# Patient Record
Sex: FEMALE | Race: WHITE | NOT HISPANIC OR LATINO | Employment: OTHER | URBAN - METROPOLITAN AREA
[De-identification: names, ages, dates, MRNs, and addresses within clinical notes are randomized per-mention and may not be internally consistent; named-entity substitution may affect disease eponyms.]

---

## 2017-02-28 RX ORDER — LOSARTAN POTASSIUM 100 MG/1
100 TABLET ORAL DAILY
Qty: 180 TABLET | Refills: 2 | Status: SHIPPED | OUTPATIENT
Start: 2017-02-28 | End: 2017-02-28 | Stop reason: SDUPTHER

## 2017-02-28 RX ORDER — LOSARTAN POTASSIUM 100 MG/1
100 TABLET ORAL DAILY
Qty: 180 TABLET | Refills: 0 | Status: SHIPPED | OUTPATIENT
Start: 2017-02-28 | End: 2017-05-18 | Stop reason: SDUPTHER

## 2017-02-28 RX ORDER — CARVEDILOL 6.25 MG/1
6.25 TABLET ORAL 2 TIMES DAILY
Qty: 180 TABLET | Refills: 3 | Status: SHIPPED | OUTPATIENT
Start: 2017-02-28 | End: 2017-02-28 | Stop reason: SDUPTHER

## 2017-02-28 RX ORDER — CARVEDILOL 6.25 MG/1
6.25 TABLET ORAL 2 TIMES DAILY
Qty: 180 TABLET | Refills: 0 | Status: SHIPPED | OUTPATIENT
Start: 2017-02-28 | End: 2017-05-18 | Stop reason: SDUPTHER

## 2017-03-07 RX ORDER — ISOSORBIDE MONONITRATE 20 MG/1
20 TABLET ORAL 2 TIMES DAILY
Qty: 360 TABLET | Refills: 3 | Status: SHIPPED | OUTPATIENT
Start: 2017-03-07 | End: 2018-07-07 | Stop reason: SDUPTHER

## 2017-03-07 RX ORDER — SPIRONOLACTONE 25 MG/1
25 TABLET ORAL DAILY
Qty: 90 TABLET | Refills: 3 | Status: SHIPPED | OUTPATIENT
Start: 2017-03-07 | End: 2018-03-28 | Stop reason: SDUPTHER

## 2017-05-18 RX ORDER — LOSARTAN POTASSIUM 100 MG/1
100 TABLET ORAL DAILY
Qty: 180 TABLET | Refills: 0 | Status: SHIPPED | OUTPATIENT
Start: 2017-05-18 | End: 2018-03-28 | Stop reason: SDUPTHER

## 2017-05-18 RX ORDER — CARVEDILOL 6.25 MG/1
6.25 TABLET ORAL 2 TIMES DAILY
Qty: 180 TABLET | Refills: 0 | Status: SHIPPED | OUTPATIENT
Start: 2017-05-18 | End: 2018-10-11 | Stop reason: SDUPTHER

## 2017-06-22 ENCOUNTER — OFFICE VISIT (OUTPATIENT)
Dept: CARDIOLOGY | Facility: CLINIC | Age: 72
End: 2017-06-22

## 2017-06-22 VITALS
HEIGHT: 62 IN | BODY MASS INDEX: 29.26 KG/M2 | SYSTOLIC BLOOD PRESSURE: 118 MMHG | HEART RATE: 59 BPM | WEIGHT: 159 LBS | DIASTOLIC BLOOD PRESSURE: 70 MMHG

## 2017-06-22 DIAGNOSIS — E78.2 MIXED HYPERLIPIDEMIA: ICD-10-CM

## 2017-06-22 DIAGNOSIS — R26.2 INABILITY TO AMBULATE DUE TO HIP: ICD-10-CM

## 2017-06-22 DIAGNOSIS — I25.118 CORONARY ARTERY DISEASE OF NATIVE ARTERY OF NATIVE HEART WITH STABLE ANGINA PECTORIS (HCC): Primary | ICD-10-CM

## 2017-06-22 DIAGNOSIS — I10 ESSENTIAL HYPERTENSION: ICD-10-CM

## 2017-06-22 DIAGNOSIS — I20.9 ANGINA PECTORIS (HCC): ICD-10-CM

## 2017-06-22 PROCEDURE — 99214 OFFICE O/P EST MOD 30 MIN: CPT | Performed by: INTERNAL MEDICINE

## 2017-06-22 PROCEDURE — 93000 ELECTROCARDIOGRAM COMPLETE: CPT | Performed by: INTERNAL MEDICINE

## 2017-06-22 NOTE — PROGRESS NOTES
Subjective:     Encounter Date:6/22/2017      Patient ID: Ashlie Levi is a 71 y.o. female.    Chief Complaint:  History of Present Illness    Dear Dr. John,    I had the pleasure of seeing this patient in the office today for follow-up of her cardiac status.  She comes in for routine follow-up; it's been one year since her last visit.  She has a history of CAD and had a stent placed in her LAD in 2006.  This was performed by Dr. Caldwell at [facility].  She had a stress test performed about 3 and half years ago that was normal with no ischemia and normal ventricular function.    She has been having worsening fatigability.  She states if she does anything now she just has no energy and she has to stop.  She has been having some shortness breath with activity.  She has occasional chest discomfort but this is not a consistent finding.  When she has the discomfort it does not radiate and is not associated with nausea, vomiting, or diaphoresis.  She's been having a lot of back pain as well as left hip and left groin pain.  This is been really limiting which she can do as well.    She denies any palpitations or heart racing.  No presyncope or syncope.  No orthopnea or PND.  She has not experienced any lower extremity edema.    The following portions of the patient's history were reviewed and updated as appropriate: allergies, current medications, past family history, past medical history, past social history, past surgical history and problem list.    Past Medical History:   Diagnosis Date   • Abnormal electrocardiogram    • Chest discomfort    • Coronary artery disease    • Disease of thyroid gland    • Hyperlipidemia    • Hypertension        Past Surgical History:   Procedure Laterality Date   • APPENDECTOMY     • CARDIAC SURGERY     • CHOLECYSTECTOMY     • COLONOSCOPY N/A 7/13/2016    Procedure: COLONOSCOPY;  Surgeon: Giovani Avelar MD;  Location: [facility];  Service:    • CORONARY STENT PLACEMENT      • EYE SURGERY     • FOOT GANGLION EXCISION Left    • HEMORROIDECTOMY     • HERNIA REPAIR     • HYSTERECTOMY         Social History     Social History   • Marital status:      Spouse name: N/A   • Number of children: N/A   • Years of education: N/A     Occupational History   • Not on file.     Social History Main Topics   • Smoking status: Former Smoker     Packs/day: 1.00     Years: 50.00     Quit date: 10/13/2006   • Smokeless tobacco: Never Used   • Alcohol use Yes      Comment: occasional   • Drug use: Not on file   • Sexual activity: Not on file     Other Topics Concern   • Not on file     Social History Narrative       Review of Systems   Constitution: Negative for chills, decreased appetite, fever and night sweats.   HENT: Negative for ear discharge, ear pain, hearing loss, nosebleeds and sore throat.    Eyes: Negative for blurred vision, double vision and pain.   Cardiovascular: Negative for cyanosis.   Respiratory: Negative for hemoptysis and sputum production.    Endocrine: Negative for cold intolerance and heat intolerance.   Hematologic/Lymphatic: Negative for adenopathy.   Skin: Negative for dry skin, itching, nail changes, rash and suspicious lesions.   Musculoskeletal: Negative for arthritis, gout, muscle cramps, muscle weakness, myalgias and neck pain.   Gastrointestinal: Negative for anorexia, bowel incontinence, constipation, diarrhea, dysphagia, hematemesis and jaundice.   Genitourinary: Negative for bladder incontinence, dysuria, flank pain, frequency, hematuria and nocturia.   Neurological: Negative for focal weakness, numbness, paresthesias and seizures.   Psychiatric/Behavioral: Negative for altered mental status, hallucinations, hypervigilance, suicidal ideas and thoughts of violence.   Allergic/Immunologic: Negative for persistent infections.         ECG 12 Lead  Date/Time: 6/22/2017 1:55 PM  Performed by: JEAN CLAUDE ORTIZ III  Authorized by: JEAN CLAUDE ORTIZ III   Comparison:  "compared with previous ECG   Similar to previous ECG  Rhythm: sinus rhythm  Rate: normal  Conduction: conduction normal  ST Segments: ST segments normal  T Waves: T waves normal  QRS axis: normal  Other: no other findings  Clinical impression: normal ECG               Objective:     Vitals:    06/22/17 1030   BP: 118/70   Pulse: 59   Weight: 159 lb (72.1 kg)   Height: 62\" (157.5 cm)         Physical Exam   Constitutional: She is oriented to person, place, and time. She appears well-developed and well-nourished. No distress.   HENT:   Head: Normocephalic and atraumatic.   Nose: Nose normal.   Mouth/Throat: Oropharynx is clear and moist.   Eyes: Conjunctivae and EOM are normal. Pupils are equal, round, and reactive to light. Right eye exhibits no discharge. Left eye exhibits no discharge.   Neck: Normal range of motion. Neck supple. No tracheal deviation present. No thyromegaly present.   Cardiovascular: Normal rate, regular rhythm, S1 normal, S2 normal, normal heart sounds and normal pulses.  Exam reveals no S3.    Pulmonary/Chest: Effort normal and breath sounds normal. No stridor. No respiratory distress. She exhibits no tenderness.   Abdominal: Soft. Bowel sounds are normal. She exhibits no distension and no mass. There is no tenderness. There is no rebound and no guarding.   Musculoskeletal: Normal range of motion. She exhibits no tenderness or deformity.   Lymphadenopathy:     She has no cervical adenopathy.   Neurological: She is alert and oriented to person, place, and time. She has normal reflexes.   Skin: Skin is warm and dry. No rash noted. She is not diaphoretic. No erythema.   Psychiatric: She has a normal mood and affect. Thought content normal.       Lab Review:             Performed        Assessment:          Diagnosis Plan   1. Coronary artery disease of native artery of native heart with stable angina pectoris  Stress Test With Myocardial Perfusion One Day   2. Mixed hyperlipidemia  Stress Test With " Myocardial Perfusion One Day   3. Essential hypertension     4. Inability to ambulate due to hip  Stress Test With Myocardial Perfusion One Day   5. Angina pectoris  Stress Test With Myocardial Perfusion One Day          Plan:       1. Coronary Artery Disease  Assessment  • The patient has no angina    Plan  • Lifestyle modifications discussed include adhering to a heart healthy diet, avoidance of tobacco products, maintenance of a healthy weight, medication compliance, regular exercise and regular monitoring of cholesterol and blood pressure  • This patient is experiencing worsening fatigue with any activity as well as worsening dyspnea.  She is not able to ambulate on a treadmill because of her left groin and left hip pain; we will arrange for a [facility] given her known history of CAD and no recent evaluation.    Subjective - Objective  • There has been a previous stent procedure using HERBERTH  • Current antiplatelet therapy includes aspirin 81 mg      Thank you very much for allowing us to participate in the care of this pleasant patient.  Please don't hesitate to call if I can be of assistance in any way.      Current Outpatient Prescriptions:   •  amLODIPine (NORVASC) 2.5 MG tablet, Take 1 tablet by mouth Daily., Disp: 90 tablet, Rfl: 3  •  aspirin 81 MG tablet, Take by mouth., Disp: , Rfl:   •  carvedilol (COREG) 6.25 MG tablet, Take 1 tablet by mouth 2 (Two) Times a Day., Disp: 180 tablet, Rfl: 0  •  isosorbide mononitrate (ISMO,MONOKET) 20 MG tablet, Take 1 tablet by mouth 2 (Two) Times a Day., Disp: 360 tablet, Rfl: 3  •  levothyroxine (SYNTHROID, LEVOTHROID) 150 MCG tablet, Take 150 mcg by mouth Daily., Disp: , Rfl:   •  losartan (COZAAR) 100 MG tablet, Take 1 tablet by mouth Daily., Disp: 180 tablet, Rfl: 0  •  sertraline (ZOLOFT) 100 MG tablet, Take 100 mg by mouth daily., Disp: , Rfl:   •  spironolactone (ALDACTONE) 25 MG tablet, Take 1 tablet by mouth Daily., Disp: 90 tablet, Rfl: 3         EMR  Dragon/Transcription disclaimer:    Much of this encounter note is an electronic transcription/translation of spoken language to printed text. The electronic translation of spoken language may permit erroneous, or at times, nonsensical words or phrases to be inadvertently transcribed; Although I have reviewed the note for such errors, some may still exist.

## 2017-06-23 ENCOUNTER — HOSPITAL ENCOUNTER (OUTPATIENT)
Dept: NUCLEAR MEDICINE | Facility: HOSPITAL | Age: 72
Discharge: HOME | End: 2017-06-23
Attending: INTERNAL MEDICINE

## 2017-06-23 ENCOUNTER — HOSPITAL ENCOUNTER (OUTPATIENT)
Dept: CARDIOLOGY | Facility: HOSPITAL | Age: 72
Discharge: HOME | End: 2017-06-23
Attending: INTERNAL MEDICINE

## 2017-06-23 DIAGNOSIS — I20.9 ANGINA PECTORIS (HCC): ICD-10-CM

## 2017-06-23 DIAGNOSIS — E78.2 MIXED HYPERLIPIDEMIA: ICD-10-CM

## 2017-06-23 DIAGNOSIS — R26.2 INABILITY TO AMBULATE DUE TO HIP: ICD-10-CM

## 2017-06-23 DIAGNOSIS — I25.118 CORONARY ARTERY DISEASE OF NATIVE ARTERY OF NATIVE HEART WITH STABLE ANGINA PECTORIS (HCC): ICD-10-CM

## 2017-06-23 LAB
BH CV NUCLEAR PRIOR STUDY: 3
BH CV STRESS BP STAGE 1: NORMAL
BH CV STRESS BP STAGE 2: NORMAL
BH CV STRESS BP STAGE 3: NORMAL
BH CV STRESS BP STAGE 4: NORMAL
BH CV STRESS COMMENTS STAGE 1: NORMAL
BH CV STRESS COMMENTS STAGE 2: NORMAL
BH CV STRESS DOSE REGADENOSON STAGE 1: 0.4
BH CV STRESS DURATION MIN STAGE 1: 0
BH CV STRESS DURATION MIN STAGE 2: 2
BH CV STRESS DURATION MIN STAGE 3: 4
BH CV STRESS DURATION MIN STAGE 4: 5
BH CV STRESS DURATION SEC STAGE 1: 15
BH CV STRESS DURATION SEC STAGE 2: 0
BH CV STRESS HR STAGE 1: 70
BH CV STRESS HR STAGE 2: 84
BH CV STRESS HR STAGE 3: 75
BH CV STRESS HR STAGE 4: 74
BH CV STRESS O2 STAGE 1: 98
BH CV STRESS PROTOCOL 1: NORMAL
BH CV STRESS RECOVERY BP: NORMAL MMHG
BH CV STRESS RECOVERY HR: 74 BPM
BH CV STRESS STAGE 1: 1
BH CV STRESS STAGE 2: 2
BH CV STRESS STAGE 3: 3
BH CV STRESS STAGE 4: 4
LV EF NUC BP: 86 %
MAXIMAL PREDICTED HEART RATE: 147 BPM
PERCENT MAX PREDICTED HR: 57.82 %
STRESS BASELINE BP: NORMAL MMHG
STRESS BASELINE HR: 59 BPM
STRESS O2 SAT REST: 96 %
STRESS PERCENT HR: 68 %
STRESS POST ESTIMATED WORKLOAD: 1 METS
STRESS POST EXERCISE DUR SEC: 30 SEC
STRESS POST PEAK BP: NORMAL MMHG
STRESS POST PEAK HR: 85 BPM
STRESS TARGET HR: 125 BPM

## 2017-06-23 PROCEDURE — 0 TECHNETIUM SESTAMIBI: Performed by: INTERNAL MEDICINE

## 2017-06-23 PROCEDURE — 93016 CV STRESS TEST SUPVJ ONLY: CPT | Performed by: INTERNAL MEDICINE

## 2017-06-23 PROCEDURE — 93018 CV STRESS TEST I&R ONLY: CPT | Performed by: INTERNAL MEDICINE

## 2017-06-23 PROCEDURE — 78452 HT MUSCLE IMAGE SPECT MULT: CPT | Performed by: INTERNAL MEDICINE

## 2017-06-23 PROCEDURE — A9500 TC99M SESTAMIBI: HCPCS | Performed by: INTERNAL MEDICINE

## 2017-06-23 RX ADMIN — REGADENOSON 0.4 MG: 0.08 INJECTION, SOLUTION INTRAVENOUS at 10:15

## 2017-06-23 RX ADMIN — Medication 1 DOSE: at 06:45

## 2017-06-23 RX ADMIN — Medication 1 DOSE: at 09:35

## 2017-07-10 ENCOUNTER — OFFICE VISIT (OUTPATIENT)
Dept: SURGERY | Facility: CLINIC | Age: 72
End: 2017-07-10

## 2017-07-10 VITALS
HEIGHT: 62 IN | DIASTOLIC BLOOD PRESSURE: 74 MMHG | HEART RATE: 72 BPM | BODY MASS INDEX: 29.44 KG/M2 | RESPIRATION RATE: 16 BRPM | SYSTOLIC BLOOD PRESSURE: 120 MMHG | WEIGHT: 160 LBS

## 2017-07-10 DIAGNOSIS — R10.32 LEFT GROIN PAIN: Primary | ICD-10-CM

## 2017-07-10 DIAGNOSIS — R10.32 LLQ PAIN: Primary | ICD-10-CM

## 2017-07-10 PROCEDURE — 99203 OFFICE O/P NEW LOW 30 MIN: CPT | Performed by: SURGERY

## 2017-07-10 NOTE — PROGRESS NOTES
Ashlie PUSHPA Levi 71 y.o. female presents @ the req of Dr. Conway for eval of poss LIH.  Pt reports she had either CT or MRI last winter that reveal LIH.  Pt states she has not had hernia repaired.  Pt c/o LLQ pain.  PCP Dr. Conway.       HPI     Above noted and agree.  This very pleasant patient noticed pain this past winter in her left groin when she would stand up.  She had a left inguinal hernia repair 10-15 years ago and this pain is similar.  She had a CT scan done that showed diverticulosis but did not show any hernia.  She tolerates a regular diet and gets nauseated at night.  She has IBS and has had abdominal issues for years.  She moves her bowels daily.  She denies chest pain and shortness of breath.  She really only hurts when she walks.  She feels like she can push something back in.    Review of Systems   All other systems reviewed and are negative.          Past Medical History:   Diagnosis Date   • Abnormal electrocardiogram    • Chest discomfort    • Coronary artery disease    • Disease of thyroid gland    • Hyperlipidemia    • Hypertension            Past Surgical History:   Procedure Laterality Date   • APPENDECTOMY     • CARDIAC SURGERY     • CHOLECYSTECTOMY     • COLONOSCOPY N/A 7/13/2016    Procedure: COLONOSCOPY;  Surgeon: Giovani Avelar MD;  Location: [facility];  Service:    • CORONARY STENT PLACEMENT     • EYE SURGERY     • FOOT GANGLION EXCISION Left    • HEMORROIDECTOMY     • HERNIA REPAIR     • HYSTERECTOMY             Physical Exam   Constitutional: She is oriented to person, place, and time. She appears well-developed and well-nourished.   HENT:   Head: Normocephalic and atraumatic.   Neck: Neck supple.   Cardiovascular: Normal rate and regular rhythm.    Pulmonary/Chest: Effort normal and breath sounds normal.   Abdominal: Soft. Bowel sounds are normal.   Musculoskeletal: She exhibits no edema.   Neurological: She is alert and oriented to person, place, and time.   Skin: Skin is  "warm and dry.   Psychiatric: She has a normal mood and affect. Her behavior is normal.   Nursing note and vitals reviewed.          /74  Pulse 72  Resp 16  Ht 62\" (157.5 cm)  Wt 160 lb (72.6 kg)  BMI 29.26 kg/m2        Ashlie was seen today for hernia.    Diagnoses and all orders for this visit:    Left groin pain    At this point we will order an MRI of the pelvis.  I can not palpate a hernia but her symptoms do sound consistent with a recurrent hernia.  We will follow up after that.    Thank you for allowing me to participate in the care of this interesting patient.  "

## 2017-07-11 ENCOUNTER — HOSPITAL ENCOUNTER (OUTPATIENT)
Dept: MRI IMAGING | Facility: HOSPITAL | Age: 72
Discharge: HOME | End: 2017-07-11
Attending: SURGERY | Admitting: SURGERY

## 2017-07-11 DIAGNOSIS — R10.32 LLQ PAIN: ICD-10-CM

## 2017-07-11 PROCEDURE — A9577 INJ MULTIHANCE: HCPCS | Performed by: SURGERY

## 2017-07-11 PROCEDURE — 0 GADOBENATE DIMEGLUMINE 529 MG/ML SOLUTION: Performed by: SURGERY

## 2017-07-11 PROCEDURE — 72197 MRI PELVIS W/O & W/DYE: CPT

## 2017-07-11 RX ADMIN — GADOBENATE DIMEGLUMINE 14 ML: 529 INJECTION, SOLUTION INTRAVENOUS at 14:00

## 2017-07-17 ENCOUNTER — OFFICE VISIT (OUTPATIENT)
Dept: SURGERY | Facility: CLINIC | Age: 72
End: 2017-07-17

## 2017-07-17 DIAGNOSIS — R10.32 LEFT GROIN PAIN: Primary | ICD-10-CM

## 2017-07-17 PROCEDURE — 99213 OFFICE O/P EST LOW 20 MIN: CPT | Performed by: SURGERY

## 2017-07-17 RX ORDER — MELOXICAM 7.5 MG/1
7.5 TABLET ORAL 2 TIMES DAILY
Qty: 60 TABLET | Refills: 3 | Status: SHIPPED | OUTPATIENT
Start: 2017-07-17 | End: 2017-08-16

## 2017-07-17 NOTE — PROGRESS NOTES
Ashlie Levi 71 y.o. female presents for  FU/discuss MRI.  Pt reports she cont. To have pain LEFT ing area.  PCP Dr. Conway.       HPI     Ashlie is here following her MRI.  The MRI did not show any hernia, but did show a mild subarticular cyst of the left anterior acetabulum which may be related to degenerative arthropathy.  She is tolerating a regular diet without nausea or vomiting.  She moves her bowels well.  She denies fevers or chills.  Her pain occurs with ambulation.    Review of Systems        Past Medical History:   Diagnosis Date   • Abnormal electrocardiogram    • Chest discomfort    • Coronary artery disease    • Disease of thyroid gland    • Hyperlipidemia    • Hypertension            Past Surgical History:   Procedure Laterality Date   • APPENDECTOMY     • CARDIAC SURGERY     • CHOLECYSTECTOMY     • COLONOSCOPY N/A 7/13/2016    Procedure: COLONOSCOPY;  Surgeon: Giovani Avelar MD;  Location: [facility];  Service:    • CORONARY STENT PLACEMENT     • EYE SURGERY     • FOOT GANGLION EXCISION Left    • HEMORROIDECTOMY     • HERNIA REPAIR     • HYSTERECTOMY             Physical Exam   Constitutional: She is oriented to person, place, and time. She appears well-developed and well-nourished.   HENT:   Head: Normocephalic and atraumatic.   Cardiovascular: Normal rate and regular rhythm.    Pulmonary/Chest: Effort normal and breath sounds normal.   Abdominal: Soft. Bowel sounds are normal.   Musculoskeletal: She exhibits no edema.   Neurological: She is alert and oriented to person, place, and time.   Psychiatric: She has a normal mood and affect. Her behavior is normal.   Nursing note reviewed.          There were no vitals taken for this visit.        Ashlie was seen today for follow-up.    Diagnoses and all orders for this visit:    Left groin pain    Other orders  -     meloxicam (MOBIC) 7.5 MG tablet; Take 1 tablet by mouth 2 (Two) Times a Day for 30 days.    If the Mobic does not help her pain,  then we can discuss a diagnostic laparoscopy.    Thank you for allowing me to participate in the care of this interesting patient.

## 2017-09-11 ENCOUNTER — TRANSCRIBE ORDERS (OUTPATIENT)
Dept: ADMINISTRATIVE | Facility: HOSPITAL | Age: 72
End: 2017-09-11

## 2017-09-11 DIAGNOSIS — Z12.31 VISIT FOR SCREENING MAMMOGRAM: Primary | ICD-10-CM

## 2017-10-06 ENCOUNTER — HOSPITAL ENCOUNTER (OUTPATIENT)
Dept: MAMMOGRAPHY | Facility: HOSPITAL | Age: 72
Discharge: HOME | End: 2017-10-06
Admitting: FAMILY MEDICINE

## 2017-10-06 DIAGNOSIS — Z12.31 VISIT FOR SCREENING MAMMOGRAM: ICD-10-CM

## 2017-10-06 PROCEDURE — 77063 BREAST TOMOSYNTHESIS BI: CPT

## 2017-10-06 PROCEDURE — G0202 SCR MAMMO BI INCL CAD: HCPCS

## 2017-10-17 ENCOUNTER — HOSPITAL ENCOUNTER (EMERGENCY)
Facility: HOSPITAL | Age: 72
Discharge: HOME | End: 2017-10-17
Attending: EMERGENCY MEDICINE | Admitting: EMERGENCY MEDICINE

## 2017-10-17 ENCOUNTER — APPOINTMENT (OUTPATIENT)
Dept: CT IMAGING | Facility: HOSPITAL | Age: 72
End: 2017-10-17
Attending: EMERGENCY MEDICINE

## 2017-10-17 VITALS
TEMPERATURE: 98.4 F | HEART RATE: 62 BPM | WEIGHT: 147.7 LBS | RESPIRATION RATE: 16 BRPM | OXYGEN SATURATION: 96 % | DIASTOLIC BLOOD PRESSURE: 66 MMHG | HEIGHT: 62 IN | BODY MASS INDEX: 27.18 KG/M2 | SYSTOLIC BLOOD PRESSURE: 126 MMHG

## 2017-10-17 DIAGNOSIS — R10.84 GENERALIZED ABDOMINAL PAIN: Primary | ICD-10-CM

## 2017-10-17 DIAGNOSIS — R11.2 NON-INTRACTABLE VOMITING WITH NAUSEA, UNSPECIFIED VOMITING TYPE: ICD-10-CM

## 2017-10-17 LAB
ALBUMIN SERPL-MCNC: 4.7 G/DL (ref 3.5–5.2)
ALBUMIN/GLOB SERPL: 1.3 G/DL
ALP SERPL-CCNC: 72 U/L (ref 40–129)
ALT SERPL W P-5'-P-CCNC: 20 U/L (ref 5–33)
ANION GAP SERPL CALCULATED.3IONS-SCNC: 13.6 MMOL/L
AST SERPL-CCNC: 21 U/L (ref 5–32)
BACTERIA UR QL AUTO: ABNORMAL /HPF
BASOPHILS # BLD AUTO: 0.06 10*3/MM3 (ref 0–0.2)
BASOPHILS NFR BLD AUTO: 0.4 % (ref 0–2)
BILIRUB SERPL-MCNC: 0.7 MG/DL (ref 0.2–1.2)
BILIRUB UR QL STRIP: NEGATIVE
BUN BLD-MCNC: 13 MG/DL (ref 8–23)
BUN/CREAT SERPL: 15.3 (ref 7–25)
CALCIUM SPEC-SCNC: 10.7 MG/DL (ref 8.8–10.5)
CHLORIDE SERPL-SCNC: 97 MMOL/L (ref 98–107)
CLARITY UR: CLEAR
CO2 SERPL-SCNC: 27.4 MMOL/L (ref 22–29)
COLOR UR: YELLOW
CREAT BLD-MCNC: 0.85 MG/DL (ref 0.57–1)
DEPRECATED RDW RBC AUTO: 40.2 FL (ref 37–54)
EOSINOPHIL # BLD AUTO: 0.24 10*3/MM3 (ref 0.1–0.3)
EOSINOPHIL NFR BLD AUTO: 1.6 % (ref 0–4)
ERYTHROCYTE [DISTWIDTH] IN BLOOD BY AUTOMATED COUNT: 12.3 % (ref 11.5–14.5)
GFR SERPL CREATININE-BSD FRML MDRD: 65 ML/MIN/1.73
GLOBULIN UR ELPH-MCNC: 3.6 GM/DL
GLUCOSE BLD-MCNC: 132 MG/DL (ref 65–99)
GLUCOSE UR STRIP-MCNC: NEGATIVE MG/DL
HCT VFR BLD AUTO: 46.7 % (ref 37–47)
HGB BLD-MCNC: 15.8 G/DL (ref 12–16)
HGB UR QL STRIP.AUTO: NEGATIVE
HYALINE CASTS UR QL AUTO: ABNORMAL /LPF
IMM GRANULOCYTES # BLD: 0.07 10*3/MM3 (ref 0–0.03)
IMM GRANULOCYTES NFR BLD: 0.5 % (ref 0–0.5)
KETONES UR QL STRIP: NEGATIVE
LEUKOCYTE ESTERASE UR QL STRIP.AUTO: ABNORMAL
LIPASE SERPL-CCNC: 12 U/L (ref 13–60)
LYMPHOCYTES # BLD AUTO: 2.52 10*3/MM3 (ref 0.6–4.8)
LYMPHOCYTES NFR BLD AUTO: 16.3 % (ref 20–45)
MCH RBC QN AUTO: 30.4 PG (ref 27–31)
MCHC RBC AUTO-ENTMCNC: 33.8 G/DL (ref 31–37)
MCV RBC AUTO: 90 FL (ref 81–99)
MONOCYTES # BLD AUTO: 0.91 10*3/MM3 (ref 0–1)
MONOCYTES NFR BLD AUTO: 5.9 % (ref 3–8)
NEUTROPHILS # BLD AUTO: 11.66 10*3/MM3 (ref 1.5–8.3)
NEUTROPHILS NFR BLD AUTO: 75.3 % (ref 45–70)
NITRITE UR QL STRIP: NEGATIVE
NRBC BLD MANUAL-RTO: 0 /100 WBC (ref 0–0)
PH UR STRIP.AUTO: 5.5 [PH] (ref 4.5–8)
PLATELET # BLD AUTO: 252 10*3/MM3 (ref 140–500)
PMV BLD AUTO: 10.4 FL (ref 7.4–10.4)
POTASSIUM BLD-SCNC: 4.2 MMOL/L (ref 3.5–5.2)
PROT SERPL-MCNC: 8.3 G/DL (ref 6–8.5)
PROT UR QL STRIP: NEGATIVE
RBC # BLD AUTO: 5.19 10*6/MM3 (ref 4.2–5.4)
RBC # UR: ABNORMAL /HPF
REF LAB TEST METHOD: ABNORMAL
SODIUM BLD-SCNC: 138 MMOL/L (ref 136–145)
SP GR UR STRIP: <=1.005 (ref 1–1.03)
SQUAMOUS #/AREA URNS HPF: ABNORMAL /HPF
TROPONIN T SERPL-MCNC: <0.01 NG/ML (ref 0–0.03)
UROBILINOGEN UR QL STRIP: ABNORMAL
WBC NRBC COR # BLD: 15.46 10*3/MM3 (ref 4.8–10.8)
WBC UR QL AUTO: ABNORMAL /HPF

## 2017-10-17 PROCEDURE — 93005 ELECTROCARDIOGRAM TRACING: CPT | Performed by: EMERGENCY MEDICINE

## 2017-10-17 PROCEDURE — 83690 ASSAY OF LIPASE: CPT | Performed by: EMERGENCY MEDICINE

## 2017-10-17 PROCEDURE — 74177 CT ABD & PELVIS W/CONTRAST: CPT

## 2017-10-17 PROCEDURE — 81001 URINALYSIS AUTO W/SCOPE: CPT | Performed by: EMERGENCY MEDICINE

## 2017-10-17 PROCEDURE — 0 IOPAMIDOL PER 1 ML: Performed by: EMERGENCY MEDICINE

## 2017-10-17 PROCEDURE — 99284 EMERGENCY DEPT VISIT MOD MDM: CPT | Performed by: EMERGENCY MEDICINE

## 2017-10-17 PROCEDURE — 96361 HYDRATE IV INFUSION ADD-ON: CPT

## 2017-10-17 PROCEDURE — 84484 ASSAY OF TROPONIN QUANT: CPT | Performed by: EMERGENCY MEDICINE

## 2017-10-17 PROCEDURE — 96375 TX/PRO/DX INJ NEW DRUG ADDON: CPT

## 2017-10-17 PROCEDURE — 25010000002 ONDANSETRON PER 1 MG: Performed by: EMERGENCY MEDICINE

## 2017-10-17 PROCEDURE — 80053 COMPREHEN METABOLIC PANEL: CPT | Performed by: EMERGENCY MEDICINE

## 2017-10-17 PROCEDURE — 96374 THER/PROPH/DIAG INJ IV PUSH: CPT

## 2017-10-17 PROCEDURE — 87086 URINE CULTURE/COLONY COUNT: CPT | Performed by: EMERGENCY MEDICINE

## 2017-10-17 PROCEDURE — 96376 TX/PRO/DX INJ SAME DRUG ADON: CPT

## 2017-10-17 PROCEDURE — 99284 EMERGENCY DEPT VISIT MOD MDM: CPT

## 2017-10-17 PROCEDURE — 25010000002 HYDROMORPHONE PER 4 MG: Performed by: EMERGENCY MEDICINE

## 2017-10-17 PROCEDURE — 93010 ELECTROCARDIOGRAM REPORT: CPT | Performed by: INTERNAL MEDICINE

## 2017-10-17 PROCEDURE — 85025 COMPLETE CBC W/AUTO DIFF WBC: CPT | Performed by: EMERGENCY MEDICINE

## 2017-10-17 RX ORDER — SODIUM CHLORIDE 0.9 % (FLUSH) 0.9 %
10 SYRINGE (ML) INJECTION AS NEEDED
Status: DISCONTINUED | OUTPATIENT
Start: 2017-10-17 | End: 2017-10-17 | Stop reason: HOSPADM

## 2017-10-17 RX ORDER — ONDANSETRON 2 MG/ML
4 INJECTION INTRAMUSCULAR; INTRAVENOUS ONCE
Status: COMPLETED | OUTPATIENT
Start: 2017-10-17 | End: 2017-10-17

## 2017-10-17 RX ORDER — DICYCLOMINE HCL 20 MG
20 TABLET ORAL EVERY 8 HOURS PRN
Qty: 20 TABLET | Refills: 0 | Status: SHIPPED | OUTPATIENT
Start: 2017-10-17 | End: 2018-07-05

## 2017-10-17 RX ORDER — ONDANSETRON 4 MG/1
4 TABLET, FILM COATED ORAL EVERY 8 HOURS PRN
Qty: 20 TABLET | Refills: 0 | Status: SHIPPED | OUTPATIENT
Start: 2017-10-17 | End: 2017-10-24

## 2017-10-17 RX ADMIN — HYDROMORPHONE HYDROCHLORIDE 0.5 MG: 1 INJECTION, SOLUTION INTRAMUSCULAR; INTRAVENOUS; SUBCUTANEOUS at 12:54

## 2017-10-17 RX ADMIN — IOPAMIDOL 100 ML: 755 INJECTION, SOLUTION INTRAVENOUS at 13:38

## 2017-10-17 RX ADMIN — Medication 10 ML: at 14:23

## 2017-10-17 RX ADMIN — ONDANSETRON 4 MG: 2 INJECTION, SOLUTION INTRAMUSCULAR; INTRAVENOUS at 12:41

## 2017-10-17 RX ADMIN — HYDROMORPHONE HYDROCHLORIDE 0.5 MG: 1 INJECTION, SOLUTION INTRAMUSCULAR; INTRAVENOUS; SUBCUTANEOUS at 16:23

## 2017-10-17 RX ADMIN — SODIUM CHLORIDE 1000 ML: 9 INJECTION, SOLUTION INTRAVENOUS at 12:41

## 2017-10-17 NOTE — ED PROVIDER NOTES
Subjective   History of Present Illness  History of Present Illness    Chief complaint: Abdominal pain, belching, vomiting    Location: Diffuse abdominal    Quality/Severity:  Moderate aching, crampy pain    Timing/Duration: Present for the past 3 days now.    Modifying Factors: None    Narrative: This patient presents for evaluation of diffuse abdominal pains with a lot of belching and passing gas and multiple episodes of nausea and vomiting.  She has not had any diarrhea or blood in her stools.  She tried multiple medications over the past several days including Tums and even nitroglycerin at one point time.  She complains of pain in the upper abdomen but also pain radiating down towards the lower abdomen equally.  There doesn't seem to be any pain radiating to the back.  She denies any dysuria or hematuria.  She denies any fevers.  There are no known sick contacts.  She has had no appetite today.  She went to her primary care doctor's office this morning but he referred her here for further consultation.    Associated Symptoms: As above    Review of Systems   Constitutional: Positive for activity change, appetite change and fatigue. Negative for fever.   HENT: Negative.    Eyes: Positive for photophobia. Negative for redness.   Respiratory: Negative for cough and shortness of breath.    Cardiovascular: Negative for chest pain.   Gastrointestinal: Positive for abdominal pain, nausea and vomiting. Negative for blood in stool.   Genitourinary: Negative for dysuria, frequency and hematuria.   Musculoskeletal: Positive for myalgias. Negative for back pain.   Skin: Negative for color change, rash and wound.   Neurological: Negative for syncope and headaches.   All other systems reviewed and are negative.      Past Medical History:   Diagnosis Date   • Abnormal electrocardiogram    • Chest discomfort    • Coronary artery disease    • Disease of thyroid gland    • Hyperlipidemia    • Hypertension        No Known  Allergies    Past Surgical History:   Procedure Laterality Date   • APPENDECTOMY     • CARDIAC SURGERY     • CHOLECYSTECTOMY     • COLONOSCOPY N/A 7/13/2016    Procedure: COLONOSCOPY;  Surgeon: Giovani Avelar MD;  Location: [facility];  Service:    • CORONARY STENT PLACEMENT     • EYE SURGERY     • FOOT GANGLION EXCISION Left    • HEMORROIDECTOMY     • HERNIA REPAIR     • HYSTERECTOMY         Family History   Problem Relation Age of Onset   • Hypertension Mother    • Stroke Mother    • Diabetes Father    • Hypertension Father    • Heart disease Father    • Stroke Father    • Heart disease Brother        Social History     Social History   • Marital status:      Spouse name: N/A   • Number of children: N/A   • Years of education: N/A     Social History Main Topics   • Smoking status: Former Smoker     Packs/day: 1.00     Years: 50.00     Quit date: 10/13/2006   • Smokeless tobacco: Never Used   • Alcohol use Yes      Comment: occasional   • Drug use: No   • Sexual activity: Not Asked     Other Topics Concern   • None     Social History Narrative   • None     ED Triage Vitals   Temp Heart Rate Resp BP SpO2   10/17/17 1203 10/17/17 1203 10/17/17 1203 10/17/17 1203 10/17/17 1203   98.4 °F (36.9 °C) 69 16 140/78 96 %      Temp src Heart Rate Source Patient Position BP Location FiO2 (%)   10/17/17 1203 10/17/17 1203 10/17/17 1203 10/17/17 1203 --   Oral Monitor Lying Right arm            Objective   Physical Exam   Constitutional: She is oriented to person, place, and time. She appears well-developed and well-nourished. She appears distressed (mild).   HENT:   Head: Normocephalic and atraumatic.   Eyes: EOM are normal. Pupils are equal, round, and reactive to light. Right eye exhibits no discharge. Left eye exhibits no discharge.   Neck: Normal range of motion. Neck supple. No tracheal deviation present.   Cardiovascular: Normal rate, regular rhythm, normal heart sounds and intact distal pulses.  Exam  reveals no gallop and no friction rub.    No murmur heard.  Pulmonary/Chest: Effort normal. No respiratory distress. She has no wheezes. She has no rales. She exhibits no tenderness.   Abdominal: Soft. She exhibits no mass. There is tenderness. There is no rebound and no guarding. No hernia.   Mild diffuse tenderness throughout the entire abdomen.  There are no focal areas of maximal tenderness apparent.  No peritoneal signs.   Musculoskeletal: Normal range of motion. She exhibits no edema, tenderness or deformity.   Neurological: She is alert and oriented to person, place, and time. She exhibits normal muscle tone.   Skin: Skin is warm and dry. No rash noted. She is not diaphoretic. No erythema. No pallor.   Psychiatric: She has a normal mood and affect. Her behavior is normal. Judgment and thought content normal.   Nursing note and vitals reviewed.    EKG           EKG time/Interp time: 1242/1245  Rhythm/Rate: Normal sinus rhythm, 63 bpm  P waves and KS: P waves are present, 200 ms  QRS, axis: 84 ms, normal axis   ST and T waves: No acute ischemic changes evident     Independently interpreted by me contemporaneously with treatment    Results for orders placed or performed during the hospital encounter of 10/17/17   Comprehensive Metabolic Panel   Result Value Ref Range    Glucose 132 (H) 65 - 99 mg/dL    BUN 13 8 - 23 mg/dL    Creatinine 0.85 0.57 - 1.00 mg/dL    Sodium 138 136 - 145 mmol/L    Potassium 4.2 3.5 - 5.2 mmol/L    Chloride 97 (L) 98 - 107 mmol/L    CO2 27.4 22.0 - 29.0 mmol/L    Calcium 10.7 (H) 8.8 - 10.5 mg/dL    Total Protein 8.3 6.0 - 8.5 g/dL    Albumin 4.70 3.50 - 5.20 g/dL    ALT (SGPT) 20 5 - 33 U/L    AST (SGOT) 21 5 - 32 U/L    Alkaline Phosphatase 72 40 - 129 U/L    Total Bilirubin 0.7 0.2 - 1.2 mg/dL    eGFR Non African Amer 65 >60 mL/min/1.73    Globulin 3.6 gm/dL    A/G Ratio 1.3 g/dL    BUN/Creatinine Ratio 15.3 7.0 - 25.0    Anion Gap 13.6 mmol/L   Lipase   Result Value Ref Range     Lipase 12 (L) 13 - 60 U/L   Urinalysis With / Culture If Indicated - Urine, Clean Catch   Result Value Ref Range    Color, UA Yellow Yellow, Straw    Appearance, UA Clear Clear    pH, UA 5.5 4.5 - 8.0    Specific Gravity, UA <=1.005 1.003 - 1.030    Glucose, UA Negative Negative    Ketones, UA Negative Negative, 80 mg/dL (3+), >=160 mg/dL (4+)    Bilirubin, UA Negative Negative    Blood, UA Negative Negative    Protein, UA Negative Negative    Leuk Esterase, UA Trace (A) Negative    Nitrite, UA Negative Negative    Urobilinogen, UA 0.2 E.U./dL 0.2 - 1.0 E.U./dL   CBC Auto Differential   Result Value Ref Range    WBC 15.46 (H) 4.80 - 10.80 10*3/mm3    RBC 5.19 4.20 - 5.40 10*6/mm3    Hemoglobin 15.8 12.0 - 16.0 g/dL    Hematocrit 46.7 37.0 - 47.0 %    MCV 90.0 81.0 - 99.0 fL    MCH 30.4 27.0 - 31.0 pg    MCHC 33.8 31.0 - 37.0 g/dL    RDW 12.3 11.5 - 14.5 %    RDW-SD 40.2 37.0 - 54.0 fl    MPV 10.4 7.4 - 10.4 fL    Platelets 252 140 - 500 10*3/mm3    Neutrophil % 75.3 (H) 45.0 - 70.0 %    Lymphocyte % 16.3 (L) 20.0 - 45.0 %    Monocyte % 5.9 3.0 - 8.0 %    Eosinophil % 1.6 0.0 - 4.0 %    Basophil % 0.4 0.0 - 2.0 %    Immature Grans % 0.5 0.0 - 0.5 %    Neutrophils, Absolute 11.66 (H) 1.50 - 8.30 10*3/mm3    Lymphocytes, Absolute 2.52 0.60 - 4.80 10*3/mm3    Monocytes, Absolute 0.91 0.00 - 1.00 10*3/mm3    Eosinophils, Absolute 0.24 0.10 - 0.30 10*3/mm3    Basophils, Absolute 0.06 0.00 - 0.20 10*3/mm3    Immature Grans, Absolute 0.07 (H) 0.00 - 0.03 10*3/mm3    nRBC 0.0 0.0 - 0.0 /100 WBC   Troponin   Result Value Ref Range    Troponin T <0.010 0.000 - 0.030 ng/mL   Urinalysis, Microscopic Only - Urine, Clean Catch   Result Value Ref Range    RBC, UA 3-5 (A) None Seen /HPF    WBC, UA 13-20 (A) None Seen /HPF    Bacteria, UA Trace (A) None Seen /HPF    Squamous Epithelial Cells, UA 3-6 (A) None Seen, 0-2 /HPF    Hyaline Casts, UA 0-2 None Seen /LPF    Methodology Manual Light Microscopy        RADIOLOGY        Study: CT  "abdomen and pelvis with contrast    Findings: IMPRESSION:   Impression:    1. Abnormal circumferential thickening of distal small bowel loops with  some associated inflammation in the mesentery. This probably represents  an enteritis, rather than an early small bowel obstruction, however,  follow-up would be recommended.  2. Small volume of ascites is likely reactive. No abscess or  perforation.  3. Diverticulosis without evidence of diverticulitis.     This report was finalized on 10/17/2017 2:28 PM by Dr. Tristin Galaviz MD.     Interpreted contemporaneously with treatment by Dr. hardy, independently viewed by me            Procedures         ED Course  ED Course   Comment By Time   I have reviewed the labs and the CT report.  The findings are most consistent with a enteritis process.  That was when the patient's crampy pain and her vomiting symptoms.  There is some bacteria in her urine but I think this is probably a contaminant because she does not have any dysuria symptoms at all.  I'll elect to not treat with any antibiotics at this time.  Can wait for urine cultures as needed.  I'll plan to discharge her home with Bentyl and Zofran when necessary for symptomatic management.  We spoke about gentle diet strategies for the next one to 2 days.  I also encouraged her to follow up with her primary care doctor this week for repeat evaluation.  I gave her the usual \"return to ER\" instructions for abdominal pain symptoms at the time discharge.  She is feeling much better now and resting more comfortably.  She has not had any vomiting in the past couple hours.  Will discharge home in good condition Isidro Villatoro MD 10/17 1603                  MDM  Number of Diagnoses or Management Options     Amount and/or Complexity of Data Reviewed  Clinical lab tests: reviewed and ordered  Tests in the radiology section of CPT®: ordered and reviewed  Independent visualization of images, tracings, or specimens: yes    Risk of " Complications, Morbidity, and/or Mortality  Presenting problems: moderate  Diagnostic procedures: moderate  Management options: moderate        Final diagnoses:   Generalized abdominal pain   Non-intractable vomiting with nausea, unspecified vomiting type            Isidro Villatoro MD  10/17/17 [id number]

## 2017-10-17 NOTE — ED NOTES
Pt attempted urine specimen but was not able to get urine into collection hat.  Pt refused in and out catheter and asked to wait until IV fluids went in and try again for collection     Cookie Bennett RN  10/17/17 [id number]

## 2017-10-17 NOTE — DISCHARGE INSTRUCTIONS
Please return to the emergency room for any worsening pain, fevers, nausea, vomiting, weakness or any other concerns.

## 2017-10-19 LAB
BACTERIA SPEC AEROBE CULT: NORMAL
BACTERIA SPEC AEROBE CULT: NORMAL

## 2017-11-03 ENCOUNTER — OFFICE VISIT (OUTPATIENT)
Dept: SURGERY | Facility: CLINIC | Age: 72
End: 2017-11-03

## 2017-11-03 VITALS
SYSTOLIC BLOOD PRESSURE: 124 MMHG | HEART RATE: 72 BPM | BODY MASS INDEX: 26.5 KG/M2 | HEIGHT: 62 IN | WEIGHT: 144 LBS | DIASTOLIC BLOOD PRESSURE: 80 MMHG | RESPIRATION RATE: 16 BRPM

## 2017-11-03 DIAGNOSIS — K52.9 ENTERITIS: Primary | ICD-10-CM

## 2017-11-03 DIAGNOSIS — R10.13 EPIGASTRIC PAIN: ICD-10-CM

## 2017-11-03 DIAGNOSIS — R10.13 EPIGASTRIC PAIN: Primary | ICD-10-CM

## 2017-11-03 PROCEDURE — 99213 OFFICE O/P EST LOW 20 MIN: CPT | Performed by: SURGERY

## 2017-11-03 NOTE — PROGRESS NOTES
"Ashlie Levi 72 y.o. female presents @ the req of Dr. Conway for eval of epi pain X \"20 years.\"  Pt report + nausea and sudden urge for BM 30 min after a meal.        HPI   Above noted and agree.  Ashlie has several attacks of abdominal pain, nausea and vomiting, and diarrhea each year.  On October 17, she got very sick.  She has nausea and vomiting, severe abdominal pain and diarrhea.  She went to [place] [facility] where she was found to have an elevated WBC of 15 and a CT scan that showed enteritis of the terminal ileum.  She still has some epigastric tenderness and bloating.  She also gets nauseated.  She had a colonoscopy a year ago and had some polyps removed.  She denies fevers or chills.  She has no chest pain.        Review of Systems        Past Medical History:   Diagnosis Date   • Abnormal electrocardiogram    • Chest discomfort    • Coronary artery disease    • Disease of thyroid gland    • Hyperlipidemia    • Hypertension            Past Surgical History:   Procedure Laterality Date   • APPENDECTOMY     • CARDIAC SURGERY     • CHOLECYSTECTOMY     • COLONOSCOPY N/A 7/13/2016    Procedure: COLONOSCOPY;  Surgeon: Giovani Avelar MD;  Location: [facility];  Service:    • CORONARY STENT PLACEMENT     • EYE SURGERY     • FOOT GANGLION EXCISION Left    • HEMORROIDECTOMY     • HERNIA REPAIR     • HYSTERECTOMY             Physical Exam   Constitutional: She is oriented to person, place, and time. She appears well-developed and well-nourished.   HENT:   Head: Normocephalic and atraumatic.   Cardiovascular: Normal rate and regular rhythm.    Pulmonary/Chest: Effort normal and breath sounds normal.   Abdominal: Soft. Bowel sounds are normal.   Epigastric tenderness and RLQ tenderness of palpation   Musculoskeletal: She exhibits no edema.   Neurological: She is alert and oriented to person, place, and time.   Skin: Skin is warm and dry.   Psychiatric: She has a normal mood and affect. Her behavior is normal. " "  Nursing note and vitals reviewed.          /80  Pulse 72  Resp 16  Ht 62\" (157.5 cm)  Wt 144 lb (65.3 kg)  BMI 26.34 kg/m2        Ashlie was seen today for abdominal pain.    Diagnoses and all orders for this visit:    Enteritis    Epigastric pain    We will order and upper GI with small bowel follow through.      Thank you for allowing me to participate in the care of this interesting patient.      "

## 2017-11-09 ENCOUNTER — HOSPITAL ENCOUNTER (OUTPATIENT)
Dept: GENERAL RADIOLOGY | Facility: HOSPITAL | Age: 72
Discharge: HOME | End: 2017-11-09
Attending: SURGERY | Admitting: SURGERY

## 2017-11-09 DIAGNOSIS — R10.13 EPIGASTRIC PAIN: ICD-10-CM

## 2017-11-09 PROCEDURE — 74249: CPT

## 2017-11-15 ENCOUNTER — OFFICE VISIT (OUTPATIENT)
Dept: SURGERY | Facility: CLINIC | Age: 72
End: 2017-11-15

## 2017-11-15 VITALS
OXYGEN SATURATION: 97 % | WEIGHT: 144 LBS | SYSTOLIC BLOOD PRESSURE: 102 MMHG | DIASTOLIC BLOOD PRESSURE: 60 MMHG | HEART RATE: 67 BPM | BODY MASS INDEX: 26.5 KG/M2 | HEIGHT: 62 IN

## 2017-11-15 DIAGNOSIS — R11.0 NAUSEA: ICD-10-CM

## 2017-11-15 DIAGNOSIS — R10.13 EPIGASTRIC PAIN: Primary | ICD-10-CM

## 2017-11-15 PROCEDURE — 99214 OFFICE O/P EST MOD 30 MIN: CPT | Performed by: SURGERY

## 2017-11-15 NOTE — PROGRESS NOTES
PATIENT INFORMATION  Ashlie Levi       - 1945    CHIEF COMPLAINT  Chief Complaint   Patient presents with   • Follow-up     UPPER GI, ABD PAIN AND NAUSEA       HISTORY OF PRESENT ILLNESS  HPI    Ashlie's upper GI with small bowel follow through were normal.  She says she still gets epigastric pain and nausea after eating.  She had a colonoscopy last year where polyps were removed but she has never had an EGD.  She is moving her bowels well without blood.  She has no chest pain.  She has no shortness of breath.  She has no other complaints.    REVIEW OF SYSTEMS  Review of Systems   All other systems reviewed and are negative.        ACTIVE PROBLEMS  Patient Active Problem List    Diagnosis   • Coronary artery disease [I25.10]   • Hyperlipidemia [E78.5]   • Hypertension [I10]         PAST MEDICAL HISTORY  Past Medical History:   Diagnosis Date   • Abnormal electrocardiogram    • Chest discomfort    • Coronary artery disease    • Disease of thyroid gland    • Hyperlipidemia    • Hypertension          SURGICAL HISTORY  Past Surgical History:   Procedure Laterality Date   • APPENDECTOMY     • CARDIAC SURGERY     • CHOLECYSTECTOMY     • COLONOSCOPY N/A 2016    Procedure: COLONOSCOPY;  Surgeon: Giovani Avelar MD;  Location: [facility];  Service:    • CORONARY STENT PLACEMENT     • EYE SURGERY     • FOOT GANGLION EXCISION Left    • HEMORROIDECTOMY     • HERNIA REPAIR     • HYSTERECTOMY           FAMILY HISTORY  Family History   Problem Relation Age of Onset   • Hypertension Mother    • Stroke Mother    • Diabetes Father    • Hypertension Father    • Heart disease Father    • Stroke Father    • Heart disease Brother          SOCIAL HISTORY  Social History     Occupational History   • Not on file.     Social History Main Topics   • Smoking status: Former Smoker     Packs/day: 1.00     Years: 50.00     Quit date: 10/13/2006   • Smokeless tobacco: Never Used   • Alcohol use Yes      Comment:  "occasional   • Drug use: No   • Sexual activity: Not on file         CURRENT MEDICATIONS    Current Outpatient Prescriptions:   •  amLODIPine (NORVASC) 2.5 MG tablet, Take 1 tablet by mouth Daily., Disp: 90 tablet, Rfl: 3  •  aspirin 81 MG tablet, Take by mouth., Disp: , Rfl:   •  carvedilol (COREG) 6.25 MG tablet, Take 1 tablet by mouth 2 (Two) Times a Day., Disp: 180 tablet, Rfl: 0  •  dicyclomine (BENTYL) 20 MG tablet, Take 1 tablet by mouth Every 8 (Eight) Hours As Needed (abdominal pain)., Disp: 20 tablet, Rfl: 0  •  isosorbide mononitrate (ISMO,MONOKET) 20 MG tablet, Take 1 tablet by mouth 2 (Two) Times a Day., Disp: 360 tablet, Rfl: 3  •  levothyroxine (SYNTHROID, LEVOTHROID) 150 MCG tablet, Take 150 mcg by mouth Daily., Disp: , Rfl:   •  losartan (COZAAR) 100 MG tablet, Take 1 tablet by mouth Daily., Disp: 180 tablet, Rfl: 0  •  sertraline (ZOLOFT) 100 MG tablet, Take 100 mg by mouth daily., Disp: , Rfl:   •  spironolactone (ALDACTONE) 25 MG tablet, Take 1 tablet by mouth Daily., Disp: 90 tablet, Rfl: 3    ALLERGIES  Review of patient's allergies indicates no known allergies.    VITALS  Vitals:    11/15/17 1114   BP: 102/60   Pulse: 67   SpO2: 97%   Weight: 144 lb (65.3 kg)   Height: 62\" (157.5 cm)       LAST RESULTS   Admission on 10/17/2017, Discharged on 10/17/2017   Component Date Value Ref Range Status   • Glucose 10/17/2017 132* 65 - 99 mg/dL Final   • BUN 10/17/2017 13  8 - 23 mg/dL Final   • Creatinine 10/17/2017 0.85  0.57 - 1.00 mg/dL Final   • Sodium 10/17/2017 138  136 - 145 mmol/L Final   • Potassium 10/17/2017 4.2  3.5 - 5.2 mmol/L Final   • Chloride 10/17/2017 97* 98 - 107 mmol/L Final   • CO2 10/17/2017 27.4  22.0 - 29.0 mmol/L Final   • Calcium 10/17/2017 10.7* 8.8 - 10.5 mg/dL Final   • Total Protein 10/17/2017 8.3  6.0 - 8.5 g/dL Final   • Albumin 10/17/2017 4.70  3.50 - 5.20 g/dL Final   • ALT (SGPT) 10/17/2017 20  5 - 33 U/L Final   • AST (SGOT) 10/17/2017 21  5 - 32 U/L Final   • " Alkaline Phosphatase 10/17/2017 72  40 - 129 U/L Final   • Total Bilirubin 10/17/2017 0.7  0.2 - 1.2 mg/dL Final   • eGFR Non  Amer 10/17/2017 65  >60 mL/min/1.73 Final   • Globulin 10/17/2017 3.6  gm/dL Final   • A/G Ratio 10/17/2017 1.3  g/dL Final   • BUN/Creatinine Ratio 10/17/2017 15.3  7.0 - 25.0 Final   • Anion Gap 10/17/2017 13.6  mmol/L Final   • Lipase 10/17/2017 12* 13 - 60 U/L Final   • Color, UA 10/17/2017 Yellow  Yellow, Straw Final   • Appearance, UA 10/17/2017 Clear  Clear Final   • pH, UA 10/17/2017 5.5  4.5 - 8.0 Final   • Specific Gravity, UA 10/17/2017 <=1.005  1.003 - 1.030 Final   • Glucose, UA 10/17/2017 Negative  Negative Final   • Ketones, UA 10/17/2017 Negative  Negative, 80 mg/dL (3+), >=160 mg/dL (4+) Final   • Bilirubin, UA 10/17/2017 Negative  Negative Final   • Blood, UA 10/17/2017 Negative  Negative Final   • Protein, UA 10/17/2017 Negative  Negative Final   • Leuk Esterase, UA 10/17/2017 Trace* Negative Final   • Nitrite, UA 10/17/2017 Negative  Negative Final   • Urobilinogen, UA 10/17/2017 0.2 E.U./dL  0.2 - 1.0 E.U./dL Final   • WBC 10/17/2017 15.46* 4.80 - 10.80 10*3/mm3 Final   • RBC 10/17/2017 5.19  4.20 - 5.40 10*6/mm3 Final   • Hemoglobin 10/17/2017 15.8  12.0 - 16.0 g/dL Final   • Hematocrit 10/17/2017 46.7  37.0 - 47.0 % Final   • MCV 10/17/2017 90.0  81.0 - 99.0 fL Final   • MCH 10/17/2017 30.4  27.0 - 31.0 pg Final   • MCHC 10/17/2017 33.8  31.0 - 37.0 g/dL Final   • RDW 10/17/2017 12.3  11.5 - 14.5 % Final   • RDW-SD 10/17/2017 40.2  37.0 - 54.0 fl Final   • MPV 10/17/2017 10.4  7.4 - 10.4 fL Final   • Platelets 10/17/2017 252  140 - 500 10*3/mm3 Final   • Neutrophil % 10/17/2017 75.3* 45.0 - 70.0 % Final   • Lymphocyte % 10/17/2017 16.3* 20.0 - 45.0 % Final   • Monocyte % 10/17/2017 5.9  3.0 - 8.0 % Final   • Eosinophil % 10/17/2017 1.6  0.0 - 4.0 % Final   • Basophil % 10/17/2017 0.4  0.0 - 2.0 % Final   • Immature Grans % 10/17/2017 0.5  0.0 - 0.5 % Final   •  Neutrophils, Absolute 10/17/2017 11.66* 1.50 - 8.30 10*3/mm3 Final   • Lymphocytes, Absolute 10/17/2017 2.52  0.60 - 4.80 10*3/mm3 Final   • Monocytes, Absolute 10/17/2017 0.91  0.00 - 1.00 10*3/mm3 Final   • Eosinophils, Absolute 10/17/2017 0.24  0.10 - 0.30 10*3/mm3 Final   • Basophils, Absolute 10/17/2017 0.06  0.00 - 0.20 10*3/mm3 Final   • Immature Grans, Absolute 10/17/2017 0.07* 0.00 - 0.03 10*3/mm3 Final   • nRBC 10/17/2017 0.0  0.0 - 0.0 /100 WBC Final   • Troponin T 10/17/2017 <0.010  0.000 - 0.030 ng/mL Final   • RBC, UA 10/17/2017 3-5* None Seen /HPF Final   • WBC, UA 10/17/2017 13-20* None Seen /HPF Final   • Bacteria, UA 10/17/2017 Trace* None Seen /HPF Final   • Squamous Epithelial Cells, UA 10/17/2017 3-6* None Seen, 0-2 /HPF Final   • Hyaline Casts, UA 10/17/2017 0-2  None Seen /LPF Final   • Methodology 10/17/2017 Manual Light Microscopy   Final   • Urine Culture 10/17/2017 50,000 CFU/mL Mixed Priscila Isolated   Final     Ct Abdomen Pelvis With Contrast    Result Date: 10/17/2017  Narrative: INDICATION: Nausea and vomiting. Generalized abdominal pain and tenderness for 3 days..  TECHNIQUE: CT of the abdomen and pelvis with p.o. and IV contrast. Coronal and sagittal reconstructions were obtained.  Radiation dose reduction techniques were utilized, including automated exposure control and exposure modulation based on body size.  COMPARISON: CT abdomen pelvis 11/03/2015.  FINDINGS: Abdomen: The solid abdominal organs are normal. The gallbladder is surgically absent. The proximal small bowel is fluid-filled. There are occasional air-fluid levels. The bowel measures up to 2.3 cm in diameter. There is a focal area of circumferential thickening of the distal ileum in the right lower quadrant. There is some associated induration and inflammation of the small bowel mesentery. This has appearance more typical for an enteritis, rather than a small bowel obstruction. There are occasional colonic diverticula,  however no diverticulitis. There is a small volume of ascites.  There is diffuse atherosclerotic disease of the abdominal aorta and iliac vessels. No aneurysm.  The appendix is not identified, however there is no focal inflammation around the cecum.  Pelvis: No pelvic mass.  No enlarged pelvic or inguinal lymph nodes.The uterus and ovaries are presumed surgically absent.  No acute osseous abnormalities.      Impression: Impression:  1. Abnormal circumferential thickening of distal small bowel loops with some associated inflammation in the mesentery. This probably represents an enteritis, rather than an early small bowel obstruction, however, follow-up would be recommended. 2. Small volume of ascites is likely reactive. No abscess or perforation. 3. Diverticulosis without evidence of diverticulitis.  This report was finalized on 10/17/2017 2:28 PM by Dr. Tristin Galaviz MD.      Fl Upper Gi With Air Contrast & Sbft    Result Date: 11/9/2017  Narrative: UPPER GI EXAMINATION AND SMALL BOWEL FOLLOW-THROUGH, 11/09/2017:  INDICATION: 72-year-old female complaining of four week history of intermittent epigastric pain. Some nausea.  TECHNIQUE: Upper GI examination was performed using double contrast technique. *  Fluoroscopy time, 03 minutes, 40 seconds. *  37 fluoroscopic images were recorded.  FINDINGS: The esophagus is normal in appearance. No hiatal hernia or gastroesophageal reflux is observed with the patient in recumbent position. No evidence of active esophagitis, esophageal stricture or mass. A 13 mm diameter barium tablet swallowed in upright position passes promptly into the stomach without obstruction or delay.  Stomach and duodenum are normal in appearance. No evidence of peptic ulcer disease or mass. The stomach empties without delay.  Jejunum and ileum are normal in caliber and appearance. No evidence of inflammatory bowel disease. No small bowel stricture, diverticulum or mass is identified.      Impression:  1. Negative upper GI examination. 2. Negative barium small bowel examination.  This report was finalized on 11/9/2017 1:15 PM by Dr. Miguelito Gottlieb MD.        PHYSICAL EXAM  Physical Exam   Constitutional: She is oriented to person, place, and time. She appears well-developed and well-nourished.   HENT:   Head: Normocephalic and atraumatic.   Neck: Neck supple.   Cardiovascular: Normal rate and regular rhythm.    Pulmonary/Chest: Effort normal and breath sounds normal.   Abdominal: Soft. Bowel sounds are normal.   Epigastric tenderness   Musculoskeletal: She exhibits no edema.   Neurological: She is alert and oriented to person, place, and time.   Skin: Skin is warm and dry.   Psychiatric: She has a normal mood and affect. Her behavior is normal.   Nursing note and vitals reviewed.      ASSESSMENT  Diagnoses and all orders for this visit:    Epigastric pain    Nausea        PLAN  We will schedule Ashile for an EGD.  I discussed with the patient the benefits and risks of performing endoscopy.  Benefits and risks were not limited to but including bleeding, infection, perforation, complications of anesthesia, aspiration.  The patient appeared to understand and is willing to proceed.    Thank you for allowing me to participate in the care of this interesting patient.

## 2017-11-15 NOTE — H&P
PATIENT INFORMATION  Ashlie Levi       - 1945    CHIEF COMPLAINT  Chief Complaint   Patient presents with   • Follow-up     UPPER GI, ABD PAIN AND NAUSEA       HISTORY OF PRESENT ILLNESS  HPI    Ashlie's upper GI with small bowel follow through were normal.  She says she still gets epigastric pain and nausea after eating.  She had a colonoscopy last year where polyps were removed but she has never had an EGD.  She is moving her bowels well without blood.  She has no chest pain.  She has no shortness of breath.  She has no other complaints.    REVIEW OF SYSTEMS  Review of Systems   All other systems reviewed and are negative.        ACTIVE PROBLEMS  Patient Active Problem List    Diagnosis   • Coronary artery disease [I25.10]   • Hyperlipidemia [E78.5]   • Hypertension [I10]         PAST MEDICAL HISTORY  Past Medical History:   Diagnosis Date   • Abnormal electrocardiogram    • Chest discomfort    • Coronary artery disease    • Disease of thyroid gland    • Hyperlipidemia    • Hypertension          SURGICAL HISTORY  Past Surgical History:   Procedure Laterality Date   • APPENDECTOMY     • CARDIAC SURGERY     • CHOLECYSTECTOMY     • COLONOSCOPY N/A 2016    Procedure: COLONOSCOPY;  Surgeon: Giovani Avelar MD;  Location: [facility];  Service:    • CORONARY STENT PLACEMENT     • EYE SURGERY     • FOOT GANGLION EXCISION Left    • HEMORROIDECTOMY     • HERNIA REPAIR     • HYSTERECTOMY           FAMILY HISTORY  Family History   Problem Relation Age of Onset   • Hypertension Mother    • Stroke Mother    • Diabetes Father    • Hypertension Father    • Heart disease Father    • Stroke Father    • Heart disease Brother          SOCIAL HISTORY  Social History     Occupational History   • Not on file.     Social History Main Topics   • Smoking status: Former Smoker     Packs/day: 1.00     Years: 50.00     Quit date: 10/13/2006   • Smokeless tobacco: Never Used   • Alcohol use Yes      Comment:  "occasional   • Drug use: No   • Sexual activity: Not on file         CURRENT MEDICATIONS    Current Outpatient Prescriptions:   •  amLODIPine (NORVASC) 2.5 MG tablet, Take 1 tablet by mouth Daily., Disp: 90 tablet, Rfl: 3  •  aspirin 81 MG tablet, Take by mouth., Disp: , Rfl:   •  carvedilol (COREG) 6.25 MG tablet, Take 1 tablet by mouth 2 (Two) Times a Day., Disp: 180 tablet, Rfl: 0  •  dicyclomine (BENTYL) 20 MG tablet, Take 1 tablet by mouth Every 8 (Eight) Hours As Needed (abdominal pain)., Disp: 20 tablet, Rfl: 0  •  isosorbide mononitrate (ISMO,MONOKET) 20 MG tablet, Take 1 tablet by mouth 2 (Two) Times a Day., Disp: 360 tablet, Rfl: 3  •  levothyroxine (SYNTHROID, LEVOTHROID) 150 MCG tablet, Take 150 mcg by mouth Daily., Disp: , Rfl:   •  losartan (COZAAR) 100 MG tablet, Take 1 tablet by mouth Daily., Disp: 180 tablet, Rfl: 0  •  sertraline (ZOLOFT) 100 MG tablet, Take 100 mg by mouth daily., Disp: , Rfl:   •  spironolactone (ALDACTONE) 25 MG tablet, Take 1 tablet by mouth Daily., Disp: 90 tablet, Rfl: 3    ALLERGIES  Review of patient's allergies indicates no known allergies.    VITALS  Vitals:    11/15/17 1114   BP: 102/60   Pulse: 67   SpO2: 97%   Weight: 144 lb (65.3 kg)   Height: 62\" (157.5 cm)       LAST RESULTS   Admission on 10/17/2017, Discharged on 10/17/2017   Component Date Value Ref Range Status   • Glucose 10/17/2017 132* 65 - 99 mg/dL Final   • BUN 10/17/2017 13  8 - 23 mg/dL Final   • Creatinine 10/17/2017 0.85  0.57 - 1.00 mg/dL Final   • Sodium 10/17/2017 138  136 - 145 mmol/L Final   • Potassium 10/17/2017 4.2  3.5 - 5.2 mmol/L Final   • Chloride 10/17/2017 97* 98 - 107 mmol/L Final   • CO2 10/17/2017 27.4  22.0 - 29.0 mmol/L Final   • Calcium 10/17/2017 10.7* 8.8 - 10.5 mg/dL Final   • Total Protein 10/17/2017 8.3  6.0 - 8.5 g/dL Final   • Albumin 10/17/2017 4.70  3.50 - 5.20 g/dL Final   • ALT (SGPT) 10/17/2017 20  5 - 33 U/L Final   • AST (SGOT) 10/17/2017 21  5 - 32 U/L Final   • " Alkaline Phosphatase 10/17/2017 72  40 - 129 U/L Final   • Total Bilirubin 10/17/2017 0.7  0.2 - 1.2 mg/dL Final   • eGFR Non  Amer 10/17/2017 65  >60 mL/min/1.73 Final   • Globulin 10/17/2017 3.6  gm/dL Final   • A/G Ratio 10/17/2017 1.3  g/dL Final   • BUN/Creatinine Ratio 10/17/2017 15.3  7.0 - 25.0 Final   • Anion Gap 10/17/2017 13.6  mmol/L Final   • Lipase 10/17/2017 12* 13 - 60 U/L Final   • Color, UA 10/17/2017 Yellow  Yellow, Straw Final   • Appearance, UA 10/17/2017 Clear  Clear Final   • pH, UA 10/17/2017 5.5  4.5 - 8.0 Final   • Specific Gravity, UA 10/17/2017 <=1.005  1.003 - 1.030 Final   • Glucose, UA 10/17/2017 Negative  Negative Final   • Ketones, UA 10/17/2017 Negative  Negative, 80 mg/dL (3+), >=160 mg/dL (4+) Final   • Bilirubin, UA 10/17/2017 Negative  Negative Final   • Blood, UA 10/17/2017 Negative  Negative Final   • Protein, UA 10/17/2017 Negative  Negative Final   • Leuk Esterase, UA 10/17/2017 Trace* Negative Final   • Nitrite, UA 10/17/2017 Negative  Negative Final   • Urobilinogen, UA 10/17/2017 0.2 E.U./dL  0.2 - 1.0 E.U./dL Final   • WBC 10/17/2017 15.46* 4.80 - 10.80 10*3/mm3 Final   • RBC 10/17/2017 5.19  4.20 - 5.40 10*6/mm3 Final   • Hemoglobin 10/17/2017 15.8  12.0 - 16.0 g/dL Final   • Hematocrit 10/17/2017 46.7  37.0 - 47.0 % Final   • MCV 10/17/2017 90.0  81.0 - 99.0 fL Final   • MCH 10/17/2017 30.4  27.0 - 31.0 pg Final   • MCHC 10/17/2017 33.8  31.0 - 37.0 g/dL Final   • RDW 10/17/2017 12.3  11.5 - 14.5 % Final   • RDW-SD 10/17/2017 40.2  37.0 - 54.0 fl Final   • MPV 10/17/2017 10.4  7.4 - 10.4 fL Final   • Platelets 10/17/2017 252  140 - 500 10*3/mm3 Final   • Neutrophil % 10/17/2017 75.3* 45.0 - 70.0 % Final   • Lymphocyte % 10/17/2017 16.3* 20.0 - 45.0 % Final   • Monocyte % 10/17/2017 5.9  3.0 - 8.0 % Final   • Eosinophil % 10/17/2017 1.6  0.0 - 4.0 % Final   • Basophil % 10/17/2017 0.4  0.0 - 2.0 % Final   • Immature Grans % 10/17/2017 0.5  0.0 - 0.5 % Final   •  Neutrophils, Absolute 10/17/2017 11.66* 1.50 - 8.30 10*3/mm3 Final   • Lymphocytes, Absolute 10/17/2017 2.52  0.60 - 4.80 10*3/mm3 Final   • Monocytes, Absolute 10/17/2017 0.91  0.00 - 1.00 10*3/mm3 Final   • Eosinophils, Absolute 10/17/2017 0.24  0.10 - 0.30 10*3/mm3 Final   • Basophils, Absolute 10/17/2017 0.06  0.00 - 0.20 10*3/mm3 Final   • Immature Grans, Absolute 10/17/2017 0.07* 0.00 - 0.03 10*3/mm3 Final   • nRBC 10/17/2017 0.0  0.0 - 0.0 /100 WBC Final   • Troponin T 10/17/2017 <0.010  0.000 - 0.030 ng/mL Final   • RBC, UA 10/17/2017 3-5* None Seen /HPF Final   • WBC, UA 10/17/2017 13-20* None Seen /HPF Final   • Bacteria, UA 10/17/2017 Trace* None Seen /HPF Final   • Squamous Epithelial Cells, UA 10/17/2017 3-6* None Seen, 0-2 /HPF Final   • Hyaline Casts, UA 10/17/2017 0-2  None Seen /LPF Final   • Methodology 10/17/2017 Manual Light Microscopy   Final   • Urine Culture 10/17/2017 50,000 CFU/mL Mixed Priscila Isolated   Final     Ct Abdomen Pelvis With Contrast    Result Date: 10/17/2017  Narrative: INDICATION: Nausea and vomiting. Generalized abdominal pain and tenderness for 3 days..  TECHNIQUE: CT of the abdomen and pelvis with p.o. and IV contrast. Coronal and sagittal reconstructions were obtained.  Radiation dose reduction techniques were utilized, including automated exposure control and exposure modulation based on body size.  COMPARISON: CT abdomen pelvis 11/03/2015.  FINDINGS: Abdomen: The solid abdominal organs are normal. The gallbladder is surgically absent. The proximal small bowel is fluid-filled. There are occasional air-fluid levels. The bowel measures up to 2.3 cm in diameter. There is a focal area of circumferential thickening of the distal ileum in the right lower quadrant. There is some associated induration and inflammation of the small bowel mesentery. This has appearance more typical for an enteritis, rather than a small bowel obstruction. There are occasional colonic diverticula,  however no diverticulitis. There is a small volume of ascites.  There is diffuse atherosclerotic disease of the abdominal aorta and iliac vessels. No aneurysm.  The appendix is not identified, however there is no focal inflammation around the cecum.  Pelvis: No pelvic mass.  No enlarged pelvic or inguinal lymph nodes.The uterus and ovaries are presumed surgically absent.  No acute osseous abnormalities.      Impression: Impression:  1. Abnormal circumferential thickening of distal small bowel loops with some associated inflammation in the mesentery. This probably represents an enteritis, rather than an early small bowel obstruction, however, follow-up would be recommended. 2. Small volume of ascites is likely reactive. No abscess or perforation. 3. Diverticulosis without evidence of diverticulitis.  This report was finalized on 10/17/2017 2:28 PM by Dr. Tristin Galaviz MD.      Fl Upper Gi With Air Contrast & Sbft    Result Date: 11/9/2017  Narrative: UPPER GI EXAMINATION AND SMALL BOWEL FOLLOW-THROUGH, 11/09/2017:  INDICATION: 72-year-old female complaining of four week history of intermittent epigastric pain. Some nausea.  TECHNIQUE: Upper GI examination was performed using double contrast technique. *  Fluoroscopy time, 03 minutes, 40 seconds. *  37 fluoroscopic images were recorded.  FINDINGS: The esophagus is normal in appearance. No hiatal hernia or gastroesophageal reflux is observed with the patient in recumbent position. No evidence of active esophagitis, esophageal stricture or mass. A 13 mm diameter barium tablet swallowed in upright position passes promptly into the stomach without obstruction or delay.  Stomach and duodenum are normal in appearance. No evidence of peptic ulcer disease or mass. The stomach empties without delay.  Jejunum and ileum are normal in caliber and appearance. No evidence of inflammatory bowel disease. No small bowel stricture, diverticulum or mass is identified.      Impression:  1. Negative upper GI examination. 2. Negative barium small bowel examination.  This report was finalized on 11/9/2017 1:15 PM by Dr. Miguelito Gottlieb MD.        PHYSICAL EXAM  Physical Exam   Constitutional: She is oriented to person, place, and time. She appears well-developed and well-nourished.   HENT:   Head: Normocephalic and atraumatic.   Neck: Neck supple.   Cardiovascular: Normal rate and regular rhythm.    Pulmonary/Chest: Effort normal and breath sounds normal.   Abdominal: Soft. Bowel sounds are normal.   Epigastric tenderness   Musculoskeletal: She exhibits no edema.   Neurological: She is alert and oriented to person, place, and time.   Skin: Skin is warm and dry.   Psychiatric: She has a normal mood and affect. Her behavior is normal.   Nursing note and vitals reviewed.      ASSESSMENT  Diagnoses and all orders for this visit:    Epigastric pain    Nausea        PLAN  We will schedule Ashlie for an EGD.  I discussed with the patient the benefits and risks of performing endoscopy.  Benefits and risks were not limited to but including bleeding, infection, perforation, complications of anesthesia, aspiration.  The patient appeared to understand and is willing to proceed.    Thank you for allowing me to participate in the care of this interesting patient.

## 2017-11-20 ENCOUNTER — ANESTHESIA EVENT (OUTPATIENT)
Dept: PERIOP | Facility: HOSPITAL | Age: 72
End: 2017-11-20

## 2017-11-21 ENCOUNTER — ANESTHESIA (OUTPATIENT)
Dept: PERIOP | Facility: HOSPITAL | Age: 72
End: 2017-11-21

## 2017-11-21 ENCOUNTER — HOSPITAL ENCOUNTER (OUTPATIENT)
Facility: HOSPITAL | Age: 72
Setting detail: HOSPITAL OUTPATIENT SURGERY
Discharge: HOME | End: 2017-11-21
Attending: SURGERY | Admitting: SURGERY

## 2017-11-21 VITALS
OXYGEN SATURATION: 96 % | BODY MASS INDEX: 26.59 KG/M2 | SYSTOLIC BLOOD PRESSURE: 128 MMHG | TEMPERATURE: 97.7 F | RESPIRATION RATE: 14 BRPM | HEART RATE: 57 BPM | DIASTOLIC BLOOD PRESSURE: 70 MMHG | WEIGHT: 145.4 LBS

## 2017-11-21 DIAGNOSIS — R10.13 EPIGASTRIC PAIN: ICD-10-CM

## 2017-11-21 DIAGNOSIS — R11.0 NAUSEA: ICD-10-CM

## 2017-11-21 PROCEDURE — 43239 EGD BIOPSY SINGLE/MULTIPLE: CPT | Performed by: SURGERY

## 2017-11-21 PROCEDURE — 25010000002 PROPOFOL 10 MG/ML EMULSION: Performed by: ANESTHESIOLOGY

## 2017-11-21 PROCEDURE — 87081 CULTURE SCREEN ONLY: CPT | Performed by: SURGERY

## 2017-11-21 RX ORDER — SODIUM CHLORIDE 0.9 % (FLUSH) 0.9 %
1-10 SYRINGE (ML) INJECTION AS NEEDED
Status: DISCONTINUED | OUTPATIENT
Start: 2017-11-21 | End: 2017-11-21 | Stop reason: HOSPADM

## 2017-11-21 RX ORDER — OMEPRAZOLE 40 MG/1
40 CAPSULE, DELAYED RELEASE ORAL DAILY
Qty: 30 CAPSULE | Refills: 6 | Status: SHIPPED | OUTPATIENT
Start: 2017-11-21 | End: 2018-07-05

## 2017-11-21 RX ORDER — LIDOCAINE HYDROCHLORIDE 10 MG/ML
0.5 INJECTION, SOLUTION EPIDURAL; INFILTRATION; INTRACAUDAL; PERINEURAL ONCE AS NEEDED
Status: COMPLETED | OUTPATIENT
Start: 2017-11-21 | End: 2017-11-21

## 2017-11-21 RX ORDER — LIDOCAINE HYDROCHLORIDE 20 MG/ML
INJECTION, SOLUTION INFILTRATION; PERINEURAL AS NEEDED
Status: DISCONTINUED | OUTPATIENT
Start: 2017-11-21 | End: 2017-11-21 | Stop reason: SURG

## 2017-11-21 RX ORDER — SODIUM CHLORIDE 9 MG/ML
40 INJECTION, SOLUTION INTRAVENOUS AS NEEDED
Status: DISCONTINUED | OUTPATIENT
Start: 2017-11-21 | End: 2017-11-21 | Stop reason: HOSPADM

## 2017-11-21 RX ORDER — PROPOFOL 10 MG/ML
VIAL (ML) INTRAVENOUS AS NEEDED
Status: DISCONTINUED | OUTPATIENT
Start: 2017-11-21 | End: 2017-11-21 | Stop reason: SURG

## 2017-11-21 RX ORDER — SODIUM CHLORIDE, SODIUM LACTATE, POTASSIUM CHLORIDE, CALCIUM CHLORIDE 600; 310; 30; 20 MG/100ML; MG/100ML; MG/100ML; MG/100ML
9 INJECTION, SOLUTION INTRAVENOUS CONTINUOUS
Status: DISCONTINUED | OUTPATIENT
Start: 2017-11-21 | End: 2017-11-21 | Stop reason: HOSPADM

## 2017-11-21 RX ADMIN — SODIUM CHLORIDE, POTASSIUM CHLORIDE, SODIUM LACTATE AND CALCIUM CHLORIDE 9 ML/HR: 600; 310; 30; 20 INJECTION, SOLUTION INTRAVENOUS at 10:40

## 2017-11-21 RX ADMIN — LIDOCAINE HYDROCHLORIDE 60 MG: 20 INJECTION, SOLUTION INFILTRATION; PERINEURAL at 11:24

## 2017-11-21 RX ADMIN — LIDOCAINE HYDROCHLORIDE 0.1 ML: 10 INJECTION, SOLUTION EPIDURAL; INFILTRATION; INTRACAUDAL; PERINEURAL at 10:40

## 2017-11-21 RX ADMIN — PROPOFOL 80 MG: 10 INJECTION, EMULSION INTRAVENOUS at 11:24

## 2017-11-21 NOTE — H&P (VIEW-ONLY)
PATIENT INFORMATION  Ashlie Levi       - 1945    CHIEF COMPLAINT  Chief Complaint   Patient presents with   • Follow-up     UPPER GI, ABD PAIN AND NAUSEA       HISTORY OF PRESENT ILLNESS  HPI    Ashlie's upper GI with small bowel follow through were normal.  She says she still gets epigastric pain and nausea after eating.  She had a colonoscopy last year where polyps were removed but she has never had an EGD.  She is moving her bowels well without blood.  She has no chest pain.  She has no shortness of breath.  She has no other complaints.    REVIEW OF SYSTEMS  Review of Systems   All other systems reviewed and are negative.        ACTIVE PROBLEMS  Patient Active Problem List    Diagnosis   • Coronary artery disease [I25.10]   • Hyperlipidemia [E78.5]   • Hypertension [I10]         PAST MEDICAL HISTORY  Past Medical History:   Diagnosis Date   • Abnormal electrocardiogram    • Chest discomfort    • Coronary artery disease    • Disease of thyroid gland    • Hyperlipidemia    • Hypertension          SURGICAL HISTORY  Past Surgical History:   Procedure Laterality Date   • APPENDECTOMY     • CARDIAC SURGERY     • CHOLECYSTECTOMY     • COLONOSCOPY N/A 2016    Procedure: COLONOSCOPY;  Surgeon: Giovani Avelar MD;  Location: [facility];  Service:    • CORONARY STENT PLACEMENT     • EYE SURGERY     • FOOT GANGLION EXCISION Left    • HEMORROIDECTOMY     • HERNIA REPAIR     • HYSTERECTOMY           FAMILY HISTORY  Family History   Problem Relation Age of Onset   • Hypertension Mother    • Stroke Mother    • Diabetes Father    • Hypertension Father    • Heart disease Father    • Stroke Father    • Heart disease Brother          SOCIAL HISTORY  Social History     Occupational History   • Not on file.     Social History Main Topics   • Smoking status: Former Smoker     Packs/day: 1.00     Years: 50.00     Quit date: 10/13/2006   • Smokeless tobacco: Never Used   • Alcohol use Yes      Comment:  "occasional   • Drug use: No   • Sexual activity: Not on file         CURRENT MEDICATIONS    Current Outpatient Prescriptions:   •  amLODIPine (NORVASC) 2.5 MG tablet, Take 1 tablet by mouth Daily., Disp: 90 tablet, Rfl: 3  •  aspirin 81 MG tablet, Take by mouth., Disp: , Rfl:   •  carvedilol (COREG) 6.25 MG tablet, Take 1 tablet by mouth 2 (Two) Times a Day., Disp: 180 tablet, Rfl: 0  •  dicyclomine (BENTYL) 20 MG tablet, Take 1 tablet by mouth Every 8 (Eight) Hours As Needed (abdominal pain)., Disp: 20 tablet, Rfl: 0  •  isosorbide mononitrate (ISMO,MONOKET) 20 MG tablet, Take 1 tablet by mouth 2 (Two) Times a Day., Disp: 360 tablet, Rfl: 3  •  levothyroxine (SYNTHROID, LEVOTHROID) 150 MCG tablet, Take 150 mcg by mouth Daily., Disp: , Rfl:   •  losartan (COZAAR) 100 MG tablet, Take 1 tablet by mouth Daily., Disp: 180 tablet, Rfl: 0  •  sertraline (ZOLOFT) 100 MG tablet, Take 100 mg by mouth daily., Disp: , Rfl:   •  spironolactone (ALDACTONE) 25 MG tablet, Take 1 tablet by mouth Daily., Disp: 90 tablet, Rfl: 3    ALLERGIES  Review of patient's allergies indicates no known allergies.    VITALS  Vitals:    11/15/17 1114   BP: 102/60   Pulse: 67   SpO2: 97%   Weight: 144 lb (65.3 kg)   Height: 62\" (157.5 cm)       LAST RESULTS   Admission on 10/17/2017, Discharged on 10/17/2017   Component Date Value Ref Range Status   • Glucose 10/17/2017 132* 65 - 99 mg/dL Final   • BUN 10/17/2017 13  8 - 23 mg/dL Final   • Creatinine 10/17/2017 0.85  0.57 - 1.00 mg/dL Final   • Sodium 10/17/2017 138  136 - 145 mmol/L Final   • Potassium 10/17/2017 4.2  3.5 - 5.2 mmol/L Final   • Chloride 10/17/2017 97* 98 - 107 mmol/L Final   • CO2 10/17/2017 27.4  22.0 - 29.0 mmol/L Final   • Calcium 10/17/2017 10.7* 8.8 - 10.5 mg/dL Final   • Total Protein 10/17/2017 8.3  6.0 - 8.5 g/dL Final   • Albumin 10/17/2017 4.70  3.50 - 5.20 g/dL Final   • ALT (SGPT) 10/17/2017 20  5 - 33 U/L Final   • AST (SGOT) 10/17/2017 21  5 - 32 U/L Final   • " Alkaline Phosphatase 10/17/2017 72  40 - 129 U/L Final   • Total Bilirubin 10/17/2017 0.7  0.2 - 1.2 mg/dL Final   • eGFR Non  Amer 10/17/2017 65  >60 mL/min/1.73 Final   • Globulin 10/17/2017 3.6  gm/dL Final   • A/G Ratio 10/17/2017 1.3  g/dL Final   • BUN/Creatinine Ratio 10/17/2017 15.3  7.0 - 25.0 Final   • Anion Gap 10/17/2017 13.6  mmol/L Final   • Lipase 10/17/2017 12* 13 - 60 U/L Final   • Color, UA 10/17/2017 Yellow  Yellow, Straw Final   • Appearance, UA 10/17/2017 Clear  Clear Final   • pH, UA 10/17/2017 5.5  4.5 - 8.0 Final   • Specific Gravity, UA 10/17/2017 <=1.005  1.003 - 1.030 Final   • Glucose, UA 10/17/2017 Negative  Negative Final   • Ketones, UA 10/17/2017 Negative  Negative, 80 mg/dL (3+), >=160 mg/dL (4+) Final   • Bilirubin, UA 10/17/2017 Negative  Negative Final   • Blood, UA 10/17/2017 Negative  Negative Final   • Protein, UA 10/17/2017 Negative  Negative Final   • Leuk Esterase, UA 10/17/2017 Trace* Negative Final   • Nitrite, UA 10/17/2017 Negative  Negative Final   • Urobilinogen, UA 10/17/2017 0.2 E.U./dL  0.2 - 1.0 E.U./dL Final   • WBC 10/17/2017 15.46* 4.80 - 10.80 10*3/mm3 Final   • RBC 10/17/2017 5.19  4.20 - 5.40 10*6/mm3 Final   • Hemoglobin 10/17/2017 15.8  12.0 - 16.0 g/dL Final   • Hematocrit 10/17/2017 46.7  37.0 - 47.0 % Final   • MCV 10/17/2017 90.0  81.0 - 99.0 fL Final   • MCH 10/17/2017 30.4  27.0 - 31.0 pg Final   • MCHC 10/17/2017 33.8  31.0 - 37.0 g/dL Final   • RDW 10/17/2017 12.3  11.5 - 14.5 % Final   • RDW-SD 10/17/2017 40.2  37.0 - 54.0 fl Final   • MPV 10/17/2017 10.4  7.4 - 10.4 fL Final   • Platelets 10/17/2017 252  140 - 500 10*3/mm3 Final   • Neutrophil % 10/17/2017 75.3* 45.0 - 70.0 % Final   • Lymphocyte % 10/17/2017 16.3* 20.0 - 45.0 % Final   • Monocyte % 10/17/2017 5.9  3.0 - 8.0 % Final   • Eosinophil % 10/17/2017 1.6  0.0 - 4.0 % Final   • Basophil % 10/17/2017 0.4  0.0 - 2.0 % Final   • Immature Grans % 10/17/2017 0.5  0.0 - 0.5 % Final   •  Neutrophils, Absolute 10/17/2017 11.66* 1.50 - 8.30 10*3/mm3 Final   • Lymphocytes, Absolute 10/17/2017 2.52  0.60 - 4.80 10*3/mm3 Final   • Monocytes, Absolute 10/17/2017 0.91  0.00 - 1.00 10*3/mm3 Final   • Eosinophils, Absolute 10/17/2017 0.24  0.10 - 0.30 10*3/mm3 Final   • Basophils, Absolute 10/17/2017 0.06  0.00 - 0.20 10*3/mm3 Final   • Immature Grans, Absolute 10/17/2017 0.07* 0.00 - 0.03 10*3/mm3 Final   • nRBC 10/17/2017 0.0  0.0 - 0.0 /100 WBC Final   • Troponin T 10/17/2017 <0.010  0.000 - 0.030 ng/mL Final   • RBC, UA 10/17/2017 3-5* None Seen /HPF Final   • WBC, UA 10/17/2017 13-20* None Seen /HPF Final   • Bacteria, UA 10/17/2017 Trace* None Seen /HPF Final   • Squamous Epithelial Cells, UA 10/17/2017 3-6* None Seen, 0-2 /HPF Final   • Hyaline Casts, UA 10/17/2017 0-2  None Seen /LPF Final   • Methodology 10/17/2017 Manual Light Microscopy   Final   • Urine Culture 10/17/2017 50,000 CFU/mL Mixed Pricsila Isolated   Final     Ct Abdomen Pelvis With Contrast    Result Date: 10/17/2017  Narrative: INDICATION: Nausea and vomiting. Generalized abdominal pain and tenderness for 3 days..  TECHNIQUE: CT of the abdomen and pelvis with p.o. and IV contrast. Coronal and sagittal reconstructions were obtained.  Radiation dose reduction techniques were utilized, including automated exposure control and exposure modulation based on body size.  COMPARISON: CT abdomen pelvis 11/03/2015.  FINDINGS: Abdomen: The solid abdominal organs are normal. The gallbladder is surgically absent. The proximal small bowel is fluid-filled. There are occasional air-fluid levels. The bowel measures up to 2.3 cm in diameter. There is a focal area of circumferential thickening of the distal ileum in the right lower quadrant. There is some associated induration and inflammation of the small bowel mesentery. This has appearance more typical for an enteritis, rather than a small bowel obstruction. There are occasional colonic diverticula,  however no diverticulitis. There is a small volume of ascites.  There is diffuse atherosclerotic disease of the abdominal aorta and iliac vessels. No aneurysm.  The appendix is not identified, however there is no focal inflammation around the cecum.  Pelvis: No pelvic mass.  No enlarged pelvic or inguinal lymph nodes.The uterus and ovaries are presumed surgically absent.  No acute osseous abnormalities.      Impression: Impression:  1. Abnormal circumferential thickening of distal small bowel loops with some associated inflammation in the mesentery. This probably represents an enteritis, rather than an early small bowel obstruction, however, follow-up would be recommended. 2. Small volume of ascites is likely reactive. No abscess or perforation. 3. Diverticulosis without evidence of diverticulitis.  This report was finalized on 10/17/2017 2:28 PM by Dr. Tristin Galaviz MD.      Fl Upper Gi With Air Contrast & Sbft    Result Date: 11/9/2017  Narrative: UPPER GI EXAMINATION AND SMALL BOWEL FOLLOW-THROUGH, 11/09/2017:  INDICATION: 72-year-old female complaining of four week history of intermittent epigastric pain. Some nausea.  TECHNIQUE: Upper GI examination was performed using double contrast technique. *  Fluoroscopy time, 03 minutes, 40 seconds. *  37 fluoroscopic images were recorded.  FINDINGS: The esophagus is normal in appearance. No hiatal hernia or gastroesophageal reflux is observed with the patient in recumbent position. No evidence of active esophagitis, esophageal stricture or mass. A 13 mm diameter barium tablet swallowed in upright position passes promptly into the stomach without obstruction or delay.  Stomach and duodenum are normal in appearance. No evidence of peptic ulcer disease or mass. The stomach empties without delay.  Jejunum and ileum are normal in caliber and appearance. No evidence of inflammatory bowel disease. No small bowel stricture, diverticulum or mass is identified.      Impression:  1. Negative upper GI examination. 2. Negative barium small bowel examination.  This report was finalized on 11/9/2017 1:15 PM by Dr. Miguelito Gottlieb MD.        PHYSICAL EXAM  Physical Exam   Constitutional: She is oriented to person, place, and time. She appears well-developed and well-nourished.   HENT:   Head: Normocephalic and atraumatic.   Neck: Neck supple.   Cardiovascular: Normal rate and regular rhythm.    Pulmonary/Chest: Effort normal and breath sounds normal.   Abdominal: Soft. Bowel sounds are normal.   Epigastric tenderness   Musculoskeletal: She exhibits no edema.   Neurological: She is alert and oriented to person, place, and time.   Skin: Skin is warm and dry.   Psychiatric: She has a normal mood and affect. Her behavior is normal.   Nursing note and vitals reviewed.      ASSESSMENT  Diagnoses and all orders for this visit:    Epigastric pain    Nausea        PLAN  We will schedule Ashlie for an EGD.  I discussed with the patient the benefits and risks of performing endoscopy.  Benefits and risks were not limited to but including bleeding, infection, perforation, complications of anesthesia, aspiration.  The patient appeared to understand and is willing to proceed.    Thank you for allowing me to participate in the care of this interesting patient.

## 2017-11-21 NOTE — PLAN OF CARE
Problem: Patient Care Overview (Adult)  Goal: Plan of Care Review  Outcome: Outcome(s) achieved Date Met:  11/21/17 11/21/17 1044 11/21/17 1140   Coping/Psychosocial Response Interventions   Plan Of Care Reviewed With patient;spouse --    Patient Care Overview   Progress --  improving   Outcome Evaluation   Outcome Summary/Follow up Plan --  vss, waiting to go home

## 2017-11-21 NOTE — ANESTHESIA POSTPROCEDURE EVALUATION
Patient: Ashlie Levi    Procedure Summary     Date Anesthesia Start Anesthesia Stop Room / Location    11/21/17 1120 1133 [facility] / [facility]       Procedure Diagnosis Surgeon Provider    ESOPHAGOGASTRODUODENOSCOPY with CARIN test  (N/A Esophagus) Nausea; Epigastric pain  (Nausea [R11.0]; Epigastric pain [R10.13]) DO Moraima Henely MD          Anesthesia Type: MAC  Last vitals  BP   116/62 (11/21/17 1140)   Temp   97.7 °F (36.5 °C) (11/21/17 1030)   Pulse   56 (11/21/17 1140)   Resp   18 (11/21/17 1140)     SpO2   96 % (11/21/17 1140)     Post Anesthesia Care and Evaluation    Patient location during evaluation: PHASE II  Patient participation: complete - patient participated  Level of consciousness: awake and alert  Pain score: 0  Pain management: adequate  Airway patency: patent  Anesthetic complications: No anesthetic complications  PONV Status: none  Cardiovascular status: acceptable  Respiratory status: acceptable  Hydration status: acceptable

## 2017-11-21 NOTE — PLAN OF CARE
Problem: GI Endoscopy (Adult)  Goal: Signs and Symptoms of Listed Potential Problems Will be Absent or Manageable (GI Endoscopy)  Outcome: Ongoing (interventions implemented as appropriate)    11/21/17 1127   GI Endoscopy   Problems Assessed (GI Endoscopy) all   Problems Present (GI Endoscopy) none

## 2017-11-21 NOTE — PLAN OF CARE
Problem: Patient Care Overview (Adult)  Goal: Plan of Care Review  Outcome: Ongoing (interventions implemented as appropriate)    11/21/17 1044   Coping/Psychosocial Response Interventions   Plan Of Care Reviewed With patient;spouse   Patient Care Overview   Progress no change   Outcome Evaluation   Outcome Summary/Follow up Plan VSS, NO C/O, AWAITING ANESTHESIA THEN READY FOR PROCEDURE       Goal: Adult Individualization and Mutuality  Outcome: Ongoing (interventions implemented as appropriate)    Problem: GI Endoscopy (Adult)  Goal: Signs and Symptoms of Listed Potential Problems Will be Absent or Manageable (GI Endoscopy)  Outcome: Ongoing (interventions implemented as appropriate)

## 2017-11-21 NOTE — OP NOTE
EGD Procedure Note    Pre-operative Diagnosis:  Nausea [R11.0]  Epigastric pain [R10.13]    Post-operative Diagnosis: gastritis    Procedure:  EGD with biopsy for H. Pylori with cold forceps    Surgeon: Karlene    Estimated Blood Loss:  Minimal     Complications:  none    Anesthetic: MAC per Moraima Nelson MD    Indications:  See preop diagnosis    Findings/Treatments:  Gastritis--biopsy for H. Pylori with cold forceps    Recommendations:  -Await CARIN test result and treat for Helicobacter pylori if positive.      Technique:    After discussing the benefits and risks of an EGD, benefits and risks not limited to but including:  Bleeding, infection, perforation, aspiration; informed consent was signed.  The patient was taken into the endoscopy suite at [facility] and placed in the left lateral decubitus position.  MAC anesthesia was induced under appropriate monitoring.  Bite block was placed and the gastroscope was inserted thru such and advanced under direct vision to second portion of the duodenum.  A careful inspection was made as the gastroscope was withdrawn, including a retroflexed view of the proximal stomach; findings and interventions are described below.  If biopsies were taken, this was done with the cold biopsy forceps.    Alfreda Soler D.O.

## 2017-11-21 NOTE — ANESTHESIA PREPROCEDURE EVALUATION
Anesthesia Evaluation     no history of anesthetic complications:  NPO Solid Status: > 6 hours  NPO Liquid Status: > 8 hours     Airway   Mallampati: I  TM distance: >3 FB  Neck ROM: full  no difficulty expected  Dental - normal exam     Pulmonary - normal exam   (+) a smoker (QUIT 2006) Former,   Cardiovascular     Patient on routine beta blocker and Beta blocker given within 24 hours of surgery  Rhythm: regular  Rate: normal    (+) hypertension well controlled, past MI , CAD, cardiac stents (one stent placed 2006)       Neuro/Psych  (+) numbness (FEET AND HANDS),    GI/Hepatic/Renal/Endo    (+)  GERD, hypothyroidism,   Hepatitis: as a child.    Musculoskeletal     (+) back pain, chronic pain,   Abdominal    Substance History      OB/GYN negative ob/gyn ROS         Other   (+) arthritis (left shoulder)                                     Anesthesia Plan    ASA 2     MAC     intravenous induction   Anesthetic plan and risks discussed with patient.

## 2017-11-21 NOTE — PLAN OF CARE
Problem: GI Endoscopy (Adult)  Goal: Signs and Symptoms of Listed Potential Problems Will be Absent or Manageable (GI Endoscopy)  Outcome: Outcome(s) achieved Date Met:  11/21/17 11/21/17 1127   GI Endoscopy   Problems Assessed (GI Endoscopy) all   Problems Present (GI Endoscopy) none

## 2017-11-21 NOTE — PLAN OF CARE
Problem: Patient Care Overview (Adult)  Goal: Adult Individualization and Mutuality  Outcome: Outcome(s) achieved Date Met:  11/21/17 11/21/17 1044   Individualization   Patient Specific Preferences GOES BY ADAMA

## 2017-11-22 LAB — UREASE TISS QL: NEGATIVE

## 2017-11-25 ENCOUNTER — HOSPITAL ENCOUNTER (EMERGENCY)
Facility: HOSPITAL | Age: 72
Discharge: HOME | End: 2017-11-25
Attending: EMERGENCY MEDICINE | Admitting: EMERGENCY MEDICINE

## 2017-11-25 VITALS
RESPIRATION RATE: 16 BRPM | BODY MASS INDEX: 26.22 KG/M2 | OXYGEN SATURATION: 90 % | TEMPERATURE: 98 F | SYSTOLIC BLOOD PRESSURE: 158 MMHG | DIASTOLIC BLOOD PRESSURE: 75 MMHG | HEIGHT: 63 IN | HEART RATE: 63 BPM | WEIGHT: 148 LBS

## 2017-11-25 DIAGNOSIS — R10.13 EPIGASTRIC PAIN: Primary | ICD-10-CM

## 2017-11-25 LAB
ALBUMIN SERPL-MCNC: 4 G/DL (ref 3.5–5.2)
ALBUMIN/GLOB SERPL: 1.2 G/DL
ALP SERPL-CCNC: 78 U/L (ref 40–129)
ALT SERPL W P-5'-P-CCNC: 29 U/L (ref 5–33)
ANION GAP SERPL CALCULATED.3IONS-SCNC: 13.4 MMOL/L
AST SERPL-CCNC: 27 U/L (ref 5–32)
BACTERIA UR QL AUTO: ABNORMAL /HPF
BASOPHILS # BLD AUTO: 0.05 10*3/MM3 (ref 0–0.2)
BASOPHILS NFR BLD AUTO: 0.5 % (ref 0–2)
BILIRUB SERPL-MCNC: 0.2 MG/DL (ref 0.2–1.2)
BILIRUB UR QL STRIP: NEGATIVE
BUN BLD-MCNC: 11 MG/DL (ref 8–23)
BUN/CREAT SERPL: 17.7 (ref 7–25)
CALCIUM SPEC-SCNC: 9.5 MG/DL (ref 8.8–10.5)
CHLORIDE SERPL-SCNC: 99 MMOL/L (ref 98–107)
CLARITY UR: CLEAR
CO2 SERPL-SCNC: 24.6 MMOL/L (ref 22–29)
COLOR UR: YELLOW
CREAT BLD-MCNC: 0.62 MG/DL (ref 0.57–1)
DEPRECATED RDW RBC AUTO: 38.4 FL (ref 37–54)
EOSINOPHIL # BLD AUTO: 0.5 10*3/MM3 (ref 0.1–0.3)
EOSINOPHIL NFR BLD AUTO: 5.4 % (ref 0–4)
ERYTHROCYTE [DISTWIDTH] IN BLOOD BY AUTOMATED COUNT: 11.9 % (ref 11.5–14.5)
GFR SERPL CREATININE-BSD FRML MDRD: 94 ML/MIN/1.73
GLOBULIN UR ELPH-MCNC: 3.3 GM/DL
GLUCOSE BLD-MCNC: 86 MG/DL (ref 65–99)
GLUCOSE UR STRIP-MCNC: NEGATIVE MG/DL
HCT VFR BLD AUTO: 38.4 % (ref 37–47)
HGB BLD-MCNC: 13.3 G/DL (ref 12–16)
HGB UR QL STRIP.AUTO: ABNORMAL
IMM GRANULOCYTES # BLD: 0.04 10*3/MM3 (ref 0–0.03)
IMM GRANULOCYTES NFR BLD: 0.4 % (ref 0–0.5)
KETONES UR QL STRIP: NEGATIVE
LEUKOCYTE ESTERASE UR QL STRIP.AUTO: ABNORMAL
LIPASE SERPL-CCNC: 24 U/L (ref 13–60)
LYMPHOCYTES # BLD AUTO: 3.08 10*3/MM3 (ref 0.6–4.8)
LYMPHOCYTES NFR BLD AUTO: 33.3 % (ref 20–45)
MCH RBC QN AUTO: 30.3 PG (ref 27–31)
MCHC RBC AUTO-ENTMCNC: 34.6 G/DL (ref 31–37)
MCV RBC AUTO: 87.5 FL (ref 81–99)
MONOCYTES # BLD AUTO: 0.74 10*3/MM3 (ref 0–1)
MONOCYTES NFR BLD AUTO: 8 % (ref 3–8)
NEUTROPHILS # BLD AUTO: 4.84 10*3/MM3 (ref 1.5–8.3)
NEUTROPHILS NFR BLD AUTO: 52.4 % (ref 45–70)
NITRITE UR QL STRIP: NEGATIVE
NRBC BLD MANUAL-RTO: 0 /100 WBC (ref 0–0)
PH UR STRIP.AUTO: 5.5 [PH] (ref 4.5–8)
PLATELET # BLD AUTO: 223 10*3/MM3 (ref 140–500)
PMV BLD AUTO: 10.3 FL (ref 7.4–10.4)
POTASSIUM BLD-SCNC: 4 MMOL/L (ref 3.5–5.2)
PROT SERPL-MCNC: 7.3 G/DL (ref 6–8.5)
PROT UR QL STRIP: NEGATIVE
RBC # BLD AUTO: 4.39 10*6/MM3 (ref 4.2–5.4)
RBC # UR: ABNORMAL /HPF
REF LAB TEST METHOD: ABNORMAL
SODIUM BLD-SCNC: 137 MMOL/L (ref 136–145)
SP GR UR STRIP: 1.01 (ref 1–1.03)
SQUAMOUS #/AREA URNS HPF: ABNORMAL /HPF
TROPONIN T SERPL-MCNC: <0.01 NG/ML (ref 0–0.03)
UROBILINOGEN UR QL STRIP: ABNORMAL
WBC NRBC COR # BLD: 9.25 10*3/MM3 (ref 4.8–10.8)
WBC UR QL AUTO: ABNORMAL /HPF

## 2017-11-25 PROCEDURE — 25010000002 MORPHINE PER 10 MG: Performed by: EMERGENCY MEDICINE

## 2017-11-25 PROCEDURE — 96361 HYDRATE IV INFUSION ADD-ON: CPT

## 2017-11-25 PROCEDURE — 93005 ELECTROCARDIOGRAM TRACING: CPT | Performed by: EMERGENCY MEDICINE

## 2017-11-25 PROCEDURE — 99284 EMERGENCY DEPT VISIT MOD MDM: CPT

## 2017-11-25 PROCEDURE — 85025 COMPLETE CBC W/AUTO DIFF WBC: CPT | Performed by: EMERGENCY MEDICINE

## 2017-11-25 PROCEDURE — 80053 COMPREHEN METABOLIC PANEL: CPT | Performed by: EMERGENCY MEDICINE

## 2017-11-25 PROCEDURE — 96374 THER/PROPH/DIAG INJ IV PUSH: CPT

## 2017-11-25 PROCEDURE — 96375 TX/PRO/DX INJ NEW DRUG ADDON: CPT

## 2017-11-25 PROCEDURE — 99282 EMERGENCY DEPT VISIT SF MDM: CPT | Performed by: EMERGENCY MEDICINE

## 2017-11-25 PROCEDURE — 83690 ASSAY OF LIPASE: CPT | Performed by: EMERGENCY MEDICINE

## 2017-11-25 PROCEDURE — 25010000002 ONDANSETRON PER 1 MG: Performed by: EMERGENCY MEDICINE

## 2017-11-25 PROCEDURE — 84484 ASSAY OF TROPONIN QUANT: CPT | Performed by: EMERGENCY MEDICINE

## 2017-11-25 PROCEDURE — 87086 URINE CULTURE/COLONY COUNT: CPT | Performed by: EMERGENCY MEDICINE

## 2017-11-25 PROCEDURE — 81001 URINALYSIS AUTO W/SCOPE: CPT | Performed by: EMERGENCY MEDICINE

## 2017-11-25 PROCEDURE — 93010 ELECTROCARDIOGRAM REPORT: CPT | Performed by: INTERNAL MEDICINE

## 2017-11-25 RX ORDER — ALUMINA, MAGNESIA, AND SIMETHICONE 2400; 2400; 240 MG/30ML; MG/30ML; MG/30ML
15 SUSPENSION ORAL ONCE
Status: COMPLETED | OUTPATIENT
Start: 2017-11-25 | End: 2017-11-25

## 2017-11-25 RX ORDER — METOCLOPRAMIDE 10 MG/1
10 TABLET ORAL ONCE
Status: COMPLETED | OUTPATIENT
Start: 2017-11-25 | End: 2017-11-25

## 2017-11-25 RX ORDER — METOCLOPRAMIDE 10 MG/1
10 TABLET ORAL 2 TIMES DAILY PRN
Qty: 20 TABLET | Refills: 0 | Status: SHIPPED | OUTPATIENT
Start: 2017-11-25 | End: 2018-07-05

## 2017-11-25 RX ORDER — HYDROCODONE BITARTRATE AND ACETAMINOPHEN 5; 325 MG/1; MG/1
1 TABLET ORAL ONCE
Status: COMPLETED | OUTPATIENT
Start: 2017-11-25 | End: 2017-11-25

## 2017-11-25 RX ORDER — SODIUM CHLORIDE 0.9 % (FLUSH) 0.9 %
10 SYRINGE (ML) INJECTION AS NEEDED
Status: DISCONTINUED | OUTPATIENT
Start: 2017-11-25 | End: 2017-11-26 | Stop reason: HOSPADM

## 2017-11-25 RX ORDER — METOCLOPRAMIDE 10 MG/1
TABLET ORAL
Status: COMPLETED
Start: 2017-11-25 | End: 2017-11-25

## 2017-11-25 RX ORDER — ONDANSETRON 2 MG/ML
4 INJECTION INTRAMUSCULAR; INTRAVENOUS ONCE
Status: COMPLETED | OUTPATIENT
Start: 2017-11-25 | End: 2017-11-25

## 2017-11-25 RX ADMIN — HYDROCODONE BITARTRATE AND ACETAMINOPHEN 1 TABLET: 5; 325 TABLET ORAL at 22:05

## 2017-11-25 RX ADMIN — MORPHINE SULFATE 4 MG: 4 INJECTION, SOLUTION INTRAMUSCULAR; INTRAVENOUS at 20:54

## 2017-11-25 RX ADMIN — METOCLOPRAMIDE 10 MG: 10 TABLET ORAL at 22:32

## 2017-11-25 RX ADMIN — LIDOCAINE HYDROCHLORIDE 15 ML: 20 SOLUTION ORAL; TOPICAL at 22:05

## 2017-11-25 RX ADMIN — ALUMINUM HYDROXIDE, MAGNESIUM HYDROXIDE, AND DIMETHICONE 15 ML: 400; 400; 40 SUSPENSION ORAL at 22:05

## 2017-11-25 RX ADMIN — ONDANSETRON 4 MG: 2 INJECTION, SOLUTION INTRAMUSCULAR; INTRAVENOUS at 20:53

## 2017-11-25 RX ADMIN — ALUMINUM HYDROXIDE, MAGNESIUM HYDROXIDE, AND DIMETHICONE 15 ML: 400; 400; 40 SUSPENSION ORAL at 20:56

## 2017-11-25 RX ADMIN — LIDOCAINE HYDROCHLORIDE 15 ML: 20 SOLUTION ORAL; TOPICAL at 20:56

## 2017-11-25 RX ADMIN — SODIUM CHLORIDE 1000 ML: 9 INJECTION, SOLUTION INTRAVENOUS at 20:52

## 2017-11-26 NOTE — ED NOTES
Patient up to the restroom stating her pain is coming back. Dr. Villatoro aware.     Luisana Leal, RN  11/25/17 2908

## 2017-11-26 NOTE — DISCHARGE INSTRUCTIONS
Gentle diet as tolerated.  Continue taking your stomach acid medications as directed by your surgery doctor.  Please return to the emergency room for any worsening pain, fevers, nausea, vomiting, diarrhea, weakness, difficulties breathing or any other concerns.

## 2017-11-26 NOTE — ED PROVIDER NOTES
Subjective   History of Present Illness  History of Present Illness    Chief complaint: Abdominal pain    Location: Upper abdomen only    Quality/Severity:  Moderate to severe cramping pain    Timing/Duration: Began about one and half hours prior to arrival    Modifying Factors: None    Narrative: This patient presents for evaluation of new onset upper abdominal pain symptoms tonight.  She said that she was feeling well earlier today but this evening the pain came on suddenly about an hour and a half ago.  It is severe crampy and sharp pain in the upper abdomen only.  It does not radiate to the back.  It has caused some nausea and a couple episodes of vomiting prior to arrival.  She denies any diarrhea.  She denies any blood in her stool.  She denies any fevers.  She denies any dysuria or hematuria.  She has had some similar episodes in the past.  In fact, she had an EGD last week for further evaluation of these symptoms and she is still awaiting final results from that study.  She tried to take a pain pill or to arrival here today but that has not helped her at all so far.    Associated Symptoms: As above    Review of Systems   Constitutional: Negative for activity change, diaphoresis and fever.   HENT: Negative.    Respiratory: Negative for cough and shortness of breath.    Cardiovascular: Negative for chest pain.   Gastrointestinal: Positive for abdominal pain, nausea and vomiting. Negative for blood in stool and diarrhea.   Genitourinary: Negative for dysuria and frequency.   Musculoskeletal: Negative for back pain and myalgias.   Skin: Negative for color change and rash.   Neurological: Negative for syncope and headaches.   All other systems reviewed and are negative.      Past Medical History:   Diagnosis Date   • Abnormal electrocardiogram    • Arthritis    • Chest discomfort    • Coronary artery disease    • Disease of thyroid gland    • Hyperlipidemia    • Hypertension        No Known Allergies    Past  Surgical History:   Procedure Laterality Date   • APPENDECTOMY     • CARDIAC SURGERY     • CHOLECYSTECTOMY     • COLONOSCOPY N/A 7/13/2016    Procedure: COLONOSCOPY;  Surgeon: Giovani Avelar MD;  Location: [facility] OR;  Service:    • CORONARY STENT PLACEMENT     • ENDOSCOPY N/A 11/21/2017    Procedure: ESOPHAGOGASTRODUODENOSCOPY with CARIN test ;  Surgeon: Alfreda Soler DO;  Location: [facility] OR;  Service:    • EYE SURGERY     • FOOT GANGLION EXCISION Left    • HEMORROIDECTOMY     • HERNIA REPAIR     • HYSTERECTOMY         Family History   Problem Relation Age of Onset   • Hypertension Mother    • Stroke Mother    • Diabetes Father    • Hypertension Father    • Heart disease Father    • Stroke Father    • Heart disease Brother        Social History     Social History   • Marital status:      Spouse name: N/A   • Number of children: N/A   • Years of education: N/A     Social History Main Topics   • Smoking status: Former Smoker     Packs/day: 1.00     Years: 50.00     Quit date: 10/13/2006   • Smokeless tobacco: Never Used   • Alcohol use Yes      Comment: occasional   • Drug use: No   • Sexual activity: Defer     Other Topics Concern   • None     Social History Narrative     ED Triage Vitals   Temp Heart Rate Resp BP SpO2   11/25/17 2017 11/25/17 2017 11/25/17 2017 11/25/17 2017 11/25/17 2017   98 °F (36.7 °C) 62 22 174/95 96 %      Temp src Heart Rate Source Patient Position BP Location FiO2 (%)   11/25/17 2017 11/25/17 2017 11/25/17 2017 11/25/17 2017 --   Oral Monitor Lying Left arm            Objective   Physical Exam   Constitutional: She is oriented to person, place, and time. She appears well-developed and well-nourished. She appears distressed (mild).   HENT:   Head: Normocephalic and atraumatic.   Eyes: EOM are normal. Pupils are equal, round, and reactive to light. Right eye exhibits no discharge. Left eye exhibits no discharge.   Neck: Normal range of motion. Neck supple. No tracheal  deviation present.   Cardiovascular: Normal rate, regular rhythm, normal heart sounds and intact distal pulses.  Exam reveals no gallop and no friction rub.    No murmur heard.  Pulmonary/Chest: Effort normal. No respiratory distress. She has no wheezes. She has no rales. She exhibits no tenderness.   Abdominal: Soft. She exhibits no mass. There is tenderness (mild epigastric tenderness to palpation only.  No peritoneal signs). There is no rebound and no guarding. No hernia.   Musculoskeletal: Normal range of motion. She exhibits no edema or deformity.   Neurological: She is alert and oriented to person, place, and time. She exhibits normal muscle tone.   Skin: Skin is warm and dry. No rash noted. She is not diaphoretic. No erythema. No pallor.   Psychiatric: She has a normal mood and affect. Her behavior is normal. Judgment and thought content normal.   Nursing note and vitals reviewed.    EKG           EKG time/Interp time: 2108/2110  Rhythm/Rate: Normal sinus rhythm, 57 bpm  P waves and NV: P waves are present, 216 ms  QRS, axis: 78 ms, normal axis   ST and T waves: No acute ischemic changes apparent     Independently interpreted by me contemporaneously with treatment    Results for orders placed or performed during the hospital encounter of 11/25/17   Comprehensive Metabolic Panel   Result Value Ref Range    Glucose 86 65 - 99 mg/dL    BUN 11 8 - 23 mg/dL    Creatinine 0.62 0.57 - 1.00 mg/dL    Sodium 137 136 - 145 mmol/L    Potassium 4.0 3.5 - 5.2 mmol/L    Chloride 99 98 - 107 mmol/L    CO2 24.6 22.0 - 29.0 mmol/L    Calcium 9.5 8.8 - 10.5 mg/dL    Total Protein 7.3 6.0 - 8.5 g/dL    Albumin 4.00 3.50 - 5.20 g/dL    ALT (SGPT) 29 5 - 33 U/L    AST (SGOT) 27 5 - 32 U/L    Alkaline Phosphatase 78 40 - 129 U/L    Total Bilirubin 0.2 0.2 - 1.2 mg/dL    eGFR Non African Amer 94 >60 mL/min/1.73    Globulin 3.3 gm/dL    A/G Ratio 1.2 g/dL    BUN/Creatinine Ratio 17.7 7.0 - 25.0    Anion Gap 13.4 mmol/L   Lipase    Result Value Ref Range    Lipase 24 13 - 60 U/L   Urinalysis With / Culture If Indicated - Urine, Clean Catch   Result Value Ref Range    Color, UA Yellow Yellow, Straw    Appearance, UA Clear Clear    pH, UA 5.5 4.5 - 8.0    Specific Gravity, UA 1.010 1.003 - 1.030    Glucose, UA Negative Negative    Ketones, UA Negative Negative, 80 mg/dL (3+), >=160 mg/dL (4+)    Bilirubin, UA Negative Negative    Blood, UA Trace (A) Negative    Protein, UA Negative Negative    Leuk Esterase, UA Trace (A) Negative    Nitrite, UA Negative Negative    Urobilinogen, UA 0.2 E.U./dL 0.2 - 1.0 E.U./dL   Troponin   Result Value Ref Range    Troponin T <0.010 0.000 - 0.030 ng/mL   CBC Auto Differential   Result Value Ref Range    WBC 9.25 4.80 - 10.80 10*3/mm3    RBC 4.39 4.20 - 5.40 10*6/mm3    Hemoglobin 13.3 12.0 - 16.0 g/dL    Hematocrit 38.4 37.0 - 47.0 %    MCV 87.5 81.0 - 99.0 fL    MCH 30.3 27.0 - 31.0 pg    MCHC 34.6 31.0 - 37.0 g/dL    RDW 11.9 11.5 - 14.5 %    RDW-SD 38.4 37.0 - 54.0 fl    MPV 10.3 7.4 - 10.4 fL    Platelets 223 140 - 500 10*3/mm3    Neutrophil % 52.4 45.0 - 70.0 %    Lymphocyte % 33.3 20.0 - 45.0 %    Monocyte % 8.0 3.0 - 8.0 %    Eosinophil % 5.4 (H) 0.0 - 4.0 %    Basophil % 0.5 0.0 - 2.0 %    Immature Grans % 0.4 0.0 - 0.5 %    Neutrophils, Absolute 4.84 1.50 - 8.30 10*3/mm3    Lymphocytes, Absolute 3.08 0.60 - 4.80 10*3/mm3    Monocytes, Absolute 0.74 0.00 - 1.00 10*3/mm3    Eosinophils, Absolute 0.50 (H) 0.10 - 0.30 10*3/mm3    Basophils, Absolute 0.05 0.00 - 0.20 10*3/mm3    Immature Grans, Absolute 0.04 (H) 0.00 - 0.03 10*3/mm3    nRBC 0.0 0.0 - 0.0 /100 WBC   Urinalysis, Microscopic Only - Urine, Clean Catch   Result Value Ref Range    RBC, UA 3-5 (A) None Seen /HPF    WBC, UA 6-12 (A) None Seen /HPF    Bacteria, UA None Seen None Seen /HPF    Squamous Epithelial Cells, UA 7-12 (A) None Seen, 0-2 /HPF    Methodology Manual Light Microscopy            Procedures         ED Course  ED Course   Comment  "By Time   I have reviewed all the labs as well as her recent records including her EGD report.  It seems like this patient has had multiple abdominal symptoms for the past several weeks now.  Her presentation has been pretty nonspecific.  She is feeling better now after a dose of morphine and Zofran.  She has not had any vomiting here.  Her repeat abdominal exam remains benign without any signs of peritonitis.  There is no clinical indication for CT imaging or any other diagnostics at this time.  I think she can discharge home safely to continue symptomatic management as outlined by her surgery doctor from recent visit.  I spoke with her about the need to follow-up with her doctors for continued care.  I gave her the usual \"return to ER\" instructions for abdominal pain symptoms as well. Isidro Villatoro MD 11/25 2203                  MDM  Number of Diagnoses or Management Options     Amount and/or Complexity of Data Reviewed  Clinical lab tests: reviewed and ordered  Decide to obtain previous medical records or to obtain history from someone other than the patient: yes  Review and summarize past medical records: yes  Independent visualization of images, tracings, or specimens: yes    Risk of Complications, Morbidity, and/or Mortality  Presenting problems: moderate  Diagnostic procedures: moderate  Management options: moderate        Final diagnoses:   Epigastric pain            Isidro Villatoro MD  11/25/17 2203    "

## 2017-11-27 LAB — BACTERIA SPEC AEROBE CULT: NO GROWTH

## 2017-11-27 NOTE — ED NOTES
"ED Call Back Questions    1. How are you doing since leaving the Emergency Department?  Feeling a little bit better- spouse reports she just took some pain medication     2. Do you have any questions about your discharge instructions? No     3. Have you filled your new prescriptions yet? Yes   a. Do you have any questions about those medications? No     4. Were you able to make a follow-up appointment with the physician? No     5. Do you have a primary care physician? Yes   a. If No, would you like for me to set you up with one? No   i. If Yes, “I will have our ED  give you a call right back at this number to work with you on the best time for an appointment.”    6. We are always looking to get better at what we do. Do you have any suggestions for what we can do to be even better? N/A  a. If Yes, \"Thank you for sharing your concerns. I apologize. I will follow up with our manager and patient . Would you like someone to call you back?\" N/A    7. Is there anything else I can do for you? No        Rachel Red RN  11/27/17 1211    "

## 2017-12-27 DIAGNOSIS — I10 ESSENTIAL HYPERTENSION: ICD-10-CM

## 2017-12-27 RX ORDER — AMLODIPINE BESYLATE 2.5 MG/1
2.5 TABLET ORAL DAILY
Qty: 90 TABLET | Refills: 1 | Status: SHIPPED | OUTPATIENT
Start: 2017-12-27 | End: 2019-05-29 | Stop reason: ALTCHOICE

## 2018-03-28 RX ORDER — LOSARTAN POTASSIUM 100 MG/1
TABLET ORAL
Qty: 90 TABLET | Refills: 0 | Status: SHIPPED | OUTPATIENT
Start: 2018-03-28 | End: 2018-10-11 | Stop reason: SDUPTHER

## 2018-03-28 RX ORDER — SPIRONOLACTONE 25 MG/1
TABLET ORAL
Qty: 90 TABLET | Refills: 0 | Status: SHIPPED | OUTPATIENT
Start: 2018-03-28 | End: 2019-07-08

## 2018-07-05 ENCOUNTER — OFFICE VISIT (OUTPATIENT)
Dept: CARDIOLOGY | Facility: CLINIC | Age: 73
End: 2018-07-05

## 2018-07-05 VITALS
SYSTOLIC BLOOD PRESSURE: 140 MMHG | DIASTOLIC BLOOD PRESSURE: 88 MMHG | HEIGHT: 62 IN | WEIGHT: 151 LBS | HEART RATE: 64 BPM | BODY MASS INDEX: 27.79 KG/M2

## 2018-07-05 DIAGNOSIS — I10 ESSENTIAL HYPERTENSION: Chronic | ICD-10-CM

## 2018-07-05 DIAGNOSIS — E78.2 MIXED HYPERLIPIDEMIA: Chronic | ICD-10-CM

## 2018-07-05 DIAGNOSIS — I25.118 CORONARY ARTERY DISEASE OF NATIVE ARTERY OF NATIVE HEART WITH STABLE ANGINA PECTORIS (HCC): Primary | Chronic | ICD-10-CM

## 2018-07-05 PROCEDURE — 99213 OFFICE O/P EST LOW 20 MIN: CPT | Performed by: INTERNAL MEDICINE

## 2018-07-05 PROCEDURE — 93000 ELECTROCARDIOGRAM COMPLETE: CPT | Performed by: INTERNAL MEDICINE

## 2018-07-05 RX ORDER — NITROGLYCERIN 0.4 MG/1
0.4 TABLET SUBLINGUAL
COMMUNITY
End: 2018-08-03 | Stop reason: SDUPTHER

## 2018-07-05 RX ORDER — LEVOTHYROXINE SODIUM 88 UG/1
88 CAPSULE ORAL DAILY
COMMUNITY
End: 2019-07-10 | Stop reason: HOSPADM

## 2018-07-05 NOTE — PROGRESS NOTES
Subjective:     Encounter Date: 7/5/2018      Patient ID: Ashlie Levi is a 72 y.o. female.    Chief Complaint: CAD  History of Present Illness    Dear Dr. John,    I had the pleasure of seeing this patient in the office today for follow-up of her cardiac status.  She comes in for routine follow-up; it's been one year since her last visit.  She has a history of CAD and had a stent placed in her LAD in 2006.  This was performed by Dr. Caldwell at [facility].  She had a stress test performed  June 2017 that demonstrated normal left ventricular and no ischemia.    She continues to have generalized fatigue.  She also has been seen in the emergency room twice for abdominal cramping and pain.  CT scan suggested enteritis.  Upper GI showed no pathology.    She has not had any cardiac issues since I last saw her.  She has not had any chest pain or angina pectoris.  No shortness of breath.  No palpitations or heart racing.  No presyncope or syncope.  No orthopnea or PND.  She's not been hospitalized for any reason.    She denies any palpitations or heart racing.  No presyncope or syncope.  No orthopnea or PND.  She has not experienced any lower extremity edema.    The following portions of the patient's history were reviewed and updated as appropriate: allergies, current medications, past family history, past medical history, past social history, past surgical history and problem list.    Past Medical History:   Diagnosis Date   • Abnormal electrocardiogram    • Arthritis    • Chest discomfort    • Coronary artery disease    • Disease of thyroid gland    • Hyperlipidemia    • Hypertension        Past Surgical History:   Procedure Laterality Date   • APPENDECTOMY     • CARDIAC SURGERY     • CHOLECYSTECTOMY     • COLONOSCOPY N/A 7/13/2016    Procedure: COLONOSCOPY;  Surgeon: Giovani Avelar MD;  Location: [facility];  Service:    • CORONARY STENT PLACEMENT     • ENDOSCOPY N/A 11/21/2017    Procedure:  ESOPHAGOGASTRODUODENOSCOPY with CARIN test ;  Surgeon: Alfreda Soler DO;  Location: [facility] OR;  Service:    • EYE SURGERY     • FOOT GANGLION EXCISION Left    • HEMORROIDECTOMY     • HERNIA REPAIR     • HYSTERECTOMY         Social History     Social History   • Marital status:      Spouse name: N/A   • Number of children: N/A   • Years of education: N/A     Occupational History   • Not on file.     Social History Main Topics   • Smoking status: Former Smoker     Packs/day: 1.00     Years: 50.00     Quit date: 10/13/2006   • Smokeless tobacco: Never Used   • Alcohol use Yes      Comment: occasional   • Drug use: No   • Sexual activity: Defer     Other Topics Concern   • Not on file     Social History Narrative   • No narrative on file       Review of Systems   Constitution: Negative for chills, decreased appetite, fever and night sweats.   HENT: Negative for ear discharge, ear pain, hearing loss, nosebleeds and sore throat.    Eyes: Negative for blurred vision, double vision and pain.   Cardiovascular: Negative for cyanosis.   Respiratory: Negative for hemoptysis and sputum production.    Endocrine: Negative for cold intolerance and heat intolerance.   Hematologic/Lymphatic: Negative for adenopathy.   Skin: Negative for dry skin, itching, nail changes, rash and suspicious lesions.   Musculoskeletal: Negative for arthritis, gout, muscle cramps, muscle weakness, myalgias and neck pain.   Gastrointestinal: Negative for anorexia, bowel incontinence, constipation, diarrhea, dysphagia, hematemesis and jaundice.   Genitourinary: Negative for bladder incontinence, dysuria, flank pain, frequency, hematuria and nocturia.   Neurological: Negative for focal weakness, numbness, paresthesias and seizures.   Psychiatric/Behavioral: Negative for altered mental status, hallucinations, hypervigilance, suicidal ideas and thoughts of violence.   Allergic/Immunologic: Negative for persistent infections.         ECG 12  "Lead  Date/Time: 7/5/2018 2:39 PM  Performed by: JEAN CLAUDE ORTIZ III.  Authorized by: JEAN CLAUDE ORTIZ III   Comparison: compared with previous ECG   Similar to previous ECG  Rhythm: sinus rhythm  Rate: normal  Conduction: conduction normal  ST Segments: ST segments normal  T Waves: T waves normal  QRS axis: normal  Other: no other findings  Clinical impression: normal ECG               Objective:     Vitals:    07/05/18 1416   BP: 140/88   Pulse: 64   Weight: 68.5 kg (151 lb)   Height: 157.5 cm (62\")         Physical Exam   Constitutional: She is oriented to person, place, and time. She appears well-developed and well-nourished. No distress.   HENT:   Head: Normocephalic and atraumatic.   Nose: Nose normal.   Mouth/Throat: Oropharynx is clear and moist.   Eyes: Conjunctivae and EOM are normal. Pupils are equal, round, and reactive to light. Right eye exhibits no discharge. Left eye exhibits no discharge.   Neck: Normal range of motion. Neck supple. No tracheal deviation present. No thyromegaly present.   Cardiovascular: Normal rate, regular rhythm, S1 normal, S2 normal, normal heart sounds and normal pulses.  Exam reveals no S3.    Pulmonary/Chest: Effort normal and breath sounds normal. No stridor. No respiratory distress. She exhibits no tenderness.   Abdominal: Soft. Bowel sounds are normal. She exhibits no distension and no mass. There is no tenderness. There is no rebound and no guarding.   Musculoskeletal: Normal range of motion. She exhibits no tenderness or deformity.   Lymphadenopathy:     She has no cervical adenopathy.   Neurological: She is alert and oriented to person, place, and time. She has normal reflexes.   Skin: Skin is warm and dry. No rash noted. She is not diaphoretic. No erythema.   Psychiatric: She has a normal mood and affect. Thought content normal.       Lab Review:             Performed        Assessment:          Diagnosis Plan   1. Coronary artery disease of native artery of native heart " with stable angina pectoris (CMS/[facility])  ECG 12 Lead   2. Essential hypertension  ECG 12 Lead   3. Mixed hyperlipidemia            Plan:       1. Coronary Artery Disease  Assessment  • The patient has no angina    Plan  • Lifestyle modifications discussed include adhering to a heart healthy diet, avoidance of tobacco products, maintenance of a healthy weight, medication compliance, regular exercise and regular monitoring of cholesterol and blood pressure    Subjective - Objective  • There has been a previous stent procedure using HERBERTH  • Current antiplatelet therapy includes aspirin 81 mg      Thank you very much for allowing us to participate in the care of this pleasant patient.  Please don't hesitate to call if I can be of assistance in any way.      Current Outpatient Prescriptions:   •  amLODIPine (NORVASC) 2.5 MG tablet, Take 1 tablet by mouth Daily., Disp: 90 tablet, Rfl: 1  •  aspirin 81 MG tablet, Take 81 mg by mouth Daily., Disp: , Rfl:   •  carvedilol (COREG) 6.25 MG tablet, Take 1 tablet by mouth 2 (Two) Times a Day., Disp: 180 tablet, Rfl: 0  •  isosorbide mononitrate (ISMO,MONOKET) 20 MG tablet, Take 1 tablet by mouth 2 (Two) Times a Day., Disp: 360 tablet, Rfl: 3  •  levothyroxine (SYNTHROID, LEVOTHROID) 112 MCG tablet, Take 112 mcg by mouth Daily., Disp: , Rfl:   •  losartan (COZAAR) 100 MG tablet, TAKE ONE TABLET BY MOUTH ONCE DAILY, Disp: 90 tablet, Rfl: 0  •  nitroglycerin (NITROSTAT) 0.4 MG SL tablet, Place 0.4 mg under the tongue Every 5 (Five) Minutes As Needed for Chest Pain. Take no more than 3 doses in 15 minutes., Disp: , Rfl:   •  sertraline (ZOLOFT) 100 MG tablet, Take 100 mg by mouth daily., Disp: , Rfl:   •  spironolactone (ALDACTONE) 25 MG tablet, TAKE ONE TABLET BY MOUTH ONCE DAILY, Disp: 90 tablet, Rfl: 0         EMR Dragon/Transcription disclaimer:    Much of this encounter note is an electronic transcription/translation of spoken language to printed text. The electronic translation of  spoken language may permit erroneous, or at times, nonsensical words or phrases to be inadvertently transcribed; Although I have reviewed the note for such errors, some may still exist.

## 2018-07-05 NOTE — PATIENT INSTRUCTIONS
Heart Disease Prevention  Heart disease is a leading cause of death. There are many things you can do to help prevent heart disease.  Be physically active  Physical activity is good for your heart. It helps control your blood pressure, cholesterol levels, and weight. Try to be physically active every day. Ask your health care provider what activities are best for you.  Be a healthy weight  Extra weight can strain your heart and affect your blood pressure and cholesterol levels. Lose weight with diet and exercise if recommended by your health care provider.  Eat heart-healthy foods  Follow a healthy eating plan as recommended by your health care provider or dietitian. Heart-healthy foods include:  · High-fiber foods. These include oat bran, oatmeal, and whole-grain breads and cereals.  · Fruits and vegetables.    Avoid:  · Alcohol.  · Fried foods.  · Foods high in saturated fat. These include meats, butter, whole dairy products, shortening, and coconut or palm oil.  · Salty foods. These include canned food, luncheon meat, salty snacks, and fast food.    Keep your cholesterol levels under control  Cholesterol is a substance that is used for many important functions. When your cholesterol levels are high, cholesterol can stick to the insides of your blood vessels, making them narrow or clog. This can lead to chest pain (angina) and a heart attack.  Keep your cholesterol levels under control as recommended by your health care provider. Have your cholesterol checked at least once a year. Target cholesterol levels (in mg/dL) for most people are:  · Total cholesterol below 200.  · LDL cholesterol below 100.  · HDL cholesterol above 40 in men and above 50 in women.  · Triglycerides below 150.    Keep your blood pressure under control  Having high blood pressure (hypertension) puts you at risk for stroke and other forms of heart disease. Keep your blood pressure under control as recommended by your health care provider. Ask  your health care provider if you need treatment to lower your blood pressure. If you are 18-39 years of age, have your blood pressure checked every 3-5 years. If you are 40 years of age or older, have your blood pressure checked every year.  Do not use tobacco products  Tobacco smoke can damage your heart and blood vessels. Do not use any tobacco products including cigarettes, chewing tobacco, or electronic cigarettes. If you need help quitting, ask your health care provider.  Take medicines as directed  Take medicines only as directed by your health care provider. Ask your health care provider whether you should take an aspirin every day. Taking aspirin can help reduce your risk of heart disease and stroke.  Where to find more information:  To find out more about heart disease, visit the American Heart Association's website at www.americanheart.org  This information is not intended to replace advice given to you by your health care provider. Make sure you discuss any questions you have with your health care provider.  Document Released: 08/01/2005 Document Revised: 05/17/2017 Document Reviewed: 02/11/2015  Elsevier Interactive Patient Education © 2018 [employer]

## 2018-07-07 DIAGNOSIS — I10 ESSENTIAL HYPERTENSION: ICD-10-CM

## 2018-07-10 RX ORDER — LOSARTAN POTASSIUM 100 MG/1
TABLET ORAL
Qty: 90 TABLET | Refills: 1 | Status: SHIPPED | OUTPATIENT
Start: 2018-07-10 | End: 2020-03-26 | Stop reason: SDUPTHER

## 2018-07-10 RX ORDER — SPIRONOLACTONE 25 MG/1
TABLET ORAL
Qty: 90 TABLET | Refills: 3 | Status: SHIPPED | OUTPATIENT
Start: 2018-07-10 | End: 2018-10-11 | Stop reason: SDUPTHER

## 2018-07-10 RX ORDER — CARVEDILOL 6.25 MG/1
TABLET ORAL
Qty: 180 TABLET | Refills: 3 | Status: SHIPPED | OUTPATIENT
Start: 2018-07-10 | End: 2019-09-02 | Stop reason: SDUPTHER

## 2018-07-10 RX ORDER — AMLODIPINE BESYLATE 2.5 MG/1
TABLET ORAL
Qty: 90 TABLET | Refills: 3 | Status: SHIPPED | OUTPATIENT
Start: 2018-07-10 | End: 2018-10-11 | Stop reason: SDUPTHER

## 2018-07-10 RX ORDER — ISOSORBIDE MONONITRATE 20 MG/1
TABLET ORAL
Qty: 180 TABLET | Refills: 3 | Status: SHIPPED | OUTPATIENT
Start: 2018-07-10 | End: 2019-07-25 | Stop reason: SDUPTHER

## 2018-08-03 RX ORDER — NITROGLYCERIN 0.4 MG/1
TABLET SUBLINGUAL
Qty: 25 TABLET | Refills: 0 | Status: SHIPPED | OUTPATIENT
Start: 2018-08-03 | End: 2019-07-08

## 2018-10-11 ENCOUNTER — OFFICE VISIT (OUTPATIENT)
Dept: CARDIOLOGY | Facility: CLINIC | Age: 73
End: 2018-10-11

## 2018-10-11 ENCOUNTER — LAB (OUTPATIENT)
Dept: LAB | Facility: HOSPITAL | Age: 73
End: 2018-10-11
Attending: INTERNAL MEDICINE

## 2018-10-11 VITALS
SYSTOLIC BLOOD PRESSURE: 110 MMHG | BODY MASS INDEX: 26.46 KG/M2 | HEIGHT: 62 IN | DIASTOLIC BLOOD PRESSURE: 66 MMHG | WEIGHT: 143.8 LBS | HEART RATE: 57 BPM

## 2018-10-11 DIAGNOSIS — I25.118 CORONARY ARTERY DISEASE OF NATIVE ARTERY OF NATIVE HEART WITH STABLE ANGINA PECTORIS (HCC): Primary | Chronic | ICD-10-CM

## 2018-10-11 DIAGNOSIS — I10 ESSENTIAL HYPERTENSION: Chronic | ICD-10-CM

## 2018-10-11 DIAGNOSIS — I95.9 HYPOTENSION, UNSPECIFIED HYPOTENSION TYPE: ICD-10-CM

## 2018-10-11 DIAGNOSIS — I25.118 CORONARY ARTERY DISEASE OF NATIVE ARTERY OF NATIVE HEART WITH STABLE ANGINA PECTORIS (HCC): Chronic | ICD-10-CM

## 2018-10-11 LAB
ALBUMIN SERPL-MCNC: 4.7 G/DL (ref 3.5–5.2)
ALBUMIN/GLOB SERPL: 1.6 G/DL
ALP SERPL-CCNC: 61 U/L (ref 40–129)
ALT SERPL W P-5'-P-CCNC: 29 U/L (ref 5–33)
ANION GAP SERPL CALCULATED.3IONS-SCNC: 11.7 MMOL/L
AST SERPL-CCNC: 30 U/L (ref 5–32)
BILIRUB SERPL-MCNC: 0.5 MG/DL (ref 0.2–1.2)
BUN BLD-MCNC: 13 MG/DL (ref 8–23)
BUN/CREAT SERPL: 11.9 (ref 7–25)
CALCIUM SPEC-SCNC: 9.9 MG/DL (ref 8.8–10.5)
CHLORIDE SERPL-SCNC: 102 MMOL/L (ref 98–107)
CO2 SERPL-SCNC: 28.3 MMOL/L (ref 22–29)
CREAT BLD-MCNC: 1.09 MG/DL (ref 0.57–1)
DEPRECATED RDW RBC AUTO: 42.4 FL (ref 37–54)
ERYTHROCYTE [DISTWIDTH] IN BLOOD BY AUTOMATED COUNT: 12.5 % (ref 11.5–14.5)
GFR SERPL CREATININE-BSD FRML MDRD: 49 ML/MIN/1.73
GLOBULIN UR ELPH-MCNC: 3 GM/DL
GLUCOSE BLD-MCNC: 94 MG/DL (ref 65–99)
HCT VFR BLD AUTO: 40.3 % (ref 37–47)
HGB BLD-MCNC: 13.5 G/DL (ref 12–16)
MCH RBC QN AUTO: 31 PG (ref 27–31)
MCHC RBC AUTO-ENTMCNC: 33.5 G/DL (ref 31–37)
MCV RBC AUTO: 92.4 FL (ref 81–99)
PLATELET # BLD AUTO: 205 10*3/MM3 (ref 140–500)
PMV BLD AUTO: 10.1 FL (ref 7.4–10.4)
POTASSIUM BLD-SCNC: 5.3 MMOL/L (ref 3.5–5.2)
PROT SERPL-MCNC: 7.7 G/DL (ref 6–8.5)
RBC # BLD AUTO: 4.36 10*6/MM3 (ref 4.2–5.4)
SODIUM BLD-SCNC: 142 MMOL/L (ref 136–145)
T-UPTAKE NFR SERPL: 1.03 TBI (ref 0.8–1.3)
T4 SERPL-MCNC: 10.81 MCG/DL (ref 4.5–11.7)
TSH SERPL DL<=0.05 MIU/L-ACNC: 0.27 MIU/ML (ref 0.27–4.2)
WBC NRBC COR # BLD: 7.98 10*3/MM3 (ref 4.8–10.8)

## 2018-10-11 PROCEDURE — 80053 COMPREHEN METABOLIC PANEL: CPT

## 2018-10-11 PROCEDURE — 84443 ASSAY THYROID STIM HORMONE: CPT

## 2018-10-11 PROCEDURE — 36415 COLL VENOUS BLD VENIPUNCTURE: CPT

## 2018-10-11 PROCEDURE — 99214 OFFICE O/P EST MOD 30 MIN: CPT | Performed by: INTERNAL MEDICINE

## 2018-10-11 PROCEDURE — 85027 COMPLETE CBC AUTOMATED: CPT

## 2018-10-11 PROCEDURE — 84479 ASSAY OF THYROID (T3 OR T4): CPT

## 2018-10-11 PROCEDURE — 84436 ASSAY OF TOTAL THYROXINE: CPT

## 2018-10-11 RX ORDER — RANITIDINE 300 MG/1
300 TABLET ORAL NIGHTLY
Status: ON HOLD | COMMUNITY
End: 2019-06-13

## 2018-10-11 NOTE — PROGRESS NOTES
Subjective:     Encounter Date: 10/11/2018      Patient ID: Ashlie Levi is a 72 y.o. female.    Chief Complaint: CAD  History of Present Illness    Dear Dr. Conway,    I had the pleasure of seeing this patient in the office today for follow-up of her cardiac status.  She comes in early because she has been having low blood pressure.  She has a history of CAD and had a stent placed in her LAD in 2006.  This was performed by Dr. Caldwell at [facility].  She had a stress test performed  June 2017 that demonstrated normal left ventricular and no ischemia.    Lately, they have been following her blood pressure and at times it's been running low.  She also has been having some fatigability and occasionally has had some dizziness particularly on standing.  She has not had any chest pain or chest discomfort.  No palpitations or tachycardia.  She's not had a chills or fevers.  She has not recently had any blood work performed.    She denies any palpitations or heart racing.  No presyncope or syncope.  No orthopnea or PND.  She has not experienced any lower extremity edema.    The following portions of the patient's history were reviewed and updated as appropriate: allergies, current medications, past family history, past medical history, past social history, past surgical history and problem list.    Past Medical History:   Diagnosis Date   • Abnormal electrocardiogram    • Arthritis    • Chest discomfort    • Coronary artery disease    • Disease of thyroid gland    • Hyperlipidemia    • Hypertension        Past Surgical History:   Procedure Laterality Date   • APPENDECTOMY     • CARDIAC SURGERY     • CHOLECYSTECTOMY     • COLONOSCOPY N/A 7/13/2016    Procedure: COLONOSCOPY;  Surgeon: Giovani Avelar MD;  Location: [facility] OR;  Service:    • CORONARY STENT PLACEMENT     • ENDOSCOPY N/A 11/21/2017    Procedure: ESOPHAGOGASTRODUODENOSCOPY with CARIN test ;  Surgeon: Alfreda Soler DO;  Location: [facility] OR;   Service:    • EYE SURGERY     • FOOT GANGLION EXCISION Left    • HEMORROIDECTOMY     • HERNIA REPAIR     • HYSTERECTOMY         Social History     Social History   • Marital status:      Spouse name: N/A   • Number of children: N/A   • Years of education: N/A     Occupational History   • Not on file.     Social History Main Topics   • Smoking status: Former Smoker     Packs/day: 1.00     Years: 50.00     Quit date: 10/13/2006   • Smokeless tobacco: Never Used   • Alcohol use Yes      Comment: occasional   • Drug use: No   • Sexual activity: Defer     Other Topics Concern   • Not on file     Social History Narrative   • No narrative on file       Review of Systems   Constitution: Negative for chills, decreased appetite, fever and night sweats.   HENT: Negative for ear discharge, ear pain, hearing loss, nosebleeds and sore throat.    Eyes: Negative for blurred vision, double vision and pain.   Cardiovascular: Negative for cyanosis.   Respiratory: Negative for hemoptysis and sputum production.    Endocrine: Negative for cold intolerance and heat intolerance.   Hematologic/Lymphatic: Negative for adenopathy.   Skin: Negative for dry skin, itching, nail changes, rash and suspicious lesions.   Musculoskeletal: Negative for arthritis, gout, muscle cramps, muscle weakness, myalgias and neck pain.   Gastrointestinal: Negative for anorexia, bowel incontinence, constipation, diarrhea, dysphagia, hematemesis and jaundice.   Genitourinary: Negative for bladder incontinence, dysuria, flank pain, frequency, hematuria and nocturia.   Neurological: Negative for focal weakness, numbness, paresthesias and seizures.   Psychiatric/Behavioral: Negative for altered mental status, hallucinations, hypervigilance, suicidal ideas and thoughts of violence.   Allergic/Immunologic: Negative for persistent infections.       Procedures       Objective:     Vitals:    10/11/18 1404   BP: 110/66   Pulse: 57   Weight: 65.2 kg (143 lb 12.8 oz)  "  Height: 157.5 cm (62\")         Physical Exam   Constitutional: She is oriented to person, place, and time. She appears well-developed and well-nourished. No distress.   HENT:   Head: Normocephalic and atraumatic.   Nose: Nose normal.   Mouth/Throat: Oropharynx is clear and moist.   Eyes: Pupils are equal, round, and reactive to light. Conjunctivae and EOM are normal. Right eye exhibits no discharge. Left eye exhibits no discharge.   Neck: Normal range of motion. Neck supple. No tracheal deviation present. No thyromegaly present.   Cardiovascular: Normal rate, regular rhythm, S1 normal, S2 normal, normal heart sounds and normal pulses.  Exam reveals no S3.    Pulmonary/Chest: Effort normal and breath sounds normal. No stridor. No respiratory distress. She exhibits no tenderness.   Abdominal: Soft. Bowel sounds are normal. She exhibits no distension and no mass. There is no tenderness. There is no rebound and no guarding.   Musculoskeletal: Normal range of motion. She exhibits no tenderness or deformity.   Lymphadenopathy:     She has no cervical adenopathy.   Neurological: She is alert and oriented to person, place, and time. She has normal reflexes.   Skin: Skin is warm and dry. No rash noted. She is not diaphoretic. No erythema.   Psychiatric: She has a normal mood and affect. Thought content normal.       Lab Review:             Performed        Assessment:          Diagnosis Plan   1. Coronary artery disease of native artery of native heart with stable angina pectoris (CMS/[facility])  Comprehensive Metabolic Panel    Thyroid Panel With TSH    CBC (No Diff)   2. Essential hypertension  Comprehensive Metabolic Panel    Thyroid Panel With TSH    CBC (No Diff)   3. Hypotension, unspecified hypotension type  Comprehensive Metabolic Panel    Thyroid Panel With TSH    CBC (No Diff)          Plan:       1. Coronary Artery Disease  Assessment  • The patient has no angina    Plan  • Lifestyle modifications discussed include " adhering to a heart healthy diet, avoidance of tobacco products, maintenance of a healthy weight, medication compliance, regular exercise and regular monitoring of cholesterol and blood pressure    Subjective - Objective  • There has been a previous stent procedure using HERBERTH  • Current antiplatelet therapy includes aspirin 81 mg    2.  Episodes of hypotension-I'm in to have her stop her amlodipine; she is only taking 2.5 mg daily of that.  We will also send her over for blood work.  I'll have her call me in a week with her blood pressure response and of course we will review the results of the lab tests as they become available.  Thank you very much for allowing us to participate in the care of this pleasant patient.  Please don't hesitate to call if I can be of assistance in any way.      Current Outpatient Prescriptions:   •  amLODIPine (NORVASC) 2.5 MG tablet, Take 1 tablet by mouth Daily., Disp: 90 tablet, Rfl: 1  •  aspirin 81 MG tablet, Take 81 mg by mouth Daily., Disp: , Rfl:   •  carvedilol (COREG) 6.25 MG tablet, TAKE ONE TABLET BY MOUTH TWICE DAILY (Patient taking differently: TAKE ONE TABLET BY MOUTH DAILY), Disp: 180 tablet, Rfl: 3  •  isosorbide mononitrate (ISMO,MONOKET) 20 MG tablet, TAKE ONE TABLET BY MOUTH TWICE DAILY, Disp: 180 tablet, Rfl: 3  •  levothyroxine (SYNTHROID, LEVOTHROID) 112 MCG tablet, Take 112 mcg by mouth Daily., Disp: , Rfl:   •  losartan (COZAAR) 100 MG tablet, TAKE ONE TABLET BY MOUTH ONCE DAILY, Disp: 90 tablet, Rfl: 1  •  nitroglycerin (NITROSTAT) 0.4 MG SL tablet, DISSOLVE ONE TABLET UNDER THE TONGUE EVERY 5 MINUTES AS NEEDED FOR CHEST PAIN.  DO NOT EXCEED A TOTAL OF 3 DOSES IN 15 MINUTES, Disp: 25 tablet, Rfl: 0  •  raNITIdine (ZANTAC) 300 MG tablet, Take 300 mg by mouth Every Night., Disp: , Rfl:   •  sertraline (ZOLOFT) 100 MG tablet, Take 100 mg by mouth daily., Disp: , Rfl:   •  spironolactone (ALDACTONE) 25 MG tablet, TAKE ONE TABLET BY MOUTH ONCE DAILY, Disp: 90 tablet,  Rfl: 0         JAMES Dragon/Transcription disclaimer:    Much of this encounter note is an electronic transcription/translation of spoken language to printed text. The electronic translation of spoken language may permit erroneous, or at times, nonsensical words or phrases to be inadvertently transcribed; Although I have reviewed the note for such errors, some may still exist.

## 2018-10-12 ENCOUNTER — TELEPHONE (OUTPATIENT)
Dept: CARDIOLOGY | Facility: CLINIC | Age: 73
End: 2018-10-12

## 2018-10-12 DIAGNOSIS — T50.0X5A SIDE EFFECT OF SPIRONOLACTONE: Primary | ICD-10-CM

## 2018-10-12 NOTE — TELEPHONE ENCOUNTER
----- Message from Al Granado III, MD sent at 10/12/2018  1:00 PM EDT -----  Please call patient and let her know that all of her labs look fine except her potassium is a little bit elevated.  I want her to cut her spironolactone in half, take one half tablet daily, and then I have placed an order for a BMP to be drawn in about 3 weeks.

## 2018-10-12 NOTE — TELEPHONE ENCOUNTER
I spoke to her esa. He is aware that her labs look fine except her potassium is a little bit elevated. She will cut her spironolactone in half, take one half tablet daily, and have a BMP check in 3 weeks. (11/2/18)

## 2018-11-01 ENCOUNTER — LAB (OUTPATIENT)
Dept: LAB | Facility: HOSPITAL | Age: 73
End: 2018-11-01
Attending: INTERNAL MEDICINE

## 2018-11-01 DIAGNOSIS — T50.0X5A SIDE EFFECT OF SPIRONOLACTONE: ICD-10-CM

## 2018-11-01 LAB
ANION GAP SERPL CALCULATED.3IONS-SCNC: 11.9 MMOL/L
BUN BLD-MCNC: 18 MG/DL (ref 8–23)
BUN/CREAT SERPL: 20 (ref 7–25)
CALCIUM SPEC-SCNC: 9.8 MG/DL (ref 8.8–10.5)
CHLORIDE SERPL-SCNC: 99 MMOL/L (ref 98–107)
CO2 SERPL-SCNC: 26.1 MMOL/L (ref 22–29)
CREAT BLD-MCNC: 0.9 MG/DL (ref 0.57–1)
GFR SERPL CREATININE-BSD FRML MDRD: 61 ML/MIN/1.73
GLUCOSE BLD-MCNC: 106 MG/DL (ref 65–99)
POTASSIUM BLD-SCNC: 4.5 MMOL/L (ref 3.5–5.2)
SODIUM BLD-SCNC: 137 MMOL/L (ref 136–145)

## 2018-11-01 PROCEDURE — 80048 BASIC METABOLIC PNL TOTAL CA: CPT

## 2018-11-01 PROCEDURE — 36415 COLL VENOUS BLD VENIPUNCTURE: CPT

## 2018-11-02 ENCOUNTER — TELEPHONE (OUTPATIENT)
Dept: CARDIOLOGY | Facility: CLINIC | Age: 73
End: 2018-11-02

## 2018-11-02 NOTE — TELEPHONE ENCOUNTER
----- Message from lA Granado III, MD sent at 11/2/2018 10:41 AM EDT -----  Please call and let this patient know that the lab results look good. Continue the current medical regimen.

## 2019-01-09 RX ORDER — LOSARTAN POTASSIUM 100 MG/1
TABLET ORAL
Qty: 90 TABLET | Refills: 0 | Status: SHIPPED | OUTPATIENT
Start: 2019-01-09 | End: 2019-02-02 | Stop reason: SDUPTHER

## 2019-02-04 RX ORDER — LOSARTAN POTASSIUM 100 MG/1
TABLET ORAL
Qty: 90 TABLET | Refills: 1 | Status: SHIPPED | OUTPATIENT
Start: 2019-02-04 | End: 2019-05-29 | Stop reason: SDUPTHER

## 2019-05-07 ENCOUNTER — TELEPHONE (OUTPATIENT)
Dept: CARDIOLOGY | Facility: CLINIC | Age: 74
End: 2019-05-07

## 2019-05-07 NOTE — TELEPHONE ENCOUNTER
is out and will return on Friday.    Pt is having 3 teeth pulled tomorrow with  in [place].     PT stopped her ASA 81 MG since 5/1/19    She is wanting to know if it safe to have her teeth pulled from a heart standpoint. Pt was last seen on 10/11/18.     PT #: [phone]    Thanks Lamar

## 2019-05-29 ENCOUNTER — OFFICE VISIT (OUTPATIENT)
Dept: CARDIOLOGY | Facility: CLINIC | Age: 74
End: 2019-05-29

## 2019-05-29 VITALS
HEIGHT: 62 IN | DIASTOLIC BLOOD PRESSURE: 82 MMHG | WEIGHT: 154 LBS | HEART RATE: 57 BPM | BODY MASS INDEX: 28.34 KG/M2 | SYSTOLIC BLOOD PRESSURE: 142 MMHG

## 2019-05-29 DIAGNOSIS — I10 ESSENTIAL HYPERTENSION: Chronic | ICD-10-CM

## 2019-05-29 DIAGNOSIS — E78.2 MIXED HYPERLIPIDEMIA: Chronic | ICD-10-CM

## 2019-05-29 DIAGNOSIS — I25.118 CORONARY ARTERY DISEASE OF NATIVE ARTERY OF NATIVE HEART WITH STABLE ANGINA PECTORIS (HCC): Primary | Chronic | ICD-10-CM

## 2019-05-29 PROCEDURE — 99214 OFFICE O/P EST MOD 30 MIN: CPT | Performed by: INTERNAL MEDICINE

## 2019-05-29 PROCEDURE — 93000 ELECTROCARDIOGRAM COMPLETE: CPT | Performed by: INTERNAL MEDICINE

## 2019-05-29 RX ORDER — CLINDAMYCIN HYDROCHLORIDE 300 MG/1
300 CAPSULE ORAL 3 TIMES DAILY
Status: ON HOLD | COMMUNITY
End: 2019-07-08

## 2019-05-29 NOTE — PROGRESS NOTES
Subjective:     Encounter Date: 5/29/2019      Patient ID: Ashlie Levi is a 73 y.o. female.    Chief Complaint: CAD  History of Present Illness    Dear Dr. Conway,    I had the pleasure of seeing this patient in the office today for follow-up of her cardiac status.  She comes in early because she has been having concerns of significant fatigue, some dyspnea on exertion, and some epigastric discomfort.  She has a history of CAD and had a stent placed in her LAD in 2006.  This was performed by Dr. Caldwell at [facility].  She had a stress test performed  June 2017 that demonstrated normal left ventricular and no ischemia.    Lately she is just been very fatigued.  She has no energy at all.  She has been having some dyspnea with activity.  She notes that she has had some epigastric discomfort but she thinks that it is indigestion.  She has not had any precordial discomfort with activity.  No palpitations or tachycardia.  She does have a history of hypothyroidism on replacement therapy.  She states that her thyroid replacement was decreased over the winter when she was in [place].  It has not been checked since.    I do not have a recent lipid level.  She thinks they told her that it was increased somewhat but we do not have those results and is been many months now since she had any blood work performed.    She denies any lower extremity edema.  No orthopnea or PND.      The following portions of the patient's history were reviewed and updated as appropriate: allergies, current medications, past family history, past medical history, past social history, past surgical history and problem list.    Past Medical History:   Diagnosis Date   • Abnormal electrocardiogram    • Arthritis    • Chest discomfort    • Colon polyp    • Coronary artery disease    • Disease of thyroid gland    • Hyperlipidemia    • Hypertension        Past Surgical History:   Procedure Laterality Date   • APPENDECTOMY     • CARDIAC SURGERY      • CHOLECYSTECTOMY     • COLONOSCOPY N/A 2016    Procedure: COLONOSCOPY;  Surgeon: Giovani Avelar MD;  Location: [facility] OR;  Service:    • CORONARY STENT PLACEMENT     • ENDOSCOPY N/A 2017    Procedure: ESOPHAGOGASTRODUODENOSCOPY with CARIN test ;  Surgeon: Alfreda Soler DO;  Location:  [facility] OR;  Service:    • EYE SURGERY     • FOOT GANGLION EXCISION Left    • HEMORROIDECTOMY     • HERNIA REPAIR     • HYSTERECTOMY         Social History     Socioeconomic History   • Marital status:      Spouse name: Not on file   • Number of children: Not on file   • Years of education: Not on file   • Highest education level: Not on file   Tobacco Use   • Smoking status: Former Smoker     Packs/day: 1.00     Years: 50.00     Pack years: 50.00     Last attempt to quit: 10/13/2006     Years since quittin.6   • Smokeless tobacco: Never Used   Substance and Sexual Activity   • Alcohol use: Yes     Comment: occasional   • Drug use: No   • Sexual activity: Defer       Review of Systems   Constitution: Negative for chills, decreased appetite, fever and night sweats.   HENT: Negative for ear discharge, ear pain, hearing loss, nosebleeds and sore throat.    Eyes: Negative for blurred vision, double vision and pain.   Cardiovascular: Negative for cyanosis.   Respiratory: Negative for hemoptysis and sputum production.    Endocrine: Negative for cold intolerance and heat intolerance.   Hematologic/Lymphatic: Negative for adenopathy.   Skin: Negative for dry skin, itching, nail changes, rash and suspicious lesions.   Musculoskeletal: Negative for arthritis, gout, muscle cramps, muscle weakness, myalgias and neck pain.   Gastrointestinal: Negative for anorexia, bowel incontinence, constipation, diarrhea, dysphagia, hematemesis and jaundice.   Genitourinary: Negative for bladder incontinence, dysuria, flank pain, frequency, hematuria and nocturia.   Neurological: Negative for focal weakness, numbness,  "paresthesias and seizures.   Psychiatric/Behavioral: Negative for altered mental status, hallucinations, hypervigilance, suicidal ideas and thoughts of violence.   Allergic/Immunologic: Negative for persistent infections.         ECG 12 Lead  Date/Time: 5/29/2019 10:05 AM  Performed by: Al Granado III, MD  Authorized by: Al Granado III, MD   Comparison: compared with previous ECG   Similar to previous ECG  Rhythm: sinus rhythm  Rate: normal  Conduction: conduction normal  ST Segments: ST segments normal  ST Flattening: aVF, V1 and V2  T inversion: aVL  T flattening: I, V1 and V2  QRS axis: normal  Other: no other findings    Clinical impression: abnormal EKG               Objective:     Vitals:    05/29/19 0936   BP: 142/82   Pulse: 57   Weight: 69.9 kg (154 lb)   Height: 157.5 cm (62\")         Physical Exam   Constitutional: She is oriented to person, place, and time. She appears well-developed and well-nourished. No distress.   HENT:   Head: Normocephalic and atraumatic.   Nose: Nose normal.   Mouth/Throat: Oropharynx is clear and moist.   Eyes: Conjunctivae and EOM are normal. Pupils are equal, round, and reactive to light. Right eye exhibits no discharge. Left eye exhibits no discharge.   Neck: Normal range of motion. Neck supple. No tracheal deviation present. No thyromegaly present.   Cardiovascular: Normal rate, regular rhythm, S1 normal, S2 normal, normal heart sounds and normal pulses. Exam reveals no S3.   Pulmonary/Chest: Effort normal and breath sounds normal. No stridor. No respiratory distress. She exhibits no tenderness.   Abdominal: Soft. Bowel sounds are normal. She exhibits no distension and no mass. There is no tenderness. There is no rebound and no guarding.   Musculoskeletal: Normal range of motion. She exhibits no tenderness or deformity.   Lymphadenopathy:     She has no cervical adenopathy.   Neurological: She is alert and oriented to person, place, and time. She has normal " reflexes.   Skin: Skin is warm and dry. No rash noted. She is not diaphoretic. No erythema.   Psychiatric: She has a normal mood and affect. Thought content normal.       Lab Review:             Performed        Assessment:          Diagnosis Plan   1. Coronary artery disease of native artery of native heart with stable angina pectoris (CMS/HCC)  Lipid Panel    Comprehensive Metabolic Panel    CBC (No Diff)    Thyroid Panel With TSH    Stress Test With Myocardial Perfusion One Day    ECG 12 Lead   2. Essential hypertension  Lipid Panel    Comprehensive Metabolic Panel    CBC (No Diff)    Thyroid Panel With TSH    Stress Test With Myocardial Perfusion One Day    ECG 12 Lead   3. Mixed hyperlipidemia  Lipid Panel    Comprehensive Metabolic Panel    CBC (No Diff)    Thyroid Panel With TSH    Stress Test With Myocardial Perfusion One Day    ECG 12 Lead          Plan:       1. Coronary Artery Disease  Assessment  • The patient has no angina    Plan  • Lifestyle modifications discussed include adhering to a heart healthy diet, avoidance of tobacco products, maintenance of a healthy weight, medication compliance, regular exercise and regular monitoring of cholesterol and blood pressure    Subjective - Objective  • There has been a previous stent procedure using HERBERTH  • Current antiplatelet therapy includes aspirin 81 mg    2.  Hyperlipidemia-I do not have a recent lipid level, we will arrange for that to be obtained  3.  Generalized fatigue, some episodes of epigastric discomfort, and dyspnea on exertion-a stress Cardiolite will be obtained.  EKG has pre-existing ST/T wave changes  4.  Hypertensive heart disease-at her last visit blood pressure was running low so we discontinued amlodipine.  No problems with hypotension since.  5.  On thyroid replacement- dose of thyroid replacement was diminished in [place] over the winter.  With fatigue, we will recheck thyroid levels    Thank you very much for allowing us to participate  in the care of this pleasant patient.  Please don't hesitate to call if I can be of assistance in any way.      Current Outpatient Medications:   •  aspirin 81 MG tablet, Take 81 mg by mouth Daily., Disp: , Rfl:   •  carvedilol (COREG) 6.25 MG tablet, TAKE ONE TABLET BY MOUTH TWICE DAILY (Patient taking differently: TAKE ONE TABLET twice DAILY), Disp: 180 tablet, Rfl: 3  •  clindamycin (CLEOCIN) 300 MG capsule, Take 300 mg by mouth 3 (Three) Times a Day., Disp: , Rfl:   •  isosorbide mononitrate (ISMO,MONOKET) 20 MG tablet, TAKE ONE TABLET BY MOUTH TWICE DAILY, Disp: 180 tablet, Rfl: 3  •  levothyroxine sodium (TIROSINT) 88 MCG capsule, Take 88 mcg by mouth Daily., Disp: , Rfl:   •  losartan (COZAAR) 100 MG tablet, TAKE ONE TABLET BY MOUTH ONCE DAILY, Disp: 90 tablet, Rfl: 1  •  nitroglycerin (NITROSTAT) 0.4 MG SL tablet, DISSOLVE ONE TABLET UNDER THE TONGUE EVERY 5 MINUTES AS NEEDED FOR CHEST PAIN.  DO NOT EXCEED A TOTAL OF 3 DOSES IN 15 MINUTES, Disp: 25 tablet, Rfl: 0  •  raNITIdine (ZANTAC) 300 MG tablet, Take 300 mg by mouth Every Night., Disp: , Rfl:   •  sertraline (ZOLOFT) 100 MG tablet, Take 100 mg by mouth daily., Disp: , Rfl:   •  spironolactone (ALDACTONE) 25 MG tablet, TAKE ONE TABLET BY MOUTH ONCE DAILY (Patient taking differently: TAKE half TABLET BY MOUTH ONCE DAILY), Disp: 90 tablet, Rfl: 0         EMR Dragon/Transcription disclaimer:    Much of this encounter note is an electronic transcription/translation of spoken language to printed text. The electronic translation of spoken language may permit erroneous, or at times, nonsensical words or phrases to be inadvertently transcribed; Although I have reviewed the note for such errors, some may still exist.

## 2019-05-31 ENCOUNTER — HOSPITAL ENCOUNTER (OUTPATIENT)
Dept: NUCLEAR MEDICINE | Facility: HOSPITAL | Age: 74
Discharge: HOME | End: 2019-05-31

## 2019-05-31 ENCOUNTER — HOSPITAL ENCOUNTER (OUTPATIENT)
Dept: CARDIOLOGY | Facility: HOSPITAL | Age: 74
Discharge: HOME | End: 2019-05-31
Admitting: INTERNAL MEDICINE

## 2019-05-31 ENCOUNTER — LAB (OUTPATIENT)
Dept: LAB | Facility: HOSPITAL | Age: 74
End: 2019-05-31

## 2019-05-31 ENCOUNTER — TELEPHONE (OUTPATIENT)
Dept: CARDIOLOGY | Facility: CLINIC | Age: 74
End: 2019-05-31

## 2019-05-31 DIAGNOSIS — E78.2 MIXED HYPERLIPIDEMIA: Chronic | ICD-10-CM

## 2019-05-31 DIAGNOSIS — E78.2 MIXED HYPERLIPIDEMIA: ICD-10-CM

## 2019-05-31 DIAGNOSIS — I25.118 CORONARY ARTERY DISEASE OF NATIVE ARTERY OF NATIVE HEART WITH STABLE ANGINA PECTORIS (HCC): Chronic | ICD-10-CM

## 2019-05-31 DIAGNOSIS — I10 ESSENTIAL HYPERTENSION: ICD-10-CM

## 2019-05-31 DIAGNOSIS — I25.118 CORONARY ARTERY DISEASE OF NATIVE ARTERY OF NATIVE HEART WITH STABLE ANGINA PECTORIS (HCC): ICD-10-CM

## 2019-05-31 DIAGNOSIS — I10 ESSENTIAL HYPERTENSION: Chronic | ICD-10-CM

## 2019-05-31 LAB
ALBUMIN SERPL-MCNC: 4.4 G/DL (ref 3.5–5.2)
ALBUMIN/GLOB SERPL: 1.3 G/DL
ALP SERPL-CCNC: 82 U/L (ref 39–117)
ALT SERPL W P-5'-P-CCNC: 17 U/L (ref 1–33)
ANION GAP SERPL CALCULATED.3IONS-SCNC: 12.8 MMOL/L
AST SERPL-CCNC: 23 U/L (ref 1–32)
BH CV NUCLEAR PRIOR STUDY: 3
BH CV STRESS BP STAGE 1: NORMAL
BH CV STRESS COMMENTS STAGE 1: NORMAL
BH CV STRESS DOSE REGADENOSON STAGE 1: 0.4
BH CV STRESS DURATION MIN STAGE 1: 0
BH CV STRESS DURATION SEC STAGE 1: 30
BH CV STRESS HR STAGE 1: 58
BH CV STRESS O2 STAGE 1: 93
BH CV STRESS PROTOCOL 1: NORMAL
BH CV STRESS RECOVERY BP: NORMAL MMHG
BH CV STRESS RECOVERY HR: 83 BPM
BH CV STRESS RECOVERY O2: 98 %
BH CV STRESS STAGE 1: 1
BILIRUB SERPL-MCNC: 0.4 MG/DL (ref 0.2–1.2)
BUN BLD-MCNC: 11 MG/DL (ref 8–23)
BUN/CREAT SERPL: 14.1 (ref 7–25)
CALCIUM SPEC-SCNC: 10.1 MG/DL (ref 8.6–10.5)
CHLORIDE SERPL-SCNC: 101 MMOL/L (ref 98–107)
CHOLEST SERPL-MCNC: 213 MG/DL (ref 0–200)
CO2 SERPL-SCNC: 26.2 MMOL/L (ref 22–29)
CREAT BLD-MCNC: 0.78 MG/DL (ref 0.57–1)
DEPRECATED RDW RBC AUTO: 45.2 FL (ref 37–54)
ERYTHROCYTE [DISTWIDTH] IN BLOOD BY AUTOMATED COUNT: 12.9 % (ref 12.3–15.4)
GFR SERPL CREATININE-BSD FRML MDRD: 72 ML/MIN/1.73
GLOBULIN UR ELPH-MCNC: 3.5 GM/DL
GLUCOSE BLD-MCNC: 133 MG/DL (ref 65–99)
HCT VFR BLD AUTO: 43.2 % (ref 34–46.6)
HDLC SERPL-MCNC: 44 MG/DL (ref 40–60)
HGB BLD-MCNC: 13.7 G/DL (ref 12–15.9)
LDLC SERPL CALC-MCNC: 136 MG/DL (ref 0–100)
LDLC/HDLC SERPL: 3.09 {RATIO}
LV EF NUC BP: 70 %
MAXIMAL PREDICTED HEART RATE: 145 BPM
MCH RBC QN AUTO: 30.2 PG (ref 26.6–33)
MCHC RBC AUTO-ENTMCNC: 31.7 G/DL (ref 31.5–35.7)
MCV RBC AUTO: 95.4 FL (ref 79–97)
PERCENT MAX PREDICTED HR: 62.76 %
PLATELET # BLD AUTO: 246 10*3/MM3 (ref 140–450)
PMV BLD AUTO: 10.2 FL (ref 6–12)
POTASSIUM BLD-SCNC: 5 MMOL/L (ref 3.5–5.2)
PROT SERPL-MCNC: 7.9 G/DL (ref 6–8.5)
RBC # BLD AUTO: 4.53 10*6/MM3 (ref 3.77–5.28)
SODIUM BLD-SCNC: 140 MMOL/L (ref 136–145)
STRESS BASELINE BP: NORMAL MMHG
STRESS BASELINE HR: 58 BPM
STRESS O2 SAT REST: 94 %
STRESS PERCENT HR: 74 %
STRESS POST ESTIMATED WORKLOAD: 1 METS
STRESS POST EXERCISE DUR SEC: 30 SEC
STRESS POST PEAK BP: NORMAL MMHG
STRESS POST PEAK HR: 91 BPM
STRESS TARGET HR: 123 BPM
T-UPTAKE NFR SERPL: 1.25 TBI (ref 0.8–1.3)
T4 SERPL-MCNC: 7.07 MCG/DL (ref 4.5–11.7)
TRIGL SERPL-MCNC: 165 MG/DL (ref 0–150)
TSH SERPL DL<=0.05 MIU/L-ACNC: 23.5 MIU/ML (ref 0.27–4.2)
VLDLC SERPL-MCNC: 33 MG/DL (ref 5–40)
WBC NRBC COR # BLD: 9.06 10*3/MM3 (ref 3.4–10.8)

## 2019-05-31 PROCEDURE — 0 TECHNETIUM SESTAMIBI: Performed by: INTERNAL MEDICINE

## 2019-05-31 PROCEDURE — 36415 COLL VENOUS BLD VENIPUNCTURE: CPT

## 2019-05-31 PROCEDURE — 84443 ASSAY THYROID STIM HORMONE: CPT

## 2019-05-31 PROCEDURE — 80053 COMPREHEN METABOLIC PANEL: CPT

## 2019-05-31 PROCEDURE — A9500 TC99M SESTAMIBI: HCPCS | Performed by: INTERNAL MEDICINE

## 2019-05-31 PROCEDURE — 78452 HT MUSCLE IMAGE SPECT MULT: CPT

## 2019-05-31 PROCEDURE — 93016 CV STRESS TEST SUPVJ ONLY: CPT | Performed by: INTERNAL MEDICINE

## 2019-05-31 PROCEDURE — 78452 HT MUSCLE IMAGE SPECT MULT: CPT | Performed by: INTERNAL MEDICINE

## 2019-05-31 PROCEDURE — 84436 ASSAY OF TOTAL THYROXINE: CPT

## 2019-05-31 PROCEDURE — 93018 CV STRESS TEST I&R ONLY: CPT | Performed by: INTERNAL MEDICINE

## 2019-05-31 PROCEDURE — 25010000002 REGADENOSON 0.4 MG/5ML SOLUTION: Performed by: INTERNAL MEDICINE

## 2019-05-31 PROCEDURE — 93017 CV STRESS TEST TRACING ONLY: CPT

## 2019-05-31 PROCEDURE — 80061 LIPID PANEL: CPT

## 2019-05-31 PROCEDURE — 84479 ASSAY OF THYROID (T3 OR T4): CPT

## 2019-05-31 PROCEDURE — 85027 COMPLETE CBC AUTOMATED: CPT

## 2019-05-31 RX ADMIN — TECHNETIUM TC 99M SESTAMIBI 1 DOSE: 1 INJECTION INTRAVENOUS at 07:21

## 2019-05-31 RX ADMIN — REGADENOSON 0.4 MG: 0.08 INJECTION, SOLUTION INTRAVENOUS at 08:48

## 2019-05-31 RX ADMIN — TECHNETIUM TC 99M SESTAMIBI 1 DOSE: 1 INJECTION INTRAVENOUS at 08:48

## 2019-05-31 NOTE — TELEPHONE ENCOUNTER
----- Message from Al Granado III, MD sent at 5/31/2019  3:27 PM EDT -----  Please call- normal results. Also, if you reach her let her know labs show her thyroid function is abnormal and she will need to f/u with primary/endocrine on that.  I called earlier and TC

## 2019-06-05 ENCOUNTER — OFFICE VISIT (OUTPATIENT)
Dept: SURGERY | Facility: CLINIC | Age: 74
End: 2019-06-05

## 2019-06-05 VITALS
SYSTOLIC BLOOD PRESSURE: 128 MMHG | HEIGHT: 62 IN | BODY MASS INDEX: 28.16 KG/M2 | RESPIRATION RATE: 16 BRPM | DIASTOLIC BLOOD PRESSURE: 78 MMHG | HEART RATE: 72 BPM | WEIGHT: 153 LBS

## 2019-06-05 DIAGNOSIS — R10.13 EPIGASTRIC PAIN: Primary | ICD-10-CM

## 2019-06-05 DIAGNOSIS — Z86.010 HISTORY OF COLON POLYPS: ICD-10-CM

## 2019-06-05 DIAGNOSIS — R13.19 OTHER DYSPHAGIA: ICD-10-CM

## 2019-06-05 DIAGNOSIS — R14.0 BLOATING: ICD-10-CM

## 2019-06-05 PROBLEM — Z86.0100 HISTORY OF COLON POLYPS: Status: ACTIVE | Noted: 2019-06-05

## 2019-06-05 PROCEDURE — 99214 OFFICE O/P EST MOD 30 MIN: CPT | Performed by: SURGERY

## 2019-06-05 RX ORDER — ERGOCALCIFEROL (VITAMIN D2) 10 MCG
400 TABLET ORAL DAILY
COMMUNITY

## 2019-06-05 NOTE — H&P (VIEW-ONLY)
"Ashlie Levi 73 y.o. female presents as a self ref to discuss 3 yr repeat c-scope.  Pt reports last c-scope 2016 per Dr. Avelar.  + personal H/O colon polyps = tub adenoma.  + personal H/O colon CA = brother, + abd pain, bloating, constipation/diarrhea alt., diff swallowing, GERD, heartburn.  Pt also reports 2 episodes of \"light CP.\"  Pt states she is followed per Dr. Granado, stress test last week and was told results were neg.  PCP Dr. oCnway.   Chief Complaint   Patient presents with   • Colonoscopy             HPI   Above-noted and agree.  Ashlie is known to my service.  I did a EGD on her in November 2017 and found gastritis.  She presents today to discuss a colonoscopy.  She has a history of adenomatous polyps.  Her last colonoscopy was 3 years ago.  She also has a lot of issues with epigastric pain and bloating.  She feels difficulty swallowing in her throat.  A lot of heartburn.  On her list of medications Zantac which she says she does not take that.  She also said Dr. Conway prescribed her something but she decided not to take it because she read that a side effect could be a seizure and she does not want to have a seizure.  She drinks a lot of green tea.  Not use tobacco products.      Review of Systems   All other systems reviewed and are negative.            Current Outpatient Medications:   •  Vitamin D, Cholecalciferol, (CHOLECALCIFEROL) 400 units tablet, Take 400 Units by mouth Daily., Disp: , Rfl:   •  aspirin 81 MG tablet, Take 81 mg by mouth Daily., Disp: , Rfl:   •  carvedilol (COREG) 6.25 MG tablet, TAKE ONE TABLET BY MOUTH TWICE DAILY (Patient taking differently: TAKE ONE TABLET twice DAILY), Disp: 180 tablet, Rfl: 3  •  clindamycin (CLEOCIN) 300 MG capsule, Take 300 mg by mouth 3 (Three) Times a Day., Disp: , Rfl:   •  isosorbide mononitrate (ISMO,MONOKET) 20 MG tablet, TAKE ONE TABLET BY MOUTH TWICE DAILY, Disp: 180 tablet, Rfl: 3  •  levothyroxine sodium (TIROSINT) 88 MCG capsule, Take 88 " mcg by mouth Daily., Disp: , Rfl:   •  losartan (COZAAR) 100 MG tablet, TAKE ONE TABLET BY MOUTH ONCE DAILY, Disp: 90 tablet, Rfl: 1  •  nitroglycerin (NITROSTAT) 0.4 MG SL tablet, DISSOLVE ONE TABLET UNDER THE TONGUE EVERY 5 MINUTES AS NEEDED FOR CHEST PAIN.  DO NOT EXCEED A TOTAL OF 3 DOSES IN 15 MINUTES, Disp: 25 tablet, Rfl: 0  •  raNITIdine (ZANTAC) 300 MG tablet, Take 300 mg by mouth Every Night., Disp: , Rfl:   •  sertraline (ZOLOFT) 100 MG tablet, Take 100 mg by mouth daily., Disp: , Rfl:   •  spironolactone (ALDACTONE) 25 MG tablet, TAKE ONE TABLET BY MOUTH ONCE DAILY (Patient taking differently: TAKE half TABLET BY MOUTH ONCE DAILY), Disp: 90 tablet, Rfl: 0        No Known Allergies        Past Medical History:   Diagnosis Date   • Abnormal electrocardiogram    • Arthritis    • Chest discomfort    • Colon polyp    • Coronary artery disease    • Disease of thyroid gland    • Hyperlipidemia    • Hypertension            Past Surgical History:   Procedure Laterality Date   • APPENDECTOMY     • CARDIAC SURGERY     • CHOLECYSTECTOMY     • COLONOSCOPY N/A 2016    Procedure: COLONOSCOPY;  Surgeon: Giovani Avelar MD;  Location: [facility] OR;  Service:    • CORONARY STENT PLACEMENT     • ENDOSCOPY N/A 2017    Procedure: ESOPHAGOGASTRODUODENOSCOPY with CARIN test ;  Surgeon: Alfreda Soler DO;  Location: [facility];  Service:    • EYE SURGERY     • FOOT GANGLION EXCISION Left    • HEMORROIDECTOMY     • HERNIA REPAIR     • HYSTERECTOMY             Social History     Tobacco Use   • Smoking status: Former Smoker     Packs/day: 1.00     Years: 50.00     Pack years: 50.00     Last attempt to quit: 10/13/2006     Years since quittin.6   • Smokeless tobacco: Never Used   Substance Use Topics   • Alcohol use: Yes     Comment: occasional   • Drug use: No             There is no immunization history on file for this patient.        Physical Exam   Constitutional: She is oriented to person, place,  "and time. She appears well-developed and well-nourished.   HENT:   Head: Normocephalic and atraumatic.   Right Ear: External ear normal.   Left Ear: External ear normal.   Cardiovascular: Normal rate and regular rhythm.   Pulmonary/Chest: Effort normal and breath sounds normal.   Abdominal: Soft. Bowel sounds are normal.   Musculoskeletal: She exhibits no edema or deformity.   Neurological: She is alert and oriented to person, place, and time.   Skin: Skin is warm and dry.   Psychiatric: She has a normal mood and affect. Her behavior is normal.   Nursing note and vitals reviewed.      Debilities/Disabilities Identified: None    Emotional Behavior: Appropriate      /78   Pulse 72   Resp 16   Ht 157.5 cm (62\")   Wt 69.4 kg (153 lb)   BMI 27.98 kg/m²         Ashlie was seen today for colonoscopy.    Diagnoses and all orders for this visit:    Epigastric pain    Bloating    Other dysphagia    History of colon polyps    We will get Ashlie scheduled for an EGD and colonoscopy.  I discussed with the patient the benefits and risks of performing endoscopy.  Benefits and risks were not limited to but including bleeding, infection, perforation, complications of anesthesia, aspiration.  The patient appeared to understand and is willing to proceed.    Thank you for allowing me to participate in the care of this interesting patient.            "

## 2019-06-05 NOTE — H&P
"Ashlie Levi 73 y.o. female presents as a self ref to discuss 3 yr repeat c-scope.  Pt reports last c-scope 2016 per Dr. Avelar.  + personal H/O colon polyps = tub adenoma.  + personal H/O colon CA = brother, + abd pain, bloating, constipation/diarrhea alt., diff swallowing, GERD, heartburn.  Pt also reports 2 episodes of \"light CP.\"  Pt states she is followed per Dr. Granado, stress test last week and was told results were neg.  PCP Dr. Conway.   Chief Complaint   Patient presents with   • Colonoscopy             HPI   Above-noted and agree.  Ashlie is known to my service.  I did a EGD on her in November 2017 and found gastritis.  She presents today to discuss a colonoscopy.  She has a history of adenomatous polyps.  Her last colonoscopy was 3 years ago.  She also has a lot of issues with epigastric pain and bloating.  She feels difficulty swallowing in her throat.  A lot of heartburn.  On her list of medications Zantac which she says she does not take that.  She also said Dr. Conway prescribed her something but she decided not to take it because she read that a side effect could be a seizure and she does not want to have a seizure.  She drinks a lot of green tea.  Not use tobacco products.      Review of Systems   All other systems reviewed and are negative.            Current Outpatient Medications:   •  Vitamin D, Cholecalciferol, (CHOLECALCIFEROL) 400 units tablet, Take 400 Units by mouth Daily., Disp: , Rfl:   •  aspirin 81 MG tablet, Take 81 mg by mouth Daily., Disp: , Rfl:   •  carvedilol (COREG) 6.25 MG tablet, TAKE ONE TABLET BY MOUTH TWICE DAILY (Patient taking differently: TAKE ONE TABLET twice DAILY), Disp: 180 tablet, Rfl: 3  •  clindamycin (CLEOCIN) 300 MG capsule, Take 300 mg by mouth 3 (Three) Times a Day., Disp: , Rfl:   •  isosorbide mononitrate (ISMO,MONOKET) 20 MG tablet, TAKE ONE TABLET BY MOUTH TWICE DAILY, Disp: 180 tablet, Rfl: 3  •  levothyroxine sodium (TIROSINT) 88 MCG capsule, Take 88 " mcg by mouth Daily., Disp: , Rfl:   •  losartan (COZAAR) 100 MG tablet, TAKE ONE TABLET BY MOUTH ONCE DAILY, Disp: 90 tablet, Rfl: 1  •  nitroglycerin (NITROSTAT) 0.4 MG SL tablet, DISSOLVE ONE TABLET UNDER THE TONGUE EVERY 5 MINUTES AS NEEDED FOR CHEST PAIN.  DO NOT EXCEED A TOTAL OF 3 DOSES IN 15 MINUTES, Disp: 25 tablet, Rfl: 0  •  raNITIdine (ZANTAC) 300 MG tablet, Take 300 mg by mouth Every Night., Disp: , Rfl:   •  sertraline (ZOLOFT) 100 MG tablet, Take 100 mg by mouth daily., Disp: , Rfl:   •  spironolactone (ALDACTONE) 25 MG tablet, TAKE ONE TABLET BY MOUTH ONCE DAILY (Patient taking differently: TAKE half TABLET BY MOUTH ONCE DAILY), Disp: 90 tablet, Rfl: 0        No Known Allergies        Past Medical History:   Diagnosis Date   • Abnormal electrocardiogram    • Arthritis    • Chest discomfort    • Colon polyp    • Coronary artery disease    • Disease of thyroid gland    • Hyperlipidemia    • Hypertension            Past Surgical History:   Procedure Laterality Date   • APPENDECTOMY     • CARDIAC SURGERY     • CHOLECYSTECTOMY     • COLONOSCOPY N/A 2016    Procedure: COLONOSCOPY;  Surgeon: Giovani Avelar MD;  Location: [facility] OR;  Service:    • CORONARY STENT PLACEMENT     • ENDOSCOPY N/A 2017    Procedure: ESOPHAGOGASTRODUODENOSCOPY with CARIN test ;  Surgeon: Alfreda Soler DO;  Location: [facility];  Service:    • EYE SURGERY     • FOOT GANGLION EXCISION Left    • HEMORROIDECTOMY     • HERNIA REPAIR     • HYSTERECTOMY             Social History     Tobacco Use   • Smoking status: Former Smoker     Packs/day: 1.00     Years: 50.00     Pack years: 50.00     Last attempt to quit: 10/13/2006     Years since quittin.6   • Smokeless tobacco: Never Used   Substance Use Topics   • Alcohol use: Yes     Comment: occasional   • Drug use: No             There is no immunization history on file for this patient.        Physical Exam   Constitutional: She is oriented to person, place,  "and time. She appears well-developed and well-nourished.   HENT:   Head: Normocephalic and atraumatic.   Right Ear: External ear normal.   Left Ear: External ear normal.   Cardiovascular: Normal rate and regular rhythm.   Pulmonary/Chest: Effort normal and breath sounds normal.   Abdominal: Soft. Bowel sounds are normal.   Musculoskeletal: She exhibits no edema or deformity.   Neurological: She is alert and oriented to person, place, and time.   Skin: Skin is warm and dry.   Psychiatric: She has a normal mood and affect. Her behavior is normal.   Nursing note and vitals reviewed.      Debilities/Disabilities Identified: None    Emotional Behavior: Appropriate      /78   Pulse 72   Resp 16   Ht 157.5 cm (62\")   Wt 69.4 kg (153 lb)   BMI 27.98 kg/m²          Ashlie was seen today for colonoscopy.    Diagnoses and all orders for this visit:    Epigastric pain    Bloating    Other dysphagia    History of colon polyps    We will get Ashlie scheduled for an EGD and colonoscopy.  I discussed with the patient the benefits and risks of performing endoscopy.  Benefits and risks were not limited to but including bleeding, infection, perforation, complications of anesthesia, aspiration.  The patient appeared to understand and is willing to proceed.    Thank you for allowing me to participate in the care of this interesting patient.              "

## 2019-06-05 NOTE — PROGRESS NOTES
"Ashlie Levi 73 y.o. female presents as a self ref to discuss 3 yr repeat c-scope.  Pt reports last c-scope 2016 per Dr. Avelar.  + personal H/O colon polyps = tub adenoma.  + personal H/O colon CA = brother, + abd pain, bloating, constipation/diarrhea alt., diff swallowing, GERD, heartburn.  Pt also reports 2 episodes of \"light CP.\"  Pt states she is followed per Dr. Granado, stress test last week and was told results were neg.  PCP Dr. Conway.   Chief Complaint   Patient presents with   • Colonoscopy             HPI   Above-noted and agree.  Ashlie is known to my service.  I did a EGD on her in November 2017 and found gastritis.  She presents today to discuss a colonoscopy.  She has a history of adenomatous polyps.  Her last colonoscopy was 3 years ago.  She also has a lot of issues with epigastric pain and bloating.  She feels difficulty swallowing in her throat.  A lot of heartburn.  On her list of medications Zantac which she says she does not take that.  She also said Dr. Conway prescribed her something but she decided not to take it because she read that a side effect could be a seizure and she does not want to have a seizure.  She drinks a lot of green tea.  Not use tobacco products.      Review of Systems   All other systems reviewed and are negative.            Current Outpatient Medications:   •  Vitamin D, Cholecalciferol, (CHOLECALCIFEROL) 400 units tablet, Take 400 Units by mouth Daily., Disp: , Rfl:   •  aspirin 81 MG tablet, Take 81 mg by mouth Daily., Disp: , Rfl:   •  carvedilol (COREG) 6.25 MG tablet, TAKE ONE TABLET BY MOUTH TWICE DAILY (Patient taking differently: TAKE ONE TABLET twice DAILY), Disp: 180 tablet, Rfl: 3  •  clindamycin (CLEOCIN) 300 MG capsule, Take 300 mg by mouth 3 (Three) Times a Day., Disp: , Rfl:   •  isosorbide mononitrate (ISMO,MONOKET) 20 MG tablet, TAKE ONE TABLET BY MOUTH TWICE DAILY, Disp: 180 tablet, Rfl: 3  •  levothyroxine sodium (TIROSINT) 88 MCG capsule, Take 88 " mcg by mouth Daily., Disp: , Rfl:   •  losartan (COZAAR) 100 MG tablet, TAKE ONE TABLET BY MOUTH ONCE DAILY, Disp: 90 tablet, Rfl: 1  •  nitroglycerin (NITROSTAT) 0.4 MG SL tablet, DISSOLVE ONE TABLET UNDER THE TONGUE EVERY 5 MINUTES AS NEEDED FOR CHEST PAIN.  DO NOT EXCEED A TOTAL OF 3 DOSES IN 15 MINUTES, Disp: 25 tablet, Rfl: 0  •  raNITIdine (ZANTAC) 300 MG tablet, Take 300 mg by mouth Every Night., Disp: , Rfl:   •  sertraline (ZOLOFT) 100 MG tablet, Take 100 mg by mouth daily., Disp: , Rfl:   •  spironolactone (ALDACTONE) 25 MG tablet, TAKE ONE TABLET BY MOUTH ONCE DAILY (Patient taking differently: TAKE half TABLET BY MOUTH ONCE DAILY), Disp: 90 tablet, Rfl: 0        No Known Allergies        Past Medical History:   Diagnosis Date   • Abnormal electrocardiogram    • Arthritis    • Chest discomfort    • Colon polyp    • Coronary artery disease    • Disease of thyroid gland    • Hyperlipidemia    • Hypertension            Past Surgical History:   Procedure Laterality Date   • APPENDECTOMY     • CARDIAC SURGERY     • CHOLECYSTECTOMY     • COLONOSCOPY N/A 2016    Procedure: COLONOSCOPY;  Surgeon: Giovani Avelar MD;  Location: [facility] OR;  Service:    • CORONARY STENT PLACEMENT     • ENDOSCOPY N/A 2017    Procedure: ESOPHAGOGASTRODUODENOSCOPY with CARIN test ;  Surgeon: Alfreda Soler DO;  Location: [facility];  Service:    • EYE SURGERY     • FOOT GANGLION EXCISION Left    • HEMORROIDECTOMY     • HERNIA REPAIR     • HYSTERECTOMY             Social History     Tobacco Use   • Smoking status: Former Smoker     Packs/day: 1.00     Years: 50.00     Pack years: 50.00     Last attempt to quit: 10/13/2006     Years since quittin.6   • Smokeless tobacco: Never Used   Substance Use Topics   • Alcohol use: Yes     Comment: occasional   • Drug use: No             There is no immunization history on file for this patient.        Physical Exam   Constitutional: She is oriented to person, place,  "and time. She appears well-developed and well-nourished.   HENT:   Head: Normocephalic and atraumatic.   Right Ear: External ear normal.   Left Ear: External ear normal.   Cardiovascular: Normal rate and regular rhythm.   Pulmonary/Chest: Effort normal and breath sounds normal.   Abdominal: Soft. Bowel sounds are normal.   Musculoskeletal: She exhibits no edema or deformity.   Neurological: She is alert and oriented to person, place, and time.   Skin: Skin is warm and dry.   Psychiatric: She has a normal mood and affect. Her behavior is normal.   Nursing note and vitals reviewed.      Debilities/Disabilities Identified: None    Emotional Behavior: Appropriate      /78   Pulse 72   Resp 16   Ht 157.5 cm (62\")   Wt 69.4 kg (153 lb)   BMI 27.98 kg/m²         Ashlie was seen today for colonoscopy.    Diagnoses and all orders for this visit:    Epigastric pain    Bloating    Other dysphagia    History of colon polyps    We will get Ashlie scheduled for an EGD and colonoscopy.  I discussed with the patient the benefits and risks of performing endoscopy.  Benefits and risks were not limited to but including bleeding, infection, perforation, complications of anesthesia, aspiration.  The patient appeared to understand and is willing to proceed.    Thank you for allowing me to participate in the care of this interesting patient.            "

## 2019-06-12 ENCOUNTER — ANESTHESIA EVENT (OUTPATIENT)
Dept: PERIOP | Facility: HOSPITAL | Age: 74
End: 2019-06-12

## 2019-06-13 ENCOUNTER — HOSPITAL ENCOUNTER (OUTPATIENT)
Facility: HOSPITAL | Age: 74
Setting detail: HOSPITAL OUTPATIENT SURGERY
Discharge: HOME | End: 2019-06-13
Attending: SURGERY | Admitting: SURGERY

## 2019-06-13 ENCOUNTER — ANESTHESIA (OUTPATIENT)
Dept: PERIOP | Facility: HOSPITAL | Age: 74
End: 2019-06-13

## 2019-06-13 VITALS
DIASTOLIC BLOOD PRESSURE: 62 MMHG | RESPIRATION RATE: 12 BRPM | HEART RATE: 52 BPM | BODY MASS INDEX: 28.08 KG/M2 | OXYGEN SATURATION: 97 % | HEIGHT: 62 IN | TEMPERATURE: 98 F | WEIGHT: 152.6 LBS | SYSTOLIC BLOOD PRESSURE: 141 MMHG

## 2019-06-13 DIAGNOSIS — R13.19 OTHER DYSPHAGIA: ICD-10-CM

## 2019-06-13 DIAGNOSIS — R14.0 BLOATING: ICD-10-CM

## 2019-06-13 DIAGNOSIS — R10.13 EPIGASTRIC PAIN: ICD-10-CM

## 2019-06-13 DIAGNOSIS — Z86.010 HISTORY OF COLON POLYPS: ICD-10-CM

## 2019-06-13 PROCEDURE — 87081 CULTURE SCREEN ONLY: CPT | Performed by: SURGERY

## 2019-06-13 PROCEDURE — 43239 EGD BIOPSY SINGLE/MULTIPLE: CPT | Performed by: SURGERY

## 2019-06-13 PROCEDURE — 88305 TISSUE EXAM BY PATHOLOGIST: CPT | Performed by: SURGERY

## 2019-06-13 PROCEDURE — 25010000002 PROPOFOL 10 MG/ML EMULSION: Performed by: NURSE ANESTHETIST, CERTIFIED REGISTERED

## 2019-06-13 PROCEDURE — 45380 COLONOSCOPY AND BIOPSY: CPT | Performed by: SURGERY

## 2019-06-13 RX ORDER — PROPOFOL 10 MG/ML
VIAL (ML) INTRAVENOUS CONTINUOUS PRN
Status: DISCONTINUED | OUTPATIENT
Start: 2019-06-13 | End: 2019-06-13 | Stop reason: SURG

## 2019-06-13 RX ORDER — OMEPRAZOLE 40 MG/1
40 CAPSULE, DELAYED RELEASE ORAL DAILY
Qty: 30 CAPSULE | Refills: 6 | Status: SHIPPED | OUTPATIENT
Start: 2019-06-13 | End: 2019-12-28

## 2019-06-13 RX ORDER — SODIUM CHLORIDE 9 MG/ML
40 INJECTION, SOLUTION INTRAVENOUS AS NEEDED
Status: DISCONTINUED | OUTPATIENT
Start: 2019-06-13 | End: 2019-06-13 | Stop reason: HOSPADM

## 2019-06-13 RX ORDER — PROPOFOL 10 MG/ML
VIAL (ML) INTRAVENOUS AS NEEDED
Status: DISCONTINUED | OUTPATIENT
Start: 2019-06-13 | End: 2019-06-13 | Stop reason: SURG

## 2019-06-13 RX ORDER — LIDOCAINE HYDROCHLORIDE 10 MG/ML
INJECTION, SOLUTION INFILTRATION; PERINEURAL AS NEEDED
Status: DISCONTINUED | OUTPATIENT
Start: 2019-06-13 | End: 2019-06-13 | Stop reason: SURG

## 2019-06-13 RX ORDER — SODIUM CHLORIDE, SODIUM LACTATE, POTASSIUM CHLORIDE, CALCIUM CHLORIDE 600; 310; 30; 20 MG/100ML; MG/100ML; MG/100ML; MG/100ML
9 INJECTION, SOLUTION INTRAVENOUS CONTINUOUS
Status: DISCONTINUED | OUTPATIENT
Start: 2019-06-13 | End: 2019-06-13 | Stop reason: HOSPADM

## 2019-06-13 RX ORDER — LIDOCAINE HYDROCHLORIDE 10 MG/ML
0.5 INJECTION, SOLUTION EPIDURAL; INFILTRATION; INTRACAUDAL; PERINEURAL ONCE AS NEEDED
Status: DISCONTINUED | OUTPATIENT
Start: 2019-06-13 | End: 2019-06-13 | Stop reason: HOSPADM

## 2019-06-13 RX ORDER — GLYCOPYRROLATE 0.2 MG/ML
INJECTION INTRAMUSCULAR; INTRAVENOUS AS NEEDED
Status: DISCONTINUED | OUTPATIENT
Start: 2019-06-13 | End: 2019-06-13 | Stop reason: SURG

## 2019-06-13 RX ORDER — SODIUM CHLORIDE 0.9 % (FLUSH) 0.9 %
1-10 SYRINGE (ML) INJECTION AS NEEDED
Status: DISCONTINUED | OUTPATIENT
Start: 2019-06-13 | End: 2019-06-13 | Stop reason: HOSPADM

## 2019-06-13 RX ADMIN — PROPOFOL 50 MG: 10 INJECTION, EMULSION INTRAVENOUS at 11:10

## 2019-06-13 RX ADMIN — PROPOFOL 50 MG: 10 INJECTION, EMULSION INTRAVENOUS at 10:33

## 2019-06-13 RX ADMIN — PROPOFOL 50 MCG/KG/MIN: 10 INJECTION, EMULSION INTRAVENOUS at 10:33

## 2019-06-13 RX ADMIN — SODIUM CHLORIDE, POTASSIUM CHLORIDE, SODIUM LACTATE AND CALCIUM CHLORIDE: 600; 310; 30; 20 INJECTION, SOLUTION INTRAVENOUS at 10:33

## 2019-06-13 RX ADMIN — PROPOFOL 50 MG: 10 INJECTION, EMULSION INTRAVENOUS at 10:41

## 2019-06-13 RX ADMIN — PROPOFOL 50 MG: 10 INJECTION, EMULSION INTRAVENOUS at 10:57

## 2019-06-13 RX ADMIN — PROPOFOL 50 MG: 10 INJECTION, EMULSION INTRAVENOUS at 10:36

## 2019-06-13 RX ADMIN — PROPOFOL 50 MG: 10 INJECTION, EMULSION INTRAVENOUS at 10:45

## 2019-06-13 RX ADMIN — LIDOCAINE HYDROCHLORIDE 50 MG: 10 INJECTION, SOLUTION INFILTRATION; PERINEURAL at 10:33

## 2019-06-13 RX ADMIN — GLYCOPYRROLATE 0.1 MG: 0.2 INJECTION INTRAMUSCULAR; INTRAVENOUS at 10:33

## 2019-06-13 RX ADMIN — PROPOFOL 50 MG: 10 INJECTION, EMULSION INTRAVENOUS at 11:04

## 2019-06-13 RX ADMIN — PROPOFOL 50 MG: 10 INJECTION, EMULSION INTRAVENOUS at 10:51

## 2019-06-13 NOTE — ANESTHESIA POSTPROCEDURE EVALUATION
Patient: Ashlie Levi    Procedure Summary     Date:  06/13/19 Room / Location:  [facility] ENDOSCOPY 1 /  [facility] OR    Anesthesia Start:  1033 Anesthesia Stop:  1116    Procedures:       Esophagogastroduodenoscopy with possible biopsy, polypectomy, dilation (N/A Esophagus)      Colonoscopy with possible biopsy or polypectomy (N/A ) Diagnosis:       Epigastric pain      Bloating      Other dysphagia      History of colon polyps      (Epigastric pain [R10.13])      (Bloating [R14.0])      (Other dysphagia [R13.19])      (History of colon polyps [Z86.010])    Surgeon:  Alfreda Soler DO Provider:  [place] CRNA    Anesthesia Type:  MAC ASA Status:  3          Anesthesia Type: MAC  Last vitals  BP   140/73 (06/13/19 1140)   Temp   98 °F (36.7 °C) (06/13/19 1123)   Pulse   51 (06/13/19 1140)   Resp   12 (06/13/19 1140)     SpO2   96 % (06/13/19 1140)     Post Anesthesia Care and Evaluation    Patient location during evaluation: PHASE II  Patient participation: complete - patient participated  Level of consciousness: awake  Pain management: adequate  Airway patency: patent  Anesthetic complications: No anesthetic complications  PONV Status: none  Cardiovascular status: acceptable  Respiratory status: acceptable  Hydration status: acceptable

## 2019-06-13 NOTE — OP NOTE
EGD and Colonoscopy Procedure Note      Pre-operative Diagnosis:  Epigastric pain,   Bloating [R14.0]     Other dysphagia [R13.19]    History of colon polyps [Z86.010]         Post-operative Diagnosis: erosive gastritis, duodenitis, polyps of the cecum, right colon, left colon and rectum; diverticulosis    Procedure:  EGD with biopsy for H. Pylori with cold forceps and  Colonoscopy with polypectomy using cold forceps     Surgeon: Perrenouyoli    Anesthetic: MAC     Estimated Blood Loss:  Minimal     Complications:  none    Indications:  See preop diagnosis    Findings/Treatments:   Erosive gastritis and duodenitis-biopsy for H. pylori with cold forceps  Polyps x3 of cecum-removed with cold forceps  Polyp x1 of right colon-removed cold forceps  Polyp x1 of left colon-removed with cold forceps  Polyp x1 of rectum-removed cold forceps       Scope Withdrawal Time:  > 6 minutes      Recommendations: Await pathology      Procedure Details     After discussing the benefits and risks of an EGD and Colonoscopy, benefits and risks not limited to but including:  Bleeding, infection, perforation, aspiration; informed consent was signed.  The patient was taken into the endoscopy suite at [facility] and placed in the left lateral decubitus position.  MAC anesthesia was induced under appropriate monitoring.  Bite block was placed and the gastroscope was inserted thru such and advanced under direct vision to second portion of the duodenum.  A careful inspection was made as the gastroscope was withdrawn, including a retroflexed view of the proximal stomach; findings and interventions are described below.  If biopsies were taken, this was done with the cold biopsy forceps.    The bed was then rotated 180 degrees.  A rectal exam was performed.  Sphincter tone was normal.  The colonoscope was then inserted and carefully advanced to the cecum while visualizing the mucosa.  The cecum was identified by the ileocecal  valve and the orifice of the appendix.  There were 3 cecum polyps that were removed with cold forceps.  The scope was withdrawn in the right colon where another polyp was removed cold forceps.  The scope was then slowly withdrawn while carefully evaluated mucosa deflating air.  In the left colon was a polyp.  In the sigmoid colon there was diverticulosis.  In the rectum there was a polyp that was removed cold forceps.  The scope was then removed, a piece of Gelfoam was placed into the rectum and Ashlie was taken to the recovery area in stable postoperative condition having tolerated procedure well.    Alfreda Soler DO

## 2019-06-13 NOTE — ANESTHESIA PREPROCEDURE EVALUATION
Anesthesia Evaluation     Patient summary reviewed and Nursing notes reviewed   no history of anesthetic complications:  NPO Solid Status: > 8 hours  NPO Liquid Status: > 8 hours           Airway   Mallampati: II  TM distance: >3 FB  Neck ROM: full  no difficulty expected  Dental          Pulmonary - normal exam    breath sounds clear to auscultation  (+) a smoker (QUIT 2006) Former,   Cardiovascular   Exercise tolerance: poor (<4 METS)    Patient on routine beta blocker and Beta blocker given within 24 hours of surgery  Rhythm: regular  Rate: normal    (+) hypertension well controlled, past MI ( 2006) , CAD, cardiac stents (one stent placed 2006. stress test last week for sob at rest. normal .)       Neuro/Psych  (+) numbness (FEET AND HANDS),     GI/Hepatic/Renal/Endo    (+)  GERD poorly controlled,  hypothyroidism,   Hepatitis: as a child.    Musculoskeletal     (+) back pain, chronic pain,   Abdominal    Substance History      OB/GYN negative ob/gyn ROS         Other   (+) arthritis (left shoulder)     ROS/Med Hx Other: Coreg 6/12/19  Asa 6/11/19    ECG 12 Lead   Order: [id number]   Status:  Final result   Visible to patient:  Yes (MyChart) Next appt:  06/19/2019 at 11:00 AM in General Surgery (Alfreda Soler DO) Dx:  Coronary artery disease of native art...     Narrative       Al Granado III, MD     5/29/2019 10:12 AM    ECG 12 Lead  Date/Time: 5/29/2019 10:05 AM  Performed by: Al Granado III, MD  Authorized by: Al Granado III, MD   Comparison: compared with previous ECG   Similar to previous ECG  Rhythm: sinus rhythm  Rate: normal  Conduction: conduction normal  ST Segments: ST segments normal  ST Flattening: aVF, V1 and V2  T inversion: aVL  T flattening: I, V1 and V2  QRS axis: normal  Other: no other findings    Clinical impression: abnormal EKG    Interpretation Summary     · (Calculated EF = 70%).  · Myocardial perfusion imaging indicates a normal myocardial perfusion study with no  evidence of ischemia.  · Impressions are consistent with a low risk study.                  Phys Exam Other: Upper implants   Lower caps, multiple missing                Anesthesia Plan    ASA 3     MAC     intravenous induction   Anesthetic plan, all risks, benefits, and alternatives have been provided, discussed and informed consent has been obtained with: patient.    Plan discussed with CRNA.

## 2019-06-13 NOTE — INTERVAL H&P NOTE
"  H&P reviewed. The patient was examined and there are no changes to the H&P.         /78   Pulse 72   Resp 16   Ht 157.5 cm (62\")   Wt 69.4 kg (153 lb)   BMI 27.98 kg/m²   "

## 2019-06-14 LAB
CYTO UR: NORMAL
LAB AP CASE REPORT: NORMAL
PATH REPORT.FINAL DX SPEC: NORMAL
PATH REPORT.GROSS SPEC: NORMAL
UREASE TISS QL: NEGATIVE

## 2019-06-19 ENCOUNTER — OFFICE VISIT (OUTPATIENT)
Dept: SURGERY | Facility: CLINIC | Age: 74
End: 2019-06-19

## 2019-06-19 DIAGNOSIS — K57.30 DIVERTICULOSIS OF LARGE INTESTINE WITHOUT HEMORRHAGE: ICD-10-CM

## 2019-06-19 DIAGNOSIS — K63.5 POLYP OF COLON, UNSPECIFIED PART OF COLON, UNSPECIFIED TYPE: ICD-10-CM

## 2019-06-19 DIAGNOSIS — K27.9 PUD (PEPTIC ULCER DISEASE): Primary | ICD-10-CM

## 2019-06-19 PROCEDURE — 99213 OFFICE O/P EST LOW 20 MIN: CPT | Performed by: SURGERY

## 2019-06-19 NOTE — PROGRESS NOTES
Ashlie Levi 73 y.o. female presents for PO FU EGD/C-SCOPE.  Pt is feeling better but reports occ has ab pain and bloating. PCP Dr. Conway.      HPI   Above noted and agree.  Ashlie had erosive gastritis, duodenitis, polyps of the cecum, right colon, left colon and rectum; diverticulosis.  She has occasional sharp but dull RUQ pain.  She feels bloated at times.  She has no chest pain or shortness of breath.  She has no fevers or chills.  She has no other complaints.      Review of Systems        Past Medical History:   Diagnosis Date   • Abnormal electrocardiogram    • Arthritis    • Chest discomfort    • Colon polyp    • Coronary artery disease    • Disease of thyroid gland    • Hyperlipidemia    • Hypertension            Past Surgical History:   Procedure Laterality Date   • APPENDECTOMY     • CARDIAC SURGERY     • CHOLECYSTECTOMY     • COLONOSCOPY N/A 7/13/2016    Procedure: COLONOSCOPY;  Surgeon: Giovani Avelar MD;  Location: [facility] OR;  Service:    • COLONOSCOPY N/A 6/13/2019    Procedure: Colonoscopy with possible biopsy or polypectomy;  Surgeon: Alfreda Soler DO;  Location: [facility] OR;  Service: Gastroenterology   • CORONARY STENT PLACEMENT     • ENDOSCOPY N/A 11/21/2017    Procedure: ESOPHAGOGASTRODUODENOSCOPY with CARIN test ;  Surgeon: Alfreda Soler DO;  Location: [facility] OR;  Service:    • ENDOSCOPY N/A 6/13/2019    Procedure: Esophagogastroduodenoscopy with possible biopsy, polypectomy, dilation;  Surgeon: Alfreda Soler DO;  Location: [facility] OR;  Service: Gastroenterology   • EYE SURGERY     • FOOT GANGLION EXCISION Left    • HEMORROIDECTOMY     • HERNIA REPAIR     • HYSTERECTOMY             Physical Exam   Constitutional: She is oriented to person, place, and time. She appears well-developed and well-nourished.   HENT:   Head: Normocephalic and atraumatic.   Right Ear: External ear normal.   Left Ear: External ear normal.   Cardiovascular: Normal rate and regular rhythm.    Pulmonary/Chest: Effort normal and breath sounds normal.   Abdominal: Soft. Bowel sounds are normal.   Musculoskeletal: She exhibits no edema or deformity.   Neurological: She is alert and oriented to person, place, and time.   Skin: Skin is warm and dry.   Psychiatric: She has a normal mood and affect. Her behavior is normal.           There were no vitals taken for this visit.        Ashlie was seen today for post-op follow-up.    Diagnoses and all orders for this visit:    PUD (peptic ulcer disease)    Diverticulosis of large intestine without hemorrhage    Polyp of colon, unspecified part of colon, unspecified type    I gave Ashlie a PUD diet sheet.  We discussed many dietary changes.  She will need a 3 year repeat colonoscopy.    Thank you for allowing me to participate in the care of this interesting patient.

## 2019-07-08 ENCOUNTER — APPOINTMENT (OUTPATIENT)
Dept: CT IMAGING | Facility: HOSPITAL | Age: 74
End: 2019-07-08

## 2019-07-08 ENCOUNTER — HOSPITAL ENCOUNTER (INPATIENT)
Facility: HOSPITAL | Age: 74
LOS: 2 days | Discharge: HOME | End: 2019-07-10
Attending: EMERGENCY MEDICINE | Admitting: HOSPITALIST

## 2019-07-08 DIAGNOSIS — K56.600 PARTIAL SMALL BOWEL OBSTRUCTION (HCC): ICD-10-CM

## 2019-07-08 DIAGNOSIS — I10 HYPERTENSION, UNSPECIFIED TYPE: ICD-10-CM

## 2019-07-08 DIAGNOSIS — E03.9 HYPOTHYROIDISM, UNSPECIFIED TYPE: Primary | ICD-10-CM

## 2019-07-08 DIAGNOSIS — E78.5 HYPERLIPIDEMIA, UNSPECIFIED HYPERLIPIDEMIA TYPE: ICD-10-CM

## 2019-07-08 LAB
ALBUMIN SERPL-MCNC: 4.2 G/DL (ref 3.5–5.2)
ALBUMIN/GLOB SERPL: 1.3 G/DL
ALP SERPL-CCNC: 56 U/L (ref 39–117)
ALT SERPL W P-5'-P-CCNC: 25 U/L (ref 1–33)
ANION GAP SERPL CALCULATED.3IONS-SCNC: 13.2 MMOL/L (ref 5–15)
AST SERPL-CCNC: 29 U/L (ref 1–32)
BACTERIA UR QL AUTO: ABNORMAL /HPF
BASOPHILS # BLD AUTO: 0.02 10*3/MM3 (ref 0–0.2)
BASOPHILS NFR BLD AUTO: 0.3 % (ref 0–1.5)
BILIRUB SERPL-MCNC: 0.7 MG/DL (ref 0.2–1.2)
BILIRUB UR QL STRIP: ABNORMAL
BUN BLD-MCNC: 8 MG/DL (ref 8–23)
BUN/CREAT SERPL: 10 (ref 7–25)
CALCIUM SPEC-SCNC: 9.5 MG/DL (ref 8.6–10.5)
CHLORIDE SERPL-SCNC: 98 MMOL/L (ref 98–107)
CLARITY UR: CLEAR
CO2 SERPL-SCNC: 25.8 MMOL/L (ref 22–29)
COLOR UR: YELLOW
CREAT BLD-MCNC: 0.8 MG/DL (ref 0.57–1)
DEPRECATED RDW RBC AUTO: 43.6 FL (ref 37–54)
EOSINOPHIL # BLD AUTO: 0.14 10*3/MM3 (ref 0–0.4)
EOSINOPHIL NFR BLD AUTO: 1.8 % (ref 0.3–6.2)
ERYTHROCYTE [DISTWIDTH] IN BLOOD BY AUTOMATED COUNT: 13.1 % (ref 12.3–15.4)
GFR SERPL CREATININE-BSD FRML MDRD: 70 ML/MIN/1.73
GLOBULIN UR ELPH-MCNC: 3.3 GM/DL
GLUCOSE BLD-MCNC: 151 MG/DL (ref 65–99)
GLUCOSE UR STRIP-MCNC: NEGATIVE MG/DL
HBA1C MFR BLD: 5.4 % (ref 4.8–5.6)
HCT VFR BLD AUTO: 40.9 % (ref 34–46.6)
HGB BLD-MCNC: 14 G/DL (ref 12–15.9)
HGB UR QL STRIP.AUTO: ABNORMAL
HYALINE CASTS UR QL AUTO: ABNORMAL /LPF
IMM GRANULOCYTES # BLD AUTO: 0.03 10*3/MM3 (ref 0–0.05)
IMM GRANULOCYTES NFR BLD AUTO: 0.4 % (ref 0–0.5)
KETONES UR QL STRIP: NEGATIVE
LEUKOCYTE ESTERASE UR QL STRIP.AUTO: NEGATIVE
LIPASE SERPL-CCNC: 13 U/L (ref 13–60)
LYMPHOCYTES # BLD AUTO: 1.57 10*3/MM3 (ref 0.7–3.1)
LYMPHOCYTES NFR BLD AUTO: 20.4 % (ref 19.6–45.3)
MCH RBC QN AUTO: 31.5 PG (ref 26.6–33)
MCHC RBC AUTO-ENTMCNC: 34.2 G/DL (ref 31.5–35.7)
MCV RBC AUTO: 91.9 FL (ref 79–97)
MONOCYTES # BLD AUTO: 0.59 10*3/MM3 (ref 0.1–0.9)
MONOCYTES NFR BLD AUTO: 7.7 % (ref 5–12)
NEUTROPHILS # BLD AUTO: 5.36 10*3/MM3 (ref 1.7–7)
NEUTROPHILS NFR BLD AUTO: 69.4 % (ref 42.7–76)
NITRITE UR QL STRIP: NEGATIVE
NRBC BLD AUTO-RTO: 0 /100 WBC (ref 0–0.2)
PH UR STRIP.AUTO: 5.5 [PH] (ref 4.5–8)
PLATELET # BLD AUTO: 170 10*3/MM3 (ref 140–450)
PMV BLD AUTO: 9.7 FL (ref 6–12)
POTASSIUM BLD-SCNC: 4.2 MMOL/L (ref 3.5–5.2)
PROT SERPL-MCNC: 7.5 G/DL (ref 6–8.5)
PROT UR QL STRIP: ABNORMAL
RBC # BLD AUTO: 4.45 10*6/MM3 (ref 3.77–5.28)
RBC # UR: ABNORMAL /HPF
REF LAB TEST METHOD: ABNORMAL
SODIUM BLD-SCNC: 137 MMOL/L (ref 136–145)
SP GR UR STRIP: 1.02 (ref 1–1.03)
SQUAMOUS #/AREA URNS HPF: ABNORMAL /HPF
TROPONIN T SERPL-MCNC: <0.01 NG/ML (ref 0–0.03)
TSH SERPL DL<=0.05 MIU/L-ACNC: 6 MIU/ML (ref 0.27–4.2)
UROBILINOGEN UR QL STRIP: ABNORMAL
WBC NRBC COR # BLD: 7.71 10*3/MM3 (ref 3.4–10.8)
WBC UR QL AUTO: ABNORMAL /HPF

## 2019-07-08 PROCEDURE — 25010000002 ONDANSETRON PER 1 MG: Performed by: NURSE PRACTITIONER

## 2019-07-08 PROCEDURE — 85025 COMPLETE CBC W/AUTO DIFF WBC: CPT | Performed by: EMERGENCY MEDICINE

## 2019-07-08 PROCEDURE — 93005 ELECTROCARDIOGRAM TRACING: CPT | Performed by: EMERGENCY MEDICINE

## 2019-07-08 PROCEDURE — 84484 ASSAY OF TROPONIN QUANT: CPT | Performed by: EMERGENCY MEDICINE

## 2019-07-08 PROCEDURE — 80053 COMPREHEN METABOLIC PANEL: CPT | Performed by: EMERGENCY MEDICINE

## 2019-07-08 PROCEDURE — 99284 EMERGENCY DEPT VISIT MOD MDM: CPT

## 2019-07-08 PROCEDURE — 93010 ELECTROCARDIOGRAM REPORT: CPT | Performed by: INTERNAL MEDICINE

## 2019-07-08 PROCEDURE — 25010000002 MORPHINE PER 10 MG: Performed by: NURSE PRACTITIONER

## 2019-07-08 PROCEDURE — 25010000002 ONDANSETRON PER 1 MG: Performed by: EMERGENCY MEDICINE

## 2019-07-08 PROCEDURE — 0 DIATRIZOATE MEGLUMINE & SODIUM PER 1 ML: Performed by: NURSE PRACTITIONER

## 2019-07-08 PROCEDURE — 74176 CT ABD & PELVIS W/O CONTRAST: CPT

## 2019-07-08 PROCEDURE — 83690 ASSAY OF LIPASE: CPT | Performed by: EMERGENCY MEDICINE

## 2019-07-08 PROCEDURE — 0 IOPAMIDOL PER 1 ML: Performed by: EMERGENCY MEDICINE

## 2019-07-08 PROCEDURE — 99222 1ST HOSP IP/OBS MODERATE 55: CPT | Performed by: NURSE PRACTITIONER

## 2019-07-08 PROCEDURE — 74177 CT ABD & PELVIS W/CONTRAST: CPT

## 2019-07-08 PROCEDURE — 81001 URINALYSIS AUTO W/SCOPE: CPT | Performed by: EMERGENCY MEDICINE

## 2019-07-08 PROCEDURE — 83036 HEMOGLOBIN GLYCOSYLATED A1C: CPT | Performed by: NURSE PRACTITIONER

## 2019-07-08 PROCEDURE — 99284 EMERGENCY DEPT VISIT MOD MDM: CPT | Performed by: EMERGENCY MEDICINE

## 2019-07-08 PROCEDURE — 84443 ASSAY THYROID STIM HORMONE: CPT | Performed by: EMERGENCY MEDICINE

## 2019-07-08 PROCEDURE — 25010000002 MORPHINE PER 10 MG: Performed by: SURGERY

## 2019-07-08 RX ORDER — NALOXONE HCL 0.4 MG/ML
0.4 VIAL (ML) INJECTION
Status: DISCONTINUED | OUTPATIENT
Start: 2019-07-08 | End: 2019-07-10 | Stop reason: HOSPADM

## 2019-07-08 RX ORDER — MORPHINE SULFATE 2 MG/ML
1 INJECTION, SOLUTION INTRAMUSCULAR; INTRAVENOUS
Status: DISCONTINUED | OUTPATIENT
Start: 2019-07-08 | End: 2019-07-10 | Stop reason: HOSPADM

## 2019-07-08 RX ORDER — SODIUM CHLORIDE 0.9 % (FLUSH) 0.9 %
3 SYRINGE (ML) INJECTION EVERY 12 HOURS SCHEDULED
Status: DISCONTINUED | OUTPATIENT
Start: 2019-07-08 | End: 2019-07-10 | Stop reason: HOSPADM

## 2019-07-08 RX ORDER — PANTOPRAZOLE SODIUM 40 MG/10ML
40 INJECTION, POWDER, LYOPHILIZED, FOR SOLUTION INTRAVENOUS
Status: DISCONTINUED | OUTPATIENT
Start: 2019-07-09 | End: 2019-07-10 | Stop reason: HOSPADM

## 2019-07-08 RX ORDER — SODIUM CHLORIDE 9 MG/ML
100 INJECTION, SOLUTION INTRAVENOUS CONTINUOUS
Status: DISCONTINUED | OUTPATIENT
Start: 2019-07-08 | End: 2019-07-10 | Stop reason: HOSPADM

## 2019-07-08 RX ORDER — SPIRONOLACTONE 25 MG/1
25 TABLET ORAL DAILY
COMMUNITY
End: 2020-03-26

## 2019-07-08 RX ORDER — RANITIDINE 300 MG/1
300 TABLET ORAL NIGHTLY
COMMUNITY
End: 2020-07-08

## 2019-07-08 RX ORDER — ASPIRIN 81 MG/1
81 TABLET, CHEWABLE ORAL DAILY
COMMUNITY

## 2019-07-08 RX ORDER — HYDRALAZINE HYDROCHLORIDE 20 MG/ML
10 INJECTION INTRAMUSCULAR; INTRAVENOUS EVERY 6 HOURS PRN
Status: DISCONTINUED | OUTPATIENT
Start: 2019-07-08 | End: 2019-07-10 | Stop reason: HOSPADM

## 2019-07-08 RX ORDER — MORPHINE SULFATE 2 MG/ML
1 INJECTION, SOLUTION INTRAMUSCULAR; INTRAVENOUS EVERY 4 HOURS PRN
Status: DISCONTINUED | OUTPATIENT
Start: 2019-07-08 | End: 2019-07-08

## 2019-07-08 RX ORDER — FAMOTIDINE 10 MG/ML
20 INJECTION, SOLUTION INTRAVENOUS ONCE
Status: COMPLETED | OUTPATIENT
Start: 2019-07-08 | End: 2019-07-08

## 2019-07-08 RX ORDER — NALOXONE HCL 0.4 MG/ML
0.4 VIAL (ML) INJECTION
Status: DISCONTINUED | OUTPATIENT
Start: 2019-07-08 | End: 2019-07-08

## 2019-07-08 RX ORDER — SODIUM CHLORIDE 9 MG/ML
40 INJECTION, SOLUTION INTRAVENOUS AS NEEDED
Status: DISCONTINUED | OUTPATIENT
Start: 2019-07-08 | End: 2019-07-10 | Stop reason: HOSPADM

## 2019-07-08 RX ORDER — ONDANSETRON 4 MG/1
4 TABLET, FILM COATED ORAL EVERY 6 HOURS PRN
Status: DISCONTINUED | OUTPATIENT
Start: 2019-07-08 | End: 2019-07-10 | Stop reason: HOSPADM

## 2019-07-08 RX ORDER — LEVOTHYROXINE SODIUM 0.1 MG/1
100 TABLET ORAL DAILY
COMMUNITY
End: 2019-07-10 | Stop reason: HOSPADM

## 2019-07-08 RX ORDER — ONDANSETRON 2 MG/ML
4 INJECTION INTRAMUSCULAR; INTRAVENOUS EVERY 6 HOURS PRN
Status: DISCONTINUED | OUTPATIENT
Start: 2019-07-08 | End: 2019-07-10 | Stop reason: HOSPADM

## 2019-07-08 RX ORDER — ONDANSETRON 2 MG/ML
4 INJECTION INTRAMUSCULAR; INTRAVENOUS ONCE
Status: COMPLETED | OUTPATIENT
Start: 2019-07-08 | End: 2019-07-08

## 2019-07-08 RX ORDER — SODIUM CHLORIDE 0.9 % (FLUSH) 0.9 %
3-10 SYRINGE (ML) INJECTION AS NEEDED
Status: DISCONTINUED | OUTPATIENT
Start: 2019-07-08 | End: 2019-07-10 | Stop reason: HOSPADM

## 2019-07-08 RX ADMIN — FAMOTIDINE 20 MG: 10 INJECTION, SOLUTION INTRAVENOUS at 09:55

## 2019-07-08 RX ADMIN — SODIUM CHLORIDE 100 ML/HR: 9 INJECTION, SOLUTION INTRAVENOUS at 13:17

## 2019-07-08 RX ADMIN — MORPHINE SULFATE 1 MG: 2 INJECTION, SOLUTION INTRAMUSCULAR; INTRAVENOUS at 14:56

## 2019-07-08 RX ADMIN — IOPAMIDOL 100 ML: 755 INJECTION, SOLUTION INTRAVENOUS at 10:33

## 2019-07-08 RX ADMIN — METOPROLOL TARTRATE 2.5 MG: 5 INJECTION INTRAVENOUS at 21:12

## 2019-07-08 RX ADMIN — MORPHINE SULFATE 1 MG: 2 INJECTION, SOLUTION INTRAMUSCULAR; INTRAVENOUS at 19:35

## 2019-07-08 RX ADMIN — DIATRIZOATE MEGLUMINE AND DIATRIZOATE SODIUM 30 ML: 600; 100 SOLUTION ORAL; RECTAL at 18:30

## 2019-07-08 RX ADMIN — SODIUM CHLORIDE 1000 ML: 9 INJECTION, SOLUTION INTRAVENOUS at 09:55

## 2019-07-08 RX ADMIN — MORPHINE SULFATE 1 MG: 2 INJECTION, SOLUTION INTRAMUSCULAR; INTRAVENOUS at 23:41

## 2019-07-08 RX ADMIN — ONDANSETRON 4 MG: 2 INJECTION INTRAMUSCULAR; INTRAVENOUS at 16:55

## 2019-07-08 RX ADMIN — SODIUM CHLORIDE, PRESERVATIVE FREE 3 ML: 5 INJECTION INTRAVENOUS at 13:17

## 2019-07-08 RX ADMIN — ONDANSETRON 4 MG: 2 SOLUTION INTRAMUSCULAR; INTRAVENOUS at 09:37

## 2019-07-08 NOTE — H&P
[facility] HOSPITALIST     Yandel Conway DO    CHIEF COMPLAINT: Abdominal pain, nausea, vomiting    HISTORY OF PRESENT ILLNESS:  The patient is a 73-year-old female who presented to the emergency department secondary to sudden onset epigastric area pain, copious nausea, vomiting starting at 2 PM yesterday.  She notes onset after eating applesauce.  Notes recent EGD per Dr. jonas with polyp removal and note of gastritis and esophagitis, started on omeprazole that she notes as not helping at all.  She reports change of diet since EGD to vegetables and fruit and discontinued coffee but continues to have burning sensation and must sit up after eating.  She notes passing flatus and had 3 normal bowel movements yesterday with a history of IBS type symptoms with varying constipation and diarrhea.    She reports 2 similar attacks in November and December 2018 where she came to the emergency department was given morphine and sent home with resolution.    She notes recent stress testing per Dr. Granado that resulted as normal.    She has a known history of CAD with stent, hypothyroidism, hypertension, hyperlipidemia.  She reports past colonoscopies, cholecystectomy laparoscopic and vaginal hysterectomy.    She otherwise denies f/c/headache/rhinorrhea/nasal congestion/lightheadedness/syncopal sensation/cough/soa/d/chest pain/recent illness/sick exposures/change in bowel or bladder habits/no weight change/bloody emesis or bloody stools/change in medications or any other new concerns.    Past Medical History:   Diagnosis Date   • Abnormal electrocardiogram    • Arthritis    • Chest discomfort    • Colon polyp    • Coronary artery disease    • Disease of thyroid gland    • Hyperlipidemia    • Hypertension      Past Surgical History:   Procedure Laterality Date   • APPENDECTOMY     • CARDIAC SURGERY     • CHOLECYSTECTOMY     • COLONOSCOPY N/A 7/13/2016    Procedure: COLONOSCOPY;  Surgeon: Giovani Bunn  MD Valentino;  Location: [facility] OR;  Service:    • COLONOSCOPY N/A 2019    Procedure: Colonoscopy with possible biopsy or polypectomy;  Surgeon: Alfreda Soler DO;  Location: [facility] OR;  Service: Gastroenterology   • CORONARY STENT PLACEMENT     • ENDOSCOPY N/A 2017    Procedure: ESOPHAGOGASTRODUODENOSCOPY with CARIN test ;  Surgeon: Alfreda Soler DO;  Location: [facility] OR;  Service:    • ENDOSCOPY N/A 2019    Procedure: Esophagogastroduodenoscopy with possible biopsy, polypectomy, dilation;  Surgeon: Alfreda Soler DO;  Location: [facility] OR;  Service: Gastroenterology   • EYE SURGERY     • FOOT GANGLION EXCISION Left    • HEMORROIDECTOMY     • HERNIA REPAIR     • HYSTERECTOMY       Family History   Problem Relation Age of Onset   • Hypertension Mother    • Stroke Mother    • Diabetes Father    • Hypertension Father    • Heart disease Father    • Stroke Father    • Heart disease Brother      Social History     Tobacco Use   • Smoking status: Former Smoker     Packs/day: 1.00     Years: 50.00     Pack years: 50.00     Last attempt to quit: 10/13/2006     Years since quittin.7   • Smokeless tobacco: Never Used   Substance Use Topics   • Alcohol use: Yes     Comment: occasional   • Drug use: No     Medications Prior to Admission   Medication Sig Dispense Refill Last Dose   • aspirin 81 MG tablet Take 81 mg by mouth Daily.   Unknown at Unknown time   • carvedilol (COREG) 6.25 MG tablet TAKE ONE TABLET BY MOUTH TWICE DAILY (Patient taking differently: TAKE ONE TABLET twice DAILY) 180 tablet 3 Unknown at Unknown time   • clindamycin (CLEOCIN) 300 MG capsule Take 300 mg by mouth 3 (Three) Times a Day.   2019 at 2030   • isosorbide mononitrate (ISMO,MONOKET) 20 MG tablet TAKE ONE TABLET BY MOUTH TWICE DAILY 180 tablet 3 Unknown at Unknown time   • levothyroxine (SYNTHROID, LEVOTHROID) 100 MCG tablet Take 100 mcg by mouth Daily.   Unknown at Unknown time   • levothyroxine sodium  "(TIROSINT) 88 MCG capsule Take 88 mcg by mouth Daily.   6/12/2019 at 2030   • losartan (COZAAR) 100 MG tablet TAKE ONE TABLET BY MOUTH ONCE DAILY 90 tablet 1 Unknown at Unknown time   • omeprazole (priLOSEC) 40 MG capsule Take 1 capsule by mouth Daily. 30 capsule 6 Unknown at Unknown time   • raNITIdine (ZANTAC) 300 MG tablet Take 300 mg by mouth Every Night.   Unknown at Unknown time   • sertraline (ZOLOFT) 100 MG tablet Take 100 mg by mouth daily.   Unknown at Unknown time   • spironolactone (ALDACTONE) 25 MG tablet Take 25 mg by mouth Daily.   Unknown at Unknown time   • Vitamin D, Cholecalciferol, (CHOLECALCIFEROL) 400 units tablet Take 400 Units by mouth Daily.   Unknown at Unknown time     Allergies:  Patient has no known allergies.      There is no immunization history on file for this patient.    REVIEW OF SYSTEMS:  Please see the above history of present illness for pertinent positives and negatives.  The remainder of the patient's systems have been reviewed and are negative.     Vital Signs  Temp:  [97.7 °F (36.5 °C)] 97.7 °F (36.5 °C)  Heart Rate:  [57-62] 61  Resp:  [16] 16  BP: (128-145)/(59-83) 128/59   Body mass index is 27.8 kg/m².    Flowsheet Rows      First Filed Value   Admission Height  157.5 cm (62\") Documented at 07/08/2019 0900   Admission Weight  68.9 kg (152 lb) Documented at 07/08/2019 0900           Physical Exam   Constitutional: She is oriented to person, place, and time. She appears well-developed and well-nourished.   HENT:   Head: Normocephalic and atraumatic.   Eyes: EOM are normal. Pupils are equal, round, and reactive to light.   Cardiovascular: Normal rate and regular rhythm.   Pulmonary/Chest: Effort normal and breath sounds normal.   Abdominal: Soft. Bowel sounds are normal. She exhibits no distension. There is tenderness. There is no guarding.   Mild right upper quadrant, moderate midepigastric and periumbilical and mild left lower quadrant tenderness with palpation "   Musculoskeletal: She exhibits no edema.   Neurological: She is alert and oriented to person, place, and time.   Skin: Skin is warm and dry. No erythema.   Psychiatric: She has a normal mood and affect. Her behavior is normal.   Vitals reviewed.    Emotional Behavior:    Judgement and Insight: Good   Mental Status:  Alertness alert   Memory: Good   Mood and Affect:         Depression none               Anxiety none    Debilities:   Physical Weakness none   Handicaps none   Disabilities none   Agitation none     Results Review:    I reviewed the patient's new clinical results.  Lab Results (most recent)     Procedure Component Value Units Date/Time    Urinalysis, Microscopic Only - Urine, Clean Catch [[id number]]  (Abnormal) Collected:  07/08/19 0955    Specimen:  Urine, Clean Catch Updated:  07/08/19 1009     RBC, UA 3-5 /HPF      WBC, UA None Seen /HPF      Bacteria, UA None Seen /HPF      Squamous Epithelial Cells, UA 3-6 /HPF      Hyaline Casts, UA 3-6 /LPF      Methodology Manual Light Microscopy    TSH [[id number]]  (Abnormal) Collected:  07/08/19 0928    Specimen:  Blood Updated:  07/08/19 1001     TSH 6.000 mIU/mL     Urinalysis With Microscopic If Indicated (No Culture) - Urine, Clean Catch [[id number]]  (Abnormal) Collected:  07/08/19 0955    Specimen:  Urine, Clean Catch Updated:  07/08/19 1000     Color, UA Yellow     Appearance, UA Clear     pH, UA 5.5     Specific Gravity, UA 1.025     Glucose, UA Negative     Ketones, UA Negative     Bilirubin, UA Small (1+)     Blood, UA Trace     Protein, UA 30 mg/dL (1+)     Leuk Esterase, UA Negative     Nitrite, UA Negative     Urobilinogen, UA 0.2 E.U./dL    Troponin [[id number]]  (Normal) Collected:  07/08/19 0928    Specimen:  Blood Updated:  07/08/19 0952     Troponin T <0.010 ng/mL     Narrative:       Troponin T Reference Range:  <= 0.03 ng/mL-   Negative for AMI  >0.03 ng/mL-     Abnormal for myocardial necrosis.  Clinicians would have to utilize clinical  acumen, EKG, Troponin and serial changes to determine if it is an Acute Myocardial Infarction or myocardial injury due to an underlying chronic condition.     Comprehensive Metabolic Panel [[id number]]  (Abnormal) Collected:  07/08/19 0928    Specimen:  Blood Updated:  07/08/19 0949     Glucose 151 mg/dL      BUN 8 mg/dL      Creatinine 0.80 mg/dL      Sodium 137 mmol/L      Potassium 4.2 mmol/L      Chloride 98 mmol/L      CO2 25.8 mmol/L      Calcium 9.5 mg/dL      Total Protein 7.5 g/dL      Albumin 4.20 g/dL      ALT (SGPT) 25 U/L      AST (SGOT) 29 U/L      Alkaline Phosphatase 56 U/L      Total Bilirubin 0.7 mg/dL      eGFR Non African Amer 70 mL/min/1.73      Globulin 3.3 gm/dL      A/G Ratio 1.3 g/dL      BUN/Creatinine Ratio 10.0     Anion Gap 13.2 mmol/L     Narrative:       GFR Normal >60  Chronic Kidney Disease <60  Kidney Failure <15    Lipase [[id number]]  (Normal) Collected:  07/08/19 0928    Specimen:  Blood Updated:  07/08/19 0945     Lipase 13 U/L     CBC & Differential [[id number]] Collected:  07/08/19 0928    Specimen:  Blood Updated:  07/08/19 0933    Narrative:       The following orders were created for panel order CBC & Differential.  Procedure                               Abnormality         Status                     ---------                               -----------         ------                     CBC Auto Differential[[id number]]        Normal              Final result                 Please view results for these tests on the individual orders.    CBC Auto Differential [[id number]]  (Normal) Collected:  07/08/19 0928    Specimen:  Blood Updated:  07/08/19 0933     WBC 7.71 10*3/mm3      RBC 4.45 10*6/mm3      Hemoglobin 14.0 g/dL      Hematocrit 40.9 %      MCV 91.9 fL      MCH 31.5 pg      MCHC 34.2 g/dL      RDW 13.1 %      RDW-SD 43.6 fl      MPV 9.7 fL      Platelets 170 10*3/mm3      Neutrophil % 69.4 %      Lymphocyte % 20.4 %      Monocyte % 7.7 %      Eosinophil % 1.8 %       Basophil % 0.3 %      Immature Grans % 0.4 %      Neutrophils, Absolute 5.36 10*3/mm3      Lymphocytes, Absolute 1.57 10*3/mm3      Monocytes, Absolute 0.59 10*3/mm3      Eosinophils, Absolute 0.14 10*3/mm3      Basophils, Absolute 0.02 10*3/mm3      Immature Grans, Absolute 0.03 10*3/mm3      nRBC 0.0 /100 WBC           Imaging Results (most recent)     Procedure Component Value Units Date/Time    CT Abdomen Pelvis With Contrast [[id number]] Collected:  07/08/19 1037     Updated:  07/08/19 1107    Narrative:       CT SCAN OF THE ABDOMEN AND PELVIS WITH CONTRAST 07/08/2019     HISTORY:  Mid abdominal pain, epigastric pain with nausea and vomiting beginning  yesterday. Cholecystectomy and hysterectomy.     TECHNIQUE:  Spiral CT was performed through the abdomen and pelvis following  intravenous contrast administration only as per clinician request.  Radiation dose reduction techniques included automated exposure control  or exposure modulation based on body size. Radiation audit for CT and  nuclear cardiology exams in the last 12 months: 0.     ABDOMEN FINDINGS:  There is mild fatty infiltration of the liver. Calcified granulomas are  seen in the spleen. The pancreas and adrenal glands are normal. The  gallbladder is surgically absent. Kidneys are normal.     PELVIS FINDINGS:  There is mild to moderate dilatation of the proximal and mid small bowel  with multiple air-fluid levels identified. There is a transition zone in  the mid to distal small bowel in the ileum is somewhat decompressed.  Colon is normal in course and caliber and contains a moderate amount of  fecal matter. Findings suggest partial small bowel obstruction. No cause  for the obstruction is seen. Considerate lesions. There is colonic  diverticulosis without evidence of diverticulitis. There is no free  fluid in the abdomen or pelvis. The lung bases are normal.       Impression:       1. Mild to moderate dilatation of the proximal and mid small bowel  "with  multiple air-fluid levels identified. The transition zone is seen in the  mid to distal small bowel and the ileum is somewhat decompressed.  Findings suggest partial small bowel obstruction. No cause for the  obstruction is seen. Considerate lesions. No evidence of free air.  2. Mild fatty infiltration of the liver.  3. Surgical absence of the gallbladder.  4. Diverticulosis.     This report was finalized on 7/8/2019 10:44 AM by Dr. Thomas Guy MD.           reviewed    ECG/EMG Results (most recent)     Procedure Component Value Units Date/Time    ECG 12 Lead [[id number]] Collected:  07/08/19 0951     Updated:  07/08/19 1110    Narrative:       HEART RATE= 59  bpm  RR Interval= 1020  ms  AR Interval= 212  ms  P Horizontal Axis= 35  deg  P Front Axis= 24  deg  QRSD Interval= 91  ms  QT Interval= 446  ms  QRS Axis= 7  deg  T Wave Axis= 62  deg  - BORDERLINE ECG -  Sinus rhythm  Borderline prolonged AR interval  Electronically Signed By:   Date and Time of Study: 2019-07-08 09:51:57        Pending review    Assessment/Plan   PSBO: consult surgery  NPO/NGT/IVFs/Pain management/Protonix  HOLD lovenox, SCDs only pending surgery evaluation  Monitor, ambulate    CAD with history of stent/HLD: No current acute issues, resume home meds when able    Hypothyroidism:  TSH elevated, adjust levothyroxine when off n.p.o. or add IV dosing if continues n.p.o.    Hypertension: Add scheduled metoprolol 2.5 mg every 6 hours IV, hydralazine 10 mg every 6 hours as needed for systolic blood pressure over 160  Monitor    Elevated glucose: Check A1c    I discussed the patients findings and my recommendations with patient and .     Saundra Harrell, APRN  07/08/19  2:43 PM    \"Dictated utilizing Dragon dictation\"    "

## 2019-07-08 NOTE — ED PROVIDER NOTES
Subjective   History of Present Illness  History of Present Illness    Chief complaint: Abdominal pain    Location: Upper    Quality/Severity: Sharp, 10/10 at its worst, 7/10 current    Timing/Onset/Duration: Acute onset at 2 PM yesterday, intermittent    Modifying Factors: Nothing seems to make it better work    Associated Symptoms: No headache.  No fever chills or cough.  No sore throat earache or nasal congestion.  No chest pain or shortness of breath.  No burning when she urinates.  The patient's had nonbloody emesis and nonbloody diarrhea.    Narrative: This 73-year-old white female presents with upper abdominal pain that started 2 PM yesterday.  Patient underwent an EGD and colonoscopy on June 13, 2019 and had erosive gastritis and duodenitis.  Patient had biopsy of polyps.  The patient has not been around anybody who is been sick.  The patient was placed on omeprazole by Dr. Page.    PCP:  Man    Surgeon:  Camilo      Review of Systems   Constitutional: Negative for chills and fever.   HENT: Negative for ear pain and sore throat.    Respiratory: Negative for cough, chest tightness and shortness of breath.    Cardiovascular: Negative for chest pain.   Gastrointestinal: Positive for abdominal pain, diarrhea, nausea and vomiting. Negative for blood in stool and constipation.   Genitourinary: Negative for difficulty urinating and dysuria.   Musculoskeletal: Negative for back pain.   Skin: Negative for rash.   Neurological: Negative for headaches.   Psychiatric/Behavioral: Negative.  Negative for confusion.        Medication List      ASK your doctor about these medications    aspirin 81 MG tablet     carvedilol 6.25 MG tablet  Commonly known as:  COREG  TAKE ONE TABLET BY MOUTH TWICE DAILY     clindamycin 300 MG capsule  Commonly known as:  CLEOCIN     isosorbide mononitrate 20 MG tablet  Commonly known as:  ISMO,MONOKET  TAKE ONE TABLET BY MOUTH TWICE DAILY     * levothyroxine sodium 88 MCG  capsule  Commonly known as:  TIROSINT     * levothyroxine 100 MCG tablet  Commonly known as:  SYNTHROID, LEVOTHROID     losartan 100 MG tablet  Commonly known as:  COZAAR  TAKE ONE TABLET BY MOUTH ONCE DAILY     omeprazole 40 MG capsule  Commonly known as:  priLOSEC  Take 1 capsule by mouth Daily.     raNITIdine 300 MG tablet  Commonly known as:  ZANTAC     sertraline 100 MG tablet  Commonly known as:  ZOLOFT     spironolactone 25 MG tablet  Commonly known as:  ALDACTONE     Vitamin D (Cholecalciferol) 400 units tablet  Commonly known as:  CHOLECALCIFEROL         * This list has 2 medication(s) that are the same as other medications   prescribed for you. Read the directions carefully, and ask your doctor or   other care provider to review them with you.              Past Medical History:   Diagnosis Date   • Abnormal electrocardiogram    • Arthritis    • Chest discomfort    • Colon polyp    • Coronary artery disease    • Disease of thyroid gland    • Hyperlipidemia    • Hypertension        No Known Allergies    Past Surgical History:   Procedure Laterality Date   • APPENDECTOMY     • CARDIAC SURGERY     • CHOLECYSTECTOMY     • COLONOSCOPY N/A 7/13/2016    Procedure: COLONOSCOPY;  Surgeon: Giovani Avelar MD;  Location: [facility] OR;  Service:    • COLONOSCOPY N/A 6/13/2019    Procedure: Colonoscopy with possible biopsy or polypectomy;  Surgeon: Alfreda Soler DO;  Location: [facility] OR;  Service: Gastroenterology   • CORONARY STENT PLACEMENT     • ENDOSCOPY N/A 11/21/2017    Procedure: ESOPHAGOGASTRODUODENOSCOPY with CARIN test ;  Surgeon: Alfreda Soler DO;  Location: [facility] OR;  Service:    • ENDOSCOPY N/A 6/13/2019    Procedure: Esophagogastroduodenoscopy with possible biopsy, polypectomy, dilation;  Surgeon: Alfreda Soler DO;  Location: [facility] OR;  Service: Gastroenterology   • EYE SURGERY     • FOOT GANGLION EXCISION Left    • HEMORROIDECTOMY     • HERNIA REPAIR     • HYSTERECTOMY          Family History   Problem Relation Age of Onset   • Hypertension Mother    • Stroke Mother    • Diabetes Father    • Hypertension Father    • Heart disease Father    • Stroke Father    • Heart disease Brother        Social History     Socioeconomic History   • Marital status:      Spouse name: Not on file   • Number of children: Not on file   • Years of education: Not on file   • Highest education level: Not on file   Tobacco Use   • Smoking status: Former Smoker     Packs/day: 1.00     Years: 50.00     Pack years: 50.00     Last attempt to quit: 10/13/2006     Years since quittin.7   • Smokeless tobacco: Never Used   Substance and Sexual Activity   • Alcohol use: Yes     Comment: occasional   • Drug use: No   • Sexual activity: Defer           Objective   Physical Exam   Constitutional: She is oriented to person, place, and time. She appears well-developed and well-nourished. No distress.   ED Triage Vitals (19 0900)  Temp: 97.7 °F (36.5 °C)  Heart Rate: 58  Resp: 16  BP: 143/69  SpO2: 94 %  Temp src: n/a  Heart Rate Source: n/a  Patient Position: n/a  BP Location: n/a  FiO2 (%): n/a    The patient's vitals were reviewed by me.  Unless otherwise noted they are within normal limits.     HENT:   Head: Normocephalic and atraumatic.   Right Ear: External ear normal.   Left Ear: External ear normal.   Nose: Nose normal.   Mouth/Throat: Oropharynx is clear and moist. No oropharyngeal exudate.   Eyes: Conjunctivae and EOM are normal. Pupils are equal, round, and reactive to light. Right eye exhibits no discharge. Left eye exhibits no discharge. No scleral icterus.   Neck: Normal range of motion. Neck supple. No JVD present. No tracheal deviation present. No thyromegaly present.   Cardiovascular: Normal rate, regular rhythm, normal heart sounds and intact distal pulses. Exam reveals no gallop and no friction rub.   No murmur heard.  Pulmonary/Chest: Effort normal and breath sounds normal. No stridor.  No respiratory distress. She has no wheezes. She has no rales. She exhibits no tenderness.   Abdominal: Soft. Bowel sounds are normal. She exhibits no distension and no mass. There is tenderness (Moderate epigastric and mild right-sided abdominal pain). There is no rebound and no guarding. No hernia.   Musculoskeletal: Normal range of motion. She exhibits no edema or deformity.   Lymphadenopathy:     She has no cervical adenopathy.   Neurological: She is alert and oriented to person, place, and time.   Skin: Skin is warm and dry. No rash noted. She is not diaphoretic. No erythema. No pallor.   Psychiatric: Her behavior is normal.   Nursing note and vitals reviewed.      Procedures           ED Course  ED Course as of Jul 08 1027   Mon Jul 08, 2019   1015 The laboratory values were reviewed by me.  Urine microscopic shows 3-5 red blood cells, 3 to 6 squamousepithelial cells.  The TSH is 6.000 and the serum glucose is 151.  The laboratory values are otherwise unremarkable.  [RC]      ED Course User Index  [RC] Josh Hines MD      11:10 AM, 07/08/19:  The patient was reassessed.  She feels better.  Her vital signs were reviewed and are stable.  Lung exam: Soft, moderate epigastric tenderness, no rebound, no guarding, no masses, positive bowel sounds    11:10 AM, 07/08/19:  The patient's diagnosis of abdominal pain with partial small bowel obstruction was discussed with her.  The patient will be admitted for gut rest, IV hydration and further work-up and evaluation of her bowel obstruction.  All the patient's questions were answered patient will be admitted in stable condition    11:10 AM, 07/08/19:  I spoke with Angela Harrell, practitioner excepting patients for the hospitalist service, she will accept the patient.  9:54 AM, 07/08/19:  The EKG was obtained at 951.  EKG was read by me at 953.  EKG shows normal sinus rhythm with rate of 59.  There is a normal axis with no hypertrophy.  The NC, and QRS intervals  are unremarkable.  There is a borderline prolonged QT interval at 442 ms corrected.  There is no ectopy.  There is no acute ST elevation or depression.    EGD and Colonocscopy 6/13/19    Pre-operative Diagnosis:  Epigastric pain,   Bloating [R14.0]       Other dysphagia [R13.19]     History of colon polyps [Z86.010]            Post-operative Diagnosis: erosive gastritis, duodenitis, polyps of the cecum, right colon, left colon and rectum; diverticulosis     Procedure:  EGD with biopsy for H. Pylori with cold forceps and  Colonoscopy with polypectomy using cold forceps      Surgeon: Karlene     Anesthetic: MAC      Estimated Blood Loss:  Minimal      Complications:  none     Indications:  See preop diagnosis     Findings/Treatments:   Erosive gastritis and duodenitis-biopsy for H. pylori with cold forceps  Polyps x3 of cecum-removed with cold forceps  Polyp x1 of right colon-removed cold forceps  Polyp x1 of left colon-removed with cold forceps  Polyp x1 of rectum-removed cold forceps                                        Scope Withdrawal Time:  > 6 minutes      Recommendations: Await pathology        Procedure Details      After discussing the benefits and risks of an EGD and Colonoscopy, benefits and risks not limited to but including:  Bleeding, infection, perforation, aspiration; informed consent was signed.  The patient was taken into the endoscopy suite at [facility] and placed in the left lateral decubitus position.  MAC anesthesia was induced under appropriate monitoring.  Bite block was placed and the gastroscope was inserted thru such and advanced under direct vision to second portion of the duodenum.  A careful inspection was made as the gastroscope was withdrawn, including a retroflexed view of the proximal stomach; findings and interventions are described below.  If biopsies were taken, this was done with the cold biopsy forceps.     The bed was then rotated 180 degrees.  A  rectal exam was performed.  Sphincter tone was normal.  The colonoscope was then inserted and carefully advanced to the cecum while visualizing the mucosa.  The cecum was identified by the ileocecal valve and the orifice of the appendix.  There were 3 cecum polyps that were removed with cold forceps.  The scope was withdrawn in the right colon where another polyp was removed cold forceps.  The scope was then slowly withdrawn while carefully evaluated mucosa deflating air.  In the left colon was a polyp.  In the sigmoid colon there was diverticulosis.  In the rectum there was a polyp that was removed cold forceps.  The scope was then removed, a piece of Gelfoam was placed into the rectum and Ashlie was taken to the recovery area in stable postoperative condition having tolerated procedure well.     Alfreda Soler,                MDM    No orders to display     Labs Reviewed   COMPREHENSIVE METABOLIC PANEL   URINALYSIS W/ MICROSCOPIC IF INDICATED (NO CULTURE)   CBC WITH AUTO DIFFERENTIAL   TSH   LIPASE   TROPONIN (IN-HOUSE)   CBC AND DIFFERENTIAL    Narrative:     The following orders were created for panel order CBC & Differential.  Procedure                               Abnormality         Status                     ---------                               -----------         ------                     CBC Auto Differential[[id number]]                            In process                   Please view results for these tests on the individual orders.     No results found.    Final diagnoses:   Hypothyroidism, unspecified type   Partial small bowel obstruction (CMS/HCC)         ED Medications:  Medications   ondansetron (ZOFRAN) injection 4 mg (not administered)   sodium chloride 0.9 % bolus 1,000 mL (not administered)       New Medications:     Medication List      ASK your doctor about these medications    aspirin 81 MG tablet     carvedilol 6.25 MG tablet  Commonly known as:  COREG  TAKE ONE TABLET BY MOUTH  TWICE DAILY     clindamycin 300 MG capsule  Commonly known as:  CLEOCIN     isosorbide mononitrate 20 MG tablet  Commonly known as:  ISMO,MONOKET  TAKE ONE TABLET BY MOUTH TWICE DAILY     * levothyroxine sodium 88 MCG capsule  Commonly known as:  TIROSINT     * levothyroxine 100 MCG tablet  Commonly known as:  SYNTHROID, LEVOTHROID     losartan 100 MG tablet  Commonly known as:  COZAAR  TAKE ONE TABLET BY MOUTH ONCE DAILY     omeprazole 40 MG capsule  Commonly known as:  priLOSEC  Take 1 capsule by mouth Daily.     raNITIdine 300 MG tablet  Commonly known as:  ZANTAC     sertraline 100 MG tablet  Commonly known as:  ZOLOFT     spironolactone 25 MG tablet  Commonly known as:  ALDACTONE     Vitamin D (Cholecalciferol) 400 units tablet  Commonly known as:  CHOLECALCIFEROL         * This list has 2 medication(s) that are the same as other medications   prescribed for you. Read the directions carefully, and ask your doctor or   other care provider to review them with you.              Stopped Medications:     Medication List      ASK your doctor about these medications    aspirin 81 MG tablet     carvedilol 6.25 MG tablet  Commonly known as:  COREG  TAKE ONE TABLET BY MOUTH TWICE DAILY     clindamycin 300 MG capsule  Commonly known as:  CLEOCIN     isosorbide mononitrate 20 MG tablet  Commonly known as:  ISMO,MONOKET  TAKE ONE TABLET BY MOUTH TWICE DAILY     * levothyroxine sodium 88 MCG capsule  Commonly known as:  TIROSINT     * levothyroxine 100 MCG tablet  Commonly known as:  SYNTHROID, LEVOTHROID     losartan 100 MG tablet  Commonly known as:  COZAAR  TAKE ONE TABLET BY MOUTH ONCE DAILY     omeprazole 40 MG capsule  Commonly known as:  priLOSEC  Take 1 capsule by mouth Daily.     raNITIdine 300 MG tablet  Commonly known as:  ZANTAC     sertraline 100 MG tablet  Commonly known as:  ZOLOFT     spironolactone 25 MG tablet  Commonly known as:  ALDACTONE     Vitamin D (Cholecalciferol) 400 units tablet  Commonly known  as:  CHOLECALCIFEROL         * This list has 2 medication(s) that are the same as other medications   prescribed for you. Read the directions carefully, and ask your doctor or   other care provider to review them with you.                Final diagnoses:   Hypothyroidism, unspecified type   Partial small bowel obstruction (CMS/HCC)            Josh Hines MD  07/08/19 1059

## 2019-07-09 ENCOUNTER — APPOINTMENT (OUTPATIENT)
Dept: GENERAL RADIOLOGY | Facility: HOSPITAL | Age: 74
End: 2019-07-09

## 2019-07-09 LAB
ANION GAP SERPL CALCULATED.3IONS-SCNC: 11.8 MMOL/L (ref 5–15)
B PARAPERT DNA SPEC QL NAA+PROBE: NOT DETECTED
B PERT DNA SPEC QL NAA+PROBE: NOT DETECTED
BASOPHILS # BLD AUTO: 0.02 10*3/MM3 (ref 0–0.2)
BASOPHILS NFR BLD AUTO: 0.3 % (ref 0–1.5)
BUN BLD-MCNC: 7 MG/DL (ref 8–23)
BUN/CREAT SERPL: 7.5 (ref 7–25)
C PNEUM DNA NPH QL NAA+NON-PROBE: NOT DETECTED
CALCIUM SPEC-SCNC: 8.7 MG/DL (ref 8.6–10.5)
CHLORIDE SERPL-SCNC: 103 MMOL/L (ref 98–107)
CO2 SERPL-SCNC: 27.2 MMOL/L (ref 22–29)
CREAT BLD-MCNC: 0.93 MG/DL (ref 0.57–1)
DEPRECATED RDW RBC AUTO: 45.5 FL (ref 37–54)
EOSINOPHIL # BLD AUTO: 0.34 10*3/MM3 (ref 0–0.4)
EOSINOPHIL NFR BLD AUTO: 5.4 % (ref 0.3–6.2)
ERYTHROCYTE [DISTWIDTH] IN BLOOD BY AUTOMATED COUNT: 13 % (ref 12.3–15.4)
FLUAV H1 2009 PAND RNA NPH QL NAA+PROBE: NOT DETECTED
FLUAV H1 HA GENE NPH QL NAA+PROBE: NOT DETECTED
FLUAV H3 RNA NPH QL NAA+PROBE: NOT DETECTED
FLUAV SUBTYP SPEC NAA+PROBE: NOT DETECTED
FLUBV RNA ISLT QL NAA+PROBE: NOT DETECTED
GFR SERPL CREATININE-BSD FRML MDRD: 59 ML/MIN/1.73
GLUCOSE BLD-MCNC: 101 MG/DL (ref 65–99)
HADV DNA SPEC NAA+PROBE: NOT DETECTED
HCOV 229E RNA SPEC QL NAA+PROBE: NOT DETECTED
HCOV HKU1 RNA SPEC QL NAA+PROBE: NOT DETECTED
HCOV NL63 RNA SPEC QL NAA+PROBE: NOT DETECTED
HCOV OC43 RNA SPEC QL NAA+PROBE: NOT DETECTED
HCT VFR BLD AUTO: 41.9 % (ref 34–46.6)
HGB BLD-MCNC: 13.7 G/DL (ref 12–15.9)
HMPV RNA NPH QL NAA+NON-PROBE: NOT DETECTED
HPIV1 RNA SPEC QL NAA+PROBE: NOT DETECTED
HPIV2 RNA SPEC QL NAA+PROBE: NOT DETECTED
HPIV3 RNA NPH QL NAA+PROBE: DETECTED
HPIV4 P GENE NPH QL NAA+PROBE: NOT DETECTED
IMM GRANULOCYTES # BLD AUTO: 0.03 10*3/MM3 (ref 0–0.05)
IMM GRANULOCYTES NFR BLD AUTO: 0.5 % (ref 0–0.5)
LYMPHOCYTES # BLD AUTO: 1.72 10*3/MM3 (ref 0.7–3.1)
LYMPHOCYTES NFR BLD AUTO: 27.4 % (ref 19.6–45.3)
M PNEUMO IGG SER IA-ACNC: NOT DETECTED
MCH RBC QN AUTO: 31.1 PG (ref 26.6–33)
MCHC RBC AUTO-ENTMCNC: 32.7 G/DL (ref 31.5–35.7)
MCV RBC AUTO: 95.2 FL (ref 79–97)
MONOCYTES # BLD AUTO: 0.52 10*3/MM3 (ref 0.1–0.9)
MONOCYTES NFR BLD AUTO: 8.3 % (ref 5–12)
NEUTROPHILS # BLD AUTO: 3.64 10*3/MM3 (ref 1.7–7)
NEUTROPHILS NFR BLD AUTO: 58.1 % (ref 42.7–76)
NRBC BLD AUTO-RTO: 0 /100 WBC (ref 0–0.2)
PLATELET # BLD AUTO: 168 10*3/MM3 (ref 140–450)
PMV BLD AUTO: 9.9 FL (ref 6–12)
POTASSIUM BLD-SCNC: 4.1 MMOL/L (ref 3.5–5.2)
PROCALCITONIN SERPL-MCNC: 0.09 NG/ML (ref 0.1–0.25)
RBC # BLD AUTO: 4.4 10*6/MM3 (ref 3.77–5.28)
RHINOVIRUS RNA SPEC NAA+PROBE: NOT DETECTED
RSV RNA NPH QL NAA+NON-PROBE: NOT DETECTED
SODIUM BLD-SCNC: 142 MMOL/L (ref 136–145)
WBC NRBC COR # BLD: 6.27 10*3/MM3 (ref 3.4–10.8)

## 2019-07-09 PROCEDURE — 25010000002 MORPHINE PER 10 MG: Performed by: SURGERY

## 2019-07-09 PROCEDURE — 94799 UNLISTED PULMONARY SVC/PX: CPT

## 2019-07-09 PROCEDURE — 84145 PROCALCITONIN (PCT): CPT | Performed by: NURSE PRACTITIONER

## 2019-07-09 PROCEDURE — 87070 CULTURE OTHR SPECIMN AEROBIC: CPT | Performed by: NURSE PRACTITIONER

## 2019-07-09 PROCEDURE — 99221 1ST HOSP IP/OBS SF/LOW 40: CPT | Performed by: SURGERY

## 2019-07-09 PROCEDURE — 0100U HC BIOFIRE FILMARRAY RESP PANEL 2: CPT | Performed by: NURSE PRACTITIONER

## 2019-07-09 PROCEDURE — 94640 AIRWAY INHALATION TREATMENT: CPT

## 2019-07-09 PROCEDURE — 85025 COMPLETE CBC W/AUTO DIFF WBC: CPT | Performed by: NURSE PRACTITIONER

## 2019-07-09 PROCEDURE — 74022 RADEX COMPL AQT ABD SERIES: CPT

## 2019-07-09 PROCEDURE — 80048 BASIC METABOLIC PNL TOTAL CA: CPT | Performed by: NURSE PRACTITIONER

## 2019-07-09 PROCEDURE — 99232 SBSQ HOSP IP/OBS MODERATE 35: CPT | Performed by: NURSE PRACTITIONER

## 2019-07-09 PROCEDURE — 87205 SMEAR GRAM STAIN: CPT | Performed by: NURSE PRACTITIONER

## 2019-07-09 RX ORDER — IPRATROPIUM BROMIDE AND ALBUTEROL SULFATE 2.5; .5 MG/3ML; MG/3ML
3 SOLUTION RESPIRATORY (INHALATION)
Status: DISCONTINUED | OUTPATIENT
Start: 2019-07-09 | End: 2019-07-10 | Stop reason: HOSPADM

## 2019-07-09 RX ORDER — BUDESONIDE 0.5 MG/2ML
0.5 INHALANT ORAL
Status: DISCONTINUED | OUTPATIENT
Start: 2019-07-09 | End: 2019-07-10 | Stop reason: HOSPADM

## 2019-07-09 RX ADMIN — SODIUM CHLORIDE, PRESERVATIVE FREE 3 ML: 5 INJECTION INTRAVENOUS at 20:47

## 2019-07-09 RX ADMIN — BUDESONIDE 0.5 MG: 0.5 SUSPENSION RESPIRATORY (INHALATION) at 19:32

## 2019-07-09 RX ADMIN — METOPROLOL TARTRATE 2.5 MG: 5 INJECTION INTRAVENOUS at 18:29

## 2019-07-09 RX ADMIN — METOPROLOL TARTRATE 2.5 MG: 5 INJECTION INTRAVENOUS at 10:18

## 2019-07-09 RX ADMIN — IPRATROPIUM BROMIDE AND ALBUTEROL SULFATE 3 ML: .5; 3 SOLUTION RESPIRATORY (INHALATION) at 16:20

## 2019-07-09 RX ADMIN — PANTOPRAZOLE SODIUM 40 MG: 40 INJECTION, POWDER, LYOPHILIZED, FOR SOLUTION INTRAVENOUS at 06:31

## 2019-07-09 RX ADMIN — IPRATROPIUM BROMIDE AND ALBUTEROL SULFATE 3 ML: .5; 3 SOLUTION RESPIRATORY (INHALATION) at 19:31

## 2019-07-09 RX ADMIN — METOPROLOL TARTRATE 2.5 MG: 5 INJECTION INTRAVENOUS at 03:43

## 2019-07-09 RX ADMIN — METOPROLOL TARTRATE 2.5 MG: 5 INJECTION INTRAVENOUS at 20:48

## 2019-07-09 RX ADMIN — SODIUM CHLORIDE 100 ML/HR: 9 INJECTION, SOLUTION INTRAVENOUS at 10:15

## 2019-07-09 RX ADMIN — MORPHINE SULFATE 1 MG: 2 INJECTION, SOLUTION INTRAMUSCULAR; INTRAVENOUS at 03:12

## 2019-07-09 RX ADMIN — SODIUM CHLORIDE 100 ML/HR: 9 INJECTION, SOLUTION INTRAVENOUS at 20:46

## 2019-07-09 NOTE — PLAN OF CARE
Problem: Pain, Acute (Adult)  Goal: Identify Related Risk Factors and Signs and Symptoms  Outcome: Ongoing (interventions implemented as appropriate)   07/09/19 0457   Pain, Acute (Adult)   Related Risk Factors (Acute Pain) patient perception   Signs and Symptoms (Acute Pain) verbalization of pain descriptors     Goal: Acceptable Pain Control/Comfort Level  Outcome: Ongoing (interventions implemented as appropriate)   07/09/19 0457   Pain, Acute (Adult)   Acceptable Pain Control/Comfort Level making progress toward outcome       Problem: Nausea/Vomiting (Adult)  Goal: Identify Related Risk Factors and Signs and Symptoms  Outcome: Ongoing (interventions implemented as appropriate)   07/09/19 0457   Nausea/Vomiting (Adult)   Related Risk Factors (Nausea/Vomiting) gastrointestinal dysfunction   Signs and Symptoms (Nausea/Vomiting) abdominal discomfort/pain     Goal: Symptom Relief  Outcome: Ongoing (interventions implemented as appropriate)   07/09/19 0457   Nausea/Vomiting (Adult)   Symptom Relief making progress toward outcome     Goal: Adequate Hydration  Outcome: Ongoing (interventions implemented as appropriate)   07/09/19 0457   Nausea/Vomiting (Adult)   Adequate Hydration making progress toward outcome       Problem: Bowel Obstruction (Adult)  Goal: Signs and Symptoms of Listed Potential Problems Will be Absent, Minimized or Managed (Bowel Obstruction)  Outcome: Ongoing (interventions implemented as appropriate)   07/09/19 0457   Goal/Outcome Evaluation   Problems Assessed (Bowel Obstruction) all   Problems Present (Bowel Obstruction) situational response;pain       Problem: Patient Care Overview  Goal: Plan of Care Review  Outcome: Ongoing (interventions implemented as appropriate)   07/09/19 0457   Coping/Psychosocial   Plan of Care Reviewed With patient   Plan of Care Review   Progress no change

## 2019-07-09 NOTE — PLAN OF CARE
Problem: Patient Care Overview  Goal: Discharge Needs Assessment  Outcome: Ongoing (interventions implemented as appropriate)   07/09/19 1046   Discharge Needs Assessment   Readmission Within the Last 30 Days no previous admission in last 30 days   Concerns to be Addressed denies needs/concerns at this time   Concerns Comments none voiced   Patient/Family Anticipates Transition to home with family   Patient/Family Anticipated Services at Transition none   Transportation Concerns (none)   Transportation Anticipated family or friend will provide;car, drives self  (pt still drives but her  can provide ride home at discharge.)   Anticipated Changes Related to Illness none   Equipment Needed After Discharge none   Outpatient/Agency/Support Group Needs (none)   Discharge Facility/Level of Care Needs (none)   Offered/Gave Vendor List yes  (offered community resources but patient denies the need for them at this time.)   Patient's Choice of Community Agency(s) no preference   Current Discharge Risk (none)   Discharge Coordination/Progress per pt she plans on returning home at discharge with no needs identified.   Disability   Equipment Currently Used at Home none

## 2019-07-09 NOTE — PROGRESS NOTES
I reviewed the AAS from this morning.  All of the contrast is in the colon and the small bowel dilation has resolved.  Ashlie's cough from this morning has gotten worse.  According to her  she developed the cough on Sunday around the same time her abdomen started to hurt.  Her NG tube is clamped currently.  She said she no longer feels nauseated.  We will remove the NG tube but keep her NPO.  With her worsening cough I do not want to take her to surgery unless it is absolutely necessary.

## 2019-07-09 NOTE — PROGRESS NOTES
"SERVICE: [facility] HOSPITALIST    CONSULTANTS: Surgery    CHIEF COMPLAINT: Follow-up PSBO, cough    SUBJECTIVE: The patient notes that she is ambulating regularly and this afternoon started passing flatus.  NG tube removed earlier by Dr. Soler. She notes her pain remains more epigastric area but she has not had any nausea or vomiting.  Her biggest complaint is constant coughing that worsens her abdominal pain, occasionally productive of greenish sputum.  She now reports that this is been present since Sunday and did not have any fever or chills, shortness of air associated with it.  When asked any questions, patient looks to  for answers.    OBJECTIVE:    /65 (BP Location: Left arm, Patient Position: Lying)   Pulse 67   Temp 97.9 °F (36.6 °C) (Oral)   Resp 16   Ht 157.5 cm (62\")   Wt 69.5 kg (153 lb 3.2 oz)   SpO2 92%   BMI 28.02 kg/m²     MEDS/LABS REVIEWED AND ORDERED    budesonide 0.5 mg Nebulization BID - RT   ipratropium-albuterol 3 mL Nebulization 4x Daily - RT   metoprolol tartrate 2.5 mg Intravenous Q6H   pantoprazole 40 mg Intravenous Q AM   sodium chloride 3 mL Intravenous Q12H     Physical Exam   Constitutional: She is oriented to person, place, and time. She appears well-developed and well-nourished.   HENT:   Head: Normocephalic and atraumatic.   Eyes: EOM are normal. Pupils are equal, round, and reactive to light.   Cardiovascular: Normal rate and regular rhythm.   Pulmonary/Chest: Effort normal.   Scattered rhonchi   Abdominal: Soft.   Bowel sounds hypoactive but present  Moderate mid epigastric tenderness and left sided tenderness   Musculoskeletal: She exhibits no edema.   Neurological: She is alert and oriented to person, place, and time.   Skin: Skin is warm and dry. No erythema.   Psychiatric: She has a normal mood and affect. Her behavior is normal.   Vitals reviewed.    LAB/DIAGNOSTICS:    Lab Results (last 24 hours)     Procedure Component Value Units " Date/Time    Procalcitonin [[id number]]  (Abnormal) Collected:  07/09/19 0600    Specimen:  Blood Updated:  07/09/19 1314     Procalcitonin 0.09 ng/mL     Narrative:       As a Marker for Sepsis (Non-Neonates):   1. <0.5 ng/mL represents a low risk of severe sepsis and/or septic shock.  2. >2 ng/mL represents a high risk of severe sepsis and/or septic shock.    As a Marker for Lower Respiratory Tract Infections that require antibiotic therapy:    PCT on Admission     Antibiotic Therapy       6-12 Hrs later  > 0.5                Strongly Recommended             >0.25 - <0.5         Recommended  0.1 - 0.25           Discouraged              Remeasure/reassess PCT  <0.1                 Strongly Discouraged     Remeasure/reassess PCT                     PCT values of < 0.5 ng/mL do not exclude an infection, because localized infections (without systemic signs) may be associated with such low concentrations, or a systemic infection in its initial stages (< 6 hours). Furthermore, increased PCT can occur without infection. PCT concentrations between 0.5 and 2.0 ng/mL should be interpreted taking into account the patient's history. It is recommended to retest PCT within 6-24 hours if any concentrations < 2 ng/mL are obtained.    Basic Metabolic Panel [[id number]]  (Abnormal) Collected:  07/09/19 0600    Specimen:  Blood Updated:  07/09/19 0656     Glucose 101 mg/dL      BUN 7 mg/dL      Creatinine 0.93 mg/dL      Sodium 142 mmol/L      Potassium 4.1 mmol/L      Chloride 103 mmol/L      CO2 27.2 mmol/L      Calcium 8.7 mg/dL      eGFR Non African Amer 59 mL/min/1.73      BUN/Creatinine Ratio 7.5     Anion Gap 11.8 mmol/L     Narrative:       GFR Normal >60  Chronic Kidney Disease <60  Kidney Failure <15    CBC & Differential [[id number]] Collected:  07/09/19 0600    Specimen:  Blood Updated:  07/09/19 0636    Narrative:       The following orders were created for panel order CBC & Differential.  Procedure                                Abnormality         Status                     ---------                               -----------         ------                     CBC Auto Differential[[id number]]        Normal              Final result                 Please view results for these tests on the individual orders.    CBC Auto Differential [[id number]]  (Normal) Collected:  07/09/19 0600    Specimen:  Blood Updated:  07/09/19 0636     WBC 6.27 10*3/mm3      RBC 4.40 10*6/mm3      Hemoglobin 13.7 g/dL      Hematocrit 41.9 %      MCV 95.2 fL      MCH 31.1 pg      MCHC 32.7 g/dL      RDW 13.0 %      RDW-SD 45.5 fl      MPV 9.9 fL      Platelets 168 10*3/mm3      Neutrophil % 58.1 %      Lymphocyte % 27.4 %      Monocyte % 8.3 %      Eosinophil % 5.4 %      Basophil % 0.3 %      Immature Grans % 0.5 %      Neutrophils, Absolute 3.64 10*3/mm3      Lymphocytes, Absolute 1.72 10*3/mm3      Monocytes, Absolute 0.52 10*3/mm3      Eosinophils, Absolute 0.34 10*3/mm3      Basophils, Absolute 0.02 10*3/mm3      Immature Grans, Absolute 0.03 10*3/mm3      nRBC 0.0 /100 WBC     Hemoglobin A1c [[id number]]  (Normal) Collected:  07/08/19 0928    Specimen:  Blood Updated:  07/08/19 1524     Hemoglobin A1C 5.40 %     Narrative:       Hemoglobin A1C Ranges:    Increased Risk for Diabetes  5.7% to 6.4%  Diabetes                     >= 6.5%  Diabetic Goal                < 7.0%        ECG 12 Lead   Final Result   HEART RATE= 59  bpm   RR Interval= 1020  ms   AR Interval= 212  ms   P Horizontal Axis= 35  deg   P Front Axis= 24  deg   QRSD Interval= 91  ms   QT Interval= 446  ms   QRS Axis= 7  deg   T Wave Axis= 62  deg   - BORDERLINE ECG -   Sinus rhythm   Borderline prolonged AR interval   No change from prior tracing   Electronically Signed By: Wagner Flowers ([facility]) 09-Jul-2019 12:40:41   Date and Time of Study: 2019-07-08 09:51:57           Ct Abdomen Pelvis Without Contrast    Result Date: 7/8/2019  1. Redemonstration of mild to moderate distention of multiple  small bowel loops including proximal to mid small bowel with transition point likely in the pelvis such that the distal ileum is decompressed. Oral contrast media can be seen to the level of the mildly distended more proximal ileal loops. There is concern for internal hernia with a swirling configuration of the mesenteric vessels in the lower abdomen and pelvis. Please refer back to the earlier study from this morning as well. There is no evidence for free air or drainable fluid collection. There is a small amount of free fluid in the pelvis which is either new or increased from earlier film. The degree of bowel distention is not appreciably changed. The appendix is not definitely seen. 2. Post hysterectomy and cholecystectomy. 3. Atherosclerotic vascular calcifications. 4. Sigmoid diverticulosis without evidence for diverticulitis. Signer Name: Qing Gleason MD  Signed: 7/8/2019 6:06 PM  Workstation Name: [email or web address]     Xr Abdomen 2 View With Chest 1 View    Result Date: 7/9/2019  1.  No visible small bowel dilatation today. 2.  GI contrast from examination yesterday is no longer present within small bowel and is seen within normal caliber colon. There is no evidence of mechanical bowel obstruction. 3.  NG tube in good position.  This report was finalized on 7/9/2019 9:17 AM by Dr. Miguelito Gottlieb MD.      Ct Abdomen Pelvis With Contrast    Result Date: 7/8/2019  1. Mild to moderate dilatation of the proximal and mid small bowel with multiple air-fluid levels identified. The transition zone is seen in the mid to distal small bowel and the ileum is somewhat decompressed. Findings suggest partial small bowel obstruction. No cause for the obstruction is seen. Considerate lesions. No evidence of free air. 2. Mild fatty infiltration of the liver. 3. Surgical absence of the gallbladder. 4. Diverticulosis.  This report was finalized on 7/8/2019 10:44 AM by Dr. Thomas Guy MD.      ASSESSMENT/PLAN:  PSBO: Surgery  "following  NG tube now out after x-rays demonstrated resolution of PSBO  Continue ambulation, pain and nausea control  Continue to hold Lovenox until surgically cleared with patient ambulating well and using SCDs in bed  Continue IV Protonix  Advance diet per surgery  Monitor    Acute bronchitis:   Procalcitonin negative, white blood cell count normal, patient is afebrile  Chest x-ray clear  Check respiratory viral panel, sputum culture  Add duo nebs, Acapella and budesonide nebs  Monitor     CAD/HLD with H/O stent:  No current acute issues, will resume home medications when off n.p.o.    Hypothyroidism: TSH elevated, adjust levothyroxine to 112 mcg daily when off n.p.o.    Hypertension: Pressure at goal on metoprolol 2.5 mg IV every 6 hours    Elevated glucose: A1c normal at 5.4%    PLAN FOR DISPOSITION: Home when able    ALIA Miller  Hospitalist, [facility]  07/09/19  11:10 AM    \"Dictated utilizing Dragon dictation\"    "

## 2019-07-09 NOTE — PLAN OF CARE
Problem: Pain, Acute (Adult)  Goal: Identify Related Risk Factors and Signs and Symptoms  Outcome: Ongoing (interventions implemented as appropriate)    Goal: Acceptable Pain Control/Comfort Level  Outcome: Ongoing (interventions implemented as appropriate)      Problem: Nausea/Vomiting (Adult)  Goal: Identify Related Risk Factors and Signs and Symptoms  Outcome: Ongoing (interventions implemented as appropriate)    Goal: Symptom Relief  Outcome: Ongoing (interventions implemented as appropriate)    Goal: Adequate Hydration  Outcome: Ongoing (interventions implemented as appropriate)      Problem: Bowel Obstruction (Adult)  Goal: Signs and Symptoms of Listed Potential Problems Will be Absent, Minimized or Managed (Bowel Obstruction)  Outcome: Ongoing (interventions implemented as appropriate)      Problem: Patient Care Overview  Goal: Plan of Care Review  Outcome: Ongoing (interventions implemented as appropriate)

## 2019-07-09 NOTE — CONSULTS
General Surgery      Patient Care Team:  Yandel Conway DO as PCP - General (Family Medicine)  Al Granado III, MD as PCP - Claims Attributed    CHIEF COMPLAINT: opinion regarding partial small bowel obstruction    HISTORY OF PRESENT ILLNESS:    Ashlie is well known to me.  I performed and EGD and colonoscopy on her back in June and found erosive gastritis as well as some colon polyps and diverticulosis.  She has been having issues with abdominal pain for many years.  She says the pain is most severe in her epigastric area and characterizes it as a transient pain.  She said on Sunday the pain became more severe and she also developed nausea with vomiting.  She came to the ER on Monday where she had essentially negative labs and a CT scan that showed a possible partial small bowel obstruction.  She said she had a similar episode back in November but was told nothing was wrong after morphine relieved her pain.  She denies chest pain and shortness of breath but does have a cough.  She was once hospitalized for a similar episode in [facility] and an NG tube was placed and she recovered without surgical intervention.  She has started the proton pump inhibitor that I prescribed and has changed her diet.  She again says her pain is most severe in the epigastric area.  She has had multiple abdominal surgeries.      Past Medical History:   Diagnosis Date   • Abnormal electrocardiogram    • Arthritis    • Chest discomfort    • Colon polyp    • Coronary artery disease    • Disease of thyroid gland    • Hyperlipidemia    • Hypertension      Past Surgical History:   Procedure Laterality Date   • APPENDECTOMY     • CARDIAC SURGERY     • CHOLECYSTECTOMY     • COLONOSCOPY N/A 7/13/2016    Procedure: COLONOSCOPY;  Surgeon: Giovani Avelar MD;  Location: [facility] OR;  Service:    • COLONOSCOPY N/A 6/13/2019    Procedure: Colonoscopy with possible biopsy or polypectomy;  Surgeon: Alfreda Soler DO;  Location:   [facility] OR;  Service: Gastroenterology   • CORONARY STENT PLACEMENT     • ENDOSCOPY N/A 2017    Procedure: ESOPHAGOGASTRODUODENOSCOPY with CARIN test ;  Surgeon: Alfreda Soler DO;  Location:  [facility] OR;  Service:    • ENDOSCOPY N/A 2019    Procedure: Esophagogastroduodenoscopy with possible biopsy, polypectomy, dilation;  Surgeon: Alfreda Soler DO;  Location:  [facility] OR;  Service: Gastroenterology   • EYE SURGERY     • FOOT GANGLION EXCISION Left    • HEMORROIDECTOMY     • HERNIA REPAIR     • HYSTERECTOMY       Family History   Problem Relation Age of Onset   • Hypertension Mother    • Stroke Mother    • Diabetes Father    • Hypertension Father    • Heart disease Father    • Stroke Father    • Heart disease Brother      Social History     Tobacco Use   • Smoking status: Former Smoker     Packs/day: 1.00     Years: 50.00     Pack years: 50.00     Last attempt to quit: 10/13/2006     Years since quittin.7   • Smokeless tobacco: Never Used   Substance Use Topics   • Alcohol use: Yes     Comment: occasional   • Drug use: No     Medications Prior to Admission   Medication Sig Dispense Refill Last Dose   • aspirin 81 MG chewable tablet Chew 81 mg Daily.      • carvedilol (COREG) 6.25 MG tablet TAKE ONE TABLET BY MOUTH TWICE DAILY (Patient taking differently: TAKE ONE TABLET twice DAILY) 180 tablet 3 Unknown at Unknown time   • isosorbide mononitrate (ISMO,MONOKET) 20 MG tablet TAKE ONE TABLET BY MOUTH TWICE DAILY 180 tablet 3 Unknown at Unknown time   • levothyroxine (SYNTHROID, LEVOTHROID) 100 MCG tablet Take 100 mcg by mouth Daily.   Unknown at Unknown time   • levothyroxine sodium (TIROSINT) 88 MCG capsule Take 88 mcg by mouth Daily.   2019 at 2030   • losartan (COZAAR) 100 MG tablet TAKE ONE TABLET BY MOUTH ONCE DAILY 90 tablet 1 Unknown at Unknown time   • omeprazole (priLOSEC) 40 MG capsule Take 1 capsule by mouth Daily. 30 capsule 6 Unknown at Unknown time   • raNITIdine (ZANTAC) 300 MG  "tablet Take 300 mg by mouth Every Night.   Unknown at Unknown time   • sertraline (ZOLOFT) 100 MG tablet Take 100 mg by mouth daily.   Unknown at Unknown time   • spironolactone (ALDACTONE) 25 MG tablet Take 25 mg by mouth Daily.   Unknown at Unknown time   • Vitamin D, Cholecalciferol, (CHOLECALCIFEROL) 400 units tablet Take 400 Units by mouth Daily.   Unknown at Unknown time     Allergies:  Patient has no known allergies.    REVIEW OF SYSTEMS:  Please see the above history of present illness for pertinent positives and negatives.  The remainder of the patient's systems have been reviewed and are negative.     Vital Signs  Temp:  [96.6 °F (35.9 °C)-100.4 °F (38 °C)] 99.8 °F (37.7 °C)  Heart Rate:  [57-77] 77  Resp:  [16-18] 18  BP: (128-176)/(59-84) 139/75    Flowsheet Rows      First Filed Value   Admission Height  157.5 cm (62\") Documented at 07/08/2019 0900   Admission Weight  68.9 kg (152 lb) Documented at 07/08/2019 0900           Physical Exam:  Physical Exam   Constitutional: Patient appears well-developed and well-nourished and in no acute distress   HEENT:   Head: Normocephalic and atraumatic.   Eyes:  Pupils are equal, round, and reactive to light.  Mouth and Throat: Patient has moist mucous membranes. Oropharynx is clear of any erythema or exudate.     Neck: Neck supple. No JVD present. No thyromegaly present. No lymphadenopathy present.  Cardiovascular: Regular rate, regular rhythm.  Pulmonary/Chest: Lungs are clear to auscultation bilaterally.  Abdominal: soft, no distension, good bowel sounds, diffuse tenderness but no guarding/rebound/rigidity noted  Musculoskeletal: Normal posture.  Extremities: No edema. No deformities noted  Neurological: Patient is alert and oriented.  Psychological:   Mood and behavior appropriate.  Skin: Skin is warm and dry.    Debilities/Disabilities Identified: None    Emotional Behavior: Appropriate           Results Review:    I reviewed the patient's new clinical " results.  Lab Results (most recent)     Procedure Component Value Units Date/Time    CBC & Differential [[id number]] Collected:  07/09/19 0600    Specimen:  Blood Updated:  07/09/19 0636    Narrative:       The following orders were created for panel order CBC & Differential.  Procedure                               Abnormality         Status                     ---------                               -----------         ------                     CBC Auto Differential[[id number]]        Normal              Final result                 Please view results for these tests on the individual orders.    CBC Auto Differential [[id number]]  (Normal) Collected:  07/09/19 0600    Specimen:  Blood Updated:  07/09/19 0636     WBC 6.27 10*3/mm3      RBC 4.40 10*6/mm3      Hemoglobin 13.7 g/dL      Hematocrit 41.9 %      MCV 95.2 fL      MCH 31.1 pg      MCHC 32.7 g/dL      RDW 13.0 %      RDW-SD 45.5 fl      MPV 9.9 fL      Platelets 168 10*3/mm3      Neutrophil % 58.1 %      Lymphocyte % 27.4 %      Monocyte % 8.3 %      Eosinophil % 5.4 %      Basophil % 0.3 %      Immature Grans % 0.5 %      Neutrophils, Absolute 3.64 10*3/mm3      Lymphocytes, Absolute 1.72 10*3/mm3      Monocytes, Absolute 0.52 10*3/mm3      Eosinophils, Absolute 0.34 10*3/mm3      Basophils, Absolute 0.02 10*3/mm3      Immature Grans, Absolute 0.03 10*3/mm3      nRBC 0.0 /100 WBC     Basic Metabolic Panel [[id number]] Collected:  07/09/19 0600    Specimen:  Blood Updated:  07/09/19 0600    Hemoglobin A1c [[id number]]  (Normal) Collected:  07/08/19 0928    Specimen:  Blood Updated:  07/08/19 1524     Hemoglobin A1C 5.40 %     Narrative:       Hemoglobin A1C Ranges:    Increased Risk for Diabetes  5.7% to 6.4%  Diabetes                     >= 6.5%  Diabetic Goal                < 7.0%    Urinalysis, Microscopic Only - Urine, Clean Catch [[id number]]  (Abnormal) Collected:  07/08/19 0955    Specimen:  Urine, Clean Catch Updated:  07/08/19 1009     RBC, UA 3-5 /HPF       WBC, UA None Seen /HPF      Bacteria, UA None Seen /HPF      Squamous Epithelial Cells, UA 3-6 /HPF      Hyaline Casts, UA 3-6 /LPF      Methodology Manual Light Microscopy    TSH [[id number]]  (Abnormal) Collected:  07/08/19 0928    Specimen:  Blood Updated:  07/08/19 1001     TSH 6.000 mIU/mL     Urinalysis With Microscopic If Indicated (No Culture) - Urine, Clean Catch [[id number]]  (Abnormal) Collected:  07/08/19 0955    Specimen:  Urine, Clean Catch Updated:  07/08/19 1000     Color, UA Yellow     Appearance, UA Clear     pH, UA 5.5     Specific Gravity, UA 1.025     Glucose, UA Negative     Ketones, UA Negative     Bilirubin, UA Small (1+)     Blood, UA Trace     Protein, UA 30 mg/dL (1+)     Leuk Esterase, UA Negative     Nitrite, UA Negative     Urobilinogen, UA 0.2 E.U./dL    Troponin [[id number]]  (Normal) Collected:  07/08/19 0928    Specimen:  Blood Updated:  07/08/19 0952     Troponin T <0.010 ng/mL     Narrative:       Troponin T Reference Range:  <= 0.03 ng/mL-   Negative for AMI  >0.03 ng/mL-     Abnormal for myocardial necrosis.  Clinicians would have to utilize clinical acumen, EKG, Troponin and serial changes to determine if it is an Acute Myocardial Infarction or myocardial injury due to an underlying chronic condition.     Comprehensive Metabolic Panel [[id number]]  (Abnormal) Collected:  07/08/19 0928    Specimen:  Blood Updated:  07/08/19 0949     Glucose 151 mg/dL      BUN 8 mg/dL      Creatinine 0.80 mg/dL      Sodium 137 mmol/L      Potassium 4.2 mmol/L      Chloride 98 mmol/L      CO2 25.8 mmol/L      Calcium 9.5 mg/dL      Total Protein 7.5 g/dL      Albumin 4.20 g/dL      ALT (SGPT) 25 U/L      AST (SGOT) 29 U/L      Alkaline Phosphatase 56 U/L      Total Bilirubin 0.7 mg/dL      eGFR Non African Amer 70 mL/min/1.73      Globulin 3.3 gm/dL      A/G Ratio 1.3 g/dL      BUN/Creatinine Ratio 10.0     Anion Gap 13.2 mmol/L     Narrative:       GFR Normal >60  Chronic Kidney Disease  <60  Kidney Failure <15    Lipase [[id number]]  (Normal) Collected:  07/08/19 0928    Specimen:  Blood Updated:  07/08/19 0945     Lipase 13 U/L     CBC & Differential [[id number]] Collected:  07/08/19 0928    Specimen:  Blood Updated:  07/08/19 0933    Narrative:       The following orders were created for panel order CBC & Differential.  Procedure                               Abnormality         Status                     ---------                               -----------         ------                     CBC Auto Differential[[id number]]        Normal              Final result                 Please view results for these tests on the individual orders.    CBC Auto Differential [[id number]]  (Normal) Collected:  07/08/19 0928    Specimen:  Blood Updated:  07/08/19 0933     WBC 7.71 10*3/mm3      RBC 4.45 10*6/mm3      Hemoglobin 14.0 g/dL      Hematocrit 40.9 %      MCV 91.9 fL      MCH 31.5 pg      MCHC 34.2 g/dL      RDW 13.1 %      RDW-SD 43.6 fl      MPV 9.7 fL      Platelets 170 10*3/mm3      Neutrophil % 69.4 %      Lymphocyte % 20.4 %      Monocyte % 7.7 %      Eosinophil % 1.8 %      Basophil % 0.3 %      Immature Grans % 0.4 %      Neutrophils, Absolute 5.36 10*3/mm3      Lymphocytes, Absolute 1.57 10*3/mm3      Monocytes, Absolute 0.59 10*3/mm3      Eosinophils, Absolute 0.14 10*3/mm3      Basophils, Absolute 0.02 10*3/mm3      Immature Grans, Absolute 0.03 10*3/mm3      nRBC 0.0 /100 WBC           Imaging Results (most recent)     Procedure Component Value Units Date/Time    CT Abdomen Pelvis Without Contrast [[id number]] Collected:  07/08/19 1806     Updated:  07/08/19 1808    Narrative:       INDICATION:   Follow-up small bowel obstruction noted on earlier CT today. Per surgeon, study is being repeated with oral contrast media. Patient is experiencing continued abdominal pain.    TECHNIQUE:   CT of the abdomen and pelvis without IV contrast. Oral contrast given per request. Coronal and sagittal  reconstructions were obtained.  Radiation dose reduction techniques included automated exposure control or exposure modulation based on body size.  Radiation audit for number of CT and nuclear cardiology exams performed in the last year: 2.      COMPARISON:   IV Contrast-enhanced CT abdomen pelvis study from earlier today 10:25 AM.    FINDINGS:  Lung bases: There is a calcified granuloma at the left base.    Abdomen: Lack of IV contrast media somewhat limits the study. There is IV contrast media within the renal collecting systems bilaterally consistent with earlier contrast administration.    There is a nasogastric tube that terminates in the stomach. The stomach is moderately distended with oral contrast media.    The noncontrast liver is unremarkable. The patient is post cholecystectomy. Spleen unremarkable. Adrenal glands within normal limits. Noncontrast pancreas unremarkable. No hydronephrosis. 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 This study  would not be sensitive for renal calculi or renal masses otherwise. There are moderate atherosclerotic vascular calcifications involving the abdominal aorta and branch vessels.    Pelvis: The urinary bladder contains IV contrast media from earlier exam. Post hysterectomy changes. There is a small amount of free fluid in the pelvis, new or increased from the prior study. There is sigmoid diverticulosis without CT evidence for  diverticulitis.    There are again multiple mildly to moderately distended loops of small bowel involving proximal and mid small bowel. They measure up to about 3 cm in diameter. Distal ileal loops are decompressed, similar to prior and findings are consistent with  mechanical small bowel obstruction. Oral contrast media is seen extending to the level of more mildly distended pelvic loops. Air-fluid levels are appreciated. No definite evidence for pneumatosis. There is some spiraling of the mesenteric vessels in the  lower abdomen  upper pelvis probably better seen on the contrast-enhanced portion of the study. This is concerning for internal hernia as in etiology of obstruction. On comparison to the earlier study, the amount of small bowel distention is not improved.  The appendix is not definitely seen    There are degenerative changes in the spine. There is no evidence for free air.      Impression:         1. Redemonstration of mild to moderate distention of multiple small bowel loops including proximal to mid small bowel with transition point likely in the pelvis such that the distal ileum is decompressed. Oral contrast media can be seen to the level of  the mildly distended more proximal ileal loops. There is concern for internal hernia with a swirling configuration of the mesenteric vessels in the lower abdomen and pelvis. Please refer back to the earlier study from this morning as well. There is no  evidence for free air or drainable fluid collection. There is a small amount of free fluid in the pelvis which is either new or increased from earlier film. The degree of bowel distention is not appreciably changed. The appendix is not definitely seen.  2. Post hysterectomy and cholecystectomy.  3. Atherosclerotic vascular calcifications.  4. Sigmoid diverticulosis without evidence for diverticulitis.        Signer Name: Qing Gleason MD   Signed: 7/8/2019 6:06 PM   Workstation Name: [id number]       CT Abdomen Pelvis With Contrast [[id number]] Collected:  07/08/19 1037     Updated:  07/08/19 1107    Narrative:       CT SCAN OF THE ABDOMEN AND PELVIS WITH CONTRAST 07/08/2019     HISTORY:  Mid abdominal pain, epigastric pain with nausea and vomiting beginning  yesterday. Cholecystectomy and hysterectomy.     TECHNIQUE:  Spiral CT was performed through the abdomen and pelvis following  intravenous contrast administration only as per clinician request.  Radiation dose reduction techniques included automated exposure control  or exposure  modulation based on body size. Radiation audit for CT and  nuclear cardiology exams in the last 12 months: 0.     ABDOMEN FINDINGS:  There is mild fatty infiltration of the liver. Calcified granulomas are  seen in the spleen. The pancreas and adrenal glands are normal. The  gallbladder is surgically absent. Kidneys are normal.     PELVIS FINDINGS:  There is mild to moderate dilatation of the proximal and mid small bowel  with multiple air-fluid levels identified. There is a transition zone in  the mid to distal small bowel in the ileum is somewhat decompressed.  Colon is normal in course and caliber and contains a moderate amount of  fecal matter. Findings suggest partial small bowel obstruction. No cause  for the obstruction is seen. Considerate lesions. There is colonic  diverticulosis without evidence of diverticulitis. There is no free  fluid in the abdomen or pelvis. The lung bases are normal.       Impression:       1. Mild to moderate dilatation of the proximal and mid small bowel with  multiple air-fluid levels identified. The transition zone is seen in the  mid to distal small bowel and the ileum is somewhat decompressed.  Findings suggest partial small bowel obstruction. No cause for the  obstruction is seen. Considerate lesions. No evidence of free air.  2. Mild fatty infiltration of the liver.  3. Surgical absence of the gallbladder.  4. Diverticulosis.     This report was finalized on 7/8/2019 10:44 AM by Dr. Thomas Guy MD.           reviewed    ECG/EMG Results (most recent)     Procedure Component Value Units Date/Time    ECG 12 Lead [[id number]] Collected:  07/08/19 0951     Updated:  07/08/19 1110    Narrative:       HEART RATE= 59  bpm  RR Interval= 1020  ms  IN Interval= 212  ms  P Horizontal Axis= 35  deg  P Front Axis= 24  deg  QRSD Interval= 91  ms  QT Interval= 446  ms  QRS Axis= 7  deg  T Wave Axis= 62  deg  - BORDERLINE ECG -  Sinus rhythm  Borderline prolonged IN interval  Electronically  Signed By:   Date and Time of Study: 2019-07-08 09:51:57            Assessment/Plan     Abdominal pain  Possible small bowel obstruction    --we discussed the possibility of having to go to surgery.  Ashlie wants to wait and attempt conservative measures.  Her WBC is normal at this point.  I have encouraged ambulation.  We will see if the contrast makes it into her colon.  All of her questions were answered.    I discussed the patients findings and my recommendations with patient.     Alfreda Soler DO  07/09/19  6:46 AM

## 2019-07-09 NOTE — NURSING NOTE
Discharge Planning Assessment  YAMILE Adams     Patient Name: Ashlie Levi  MRN: [id number]  Today's Date: 7/9/2019    Admit Date: 7/8/2019    Discharge Needs Assessment     Row Name 07/09/19 1046       Living Environment    Lives With  spouse    Name(s) of Who Lives With Patient  Torres,     Current Living Arrangements  home/apartment/condo single story home with 3 to 4 steps to gain entry    Duration at Residence  20 years    Potentially Unsafe Housing Conditions  -- none    Primary Care Provided by  self    Provides Primary Care For  no one    Caregiving Concerns  none voiced    Family Caregiver if Needed  spouse    Family Caregiver Names  Luca    Quality of Family Relationships  helpful;involved;supportive    Able to Return to Prior Arrangements  yes    Living Arrangement Comments  pt states he lives in a one story house with her  and plans on returning there at discharge       Resource/Environmental Concerns    Resource/Environmental Concerns  none    Transportation Concerns  -- none       Transition Planning    Patient/Family Anticipates Transition to  home with family    Patient/Family Anticipated Services at Transition  none    Transportation Anticipated  family or friend will provide;car, drives self pt still drives but her  can provide ride home at discharge.       Discharge Needs Assessment    Readmission Within the Last 30 Days  no previous admission in last 30 days    Concerns to be Addressed  denies needs/concerns at this time    Concerns Comments  none voiced    Equipment Currently Used at Home  none    Anticipated Changes Related to Illness  none    Equipment Needed After Discharge  none    Outpatient/Agency/Support Group Needs  -- none    Discharge Facility/Level of Care Needs  -- none    Offered/Gave Vendor List  yes offered community resources but patient denies the need for them at this time.    Patient's Choice of Community Agency(s)  no preference    Current Discharge  Risk  -- none    Discharge Coordination/Progress  per pt she plans on returning home at discharge with no needs identified.        Discharge Plan     Row Name 07/09/19 1053       Plan    Plan  Home    Patient/Family in Agreement with Plan  yes  Luca is at bedside and is agreeable to plan.    Plan Comments  Into room introduced self and role of CM. Discussed discharge disposition with patient and her hillary Macdonald with permission. Erin confirms that the info on her faxe sheet is correct and that she see's Dr. Conway as PCP. She also states that she uses [email or web address] pharmacy in [place] and has no problem paying for her medications or picking them up. She states she does not have a living will and declines any information regarding one. Patient states that she lives with her  in a one story house with 3 to 4 steps to gain entry. She states she is independant with her ADL's still drives and does not curretnly use any equipment at home, and does not anticipate needing any at discharge. Patient has never used a home health agency in the past and has no preference for one if needed. Offered community resources but patient denies needing any at this time. Patient states she plans on returning home at discharge with no needs identified. Patient and  had no further questions or concerns reagrding discharge plans at this time. CM will continue to follow for needs.        Destination      No service coordination in this encounter.      Durable Medical Equipment      No service coordination in this encounter.      Dialysis/Infusion      No service coordination in this encounter.      Home Medical Care      No service coordination in this encounter.      Therapy      No service coordination in this encounter.      Community Resources      No service coordination in this encounter.          Demographic Summary     Row Name 07/09/19 1040       General Information    Admission Type  inpatient    Arrived  From  home    Referral Source  admission list    Reason for Consult  decision making    Preferred Language  English     Used During This Interaction  no       Contact Information    Permission Granted to Share Info With          Functional Status    No documentation.       Psychosocial    No documentation.       Abuse/Neglect    No documentation.       Legal    No documentation.       Substance Abuse    No documentation.       Patient Forms    No documentation.           Myriam Avalos RN

## 2019-07-10 VITALS
HEIGHT: 62 IN | TEMPERATURE: 98.2 F | SYSTOLIC BLOOD PRESSURE: 161 MMHG | DIASTOLIC BLOOD PRESSURE: 71 MMHG | BODY MASS INDEX: 28.19 KG/M2 | RESPIRATION RATE: 16 BRPM | HEART RATE: 64 BPM | WEIGHT: 153.2 LBS | OXYGEN SATURATION: 95 %

## 2019-07-10 PROCEDURE — 99231 SBSQ HOSP IP/OBS SF/LOW 25: CPT | Performed by: SURGERY

## 2019-07-10 PROCEDURE — 94799 UNLISTED PULMONARY SVC/PX: CPT

## 2019-07-10 PROCEDURE — 99239 HOSP IP/OBS DSCHRG MGMT >30: CPT | Performed by: NURSE PRACTITIONER

## 2019-07-10 RX ORDER — BENZONATATE 100 MG/1
CAPSULE ORAL
Qty: 42 CAPSULE | Refills: 0 | Status: SHIPPED | OUTPATIENT
Start: 2019-07-10 | End: 2019-08-15

## 2019-07-10 RX ORDER — ALBUTEROL SULFATE 90 UG/1
2 AEROSOL, METERED RESPIRATORY (INHALATION) EVERY 4 HOURS PRN
Qty: 1 INHALER | Refills: 0 | Status: SHIPPED | OUTPATIENT
Start: 2019-07-10 | End: 2019-08-09

## 2019-07-10 RX ORDER — LEVOTHYROXINE SODIUM 112 UG/1
112 TABLET ORAL DAILY
Qty: 30 TABLET | Refills: 1 | Status: SHIPPED | OUTPATIENT
Start: 2019-07-10 | End: 2020-07-08 | Stop reason: DRUGHIGH

## 2019-07-10 RX ADMIN — SODIUM CHLORIDE 100 ML/HR: 9 INJECTION, SOLUTION INTRAVENOUS at 07:37

## 2019-07-10 RX ADMIN — IPRATROPIUM BROMIDE AND ALBUTEROL SULFATE 3 ML: .5; 3 SOLUTION RESPIRATORY (INHALATION) at 07:31

## 2019-07-10 RX ADMIN — METOPROLOL TARTRATE 2.5 MG: 5 INJECTION INTRAVENOUS at 04:17

## 2019-07-10 RX ADMIN — BUDESONIDE 0.5 MG: 0.5 SUSPENSION RESPIRATORY (INHALATION) at 07:34

## 2019-07-10 RX ADMIN — METOPROLOL TARTRATE 2.5 MG: 5 INJECTION INTRAVENOUS at 09:10

## 2019-07-10 RX ADMIN — PANTOPRAZOLE SODIUM 40 MG: 40 INJECTION, POWDER, LYOPHILIZED, FOR SOLUTION INTRAVENOUS at 09:08

## 2019-07-10 RX ADMIN — IPRATROPIUM BROMIDE AND ALBUTEROL SULFATE 3 ML: .5; 3 SOLUTION RESPIRATORY (INHALATION) at 10:54

## 2019-07-10 NOTE — PROGRESS NOTES
Surgery Progress Note   Chief Complaint:  Partial small bowel obstruction    Subjective     Interval History:     Ashlie has had no further pain or nausea.  She is hungry.  Her parainfluenza was positive.        Objective     Vital Signs  Temp:  [97.3 °F (36.3 °C)-100 °F (37.8 °C)] 98.2 °F (36.8 °C)  Heart Rate:  [54-70] 64  Resp:  [16-20] 16  BP: (130-192)/(57-85) 161/71  Body mass index is 28.02 kg/m².    Intake/Output Summary (Last 24 hours) at 7/10/2019 1210  Last data filed at 7/10/2019 0900  Gross per 24 hour   Intake 0 ml   Output 1300 ml   Net -1300 ml     I/O this shift:  In: 0   Out: 300 [Urine:300]       Physical Exam:   General: patient awake, alert and cooperative   Extremities: no rash or edema   Neurologic: Normal mood and behavior     Results Review:     I reviewed the patient's new clinical results.      WBC No results found for: WBCS   HGB Hemoglobin   Date Value Ref Range Status   07/09/2019 13.7 12.0 - 15.9 g/dL Final   07/08/2019 14.0 12.0 - 15.9 g/dL Final      HCT Hematocrit   Date Value Ref Range Status   07/09/2019 41.9 34.0 - 46.6 % Final   07/08/2019 40.9 34.0 - 46.6 % Final      Platlets No results found for: LABPLAT     PT/INR:  No results found for: PROTIME/No results found for: INR    Sodium Sodium   Date Value Ref Range Status   07/09/2019 142 136 - 145 mmol/L Final   07/08/2019 137 136 - 145 mmol/L Final      Potassium Potassium   Date Value Ref Range Status   07/09/2019 4.1 3.5 - 5.2 mmol/L Final   07/08/2019 4.2 3.5 - 5.2 mmol/L Final      Chloride Chloride   Date Value Ref Range Status   07/09/2019 103 98 - 107 mmol/L Final   07/08/2019 98 98 - 107 mmol/L Final      Bicarbonate No results found for: PLASMABICARB   BUN BUN   Date Value Ref Range Status   07/09/2019 7 (L) 8 - 23 mg/dL Final   07/08/2019 8 8 - 23 mg/dL Final      Creatinine Creatinine   Date Value Ref Range Status   07/09/2019 0.93 0.57 - 1.00 mg/dL Final   07/08/2019 0.80 0.57 - 1.00 mg/dL Final      Calcium  Calcium   Date Value Ref Range Status   07/09/2019 8.7 8.6 - 10.5 mg/dL Final   07/08/2019 9.5 8.6 - 10.5 mg/dL Final      Magnesium  AST  ALT  Bilirubin, Total  AlkPhos  Albumin    Amylase  Lipase    Radiology: No results found for: MG  No components found for: AST.*  No components found for: ALT.*  No components found for: BILIRUBIN, TOTAL.*    No components found for: ALKPHOS.*  No components found for: ALBUMIN.*      No components found for: AMYLASE.*  No components found for: LIPASE.*            Imaging Results (most recent)     Procedure Component Value Units Date/Time    XR Abdomen 2 View With Chest 1 View [[id number]] Collected:  07/09/19 0914     Updated:  07/09/19 0919    Narrative:       ACUTE ABDOMEN SERIES, 07/09/2019         HISTORY:  73-year-old female admitted to the hospital yesterday with abdomen pain,  nausea and vomiting. CT studies yesterday showing small bowel dilatation  concerning for at least partial obstruction. Exam today for follow-up.     TECHNIQUE:  Findings upright abdomen series with PA upright chest x-ray.     COMPARISON:  *  CT abdomen/pelvis x2, 07/08/2019.     FINDINGS:  GI contrast administered for CT examination yesterday is now present  within normal caliber colon. None of the contrast remains present within  small bowel. Additionally, there is no radiographically visible small  bowel dilatation today.     NG tube is well-positioned within a decompressed stomach.     Negative chest x-ray. The lungs are clear. No free intraperitoneal air  beneath the diaphragm. No pleural effusion.       Impression:       1.  No visible small bowel dilatation today.  2.  GI contrast from examination yesterday is no longer present within  small bowel and is seen within normal caliber colon. There is no  evidence of mechanical bowel obstruction.  3.  NG tube in good position.     This report was finalized on 7/9/2019 9:17 AM by Dr. Miguelito Gottlieb MD.       CT Abdomen Pelvis Without Contrast  [[id number]] Collected:  07/08/19 1806     Updated:  07/08/19 1808    Narrative:       INDICATION:   Follow-up small bowel obstruction noted on earlier CT today. Per surgeon, study is being repeated with oral contrast media. Patient is experiencing continued abdominal pain.    TECHNIQUE:   CT of the abdomen and pelvis without IV contrast. Oral contrast given per request. Coronal and sagittal reconstructions were obtained.  Radiation dose reduction techniques included automated exposure control or exposure modulation based on body size.  Radiation audit for number of CT and nuclear cardiology exams performed in the last year: 2.      COMPARISON:   IV Contrast-enhanced CT abdomen pelvis study from earlier today 10:25 AM.    FINDINGS:  Lung bases: There is a calcified granuloma at the left base.    Abdomen: Lack of IV contrast media somewhat limits the study. There is IV contrast media within the renal collecting systems bilaterally consistent with earlier contrast administration.    There is a nasogastric tube that terminates in the stomach. The stomach is moderately distended with oral contrast media.    The noncontrast liver is unremarkable. The patient is post cholecystectomy. Spleen unremarkable. Adrenal glands within normal limits. Noncontrast pancreas unremarkable. No hydronephrosis. 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 This study  would not be sensitive for renal calculi or renal masses otherwise. There are moderate atherosclerotic vascular calcifications involving the abdominal aorta and branch vessels.    Pelvis: The urinary bladder contains IV contrast media from earlier exam. Post hysterectomy changes. There is a small amount of free fluid in the pelvis, new or increased from the prior study. There is sigmoid diverticulosis without CT evidence for  diverticulitis.    There are again multiple mildly to moderately distended loops of small bowel involving proximal and mid small bowel. They  measure up to about 3 cm in diameter. Distal ileal loops are decompressed, similar to prior and findings are consistent with  mechanical small bowel obstruction. Oral contrast media is seen extending to the level of more mildly distended pelvic loops. Air-fluid levels are appreciated. No definite evidence for pneumatosis. There is some spiraling of the mesenteric vessels in the  lower abdomen upper pelvis probably better seen on the contrast-enhanced portion of the study. This is concerning for internal hernia as in etiology of obstruction. On comparison to the earlier study, the amount of small bowel distention is not improved.  The appendix is not definitely seen    There are degenerative changes in the spine. There is no evidence for free air.      Impression:         1. Redemonstration of mild to moderate distention of multiple small bowel loops including proximal to mid small bowel with transition point likely in the pelvis such that the distal ileum is decompressed. Oral contrast media can be seen to the level of  the mildly distended more proximal ileal loops. There is concern for internal hernia with a swirling configuration of the mesenteric vessels in the lower abdomen and pelvis. Please refer back to the earlier study from this morning as well. There is no  evidence for free air or drainable fluid collection. There is a small amount of free fluid in the pelvis which is either new or increased from earlier film. The degree of bowel distention is not appreciably changed. The appendix is not definitely seen.  2. Post hysterectomy and cholecystectomy.  3. Atherosclerotic vascular calcifications.  4. Sigmoid diverticulosis without evidence for diverticulitis.        Signer Name: Qing Gleason MD   Signed: 7/8/2019 6:06 PM   Workstation Name: [id number]       CT Abdomen Pelvis With Contrast [[id number]] Collected:  07/08/19 1037     Updated:  07/08/19 1107    Narrative:       CT SCAN OF THE ABDOMEN AND PELVIS WITH  CONTRAST 07/08/2019     HISTORY:  Mid abdominal pain, epigastric pain with nausea and vomiting beginning  yesterday. Cholecystectomy and hysterectomy.     TECHNIQUE:  Spiral CT was performed through the abdomen and pelvis following  intravenous contrast administration only as per clinician request.  Radiation dose reduction techniques included automated exposure control  or exposure modulation based on body size. Radiation audit for CT and  nuclear cardiology exams in the last 12 months: 0.     ABDOMEN FINDINGS:  There is mild fatty infiltration of the liver. Calcified granulomas are  seen in the spleen. The pancreas and adrenal glands are normal. The  gallbladder is surgically absent. Kidneys are normal.     PELVIS FINDINGS:  There is mild to moderate dilatation of the proximal and mid small bowel  with multiple air-fluid levels identified. There is a transition zone in  the mid to distal small bowel in the ileum is somewhat decompressed.  Colon is normal in course and caliber and contains a moderate amount of  fecal matter. Findings suggest partial small bowel obstruction. No cause  for the obstruction is seen. Considerate lesions. There is colonic  diverticulosis without evidence of diverticulitis. There is no free  fluid in the abdomen or pelvis. The lung bases are normal.       Impression:       1. Mild to moderate dilatation of the proximal and mid small bowel with  multiple air-fluid levels identified. The transition zone is seen in the  mid to distal small bowel and the ileum is somewhat decompressed.  Findings suggest partial small bowel obstruction. No cause for the  obstruction is seen. Considerate lesions. No evidence of free air.  2. Mild fatty infiltration of the liver.  3. Surgical absence of the gallbladder.  4. Diverticulosis.     This report was finalized on 7/8/2019 10:44 AM by Dr. Thomas Guy MD.                  sodium chloride 100 mL/hr Last Rate: 100 mL/hr (07/10/19 0737)          Assessment/Plan     Patient Active Problem List   Diagnosis Code   • Coronary artery disease I25.10   • Hyperlipidemia E78.5   • Hypertension I10   • Nausea R11.0   • Epigastric pain R10.13   • Bloating R14.0   • Other dysphagia R13.19   • History of colon polyps Z86.010   • Partial small bowel obstruction (CMS/HCC) K56.600       Her partial small bowel obstruction has resolved.  You may advancer her diet as tolerated and she may follow up with me in 1 months.  Please call if any additional help is needed      Alfreda Soler DO  07/10/19  12:10 PM

## 2019-07-10 NOTE — PLAN OF CARE
Problem: Pain, Acute (Adult)  Goal: Acceptable Pain Control/Comfort Level  Outcome: Ongoing (interventions implemented as appropriate)   07/10/19 0354   Pain, Acute (Adult)   Acceptable Pain Control/Comfort Level making progress toward outcome       Problem: Nausea/Vomiting (Adult)  Goal: Adequate Hydration  Outcome: Ongoing (interventions implemented as appropriate)      Problem: Bowel Obstruction (Adult)  Goal: Signs and Symptoms of Listed Potential Problems Will be Absent, Minimized or Managed (Bowel Obstruction)  Outcome: Ongoing (interventions implemented as appropriate)   07/10/19 0354   Goal/Outcome Evaluation   Problems Present (Bowel Obstruction) none       Problem: Patient Care Overview  Goal: Plan of Care Review  Outcome: Ongoing (interventions implemented as appropriate)

## 2019-07-10 NOTE — DISCHARGE SUMMARY
"Ashlie Levi  1945  [id number]    Hospitalists Discharge Summary    Date of Admission: 7/8/2019  Date of Discharge:  7/10/2019    History of Present Illness: Taken from [facility] on admit:  \"The patient is a 73-year-old female who presented to the emergency department secondary to sudden onset epigastric area pain, copious nausea, vomiting starting at 2 PM yesterday.  She notes onset after eating applesauce.  Notes recent EGD per Dr. jonas with polyp removal and note of gastritis and esophagitis, started on omeprazole that she notes as not helping at all.  She reports change of diet since EGD to vegetables and fruit and discontinued coffee but continues to have burning sensation and must sit up after eating.  She notes passing flatus and had 3 normal bowel movements yesterday with a history of IBS type symptoms with varying constipation and diarrhea.     She reports 2 similar attacks in November and December 2018 where she came to the emergency department was given morphine and sent home with resolution.     She notes recent stress testing per Dr. Granado that resulted as normal.     She has a known history of CAD with stent, hypothyroidism, hypertension, hyperlipidemia.  She reports past colonoscopies, cholecystectomy laparoscopic and vaginal hysterectomy.     She otherwise denies f/c/headache/rhinorrhea/nasal congestion/lightheadedness/syncopal sensation/cough/soa/d/chest pain/recent illness/sick exposures/change in bowel or bladder habits/no weight change/bloody emesis or bloody stools/change in medications or any other new concerns.\"    Hospital Course Summary/Primary Discharge Diagnoses:   SBO:   GERD: Dr. Soler followed  Resolved with conservative measures, NG tube, ambulation with pain and nausea controlled  Diet advancing today  Resume home omeprazole, ranitidine  Follow-up Dr. Soler in 1 month  F/U Yandel Conway, DO 1 week  Secondary Discharge Diagnoses:  Acute parainfluenza bronchitis:  Improved " with duo nebs and budesonide nebs  Tylenol for rib soreness, no NSAIDs for now  Prelim sputum culture normal jamaal  Will give inhaler, mucinex, cough pills for home  No steroid with recent diagnoses esophagitis/gastritis  Follow-up Yandel Conway DO 1 week    Hypothyroidism: TSH is noted elevated this admission, adjusted levothyroxine to 112 mcg daily this admission, follow-up for repeat TFTs with PCP 6 to 8 weeks    CAD/HLD with history of stent:  Hypertension:  Resume home losartan, Imdur, aspirin, Coreg, spironolactone    Vitamin D deficiency, resume daily supplement    Elevated glucose: A1c normal at 5.4%    PCP  Patient Care Team:  Yandel Conway DO as PCP - General (Family Medicine)  Al Granado III, MD as PCP - Claims Attributed    Consults:   Consults     Date and Time Order Name Status Description    7/8/2019 1302 Inpatient General Surgery Consult Completed         Operations and Procedures Performed:     Ct Abdomen Pelvis Without Contrast    Result Date: 7/8/2019  Narrative: INDICATION: Follow-up small bowel obstruction noted on earlier CT today. Per surgeon, study is being repeated with oral contrast media. Patient is experiencing continued abdominal pain. TECHNIQUE: CT of the abdomen and pelvis without IV contrast. Oral contrast given per request. Coronal and sagittal reconstructions were obtained.  Radiation dose reduction techniques included automated exposure control or exposure modulation based on body size. Radiation audit for number of CT and nuclear cardiology exams performed in the last year: 2.  COMPARISON: IV Contrast-enhanced CT abdomen pelvis study from earlier today 10:25 AM. FINDINGS: Lung bases: There is a calcified granuloma at the left base. Abdomen: Lack of IV contrast media somewhat limits the study. There is IV contrast media within the renal collecting systems bilaterally consistent with earlier contrast administration. There is a nasogastric tube that terminates in the  stomach. The stomach is moderately distended with oral contrast media. The noncontrast liver is unremarkable. The patient is post cholecystectomy. Spleen unremarkable. Adrenal glands within normal limits. Noncontrast pancreas unremarkable. No hydronephrosis. 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 This study would not be sensitive for renal calculi or renal masses otherwise. There are moderate atherosclerotic vascular calcifications involving the abdominal aorta and branch vessels. Pelvis: The urinary bladder contains IV contrast media from earlier exam. Post hysterectomy changes. There is a small amount of free fluid in the pelvis, new or increased from the prior study. There is sigmoid diverticulosis without CT evidence for diverticulitis. There are again multiple mildly to moderately distended loops of small bowel involving proximal and mid small bowel. They measure up to about 3 cm in diameter. Distal ileal loops are decompressed, similar to prior and findings are consistent with mechanical small bowel obstruction. Oral contrast media is seen extending to the level of more mildly distended pelvic loops. Air-fluid levels are appreciated. No definite evidence for pneumatosis. There is some spiraling of the mesenteric vessels in the lower abdomen upper pelvis probably better seen on the contrast-enhanced portion of the study. This is concerning for internal hernia as in etiology of obstruction. On comparison to the earlier study, the amount of small bowel distention is not improved. The appendix is not definitely seen There are degenerative changes in the spine. There is no evidence for free air.     Impression: 1. Redemonstration of mild to moderate distention of multiple small bowel loops including proximal to mid small bowel with transition point likely in the pelvis such that the distal ileum is decompressed. Oral contrast media can be seen to the level of the mildly distended more proximal  ileal loops. There is concern for internal hernia with a swirling configuration of the mesenteric vessels in the lower abdomen and pelvis. Please refer back to the earlier study from this morning as well. There is no evidence for free air or drainable fluid collection. There is a small amount of free fluid in the pelvis which is either new or increased from earlier film. The degree of bowel distention is not appreciably changed. The appendix is not definitely seen. 2. Post hysterectomy and cholecystectomy. 3. Atherosclerotic vascular calcifications. 4. Sigmoid diverticulosis without evidence for diverticulitis. Signer Name: Qing Gleason MD  Signed: 7/8/2019 6:06 PM  Workstation Name: [id number]     Xr Abdomen 2 View With Chest 1 View    Result Date: 7/9/2019  Narrative: ACUTE ABDOMEN SERIES, 07/09/2019     HISTORY: 73-year-old female admitted to the hospital yesterday with abdomen pain, nausea and vomiting. CT studies yesterday showing small bowel dilatation concerning for at least partial obstruction. Exam today for follow-up.  TECHNIQUE: Findings upright abdomen series with PA upright chest x-ray.  COMPARISON: *  CT abdomen/pelvis x2, 07/08/2019.  FINDINGS: GI contrast administered for CT examination yesterday is now present within normal caliber colon. None of the contrast remains present within small bowel. Additionally, there is no radiographically visible small bowel dilatation today.  NG tube is well-positioned within a decompressed stomach.  Negative chest x-ray. The lungs are clear. No free intraperitoneal air beneath the diaphragm. No pleural effusion.      Impression: 1.  No visible small bowel dilatation today. 2.  GI contrast from examination yesterday is no longer present within small bowel and is seen within normal caliber colon. There is no evidence of mechanical bowel obstruction. 3.  NG tube in good position.  This report was finalized on 7/9/2019 9:17 AM by Dr. Miguelito Gottlieb MD.      Ct Abdomen  Pelvis With Contrast    Result Date: 7/8/2019  Narrative: CT SCAN OF THE ABDOMEN AND PELVIS WITH CONTRAST 07/08/2019  HISTORY: Mid abdominal pain, epigastric pain with nausea and vomiting beginning yesterday. Cholecystectomy and hysterectomy.  TECHNIQUE: Spiral CT was performed through the abdomen and pelvis following intravenous contrast administration only as per clinician request. Radiation dose reduction techniques included automated exposure control or exposure modulation based on body size. Radiation audit for CT and nuclear cardiology exams in the last 12 months: 0.  ABDOMEN FINDINGS: There is mild fatty infiltration of the liver. Calcified granulomas are seen in the spleen. The pancreas and adrenal glands are normal. The gallbladder is surgically absent. Kidneys are normal.  PELVIS FINDINGS: There is mild to moderate dilatation of the proximal and mid small bowel with multiple air-fluid levels identified. There is a transition zone in the mid to distal small bowel in the ileum is somewhat decompressed. Colon is normal in course and caliber and contains a moderate amount of fecal matter. Findings suggest partial small bowel obstruction. No cause for the obstruction is seen. Considerate lesions. There is colonic diverticulosis without evidence of diverticulitis. There is no free fluid in the abdomen or pelvis. The lung bases are normal.      Impression: 1. Mild to moderate dilatation of the proximal and mid small bowel with multiple air-fluid levels identified. The transition zone is seen in the mid to distal small bowel and the ileum is somewhat decompressed. Findings suggest partial small bowel obstruction. No cause for the obstruction is seen. Considerate lesions. No evidence of free air. 2. Mild fatty infiltration of the liver. 3. Surgical absence of the gallbladder. 4. Diverticulosis.  This report was finalized on 7/8/2019 10:44 AM by Dr. Thomas Guy MD.      Allergies:  has No Known  Allergies.    Michael  Oxycodone 6/2019 per report of 7/8/2019, reviewed by me    Discharge Medications:     Discharge Medications      New Medications      Instructions Start Date   albuterol sulfate  (90 Base) MCG/ACT inhaler  Commonly known as:  PROVENTIL HFA   2 puffs, Inhalation, Every 4 Hours PRN      benzonatate 100 MG capsule  Commonly known as:  TESSALON PERLES   Take 1-2 caps po every 8 hours as needed for cough         Changes to Medications      Instructions Start Date   carvedilol 6.25 MG tablet  Commonly known as:  COREG  What changed:    · how much to take  · how to take this  · when to take this   TAKE ONE TABLET BY MOUTH TWICE DAILY      levothyroxine 112 MCG tablet  Commonly known as:  SYNTHROID  What changed:    · medication strength  · how much to take  · Another medication with the same name was removed. Continue taking this medication, and follow the directions you see here.   112 mcg, Oral, Daily         Continue These Medications      Instructions Start Date   aspirin 81 MG chewable tablet   81 mg, Oral, Daily      isosorbide mononitrate 20 MG tablet  Commonly known as:  ISMO,MONOKET   TAKE ONE TABLET BY MOUTH TWICE DAILY      losartan 100 MG tablet  Commonly known as:  COZAAR   TAKE ONE TABLET BY MOUTH ONCE DAILY      omeprazole 40 MG capsule  Commonly known as:  priLOSEC   40 mg, Oral, Daily      raNITIdine 300 MG tablet  Commonly known as:  ZANTAC   300 mg, Oral, Nightly      sertraline 100 MG tablet  Commonly known as:  ZOLOFT   100 mg, Oral, Daily      spironolactone 25 MG tablet  Commonly known as:  ALDACTONE   25 mg, Oral, Daily      Vitamin D (Cholecalciferol) 400 units tablet  Commonly known as:  CHOLECALCIFEROL   400 Units, Oral, Daily             Last Lab Results:   Lab Results (most recent)     Procedure Component Value Units Date/Time    Respiratory Panel, PCR - Swab, Nasopharynx [[id number]]  (Abnormal) Collected:  07/09/19 1606    Specimen:  Swab from Nasopharynx Updated:   07/09/19 2053     ADENOVIRUS, PCR Not Detected     Coronavirus 229E Not Detected     Coronavirus HKU1 Not Detected     Coronavirus NL63 Not Detected     Coronavirus OC43 Not Detected     Human Metapneumovirus Not Detected     Human Rhinovirus/Enterovirus Not Detected     Influenza B PCR Not Detected     Parainfluenza Virus 1 Not Detected     Parainfluenza Virus 2 Not Detected     Parainfluenza Virus 3 Detected     Parainfluenza Virus 4 Not Detected     Bordetella pertussis pcr Not Detected     Influenza A H1 2009 PCR Not Detected     Chlamydophila pneumoniae PCR Not Detected     Mycoplasma pneumo by PCR Not Detected     Influenza A PCR Not Detected     Influenza A H3 Not Detected     Influenza A H1 Not Detected     RSV, PCR Not Detected     Bordetella parapertussis PCR Not Detected    Respiratory Culture - Sputum, Cough [[id number]] Collected:  07/09/19 1632    Specimen:  Sputum from Cough Updated:  07/09/19 1729     Gram Stain Moderate (3+) WBCs seen      Rare (1+) Epithelial cells seen      Few (2+) Mixed bacterial morphotypes seen on Gram Stain    Procalcitonin [[id number]]  (Abnormal) Collected:  07/09/19 0600    Specimen:  Blood Updated:  07/09/19 1314     Procalcitonin 0.09 ng/mL     Narrative:       As a Marker for Sepsis (Non-Neonates):   1. <0.5 ng/mL represents a low risk of severe sepsis and/or septic shock.  2. >2 ng/mL represents a high risk of severe sepsis and/or septic shock.    As a Marker for Lower Respiratory Tract Infections that require antibiotic therapy:    PCT on Admission     Antibiotic Therapy       6-12 Hrs later  > 0.5                Strongly Recommended             >0.25 - <0.5         Recommended  0.1 - 0.25           Discouraged              Remeasure/reassess PCT  <0.1                 Strongly Discouraged     Remeasure/reassess PCT                     PCT values of < 0.5 ng/mL do not exclude an infection, because localized infections (without systemic signs) may be associated with such  low concentrations, or a systemic infection in its initial stages (< 6 hours). Furthermore, increased PCT can occur without infection. PCT concentrations between 0.5 and 2.0 ng/mL should be interpreted taking into account the patient's history. It is recommended to retest PCT within 6-24 hours if any concentrations < 2 ng/mL are obtained.    Basic Metabolic Panel [[id number]]  (Abnormal) Collected:  07/09/19 0600    Specimen:  Blood Updated:  07/09/19 0656     Glucose 101 mg/dL      BUN 7 mg/dL      Creatinine 0.93 mg/dL      Sodium 142 mmol/L      Potassium 4.1 mmol/L      Chloride 103 mmol/L      CO2 27.2 mmol/L      Calcium 8.7 mg/dL      eGFR Non African Amer 59 mL/min/1.73      BUN/Creatinine Ratio 7.5     Anion Gap 11.8 mmol/L     Narrative:       GFR Normal >60  Chronic Kidney Disease <60  Kidney Failure <15    CBC & Differential [[id number]] Collected:  07/09/19 0600    Specimen:  Blood Updated:  07/09/19 0636    Narrative:       The following orders were created for panel order CBC & Differential.  Procedure                               Abnormality         Status                     ---------                               -----------         ------                     CBC Auto Differential[[id number]]        Normal              Final result                 Please view results for these tests on the individual orders.    CBC Auto Differential [[id number]]  (Normal) Collected:  07/09/19 0600    Specimen:  Blood Updated:  07/09/19 0636     WBC 6.27 10*3/mm3      RBC 4.40 10*6/mm3      Hemoglobin 13.7 g/dL      Hematocrit 41.9 %      MCV 95.2 fL      MCH 31.1 pg      MCHC 32.7 g/dL      RDW 13.0 %      RDW-SD 45.5 fl      MPV 9.9 fL      Platelets 168 10*3/mm3      Neutrophil % 58.1 %      Lymphocyte % 27.4 %      Monocyte % 8.3 %      Eosinophil % 5.4 %      Basophil % 0.3 %      Immature Grans % 0.5 %      Neutrophils, Absolute 3.64 10*3/mm3      Lymphocytes, Absolute 1.72 10*3/mm3      Monocytes, Absolute 0.52  10*3/mm3      Eosinophils, Absolute 0.34 10*3/mm3      Basophils, Absolute 0.02 10*3/mm3      Immature Grans, Absolute 0.03 10*3/mm3      nRBC 0.0 /100 WBC     Hemoglobin A1c [[id number]]  (Normal) Collected:  07/08/19 0928    Specimen:  Blood Updated:  07/08/19 1524     Hemoglobin A1C 5.40 %     Narrative:       Hemoglobin A1C Ranges:    Increased Risk for Diabetes  5.7% to 6.4%  Diabetes                     >= 6.5%  Diabetic Goal                < 7.0%    Urinalysis, Microscopic Only - Urine, Clean Catch [[id number]]  (Abnormal) Collected:  07/08/19 0955    Specimen:  Urine, Clean Catch Updated:  07/08/19 1009     RBC, UA 3-5 /HPF      WBC, UA None Seen /HPF      Bacteria, UA None Seen /HPF      Squamous Epithelial Cells, UA 3-6 /HPF      Hyaline Casts, UA 3-6 /LPF      Methodology Manual Light Microscopy    TSH [[id number]]  (Abnormal) Collected:  07/08/19 0928    Specimen:  Blood Updated:  07/08/19 1001     TSH 6.000 mIU/mL     Urinalysis With Microscopic If Indicated (No Culture) - Urine, Clean Catch [[id number]]  (Abnormal) Collected:  07/08/19 0955    Specimen:  Urine, Clean Catch Updated:  07/08/19 1000     Color, UA Yellow     Appearance, UA Clear     pH, UA 5.5     Specific Gravity, UA 1.025     Glucose, UA Negative     Ketones, UA Negative     Bilirubin, UA Small (1+)     Blood, UA Trace     Protein, UA 30 mg/dL (1+)     Leuk Esterase, UA Negative     Nitrite, UA Negative     Urobilinogen, UA 0.2 E.U./dL    Troponin [[id number]]  (Normal) Collected:  07/08/19 0928    Specimen:  Blood Updated:  07/08/19 0952     Troponin T <0.010 ng/mL     Narrative:       Troponin T Reference Range:  <= 0.03 ng/mL-   Negative for AMI  >0.03 ng/mL-     Abnormal for myocardial necrosis.  Clinicians would have to utilize clinical acumen, EKG, Troponin and serial changes to determine if it is an Acute Myocardial Infarction or myocardial injury due to an underlying chronic condition.     Comprehensive Metabolic Panel [[id number]]   (Abnormal) Collected:  07/08/19 0928    Specimen:  Blood Updated:  07/08/19 0949     Glucose 151 mg/dL      BUN 8 mg/dL      Creatinine 0.80 mg/dL      Sodium 137 mmol/L      Potassium 4.2 mmol/L      Chloride 98 mmol/L      CO2 25.8 mmol/L      Calcium 9.5 mg/dL      Total Protein 7.5 g/dL      Albumin 4.20 g/dL      ALT (SGPT) 25 U/L      AST (SGOT) 29 U/L      Alkaline Phosphatase 56 U/L      Total Bilirubin 0.7 mg/dL      eGFR Non African Amer 70 mL/min/1.73      Globulin 3.3 gm/dL      A/G Ratio 1.3 g/dL      BUN/Creatinine Ratio 10.0     Anion Gap 13.2 mmol/L     Narrative:       GFR Normal >60  Chronic Kidney Disease <60  Kidney Failure <15    Lipase [[id number]]  (Normal) Collected:  07/08/19 0928    Specimen:  Blood Updated:  07/08/19 0945     Lipase 13 U/L     CBC & Differential [[id number]] Collected:  07/08/19 0928    Specimen:  Blood Updated:  07/08/19 0933    Narrative:       The following orders were created for panel order CBC & Differential.  Procedure                               Abnormality         Status                     ---------                               -----------         ------                     CBC Auto Differential[[id number]]        Normal              Final result                 Please view results for these tests on the individual orders.    CBC Auto Differential [[id number]]  (Normal) Collected:  07/08/19 0928    Specimen:  Blood Updated:  07/08/19 0933     WBC 7.71 10*3/mm3      RBC 4.45 10*6/mm3      Hemoglobin 14.0 g/dL      Hematocrit 40.9 %      MCV 91.9 fL      MCH 31.5 pg      MCHC 34.2 g/dL      RDW 13.1 %      RDW-SD 43.6 fl      MPV 9.7 fL      Platelets 170 10*3/mm3      Neutrophil % 69.4 %      Lymphocyte % 20.4 %      Monocyte % 7.7 %      Eosinophil % 1.8 %      Basophil % 0.3 %      Immature Grans % 0.4 %      Neutrophils, Absolute 5.36 10*3/mm3      Lymphocytes, Absolute 1.57 10*3/mm3      Monocytes, Absolute 0.59 10*3/mm3      Eosinophils, Absolute 0.14 10*3/mm3       Basophils, Absolute 0.02 10*3/mm3      Immature Grans, Absolute 0.03 10*3/mm3      nRBC 0.0 /100 WBC         Imaging Results (most recent)     Procedure Component Value Units Date/Time    XR Abdomen 2 View With Chest 1 View [[id number]] Collected:  07/09/19 0914     Updated:  07/09/19 0919    Narrative:       ACUTE ABDOMEN SERIES, 07/09/2019         HISTORY:  73-year-old female admitted to the hospital yesterday with abdomen pain,  nausea and vomiting. CT studies yesterday showing small bowel dilatation  concerning for at least partial obstruction. Exam today for follow-up.     TECHNIQUE:  Findings upright abdomen series with PA upright chest x-ray.     COMPARISON:  *  CT abdomen/pelvis x2, 07/08/2019.     FINDINGS:  GI contrast administered for CT examination yesterday is now present  within normal caliber colon. None of the contrast remains present within  small bowel. Additionally, there is no radiographically visible small  bowel dilatation today.     NG tube is well-positioned within a decompressed stomach.     Negative chest x-ray. The lungs are clear. No free intraperitoneal air  beneath the diaphragm. No pleural effusion.       Impression:       1.  No visible small bowel dilatation today.  2.  GI contrast from examination yesterday is no longer present within  small bowel and is seen within normal caliber colon. There is no  evidence of mechanical bowel obstruction.  3.  NG tube in good position.     This report was finalized on 7/9/2019 9:17 AM by Dr. Miguelito Gottlieb MD.       CT Abdomen Pelvis Without Contrast [[id number]] Collected:  07/08/19 1806     Updated:  07/08/19 1808    Narrative:       INDICATION:   Follow-up small bowel obstruction noted on earlier CT today. Per surgeon, study is being repeated with oral contrast media. Patient is experiencing continued abdominal pain.    TECHNIQUE:   CT of the abdomen and pelvis without IV contrast. Oral contrast given per request. Coronal and sagittal  reconstructions were obtained.  Radiation dose reduction techniques included automated exposure control or exposure modulation based on body size.  Radiation audit for number of CT and nuclear cardiology exams performed in the last year: 2.      COMPARISON:   IV Contrast-enhanced CT abdomen pelvis study from earlier today 10:25 AM.    FINDINGS:  Lung bases: There is a calcified granuloma at the left base.    Abdomen: Lack of IV contrast media somewhat limits the study. There is IV contrast media within the renal collecting systems bilaterally consistent with earlier contrast administration.    There is a nasogastric tube that terminates in the stomach. The stomach is moderately distended with oral contrast media.    The noncontrast liver is unremarkable. The patient is post cholecystectomy. Spleen unremarkable. Adrenal glands within normal limits. Noncontrast pancreas unremarkable. No hydronephrosis. 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 This study  would not be sensitive for renal calculi or renal masses otherwise. There are moderate atherosclerotic vascular calcifications involving the abdominal aorta and branch vessels.    Pelvis: The urinary bladder contains IV contrast media from earlier exam. Post hysterectomy changes. There is a small amount of free fluid in the pelvis, new or increased from the prior study. There is sigmoid diverticulosis without CT evidence for  diverticulitis.    There are again multiple mildly to moderately distended loops of small bowel involving proximal and mid small bowel. They measure up to about 3 cm in diameter. Distal ileal loops are decompressed, similar to prior and findings are consistent with  mechanical small bowel obstruction. Oral contrast media is seen extending to the level of more mildly distended pelvic loops. Air-fluid levels are appreciated. No definite evidence for pneumatosis. There is some spiraling of the mesenteric vessels in the  lower abdomen  upper pelvis probably better seen on the contrast-enhanced portion of the study. This is concerning for internal hernia as in etiology of obstruction. On comparison to the earlier study, the amount of small bowel distention is not improved.  The appendix is not definitely seen    There are degenerative changes in the spine. There is no evidence for free air.      Impression:         1. Redemonstration of mild to moderate distention of multiple small bowel loops including proximal to mid small bowel with transition point likely in the pelvis such that the distal ileum is decompressed. Oral contrast media can be seen to the level of  the mildly distended more proximal ileal loops. There is concern for internal hernia with a swirling configuration of the mesenteric vessels in the lower abdomen and pelvis. Please refer back to the earlier study from this morning as well. There is no  evidence for free air or drainable fluid collection. There is a small amount of free fluid in the pelvis which is either new or increased from earlier film. The degree of bowel distention is not appreciably changed. The appendix is not definitely seen.  2. Post hysterectomy and cholecystectomy.  3. Atherosclerotic vascular calcifications.  4. Sigmoid diverticulosis without evidence for diverticulitis.        Signer Name: Qing Gleason MD   Signed: 7/8/2019 6:06 PM   Workstation Name: [id number]       CT Abdomen Pelvis With Contrast [[id number]] Collected:  07/08/19 1037     Updated:  07/08/19 1107    Narrative:       CT SCAN OF THE ABDOMEN AND PELVIS WITH CONTRAST 07/08/2019     HISTORY:  Mid abdominal pain, epigastric pain with nausea and vomiting beginning  yesterday. Cholecystectomy and hysterectomy.     TECHNIQUE:  Spiral CT was performed through the abdomen and pelvis following  intravenous contrast administration only as per clinician request.  Radiation dose reduction techniques included automated exposure control  or exposure  modulation based on body size. Radiation audit for CT and  nuclear cardiology exams in the last 12 months: 0.     ABDOMEN FINDINGS:  There is mild fatty infiltration of the liver. Calcified granulomas are  seen in the spleen. The pancreas and adrenal glands are normal. The  gallbladder is surgically absent. Kidneys are normal.     PELVIS FINDINGS:  There is mild to moderate dilatation of the proximal and mid small bowel  with multiple air-fluid levels identified. There is a transition zone in  the mid to distal small bowel in the ileum is somewhat decompressed.  Colon is normal in course and caliber and contains a moderate amount of  fecal matter. Findings suggest partial small bowel obstruction. No cause  for the obstruction is seen. Considerate lesions. There is colonic  diverticulosis without evidence of diverticulitis. There is no free  fluid in the abdomen or pelvis. The lung bases are normal.       Impression:       1. Mild to moderate dilatation of the proximal and mid small bowel with  multiple air-fluid levels identified. The transition zone is seen in the  mid to distal small bowel and the ileum is somewhat decompressed.  Findings suggest partial small bowel obstruction. No cause for the  obstruction is seen. Considerate lesions. No evidence of free air.  2. Mild fatty infiltration of the liver.  3. Surgical absence of the gallbladder.  4. Diverticulosis.     This report was finalized on 7/8/2019 10:44 AM by Dr. Thomas Guy MD.           PROCEDURES: NONE    Condition on Discharge: Stable    Physical Exam at Discharge  Vital Signs  Temp:  [97.3 °F (36.3 °C)-100 °F (37.8 °C)] 98.2 °F (36.8 °C)  Heart Rate:  [54-70] 64  Resp:  [16-20] 16  BP: (130-192)/(57-85) 161/71   Body mass index is 28.02 kg/m².    Physical Exam   Constitutional: She is oriented to person, place, and time. She appears well-developed and well-nourished.   HENT:   Head: Normocephalic and atraumatic.   Eyes: EOM are normal. Pupils are  equal, round, and reactive to light.   Cardiovascular: Normal rate and regular rhythm.   Pulmonary/Chest: Effort normal.   Scattered rhonchi upper lobes, right lower rib area tenderness with palpation   Abdominal: Soft. Bowel sounds are normal. She exhibits no distension. There is no guarding.   Mild epigastric tenderness   Musculoskeletal: She exhibits no edema.   Neurological: She is alert and oriented to person, place, and time.   Skin: Skin is warm and dry. No erythema.   Psychiatric: She has a normal mood and affect. Her behavior is normal.   Vitals reviewed.    Discharge Disposition  Home    Visiting Nurse:    No     Home PT/OT:  No     Home Safety Evaluation:  No     DME  None    Discharge Diet: Full liquid advance as tolerated to cardiac diet     Dietary Orders (From admission, onward)    Start     Ordered    07/10/19 1336  Diet Full Liquid  Diet Effective Now     Question:  Diet Texture / Consistency  Answer:  Full Liquid    07/10/19 1335          Activity at Discharge:  As tolerated    Pre-discharge education  Medications, follow-up    Follow-up Appointments  Future Appointments   Date Time Provider Department Center   8/15/2019  2:00 PM Al Granado III, MD [facility] None     Additional Instructions for the Follow-ups that You Need to Schedule     Discharge Follow-up with PCP   As directed       Currently Documented PCP:    Yandel Conway DO    PCP Phone Number:    [phone]     Follow Up Details:  1 week         Discharge Follow-up with Specified Provider: Dr. Soler; 1 Month   As directed      To:  Dr. Soler    Follow Up:  1 Month               Test Results Pending at Discharge: To be followed up by PCP   Order Current Status    Respiratory Culture - Sputum, Cough Preliminary result           ALIA Enciso  07/10/19  2:33 PM    Time: Discharge Over 30 min (if over 30 minutes give explanation as to why it took greater than 30 minutes)  Secondary to:   Coordination of  "care/follow up  Medication reconciliation  D/W patient and family    \"Dictated utilizing Dragon dictation\"      "

## 2019-07-11 ENCOUNTER — READMISSION MANAGEMENT (OUTPATIENT)
Dept: CALL CENTER | Facility: HOSPITAL | Age: 74
End: 2019-07-11

## 2019-07-11 LAB
BACTERIA SPEC RESP CULT: NORMAL
GRAM STN SPEC: NORMAL

## 2019-07-11 NOTE — OUTREACH NOTE
Prep Survey      Responses   Facility patient discharged from?  [facility]   Is LACE score < 7 ?  Yes   Is patient eligible?  Yes   Discharge diagnosis  SBO, GERD, acute parainfluenza bronchititsk Hypothyroidism, CAD, HLD HTN,   Does the patient have one of the following disease processes/diagnoses(primary or secondary)?  Other   Does the patient have Home health ordered?  No   Is there a DME ordered?  No   Comments regarding appointments  See AVS   Prep survey completed?  Yes          Rossy Porras RN

## 2019-07-12 ENCOUNTER — READMISSION MANAGEMENT (OUTPATIENT)
Dept: CALL CENTER | Facility: HOSPITAL | Age: 74
End: 2019-07-12

## 2019-07-12 NOTE — NURSING NOTE
Case Management Discharge Note    Final Note: Discharged home    Destination      No service has been selected for the patient.      Durable Medical Equipment      No service has been selected for the patient.      Dialysis/Infusion      No service has been selected for the patient.      Home Medical Care      No service has been selected for the patient.      Therapy      No service has been selected for the patient.      Community Resources      No service has been selected for the patient.             Final Discharge Disposition Code: 01 - home or self-care

## 2019-07-12 NOTE — OUTREACH NOTE
LAG < 7 Survey      Responses   Facility patient discharged from?  [facility]   Does the patient have one of the following disease processes/diagnoses(primary or secondary)?  Other   Is there a successful TCM telephone encounter documented?  No   BHLAG <7 Attempt successful?  Yes   Call start time  1405   Call end time  1408   Discharge diagnosis  SBO, GERD, acute parainfluenza bronchititsk Hypothyroidism, CAD, HLD HTN,   Meds reviewed with patient/caregiver?  Yes   Is the patient having any side effects they believe may be caused by any medication additions or changes?  No   Does the patient have all medications ordered at discharge?  Yes   Is the patient taking all medications as directed (includes completed medication regime)?  Yes   Does the patient have a primary care provider?   Yes   Does the patient have an appointment with their PCP within 7 days of discharge?  Yes   Has the patient kept scheduled appointments due by today?  Yes   Psychosocial issues?  No   Did the patient receive a copy of their discharge instructions?  Yes   Nursing interventions  Reviewed instructions with patient   Is the patient/caregiver able to teach back signs and symptoms related to disease process for when to call PCP?  Yes   Is the patient/caregiver able to teach back signs and symptoms related to disease process for when to call 911?  Yes   Is the patient/caregiver able to teach back the hierarchy of who to call/visit for symptoms/problems? PCP, Specialist, Home health nurse, Urgent Care, ED, 911  Yes   Graduated  Yes          Joycelyn Hernandez RN

## 2019-07-19 ENCOUNTER — LAB (OUTPATIENT)
Dept: LAB | Facility: HOSPITAL | Age: 74
End: 2019-07-19

## 2019-07-19 ENCOUNTER — TRANSCRIBE ORDERS (OUTPATIENT)
Dept: ADMINISTRATIVE | Facility: HOSPITAL | Age: 74
End: 2019-07-19

## 2019-07-19 DIAGNOSIS — I10 HYPERTENSION, UNSPECIFIED TYPE: ICD-10-CM

## 2019-07-19 DIAGNOSIS — E03.9 HYPOTHYROIDISM, UNSPECIFIED TYPE: ICD-10-CM

## 2019-07-19 DIAGNOSIS — E78.5 HYPERLIPIDEMIA, UNSPECIFIED HYPERLIPIDEMIA TYPE: Primary | ICD-10-CM

## 2019-07-19 DIAGNOSIS — E78.5 HYPERLIPIDEMIA, UNSPECIFIED HYPERLIPIDEMIA TYPE: ICD-10-CM

## 2019-07-19 LAB
ALBUMIN SERPL-MCNC: 4.6 G/DL (ref 3.5–5.2)
ALBUMIN/GLOB SERPL: 1.4 G/DL
ALP SERPL-CCNC: 58 U/L (ref 39–117)
ALT SERPL W P-5'-P-CCNC: 59 U/L (ref 1–33)
ANION GAP SERPL CALCULATED.3IONS-SCNC: 13 MMOL/L (ref 5–15)
AST SERPL-CCNC: 77 U/L (ref 1–32)
BACTERIA UR QL AUTO: ABNORMAL /HPF
BASOPHILS # BLD AUTO: 0.05 10*3/MM3 (ref 0–0.2)
BASOPHILS NFR BLD AUTO: 0.7 % (ref 0–1.5)
BILIRUB SERPL-MCNC: 0.5 MG/DL (ref 0.2–1.2)
BILIRUB UR QL STRIP: NEGATIVE
BUN BLD-MCNC: 9 MG/DL (ref 8–23)
BUN/CREAT SERPL: 11.3 (ref 7–25)
CALCIUM SPEC-SCNC: 9.9 MG/DL (ref 8.6–10.5)
CHLORIDE SERPL-SCNC: 101 MMOL/L (ref 98–107)
CHOLEST SERPL-MCNC: 130 MG/DL (ref 0–200)
CLARITY UR: CLEAR
CO2 SERPL-SCNC: 28 MMOL/L (ref 22–29)
COLOR UR: YELLOW
CREAT BLD-MCNC: 0.8 MG/DL (ref 0.57–1)
DEPRECATED RDW RBC AUTO: 44.6 FL (ref 37–54)
EOSINOPHIL # BLD AUTO: 0.33 10*3/MM3 (ref 0–0.4)
EOSINOPHIL NFR BLD AUTO: 4.7 % (ref 0.3–6.2)
ERYTHROCYTE [DISTWIDTH] IN BLOOD BY AUTOMATED COUNT: 12.7 % (ref 12.3–15.4)
GFR SERPL CREATININE-BSD FRML MDRD: 70 ML/MIN/1.73
GLOBULIN UR ELPH-MCNC: 3.3 GM/DL
GLUCOSE BLD-MCNC: 104 MG/DL (ref 65–99)
GLUCOSE UR STRIP-MCNC: NEGATIVE MG/DL
HCT VFR BLD AUTO: 41.7 % (ref 34–46.6)
HDLC SERPL-MCNC: 32 MG/DL (ref 40–60)
HGB BLD-MCNC: 13.2 G/DL (ref 12–15.9)
HGB UR QL STRIP.AUTO: NEGATIVE
HYALINE CASTS UR QL AUTO: ABNORMAL /LPF
IMM GRANULOCYTES # BLD AUTO: 0.04 10*3/MM3 (ref 0–0.05)
IMM GRANULOCYTES NFR BLD AUTO: 0.6 % (ref 0–0.5)
KETONES UR QL STRIP: NEGATIVE
LDLC SERPL CALC-MCNC: 68 MG/DL (ref 0–100)
LDLC/HDLC SERPL: 2.13 {RATIO}
LEUKOCYTE ESTERASE UR QL STRIP.AUTO: ABNORMAL
LYMPHOCYTES # BLD AUTO: 2.35 10*3/MM3 (ref 0.7–3.1)
LYMPHOCYTES NFR BLD AUTO: 33.2 % (ref 19.6–45.3)
MCH RBC QN AUTO: 30.5 PG (ref 26.6–33)
MCHC RBC AUTO-ENTMCNC: 31.7 G/DL (ref 31.5–35.7)
MCV RBC AUTO: 96.3 FL (ref 79–97)
MONOCYTES # BLD AUTO: 0.61 10*3/MM3 (ref 0.1–0.9)
MONOCYTES NFR BLD AUTO: 8.6 % (ref 5–12)
MUCOUS THREADS URNS QL MICRO: ABNORMAL /HPF
NEUTROPHILS # BLD AUTO: 3.7 10*3/MM3 (ref 1.7–7)
NEUTROPHILS NFR BLD AUTO: 52.2 % (ref 42.7–76)
NITRITE UR QL STRIP: NEGATIVE
NRBC BLD AUTO-RTO: 0 /100 WBC (ref 0–0.2)
PH UR STRIP.AUTO: 6.5 [PH] (ref 5–8)
PLATELET # BLD AUTO: 318 10*3/MM3 (ref 140–450)
PMV BLD AUTO: 10.2 FL (ref 6–12)
POTASSIUM BLD-SCNC: 4.3 MMOL/L (ref 3.5–5.2)
PROT SERPL-MCNC: 7.9 G/DL (ref 6–8.5)
PROT UR QL STRIP: NEGATIVE
RBC # BLD AUTO: 4.33 10*6/MM3 (ref 3.77–5.28)
RBC # UR: ABNORMAL /HPF
REF LAB TEST METHOD: ABNORMAL
SODIUM BLD-SCNC: 142 MMOL/L (ref 136–145)
SP GR UR STRIP: 1.02 (ref 1–1.03)
SQUAMOUS #/AREA URNS HPF: ABNORMAL /HPF
TRIGL SERPL-MCNC: 150 MG/DL (ref 0–150)
TSH SERPL DL<=0.05 MIU/L-ACNC: 8.92 MIU/ML (ref 0.27–4.2)
UROBILINOGEN UR QL STRIP: ABNORMAL
VLDLC SERPL-MCNC: 30 MG/DL (ref 5–40)
WBC NRBC COR # BLD: 7.08 10*3/MM3 (ref 3.4–10.8)
WBC UR QL AUTO: ABNORMAL /HPF

## 2019-07-19 PROCEDURE — 36415 COLL VENOUS BLD VENIPUNCTURE: CPT

## 2019-07-19 PROCEDURE — 85025 COMPLETE CBC W/AUTO DIFF WBC: CPT

## 2019-07-19 PROCEDURE — 81001 URINALYSIS AUTO W/SCOPE: CPT | Performed by: FAMILY MEDICINE

## 2019-07-19 PROCEDURE — 80053 COMPREHEN METABOLIC PANEL: CPT | Performed by: FAMILY MEDICINE

## 2019-07-19 PROCEDURE — 84443 ASSAY THYROID STIM HORMONE: CPT | Performed by: FAMILY MEDICINE

## 2019-07-19 PROCEDURE — 80061 LIPID PANEL: CPT | Performed by: FAMILY MEDICINE

## 2019-07-25 RX ORDER — ISOSORBIDE MONONITRATE 20 MG/1
TABLET ORAL
Qty: 180 TABLET | Refills: 3 | Status: SHIPPED | OUTPATIENT
Start: 2019-07-25 | End: 2020-07-24

## 2019-07-31 ENCOUNTER — TRANSCRIBE ORDERS (OUTPATIENT)
Dept: ADMINISTRATIVE | Facility: HOSPITAL | Age: 74
End: 2019-07-31

## 2019-07-31 DIAGNOSIS — R11.2 NAUSEA AND VOMITING, INTRACTABILITY OF VOMITING NOT SPECIFIED, UNSPECIFIED VOMITING TYPE: Primary | ICD-10-CM

## 2019-07-31 DIAGNOSIS — K56.50 INTESTINAL OR PERITONEAL ADHESIONS WITH OBSTRUCTION (POSTOPERATIVE) (POSTINFECTION) (HCC): ICD-10-CM

## 2019-07-31 DIAGNOSIS — A08.11 EPIDEMIC VOMITING SYNDROME: ICD-10-CM

## 2019-07-31 DIAGNOSIS — R94.5 NONSPECIFIC ABNORMAL RESULTS OF LIVER FUNCTION STUDY: ICD-10-CM

## 2019-08-02 ENCOUNTER — HOSPITAL ENCOUNTER (OUTPATIENT)
Dept: ULTRASOUND IMAGING | Facility: HOSPITAL | Age: 74
Discharge: HOME | End: 2019-08-02
Admitting: FAMILY MEDICINE

## 2019-08-02 ENCOUNTER — LAB (OUTPATIENT)
Dept: LAB | Facility: HOSPITAL | Age: 74
End: 2019-08-02

## 2019-08-02 ENCOUNTER — TRANSCRIBE ORDERS (OUTPATIENT)
Dept: ADMINISTRATIVE | Facility: HOSPITAL | Age: 74
End: 2019-08-02

## 2019-08-02 DIAGNOSIS — R79.89 ELEVATED LFTS: ICD-10-CM

## 2019-08-02 DIAGNOSIS — R79.89 ELEVATED LFTS: Primary | ICD-10-CM

## 2019-08-02 DIAGNOSIS — K75.81 STEATOHEPATITIS: ICD-10-CM

## 2019-08-02 DIAGNOSIS — R11.2 NAUSEA AND VOMITING, INTRACTABILITY OF VOMITING NOT SPECIFIED, UNSPECIFIED VOMITING TYPE: ICD-10-CM

## 2019-08-02 DIAGNOSIS — A08.11 EPIDEMIC VOMITING SYNDROME: ICD-10-CM

## 2019-08-02 DIAGNOSIS — R94.5 NONSPECIFIC ABNORMAL RESULTS OF LIVER FUNCTION STUDY: ICD-10-CM

## 2019-08-02 DIAGNOSIS — K56.50 INTESTINAL OR PERITONEAL ADHESIONS WITH OBSTRUCTION (POSTOPERATIVE) (POSTINFECTION) (HCC): ICD-10-CM

## 2019-08-02 LAB
ALT SERPL W P-5'-P-CCNC: 29 U/L (ref 1–33)
AST SERPL-CCNC: 35 U/L (ref 1–32)
BASOPHILS # BLD AUTO: 0.06 10*3/MM3 (ref 0–0.2)
BASOPHILS NFR BLD AUTO: 0.9 % (ref 0–1.5)
DEPRECATED RDW RBC AUTO: 43.3 FL (ref 37–54)
EOSINOPHIL # BLD AUTO: 0.29 10*3/MM3 (ref 0–0.4)
EOSINOPHIL NFR BLD AUTO: 4.1 % (ref 0.3–6.2)
ERYTHROCYTE [DISTWIDTH] IN BLOOD BY AUTOMATED COUNT: 12.6 % (ref 12.3–15.4)
FERRITIN SERPL-MCNC: 269 NG/ML (ref 13–150)
GGT SERPL-CCNC: 39 U/L (ref 5–36)
HCT VFR BLD AUTO: 42.5 % (ref 34–46.6)
HGB BLD-MCNC: 13.8 G/DL (ref 12–15.9)
IMM GRANULOCYTES # BLD AUTO: 0.04 10*3/MM3 (ref 0–0.05)
IMM GRANULOCYTES NFR BLD AUTO: 0.6 % (ref 0–0.5)
IRON 24H UR-MRATE: 140 MCG/DL (ref 37–145)
LYMPHOCYTES # BLD AUTO: 2.11 10*3/MM3 (ref 0.7–3.1)
LYMPHOCYTES NFR BLD AUTO: 30 % (ref 19.6–45.3)
MCH RBC QN AUTO: 30.3 PG (ref 26.6–33)
MCHC RBC AUTO-ENTMCNC: 32.5 G/DL (ref 31.5–35.7)
MCV RBC AUTO: 93.2 FL (ref 79–97)
MONOCYTES # BLD AUTO: 0.49 10*3/MM3 (ref 0.1–0.9)
MONOCYTES NFR BLD AUTO: 7 % (ref 5–12)
NEUTROPHILS # BLD AUTO: 4.04 10*3/MM3 (ref 1.7–7)
NEUTROPHILS NFR BLD AUTO: 57.4 % (ref 42.7–76)
NRBC BLD AUTO-RTO: 0 /100 WBC (ref 0–0.2)
PLATELET # BLD AUTO: 195 10*3/MM3 (ref 140–450)
PMV BLD AUTO: 10.9 FL (ref 6–12)
RBC # BLD AUTO: 4.56 10*6/MM3 (ref 3.77–5.28)
WBC NRBC COR # BLD: 7.03 10*3/MM3 (ref 3.4–10.8)

## 2019-08-02 PROCEDURE — 83516 IMMUNOASSAY NONANTIBODY: CPT

## 2019-08-02 PROCEDURE — 83540 ASSAY OF IRON: CPT

## 2019-08-02 PROCEDURE — 82784 ASSAY IGA/IGD/IGG/IGM EACH: CPT

## 2019-08-02 PROCEDURE — 86255 FLUORESCENT ANTIBODY SCREEN: CPT

## 2019-08-02 PROCEDURE — 84450 TRANSFERASE (AST) (SGOT): CPT

## 2019-08-02 PROCEDURE — 82977 ASSAY OF GGT: CPT

## 2019-08-02 PROCEDURE — 76700 US EXAM ABDOM COMPLETE: CPT

## 2019-08-02 PROCEDURE — 85025 COMPLETE CBC W/AUTO DIFF WBC: CPT

## 2019-08-02 PROCEDURE — 82728 ASSAY OF FERRITIN: CPT

## 2019-08-02 PROCEDURE — 36415 COLL VENOUS BLD VENIPUNCTURE: CPT

## 2019-08-02 PROCEDURE — 84460 ALANINE AMINO (ALT) (SGPT): CPT

## 2019-08-05 LAB
ENDOMYSIUM IGA SER QL: NEGATIVE
GLIADIN PEPTIDE IGA SER-ACNC: 21 UNITS (ref 0–19)
GLIADIN PEPTIDE IGG SER-ACNC: 3 UNITS (ref 0–19)
IGA SERPL-MCNC: 256 MG/DL (ref 64–422)
TTG IGA SER-ACNC: <2 U/ML (ref 0–3)
TTG IGG SER-ACNC: <2 U/ML (ref 0–5)

## 2019-08-12 ENCOUNTER — OFFICE VISIT (OUTPATIENT)
Dept: SURGERY | Facility: CLINIC | Age: 74
End: 2019-08-12

## 2019-08-12 DIAGNOSIS — Z09 HOSPITAL DISCHARGE FOLLOW-UP: Primary | ICD-10-CM

## 2019-08-12 PROBLEM — R13.19 OTHER DYSPHAGIA: Status: RESOLVED | Noted: 2019-06-05 | Resolved: 2019-08-12

## 2019-08-12 PROBLEM — K56.600 PARTIAL SMALL BOWEL OBSTRUCTION: Status: RESOLVED | Noted: 2019-07-08 | Resolved: 2019-08-12

## 2019-08-12 PROBLEM — R10.13 EPIGASTRIC PAIN: Status: RESOLVED | Noted: 2017-11-15 | Resolved: 2019-08-12

## 2019-08-12 PROBLEM — Z86.010 HISTORY OF COLON POLYPS: Status: RESOLVED | Noted: 2019-06-05 | Resolved: 2019-08-12

## 2019-08-12 PROBLEM — R14.0 BLOATING: Status: RESOLVED | Noted: 2019-06-05 | Resolved: 2019-08-12

## 2019-08-12 PROBLEM — Z86.0100 HISTORY OF COLON POLYPS: Status: RESOLVED | Noted: 2019-06-05 | Resolved: 2019-08-12

## 2019-08-12 PROBLEM — R11.0 NAUSEA: Status: RESOLVED | Noted: 2017-11-15 | Resolved: 2019-08-12

## 2019-08-12 PROCEDURE — 99213 OFFICE O/P EST LOW 20 MIN: CPT | Performed by: SURGERY

## 2019-08-12 NOTE — PROGRESS NOTES
Ashlie Levi 73 y.o. female presents for  [facility].  Pt feels much better.  PCP Dr. Conway.      HPI   Above noted and agree.  Ashlie feels well when she follows the diet we discussed.  She moves her bowels daily but feels cramping when she goes.  Her cough has resolved.  She has no fevers or chills.  She has no chest pain or shortness of breath.  She has no other complaints.      Review of Systems        Past Medical History:   Diagnosis Date   • Abnormal electrocardiogram    • Arthritis    • Chest discomfort    • Colon polyp    • Coronary artery disease    • Disease of thyroid gland    • Hyperlipidemia    • Hypertension            Past Surgical History:   Procedure Laterality Date   • APPENDECTOMY     • CARDIAC SURGERY     • CHOLECYSTECTOMY     • COLONOSCOPY N/A 7/13/2016    Procedure: COLONOSCOPY;  Surgeon: Giovani Avelar MD;  Location: [facility] OR;  Service:    • COLONOSCOPY N/A 6/13/2019    Procedure: Colonoscopy with possible biopsy or polypectomy;  Surgeon: Alfreda Soler DO;  Location: [facility] OR;  Service: Gastroenterology   • CORONARY STENT PLACEMENT     • ENDOSCOPY N/A 11/21/2017    Procedure: ESOPHAGOGASTRODUODENOSCOPY with CARIN test ;  Surgeon: Alfreda Soler DO;  Location: [facility] OR;  Service:    • ENDOSCOPY N/A 6/13/2019    Procedure: Esophagogastroduodenoscopy with possible biopsy, polypectomy, dilation;  Surgeon: Alfreda Soelr DO;  Location: [facility] OR;  Service: Gastroenterology   • EYE SURGERY     • FOOT GANGLION EXCISION Left    • HEMORROIDECTOMY     • HERNIA REPAIR     • HYSTERECTOMY             Physical Exam   Constitutional: She is oriented to person, place, and time. She appears well-developed and well-nourished.   HENT:   Head: Normocephalic and atraumatic.   Right Ear: External ear normal.   Left Ear: External ear normal.   Cardiovascular: Normal rate and regular rhythm.   Pulmonary/Chest: Effort normal and breath sounds normal.   Abdominal: Soft. Bowel sounds are  normal.   Musculoskeletal: She exhibits no edema or deformity.   Neurological: She is alert and oriented to person, place, and time.   Skin: Skin is warm and dry.   Psychiatric: She has a normal mood and affect. Her behavior is normal.           There were no vitals taken for this visit.        Ashlie was seen today for follow-up.    Diagnoses and all orders for this visit:    Hospital discharge follow-up    We discussed adding Metamucil to her diet.  We also discussed her dietary changes.  She may return to clinic anytime as needed.    Thank you for allowing me to participate in the care of this interesting patient.

## 2019-08-15 ENCOUNTER — OFFICE VISIT (OUTPATIENT)
Dept: CARDIOLOGY | Facility: CLINIC | Age: 74
End: 2019-08-15

## 2019-08-15 VITALS
HEIGHT: 62 IN | BODY MASS INDEX: 27.75 KG/M2 | HEART RATE: 53 BPM | DIASTOLIC BLOOD PRESSURE: 74 MMHG | SYSTOLIC BLOOD PRESSURE: 118 MMHG | WEIGHT: 150.8 LBS

## 2019-08-15 DIAGNOSIS — I25.118 CORONARY ARTERY DISEASE OF NATIVE ARTERY OF NATIVE HEART WITH STABLE ANGINA PECTORIS (HCC): Chronic | ICD-10-CM

## 2019-08-15 DIAGNOSIS — E78.2 MIXED HYPERLIPIDEMIA: Chronic | ICD-10-CM

## 2019-08-15 DIAGNOSIS — I10 ESSENTIAL HYPERTENSION: Primary | Chronic | ICD-10-CM

## 2019-08-15 PROCEDURE — 93000 ELECTROCARDIOGRAM COMPLETE: CPT | Performed by: INTERNAL MEDICINE

## 2019-08-15 PROCEDURE — 99213 OFFICE O/P EST LOW 20 MIN: CPT | Performed by: INTERNAL MEDICINE

## 2019-08-15 NOTE — PROGRESS NOTES
Subjective:     Encounter Date: 8/15/2019      Patient ID: Ashlie Levi is a 73 y.o. female.    Chief Complaint: CAD  History of Present Illness    Dear Dr. Conway,    I had the pleasure of seeing this patient in the office today for follow-up of her cardiac status.  She comes in early because of concerns with high blood pressure at home.  She has a history of CAD and had a stent placed in her LAD in 2006.  This was performed by Dr. Caldwell at [facility].  She had a stress test performed  5/2019 that demonstrated normal left ventricular and no ischemia.    She was recently hospitalized with small bowel obstruction as well as acute parainfluenza bronchitis.    They noted recently that her blood pressures been elevated.  They are consistently getting systolic blood pressure is 150-180.  She states that she is generally been feeling okay.  She really does not feel like she is having any further issues related to her hospitalization.    No chest pain or chest discomfort.  No shortness of breath.    I do not have a recent lipid level.  She thinks they told her that it was increased somewhat but we do not have those results and is been many months now since she had any blood work performed.    She denies any lower extremity edema.  No orthopnea or PND.      The following portions of the patient's history were reviewed and updated as appropriate: allergies, current medications, past family history, past medical history, past social history, past surgical history and problem list.    Past Medical History:   Diagnosis Date   • Abnormal electrocardiogram    • Arthritis    • Chest discomfort    • Colon polyp    • Coronary artery disease    • Disease of thyroid gland    • Hyperlipidemia    • Hypertension        Past Surgical History:   Procedure Laterality Date   • APPENDECTOMY     • CARDIAC SURGERY     • CHOLECYSTECTOMY     • COLONOSCOPY N/A 7/13/2016    Procedure: COLONOSCOPY;  Surgeon: Giovani Avelar MD;   Location: [facility] OR;  Service:    • COLONOSCOPY N/A 2019    Procedure: Colonoscopy with possible biopsy or polypectomy;  Surgeon: Alfreda Soler DO;  Location: [facility] OR;  Service: Gastroenterology   • CORONARY STENT PLACEMENT     • ENDOSCOPY N/A 2017    Procedure: ESOPHAGOGASTRODUODENOSCOPY with CARIN test ;  Surgeon: Alfreda Soler DO;  Location: [facility] OR;  Service:    • ENDOSCOPY N/A 2019    Procedure: Esophagogastroduodenoscopy with possible biopsy, polypectomy, dilation;  Surgeon: Alfreda Soler DO;  Location: [facility] OR;  Service: Gastroenterology   • EYE SURGERY     • FOOT GANGLION EXCISION Left    • HEMORROIDECTOMY     • HERNIA REPAIR     • HYSTERECTOMY         Social History     Socioeconomic History   • Marital status:      Spouse name: Not on file   • Number of children: Not on file   • Years of education: Not on file   • Highest education level: Not on file   Tobacco Use   • Smoking status: Former Smoker     Packs/day: 1.00     Years: 50.00     Pack years: 50.00     Last attempt to quit: 10/13/2006     Years since quittin.8   • Smokeless tobacco: Never Used   Substance and Sexual Activity   • Alcohol use: Yes     Comment: occasional   • Drug use: No   • Sexual activity: Defer       Review of Systems   Constitution: Negative for chills, decreased appetite, fever and night sweats.   HENT: Negative for ear discharge, ear pain, hearing loss, nosebleeds and sore throat.    Eyes: Negative for blurred vision, double vision and pain.   Cardiovascular: Negative for cyanosis.   Respiratory: Negative for hemoptysis and sputum production.    Endocrine: Negative for cold intolerance and heat intolerance.   Hematologic/Lymphatic: Negative for adenopathy.   Skin: Negative for dry skin, itching, nail changes, rash and suspicious lesions.   Musculoskeletal: Negative for arthritis, gout, muscle cramps, muscle weakness, myalgias and neck pain.   Gastrointestinal: Negative for  "anorexia, bowel incontinence, constipation, diarrhea, dysphagia, hematemesis and jaundice.   Genitourinary: Negative for bladder incontinence, dysuria, flank pain, frequency, hematuria and nocturia.   Neurological: Negative for focal weakness, numbness, paresthesias and seizures.   Psychiatric/Behavioral: Negative for altered mental status, hallucinations, hypervigilance, suicidal ideas and thoughts of violence.   Allergic/Immunologic: Negative for persistent infections.         ECG 12 Lead  Date/Time: 8/15/2019 2:28 PM  Performed by: Al Granado III, MD  Authorized by: Al Granado III, MD   Comparison: compared with previous ECG   Similar to previous ECG  Rhythm: sinus rhythm  Rate: normal  Conduction: conduction normal  ST Segments: ST segments normal  T Waves: T waves normal  QRS axis: normal  Other: no other findings    Clinical impression: normal ECG               Objective:     Vitals:    08/15/19 1339   BP: 118/74   Pulse: 53   Weight: 68.4 kg (150 lb 12.8 oz)   Height: 157.5 cm (62\")         Physical Exam   Constitutional: She is oriented to person, place, and time. She appears well-developed and well-nourished. No distress.   HENT:   Head: Normocephalic and atraumatic.   Nose: Nose normal.   Mouth/Throat: Oropharynx is clear and moist.   Eyes: Conjunctivae and EOM are normal. Pupils are equal, round, and reactive to light. Right eye exhibits no discharge. Left eye exhibits no discharge.   Neck: Normal range of motion. Neck supple. No tracheal deviation present. No thyromegaly present.   Cardiovascular: Normal rate, regular rhythm, S1 normal, S2 normal, normal heart sounds and normal pulses. Exam reveals no S3.   Pulmonary/Chest: Effort normal and breath sounds normal. No stridor. No respiratory distress. She exhibits no tenderness.   Abdominal: Soft. Bowel sounds are normal. She exhibits no distension and no mass. There is no tenderness. There is no rebound and no guarding.   Musculoskeletal: Normal " range of motion. She exhibits no tenderness or deformity.   Lymphadenopathy:     She has no cervical adenopathy.   Neurological: She is alert and oriented to person, place, and time. She has normal reflexes.   Skin: Skin is warm and dry. No rash noted. She is not diaphoretic. No erythema.   Psychiatric: She has a normal mood and affect. Thought content normal.       Lab Review:             Performed        Assessment:          Diagnosis Plan   1. Essential hypertension  ECG 12 Lead   2. Coronary artery disease of native artery of native heart with stable angina pectoris (CMS/[facility])  ECG 12 Lead   3. Mixed hyperlipidemia  ECG 12 Lead          Plan:       1. Coronary Artery Disease  Assessment  • The patient has no angina    Plan  • Lifestyle modifications discussed include adhering to a heart healthy diet, avoidance of tobacco products, maintenance of a healthy weight, medication compliance, regular exercise and regular monitoring of cholesterol and blood pressure    Subjective - Objective  • There has been a previous stent procedure using HERBERTH  • Current antiplatelet therapy includes aspirin 81 mg    2.  Hyperlipidemia-I do not have a recent lipid level, we will arrange for that to be obtained  3.  Hypertensive heart disease- in the past she had been on amlodipine but then she started having some low blood pressure so that was stopped.  She is now having elevated blood pressures.  She brought in her automatic blood pressure cuff and Lamar checked the 2 side by side and there is only 6 mmHg difference.  We will have her start back on the amlodipine 2.5 mg tablets that she has at home and the report back next week.      Thank you very much for allowing us to participate in the care of this pleasant patient.  Please don't hesitate to call if I can be of assistance in any way.      Current Outpatient Medications:   •  aspirin 81 MG chewable tablet, Chew 81 mg Daily., Disp: , Rfl:   •  carvedilol (COREG) 6.25 MG tablet,  TAKE ONE TABLET BY MOUTH TWICE DAILY (Patient taking differently: TAKE ONE TABLET twice DAILY), Disp: 180 tablet, Rfl: 3  •  Cyanocobalamin (VITAMIN B 12 PO), Take 1 tablet by mouth Daily., Disp: , Rfl:   •  isosorbide mononitrate (ISMO,MONOKET) 20 MG tablet, TAKE 1 TABLET BY MOUTH TWICE DAILY, Disp: 180 tablet, Rfl: 3  •  levothyroxine (SYNTHROID) 112 MCG tablet, Take 1 tablet by mouth Daily., Disp: 30 tablet, Rfl: 1  •  losartan (COZAAR) 100 MG tablet, TAKE ONE TABLET BY MOUTH ONCE DAILY, Disp: 90 tablet, Rfl: 1  •  omeprazole (priLOSEC) 40 MG capsule, Take 1 capsule by mouth Daily., Disp: 30 capsule, Rfl: 6  •  Pyridoxine HCl (VITAMIN B-6 PO), Take 1 tablet by mouth Daily., Disp: , Rfl:   •  raNITIdine (ZANTAC) 300 MG tablet, Take 300 mg by mouth Every Night., Disp: , Rfl:   •  sertraline (ZOLOFT) 100 MG tablet, Take 100 mg by mouth daily., Disp: , Rfl:   •  spironolactone (ALDACTONE) 25 MG tablet, Take 25 mg by mouth Daily., Disp: , Rfl:   •  Vitamin D, Cholecalciferol, (CHOLECALCIFEROL) 400 units tablet, Take 400 Units by mouth Daily., Disp: , Rfl:          EMR Dragon/Transcription disclaimer:    Much of this encounter note is an electronic transcription/translation of spoken language to printed text. The electronic translation of spoken language may permit erroneous, or at times, nonsensical words or phrases to be inadvertently transcribed; Although I have reviewed the note for such errors, some may still exist.

## 2019-08-27 ENCOUNTER — TELEPHONE (OUTPATIENT)
Dept: CARDIOLOGY | Facility: CLINIC | Age: 74
End: 2019-08-27

## 2019-08-27 NOTE — TELEPHONE ENCOUNTER
Pt is calling today regarding her BP reading. You last seen the on 8/15/19.              8/21/19: AM: 181/51 AM                PM:186/83    8/22/19: AM:177/80             8/23/19: AM: 173/79                PM: 184/80    8/24/19: AM:163/69                PM:171/61    8/25/19: AM: 140/62                PM: 174/69    8/26/19: AM: 165/74    PT #: [phone]                    Thanks Lamar

## 2019-09-03 RX ORDER — CARVEDILOL 6.25 MG/1
TABLET ORAL
Qty: 180 TABLET | Refills: 3 | Status: SHIPPED | OUTPATIENT
Start: 2019-09-03 | End: 2020-08-04

## 2019-09-11 ENCOUNTER — TELEPHONE (OUTPATIENT)
Dept: CARDIOLOGY | Facility: CLINIC | Age: 74
End: 2019-09-11

## 2019-09-11 NOTE — TELEPHONE ENCOUNTER
Pt called. She said that you wanted them to call with BP readings. Readings are from 9/7/2019 to today.     164/74- 9/7  166/66  158-69  138-58  160/76  158/74  169/73- today(9-11)

## 2019-09-12 RX ORDER — AMLODIPINE BESYLATE 10 MG/1
10 TABLET ORAL DAILY
Qty: 30 TABLET | Refills: 0 | Status: SHIPPED | OUTPATIENT
Start: 2019-09-12 | End: 2019-10-03 | Stop reason: SDUPTHER

## 2019-10-03 RX ORDER — AMLODIPINE BESYLATE 5 MG/1
5 TABLET ORAL 2 TIMES DAILY
Qty: 180 TABLET | Refills: 1 | Status: SHIPPED | OUTPATIENT
Start: 2019-10-03 | End: 2020-05-21

## 2019-10-08 RX ORDER — LOSARTAN POTASSIUM 100 MG/1
TABLET ORAL
Qty: 90 TABLET | Refills: 1 | Status: SHIPPED | OUTPATIENT
Start: 2019-10-08 | End: 2020-04-10

## 2019-12-28 RX ORDER — OMEPRAZOLE 40 MG/1
CAPSULE, DELAYED RELEASE ORAL
Qty: 30 CAPSULE | Refills: 0 | Status: SHIPPED | OUTPATIENT
Start: 2019-12-28 | End: 2020-01-28

## 2020-01-28 RX ORDER — OMEPRAZOLE 40 MG/1
CAPSULE, DELAYED RELEASE ORAL
Qty: 30 CAPSULE | Refills: 11 | Status: SHIPPED | OUTPATIENT
Start: 2020-01-28 | End: 2021-02-10 | Stop reason: SDUPTHER

## 2020-03-26 RX ORDER — SPIRONOLACTONE 25 MG/1
TABLET ORAL
Qty: 90 TABLET | Refills: 3 | Status: ON HOLD | OUTPATIENT
Start: 2020-03-26 | End: 2021-05-18

## 2020-04-10 RX ORDER — AMLODIPINE BESYLATE 10 MG/1
TABLET ORAL
Qty: 30 TABLET | Refills: 2 | Status: SHIPPED | OUTPATIENT
Start: 2020-04-10 | End: 2020-08-19

## 2020-04-10 RX ORDER — LOSARTAN POTASSIUM 100 MG/1
TABLET ORAL
Qty: 90 TABLET | Refills: 0 | Status: SHIPPED | OUTPATIENT
Start: 2020-04-10 | End: 2020-07-07

## 2020-05-21 ENCOUNTER — OFFICE VISIT (OUTPATIENT)
Dept: OBSTETRICS AND GYNECOLOGY | Facility: CLINIC | Age: 75
End: 2020-05-21

## 2020-05-21 VITALS
HEIGHT: 62 IN | BODY MASS INDEX: 28.87 KG/M2 | DIASTOLIC BLOOD PRESSURE: 78 MMHG | WEIGHT: 156.9 LBS | SYSTOLIC BLOOD PRESSURE: 120 MMHG

## 2020-05-21 DIAGNOSIS — Z13.9 SCREENING FOR CONDITION: Primary | ICD-10-CM

## 2020-05-21 DIAGNOSIS — N89.8 ITCHING IN THE VAGINAL AREA: ICD-10-CM

## 2020-05-21 LAB
BILIRUB BLD-MCNC: NEGATIVE MG/DL
CLARITY, POC: CLEAR
COLOR UR: YELLOW
GLUCOSE UR STRIP-MCNC: NEGATIVE MG/DL
KETONES UR QL: NEGATIVE
LEUKOCYTE EST, POC: NEGATIVE
NITRITE UR-MCNC: NEGATIVE MG/ML
PH UR: 5 [PH] (ref 5–8)
PROT UR STRIP-MCNC: NEGATIVE MG/DL
RBC # UR STRIP: NEGATIVE /UL
SP GR UR: 1.02 (ref 1–1.03)
UROBILINOGEN UR QL: NORMAL

## 2020-05-21 PROCEDURE — 81002 URINALYSIS NONAUTO W/O SCOPE: CPT | Performed by: OBSTETRICS & GYNECOLOGY

## 2020-05-21 PROCEDURE — 99203 OFFICE O/P NEW LOW 30 MIN: CPT | Performed by: OBSTETRICS & GYNECOLOGY

## 2020-05-21 RX ORDER — CLOBETASOL PROPIONATE 0.5 MG/G
OINTMENT TOPICAL
Qty: 60 G | Refills: 1 | Status: SHIPPED | OUTPATIENT
Start: 2020-05-21

## 2020-05-21 RX ORDER — LEVOTHYROXINE SODIUM 0.1 MG/1
100 TABLET ORAL DAILY
COMMUNITY
Start: 2020-03-30 | End: 2022-05-16

## 2020-05-21 NOTE — PROGRESS NOTES
New GYN Exam    CC- Here for vaginal itching    Ashlie Levi is a 74 y.o. female new patient who presents for vaginal itching. She had a total abdominal hysterectomy with ovarian conservation at age 30 for dysfunctional uterine bleeding.  She then had a interval diagnostic scope with removal of 1 of her ovaries.  She has not had any Pap smears in more than 5 years.  She is never had an abnormal Pap smear.  She started having vaginal itching on and off about 6 to 7 years ago.  Approximately 5 years ago, she said she went somewhere and had a biopsy that was normal.  Since that time, her itching has just increased.  She notices itching everywhere but it is most significant in the anterior compartment near the clitoral area.  She states the only thing that helps this is using Aquaphor.  She is not on any hormone replacement therapy.  She is not noticed any vaginal discharge.  She lives in [place] for 6 months out of the year.    OB History        2    Para   2    Term   2            AB        Living   2       SAB        TAB        Ectopic        Molar        Multiple        Live Births              Obstetric Comments   2              Menarche:10  Menopause:30, GREG with OC for DUB, interval dx lsc for US  HRT:none  Current contraception: post menopausal status and status post hysterectomy  History of abnormal Pap smear: no  History of abnormal mammogram: no  Family history of uterine, colon or ovarian cancer: yes - brother with colon cancer> 50  Family history of breast cancer: no  STD's: none  Last pap smear:> 5 years  Gardasil: none  CADEN: none      Health Maintenance   Topic Date Due   • TDAP/TD VACCINES (1 - Tdap) 10/[birth year]/1954   • ZOSTER VACCINE (1 of 2) 10/12/1993   • LUNG CANCER SCREENING  10/12/1998   • Pneumococcal Vaccine Once at 65 Years Old  10/12/2008   • HEPATITIS C SCREENING  2017   • MEDICARE ANNUAL WELLNESS  2017   • MAMMOGRAM  10/06/2019   • LIPID PANEL  2020   • INFLUENZA  VACCINE  08/01/2020   • COLONOSCOPY  06/13/2022       Past Medical History:   Diagnosis Date   • Abnormal electrocardiogram    • Arthritis    • Chest discomfort    • Colon polyp    • Coronary artery disease    • Disease of thyroid gland    • GERD (gastroesophageal reflux disease)    • Hyperlipidemia    • Hypertension        Past Surgical History:   Procedure Laterality Date   • APPENDECTOMY     • CARDIAC SURGERY      3 stents   • CHOLECYSTECTOMY     • COLONOSCOPY N/A 7/13/2016    Procedure: COLONOSCOPY;  Surgeon: Giovani Avelar MD;  Location:  [facility] OR;  Service:    • COLONOSCOPY N/A 6/13/2019    Procedure: Colonoscopy with possible biopsy or polypectomy;  Surgeon: Alfreda Soler DO;  Location: [facility] OR;  Service: Gastroenterology   • CORONARY STENT PLACEMENT     • ENDOSCOPY N/A 11/21/2017    Procedure: ESOPHAGOGASTRODUODENOSCOPY with CARIN test ;  Surgeon: Alfreda Soler DO;  Location:  [facility] OR;  Service:    • ENDOSCOPY N/A 6/13/2019    Procedure: Esophagogastroduodenoscopy with possible biopsy, polypectomy, dilation;  Surgeon: Alfreda Soler DO;  Location: [facility] OR;  Service: Gastroenterology   • EYE SURGERY     • FOOT GANGLION EXCISION Left    • HEMORROIDECTOMY     • HERNIA REPAIR     • HYSTERECTOMY      GREG with OC age 30 for DUB   • OOPHORECTOMY      dx lsc , interval USO         Current Outpatient Medications:   •  amLODIPine (NORVASC) 10 MG tablet, Take 1 tablet by mouth once daily, Disp: 30 tablet, Rfl: 2  •  aspirin 81 MG chewable tablet, Chew 81 mg Daily., Disp: , Rfl:   •  carvedilol (COREG) 6.25 MG tablet, TAKE 1 TABLET BY MOUTH TWICE DAILY, Disp: 180 tablet, Rfl: 3  •  clobetasol (TEMOVATE) 0.05 % ointment, To affected area twice a day for 4 weeks, then daily for 2 weeks, then twice a week as needed for itching, Disp: 60 g, Rfl: 1  •  Cyanocobalamin (VITAMIN B 12 PO), Take 1 tablet by mouth Daily., Disp: , Rfl:   •  isosorbide mononitrate (ISMO,MONOKET) 20 MG tablet, TAKE 1  TABLET BY MOUTH TWICE DAILY, Disp: 180 tablet, Rfl: 3  •  levothyroxine (SYNTHROID) 112 MCG tablet, Take 1 tablet by mouth Daily., Disp: 30 tablet, Rfl: 1  •  levothyroxine (SYNTHROID, LEVOTHROID) 100 MCG tablet, Take 100 mcg by mouth Daily., Disp: , Rfl:   •  losartan (COZAAR) 100 MG tablet, Take 1 tablet by mouth once daily, Disp: 90 tablet, Rfl: 0  •  omeprazole (priLOSEC) 40 MG capsule, TAKE 1 CAPSULE BY MOUTH ONCE DAILY, Disp: 30 capsule, Rfl: 11  •  Pyridoxine HCl (VITAMIN B-6 PO), Take 1 tablet by mouth Daily., Disp: , Rfl:   •  raNITIdine (ZANTAC) 300 MG tablet, Take 300 mg by mouth Every Night., Disp: , Rfl:   •  sertraline (ZOLOFT) 100 MG tablet, Take 100 mg by mouth daily., Disp: , Rfl:   •  spironolactone (ALDACTONE) 25 MG tablet, Take 1 tablet by mouth once daily, Disp: 90 tablet, Rfl: 3  •  Vitamin D, Cholecalciferol, (CHOLECALCIFEROL) 400 units tablet, Take 400 Units by mouth Daily., Disp: , Rfl:     No Known Allergies    Social History     Tobacco Use   • Smoking status: Former Smoker     Packs/day: 1.00     Years: 50.00     Pack years: 50.00     Last attempt to quit: 10/13/2006     Years since quittin.6   • Smokeless tobacco: Never Used   • Tobacco comment: quit    Substance Use Topics   • Alcohol use: Yes     Comment: occasional   • Drug use: No       Family History   Problem Relation Age of Onset   • Hypertension Mother    • Stroke Mother    • Diabetes Father    • Hypertension Father    • Heart disease Father    • Stroke Father    • Heart disease Brother    • Colon cancer Brother         dx > 50   • Breast cancer Neg Hx    • Ovarian cancer Neg Hx    • Deep vein thrombosis Neg Hx    • Pulmonary embolism Neg Hx        Review of Systems   Constitutional: Positive for activity change. Negative for appetite change, fatigue, fever and unexpected weight change.   Eyes: Negative for photophobia and visual disturbance.   Respiratory: Negative for cough and shortness of breath.    Cardiovascular:  "Negative for chest pain and palpitations.   Gastrointestinal: Negative for abdominal distention, abdominal pain, constipation, diarrhea and nausea.   Endocrine: Negative for cold intolerance and heat intolerance.   Genitourinary: Positive for vaginal pain (itching). Negative for dyspareunia, dysuria, menstrual problem, pelvic pain and vaginal discharge.   Musculoskeletal: Negative for back pain.   Skin: Negative for color change and rash.   Neurological: Negative for headaches.   Hematological: Negative for adenopathy. Does not bruise/bleed easily.   Psychiatric/Behavioral: Negative for dysphoric mood. The patient is not nervous/anxious.        /78   Ht 157.5 cm (62\")   Wt 71.2 kg (156 lb 14.4 oz)   LMP  (LMP Unknown)   Breastfeeding No   BMI 28.70 kg/m²     Physical Exam   Constitutional: She is oriented to person, place, and time. She appears well-developed and well-nourished.   HENT:   Head: Normocephalic and atraumatic.   Eyes: Conjunctivae are normal. No scleral icterus.   Cardiovascular: Normal rate and regular rhythm.   Pulmonary/Chest: Effort normal and breath sounds normal.   Abdominal: Soft. She exhibits no distension and no mass. There is no tenderness. There is no rebound and no guarding. No hernia.   Genitourinary:       There is rash and injury on the right labia. There is no tenderness or lesion on the right labia. There is rash on the left labia. There is no tenderness, lesion or injury on the left labia. Right adnexum displays no mass, no tenderness and no fullness. Left adnexum displays no mass, no tenderness and no fullness. No erythema, tenderness or bleeding in the vagina. No foreign body in the vagina. No signs of injury around the vagina. No vaginal discharge found.   Genitourinary Comments: Moderate atrophy  Normal cuff   Neurological: She is alert and oriented to person, place, and time.   Skin: Skin is warm and dry.   Psychiatric: She has a normal mood and affect. Her behavior is " normal. Judgment and thought content normal.   Nursing note and vitals reviewed.         Assessment/Plan  1)Vaginal itching-area clinically insistent with lichen sclerosis.  We will treat empirically with Cutivate ointment twice daily for 4 weeks, daily for 2 weeks and then 2-3 times a week as needed for itching.  We will reevaluate this area in 2 months.  If it is not significantly better, we will proceed with biopsy.  2) Moderate atrophy-patient would likely benefit from estrogen therapy locally, however, her lichen sclerosus needs to be under better control first.  Will reevaluate in 2 months and see if addition of estrogen is appropriate at that time.  3) [facility] [place]TD [place]. C scope 2 years ago  4) RTO 2 months annual and recheck vaginal skin.     Ashlie was seen today for vaginal itching.    Diagnoses and all orders for this visit:    Screening for condition  -     POC Urinalysis Dipstick    Routine gynecological examination  -     Cancel: Pap IG (Image Guided)    Other orders  -     clobetasol (TEMOVATE) 0.05 % ointment; To affected area twice a day for 4 weeks, then daily for 2 weeks, then twice a week as needed for itching        Makenna Arvizu MD  05/21/2020        14:47

## 2020-07-07 RX ORDER — LOSARTAN POTASSIUM 100 MG/1
TABLET ORAL
Qty: 90 TABLET | Refills: 0 | Status: SHIPPED | OUTPATIENT
Start: 2020-07-07 | End: 2020-07-23

## 2020-07-08 ENCOUNTER — OFFICE VISIT (OUTPATIENT)
Dept: CARDIOLOGY | Facility: CLINIC | Age: 75
End: 2020-07-08

## 2020-07-08 VITALS
HEIGHT: 60 IN | BODY MASS INDEX: 31.02 KG/M2 | WEIGHT: 158 LBS | SYSTOLIC BLOOD PRESSURE: 125 MMHG | HEART RATE: 60 BPM | DIASTOLIC BLOOD PRESSURE: 59 MMHG

## 2020-07-08 DIAGNOSIS — E78.2 MIXED HYPERLIPIDEMIA: Chronic | ICD-10-CM

## 2020-07-08 DIAGNOSIS — I10 ESSENTIAL HYPERTENSION: Chronic | ICD-10-CM

## 2020-07-08 DIAGNOSIS — I25.118 CORONARY ARTERY DISEASE OF NATIVE ARTERY OF NATIVE HEART WITH STABLE ANGINA PECTORIS (HCC): Primary | Chronic | ICD-10-CM

## 2020-07-08 PROCEDURE — 99442 PR PHYS/QHP TELEPHONE EVALUATION 11-20 MIN: CPT | Performed by: INTERNAL MEDICINE

## 2020-07-08 NOTE — PROGRESS NOTES
Subjective:     Encounter Date: 8/15/2019      Patient ID: Ashlie Levi is a 74 y.o. female.    Chief Complaint: CAD  History of Present Illness    Dear Dr. Conway,    This patient has consented to a telehealth visit via audio. The visit was scheduled as a audio visit to comply with patient safety concerns in accordance with CDC recommendations.  All vitals recorded within this visit are reported by the patient.  I spent  18 minutes in total including but not limited to the 13 minutes spent in direct conversation with this patient.     Her BP has been inconsistently controlled. Her amlodipine was dropped from 10 to 5 mg daily in Nov 2019 due to low BP. Her BP now runs higher in the AM. She is not sleeping well at night. She feels tired. Rare CP, not associated with any activity.  She has not had any palpitations or tachycardia.  No presyncope or syncope.  No orthopnea or PND.  She has not been hospitalized recently for any problem.  She denies any chills or fevers.  She has not experienced a cough.  No exposure to anyone known to have COVID-19.    She has a history of CAD and had a stent placed in her LAD in 2006.  This was performed by Dr. Caldwell at [facility].  She had a stress test performed  5/2019 that demonstrated normal left ventricular and no ischemia.    She was hospitalized in 2019 with small bowel obstruction as well as acute parainfluenza bronchitis.      The following portions of the patient's history were reviewed and updated as appropriate: allergies, current medications, past family history, past medical history, past social history, past surgical history and problem list.    Past Medical History:   Diagnosis Date   • Abnormal electrocardiogram    • Arthritis    • Chest discomfort    • Colon polyp    • Coronary artery disease    • Disease of thyroid gland    • GERD (gastroesophageal reflux disease)    • Hyperlipidemia    • Hypertension        Past Surgical History:   Procedure Laterality  Date   • APPENDECTOMY     • CARDIAC SURGERY      3 stents   • CHOLECYSTECTOMY     • COLONOSCOPY N/A 2016    Procedure: COLONOSCOPY;  Surgeon: Giovani Avelar MD;  Location: [facility] OR;  Service:    • COLONOSCOPY N/A 2019    Procedure: Colonoscopy with possible biopsy or polypectomy;  Surgeon: Alfreda Soler DO;  Location: [facility] OR;  Service: Gastroenterology   • CORONARY STENT PLACEMENT     • ENDOSCOPY N/A 2017    Procedure: ESOPHAGOGASTRODUODENOSCOPY with CARIN test ;  Surgeon: Alfreda Soler DO;  Location: [facility] OR;  Service:    • ENDOSCOPY N/A 2019    Procedure: Esophagogastroduodenoscopy with possible biopsy, polypectomy, dilation;  Surgeon: Alfreda Soler DO;  Location: [facility] OR;  Service: Gastroenterology   • EYE SURGERY     • FOOT GANGLION EXCISION Left    • HEMORROIDECTOMY     • HERNIA REPAIR     • HYSTERECTOMY      GREG with OC age 30 for DUB   • OOPHORECTOMY      dx lsc , interval USO       Social History     Socioeconomic History   • Marital status:      Spouse name: Not on file   • Number of children: Not on file   • Years of education: Not on file   • Highest education level: Not on file   Tobacco Use   • Smoking status: Former Smoker     Packs/day: 1.00     Years: 50.00     Pack years: 50.00     Last attempt to quit: 10/13/2006     Years since quittin.7   • Smokeless tobacco: Never Used   • Tobacco comment: quit    Substance and Sexual Activity   • Alcohol use: Yes     Comment: occasional   • Drug use: No   • Sexual activity: Not Currently     Partners: Male     Birth control/protection: Surgical, Post-menopausal     Comment: GREG with OC       Review of Systems   Constitution: Negative for chills, decreased appetite, fever and night sweats.   HENT: Negative for ear discharge, ear pain, hearing loss, nosebleeds and sore throat.    Eyes: Negative for blurred vision, double vision and pain.   Cardiovascular: Negative for cyanosis.  "  Respiratory: Negative for hemoptysis and sputum production.    Endocrine: Negative for cold intolerance and heat intolerance.   Hematologic/Lymphatic: Negative for adenopathy.   Skin: Negative for dry skin, itching, nail changes, rash and suspicious lesions.   Musculoskeletal: Negative for arthritis, gout, muscle cramps, muscle weakness, myalgias and neck pain.   Gastrointestinal: Negative for anorexia, bowel incontinence, constipation, diarrhea, dysphagia, hematemesis and jaundice.   Genitourinary: Negative for bladder incontinence, dysuria, flank pain, frequency, hematuria and nocturia.   Neurological: Negative for focal weakness, numbness, paresthesias and seizures.   Psychiatric/Behavioral: Negative for altered mental status, hallucinations, hypervigilance, suicidal ideas and thoughts of violence.   Allergic/Immunologic: Negative for persistent infections.       Procedures       Objective:     Vitals:    07/08/20 1310   BP: 125/59   Pulse: 60   Weight: 71.7 kg (158 lb)   Height: 152.4 cm (60\")        Alert and oriented x3, thought processes normal, judgment normal, speaking in full sentences with no wheezing and no respiratory distress. Hearing is appropriate without difficulty.  Lab Review:             Performed        Assessment:          Diagnosis Plan   1. Coronary artery disease of native artery of native heart with stable angina pectoris (CMS/[facility])     2. Mixed hyperlipidemia     3. Essential hypertension            Plan:       1. Coronary Artery Disease  Assessment  • The patient has no angina    Plan  • Lifestyle modifications discussed include adhering to a heart healthy diet, avoidance of tobacco products, maintenance of a healthy weight, medication compliance, regular exercise and regular monitoring of cholesterol and blood pressure    Subjective - Objective  • There has been a previous stent procedure using HERBERTH  • Current antiplatelet therapy includes aspirin 81 mg    2.  Hyperlipidemia-I do not " have a recent lipid level, we will arrange for that to be obtained  3.  Hypertensive heart disease- I am to have her switch and take her amlodipine in the evening and call us back in 2 weeks.      Thank you very much for allowing us to participate in the care of this pleasant patient.  Please don't hesitate to call if I can be of assistance in any way.      Current Outpatient Medications:   •  amLODIPine (NORVASC) 10 MG tablet, Take 1 tablet by mouth once daily (Patient taking differently: Take 5 mg by mouth Daily.), Disp: 30 tablet, Rfl: 2  •  aspirin 81 MG chewable tablet, Chew 81 mg Daily., Disp: , Rfl:   •  carvedilol (COREG) 6.25 MG tablet, TAKE 1 TABLET BY MOUTH TWICE DAILY, Disp: 180 tablet, Rfl: 3  •  clobetasol (TEMOVATE) 0.05 % ointment, To affected area twice a day for 4 weeks, then daily for 2 weeks, then twice a week as needed for itching, Disp: 60 g, Rfl: 1  •  Cyanocobalamin (VITAMIN B 12 PO), Take 1 tablet by mouth Daily., Disp: , Rfl:   •  isosorbide mononitrate (ISMO,MONOKET) 20 MG tablet, TAKE 1 TABLET BY MOUTH TWICE DAILY, Disp: 180 tablet, Rfl: 3  •  levothyroxine (SYNTHROID, LEVOTHROID) 100 MCG tablet, Take 100 mcg by mouth Daily., Disp: , Rfl:   •  losartan (COZAAR) 100 MG tablet, Take 1 tablet by mouth once daily, Disp: 90 tablet, Rfl: 0  •  omeprazole (priLOSEC) 40 MG capsule, TAKE 1 CAPSULE BY MOUTH ONCE DAILY, Disp: 30 capsule, Rfl: 11  •  Pyridoxine HCl (VITAMIN B-6 PO), Take 1 tablet by mouth Daily., Disp: , Rfl:   •  sertraline (ZOLOFT) 100 MG tablet, Take 100 mg by mouth daily., Disp: , Rfl:   •  spironolactone (ALDACTONE) 25 MG tablet, Take 1 tablet by mouth once daily (Patient taking differently: Take 12.5 mg by mouth Daily.), Disp: 90 tablet, Rfl: 3  •  Vitamin D, Cholecalciferol, (CHOLECALCIFEROL) 400 units tablet, Take 400 Units by mouth Daily., Disp: , Rfl:          EMR Dragon/Transcription disclaimer:    Much of this encounter note is an electronic transcription/translation of  spoken language to printed text. The electronic translation of spoken language may permit erroneous, or at times, nonsensical words or phrases to be inadvertently transcribed; Although I have reviewed the note for such errors, some may still exist.

## 2020-07-23 ENCOUNTER — OFFICE VISIT (OUTPATIENT)
Dept: OBSTETRICS AND GYNECOLOGY | Facility: CLINIC | Age: 75
End: 2020-07-23

## 2020-07-23 ENCOUNTER — TELEPHONE (OUTPATIENT)
Dept: CARDIOLOGY | Facility: CLINIC | Age: 75
End: 2020-07-23

## 2020-07-23 VITALS
DIASTOLIC BLOOD PRESSURE: 70 MMHG | HEIGHT: 60 IN | SYSTOLIC BLOOD PRESSURE: 128 MMHG | WEIGHT: 159.7 LBS | BODY MASS INDEX: 31.35 KG/M2

## 2020-07-23 DIAGNOSIS — Z01.419 ROUTINE GYNECOLOGICAL EXAMINATION: ICD-10-CM

## 2020-07-23 DIAGNOSIS — Z12.31 ENCOUNTER FOR SCREENING MAMMOGRAM FOR MALIGNANT NEOPLASM OF BREAST: ICD-10-CM

## 2020-07-23 DIAGNOSIS — Z01.419 PAP SMEAR, LOW-RISK: Primary | ICD-10-CM

## 2020-07-23 DIAGNOSIS — L29.2 VULVAR ITCHING: ICD-10-CM

## 2020-07-23 DIAGNOSIS — Z13.9 SCREENING FOR CONDITION: ICD-10-CM

## 2020-07-23 LAB
BILIRUB BLD-MCNC: NEGATIVE MG/DL
CLARITY, POC: CLEAR
COLOR UR: YELLOW
GLUCOSE UR STRIP-MCNC: NEGATIVE MG/DL
KETONES UR QL: NEGATIVE
LEUKOCYTE EST, POC: NEGATIVE
NITRITE UR-MCNC: NEGATIVE MG/ML
PH UR: 5 [PH] (ref 5–8)
PROT UR STRIP-MCNC: NEGATIVE MG/DL
RBC # UR STRIP: NEGATIVE /UL
SP GR UR: 1 (ref 1–1.03)
UROBILINOGEN UR QL: NORMAL

## 2020-07-23 PROCEDURE — 81002 URINALYSIS NONAUTO W/O SCOPE: CPT | Performed by: OBSTETRICS & GYNECOLOGY

## 2020-07-23 PROCEDURE — G0101 CA SCREEN;PELVIC/BREAST EXAM: HCPCS | Performed by: OBSTETRICS & GYNECOLOGY

## 2020-07-23 RX ORDER — IRBESARTAN 300 MG/1
300 TABLET ORAL NIGHTLY
Qty: 90 TABLET | Refills: 3 | Status: SHIPPED | OUTPATIENT
Start: 2020-07-23 | End: 2021-07-20

## 2020-07-23 NOTE — TELEPHONE ENCOUNTER
----- Message from Al Granado III, MD sent at 7/23/2020  3:57 PM EDT -----  Let pt know I received her BP values- still too high at times. Have her stop her losartan. I have sent in a script for a medicine to replace the losartan- irbesartan 300 mg to take once daily.  After being on the new medicine for a week, track her BP for a week and then report in. All other meds stay the same.

## 2020-07-23 NOTE — PROGRESS NOTES
"Annual Exam    CC- Here for annual and f/u vaginal itching    Ashlie Levi is a 74 y.o. female est pt who presents for annual and f/u vaginal itching. She had a total abdominal hysterectomy with ovarian conservation at age 30 for dysfunctional uterine bleeding.  She then had a interval diagnostic scope with removal of 1 of her ovaries.  She was seen 2 months ago as a new patient for vaginal itching.  She had classic appearing lichen sclerosus and some vaginal atrophy.  She did a clobetasol taper and says she \"feels wonderful\".  She not having any vaginal itching.  She does still have some mild atrophy but does not want to start estrogen therapy as she is not sexually active and no longer has vaginal symptoms.    OB History        2    Para   2    Term   2            AB        Living   2       SAB        TAB        Ectopic        Molar        Multiple        Live Births              Obstetric Comments   2              Menarche:10  Menopause:30, GREG with OC for DUB, interval dx lsc for US  HRT:none  Current contraception: post menopausal status and status post hysterectomy  History of abnormal Pap smear: no  History of abnormal mammogram: no  Family history of uterine, colon or ovarian cancer: yes - brother with colon cancer> 50  Family history of breast cancer: no  STD's: none  Last pap smear:> 5 years  Gardasil: none  CADEN: none   Clinical diagnosis lichen sclerosus      Health Maintenance   Topic Date Due   • TDAP/TD VACCINES (1 - Tdap) 10/12/1956   • LUNG CANCER SCREENING  10/12/1998   • Pneumococcal Vaccine Once at 65 Years Old  10/12/2008   • ZOSTER VACCINE (2 of 3) 2013   • HEPATITIS C SCREENING  2017   • MEDICARE ANNUAL WELLNESS  2017   • MAMMOGRAM  10/06/2019   • LIPID PANEL  2020   • INFLUENZA VACCINE  2020   • COLONOSCOPY  2022       Past Medical History:   Diagnosis Date   • Abnormal electrocardiogram    • Arthritis    • Chest discomfort    • Colon polyp  "   • Coronary artery disease    • Disease of thyroid gland    • GERD (gastroesophageal reflux disease)    • Hyperlipidemia    • Hypertension        Past Surgical History:   Procedure Laterality Date   • APPENDECTOMY     • CARDIAC SURGERY      3 stents   • CHOLECYSTECTOMY     • COLONOSCOPY N/A 7/13/2016    Procedure: COLONOSCOPY;  Surgeon: Giovani Avelar MD;  Location:  [facility] OR;  Service:    • COLONOSCOPY N/A 6/13/2019    Procedure: Colonoscopy with possible biopsy or polypectomy;  Surgeon: Alfreda Soler DO;  Location:  [facility] OR;  Service: Gastroenterology   • CORONARY STENT PLACEMENT     • ENDOSCOPY N/A 11/21/2017    Procedure: ESOPHAGOGASTRODUODENOSCOPY with CARIN test ;  Surgeon: Alfreda Soler DO;  Location:  [facility] OR;  Service:    • ENDOSCOPY N/A 6/13/2019    Procedure: Esophagogastroduodenoscopy with possible biopsy, polypectomy, dilation;  Surgeon: Alfreda Soler DO;  Location:  [facility] OR;  Service: Gastroenterology   • EYE SURGERY     • FOOT GANGLION EXCISION Left    • HEMORROIDECTOMY     • HERNIA REPAIR     • HYSTERECTOMY      GREG with OC age 30 for DUB   • OOPHORECTOMY      dx lsc , interval USO         Current Outpatient Medications:   •  amLODIPine (NORVASC) 10 MG tablet, Take 1 tablet by mouth once daily (Patient taking differently: Take 5 mg by mouth Daily.), Disp: 30 tablet, Rfl: 2  •  aspirin 81 MG chewable tablet, Chew 81 mg Daily., Disp: , Rfl:   •  carvedilol (COREG) 6.25 MG tablet, TAKE 1 TABLET BY MOUTH TWICE DAILY, Disp: 180 tablet, Rfl: 3  •  clobetasol (TEMOVATE) 0.05 % ointment, To affected area twice a day for 4 weeks, then daily for 2 weeks, then twice a week as needed for itching, Disp: 60 g, Rfl: 1  •  Cyanocobalamin (VITAMIN B 12 PO), Take 1 tablet by mouth Daily., Disp: , Rfl:   •  isosorbide mononitrate (ISMO,MONOKET) 20 MG tablet, TAKE 1 TABLET BY MOUTH TWICE DAILY, Disp: 180 tablet, Rfl: 3  •  levothyroxine (SYNTHROID, LEVOTHROID) 100 MCG tablet, Take 100  mcg by mouth Daily., Disp: , Rfl:   •  losartan (COZAAR) 100 MG tablet, Take 1 tablet by mouth once daily, Disp: 90 tablet, Rfl: 0  •  omeprazole (priLOSEC) 40 MG capsule, TAKE 1 CAPSULE BY MOUTH ONCE DAILY, Disp: 30 capsule, Rfl: 11  •  Pyridoxine HCl (VITAMIN B-6 PO), Take 1 tablet by mouth Daily., Disp: , Rfl:   •  sertraline (ZOLOFT) 100 MG tablet, Take 100 mg by mouth daily., Disp: , Rfl:   •  spironolactone (ALDACTONE) 25 MG tablet, Take 1 tablet by mouth once daily (Patient taking differently: Take 12.5 mg by mouth Daily.), Disp: 90 tablet, Rfl: 3  •  Vitamin D, Cholecalciferol, (CHOLECALCIFEROL) 400 units tablet, Take 400 Units by mouth Daily., Disp: , Rfl:     No Known Allergies    Social History     Tobacco Use   • Smoking status: Former Smoker     Packs/day: 1.00     Years: 50.00     Pack years: 50.00     Last attempt to quit: 10/13/2006     Years since quittin.7   • Smokeless tobacco: Never Used   • Tobacco comment: quit 2006   Substance Use Topics   • Alcohol use: Yes     Comment: occasional   • Drug use: No       Family History   Problem Relation Age of Onset   • Hypertension Mother    • Stroke Mother    • Diabetes Father    • Hypertension Father    • Heart disease Father    • Stroke Father    • Heart disease Brother    • Colon cancer Brother         dx > 50   • Breast cancer Neg Hx    • Ovarian cancer Neg Hx    • Deep vein thrombosis Neg Hx    • Pulmonary embolism Neg Hx        Review of Systems   Constitutional: Positive for activity change and fatigue (not sleeping well). Negative for appetite change, fever and unexpected weight change.   Eyes: Negative for photophobia and visual disturbance.   Respiratory: Negative for cough and shortness of breath.    Cardiovascular: Negative for chest pain and palpitations.   Gastrointestinal: Negative for abdominal distention, abdominal pain, constipation, diarrhea and nausea.   Endocrine: Negative for cold intolerance and heat intolerance.   Genitourinary:  "Positive for vaginal pain (itching resolved). Negative for dyspareunia, dysuria, menstrual problem, pelvic pain, vaginal bleeding and vaginal discharge.   Musculoskeletal: Negative for back pain.   Skin: Negative for color change and rash.   Neurological: Negative for headaches.   Hematological: Negative for adenopathy. Does not bruise/bleed easily.   Psychiatric/Behavioral: Negative for dysphoric mood. The patient is not nervous/anxious.        /70   Ht 152.4 cm (60\")   Wt 72.4 kg (159 lb 11.2 oz)   LMP  (LMP Unknown)   Breastfeeding No   BMI 31.19 kg/m²     Physical Exam   Constitutional: She is oriented to person, place, and time. She appears well-developed and well-nourished.   HENT:   Head: Normocephalic and atraumatic.   Eyes: Conjunctivae are normal. No scleral icterus.   Neck: Neck supple. No thyromegaly present.   Cardiovascular: Normal rate and regular rhythm.   Pulmonary/Chest: Effort normal and breath sounds normal. Right breast exhibits no inverted nipple, no mass, no nipple discharge, no skin change and no tenderness. Left breast exhibits no inverted nipple, no mass, no nipple discharge, no skin change and no tenderness.   Abdominal: Soft. She exhibits no distension and no mass. There is no tenderness. There is no rebound and no guarding. No hernia.   Genitourinary:       There is no rash, tenderness, lesion or injury on the right labia. There is no rash, tenderness, lesion or injury on the left labia. Right adnexum displays no mass, no tenderness and no fullness. Left adnexum displays no mass, no tenderness and no fullness. No erythema, tenderness or bleeding in the vagina. No foreign body in the vagina. No signs of injury around the vagina. No vaginal discharge found.   Genitourinary Comments: Moderate atrophy  Normal cuff   Neurological: She is alert and oriented to person, place, and time.   Skin: Skin is warm and dry.   Psychiatric: She has a normal mood and affect. Her behavior is " normal. Judgment and thought content normal.   Nursing note and vitals reviewed.         Assessment/Plan  1. Annual - check pap. Last pap needed.   2. Declines STD screening- not SA  3. Bone health- pt states she is UTD with DEXA in [place]  4. Non smoker  5. Vaginal itching-area clinically insistent with lichen sclerosis.  Patient has had remarkable improvement in exam with 1 steroid taper.  Recommend that she use clobetasol ointment twice a week as needed itching.  Advised her to call in for any flares or resistant symptoms.  6.  Moderate atrophy-patient would likely benefit from estrogen therapy locally, however, she declines treatment at this time.    7.  [facility]- MMG [place] 1/2020. Schedule MMG 1/2021 here in case she does not get to go to [place] due to COVID.  8. C scope UTD 6/2019- repeat 3 years  9. Parts of this document have been copied or forwarded from her previous visits and have been reviewed, updated and edited as indicated.   10. I saw the patient with a face mask, gloves and a face shield.  The patient herself was masked.  Social distancing was observed as appropriate.  11. RTO 2 years annual or prn issues.     Ashlie was seen today for gynecologic exam and follow-up.    Diagnoses and all orders for this visit:    Pap smear, low-risk  -     Pap IG (Image Guided)    Screening for condition  -     POC Urinalysis Dipstick  -     Mammo Screening Bilateral With CAD; Future    Routine gynecological examination  -     Pap IG (Image Guided)    Encounter for screening mammogram for malignant neoplasm of breast   -     Mammo Screening Bilateral With CAD; Future    Vulvar itching        Makenna Arvizu MD  7/23/2020        14:02

## 2020-07-24 RX ORDER — ISOSORBIDE MONONITRATE 20 MG/1
TABLET ORAL
Qty: 180 TABLET | Refills: 0 | Status: SHIPPED | OUTPATIENT
Start: 2020-07-24 | End: 2020-10-26

## 2020-07-27 LAB
CYTOLOGIST CVX/VAG CYTO: NORMAL
CYTOLOGY CVX/VAG DOC CYTO: NORMAL
CYTOLOGY CVX/VAG DOC THIN PREP: NORMAL
DX ICD CODE: NORMAL
HIV 1 & 2 AB SER-IMP: NORMAL
OTHER STN SPEC: NORMAL
STAT OF ADQ CVX/VAG CYTO-IMP: NORMAL

## 2020-08-04 RX ORDER — CARVEDILOL 6.25 MG/1
TABLET ORAL
Qty: 180 TABLET | Refills: 2 | Status: SHIPPED | OUTPATIENT
Start: 2020-08-04 | End: 2021-05-17 | Stop reason: SDUPTHER

## 2020-08-19 RX ORDER — AMLODIPINE BESYLATE 5 MG/1
5 TABLET ORAL DAILY
Qty: 90 TABLET | Refills: 2 | Status: SHIPPED | OUTPATIENT
Start: 2020-08-19 | End: 2021-05-17 | Stop reason: SDUPTHER

## 2020-09-01 ENCOUNTER — HOSPITAL ENCOUNTER (OUTPATIENT)
Dept: MAMMOGRAPHY | Facility: HOSPITAL | Age: 75
Discharge: HOME | End: 2020-09-01
Admitting: OBSTETRICS & GYNECOLOGY

## 2020-09-01 DIAGNOSIS — Z13.9 SCREENING FOR CONDITION: ICD-10-CM

## 2020-09-01 DIAGNOSIS — Z12.31 ENCOUNTER FOR SCREENING MAMMOGRAM FOR MALIGNANT NEOPLASM OF BREAST: ICD-10-CM

## 2020-09-01 PROCEDURE — 77067 SCR MAMMO BI INCL CAD: CPT

## 2020-09-01 PROCEDURE — 77063 BREAST TOMOSYNTHESIS BI: CPT

## 2020-10-26 RX ORDER — ISOSORBIDE MONONITRATE 20 MG/1
TABLET ORAL
Qty: 180 TABLET | Refills: 0 | Status: SHIPPED | OUTPATIENT
Start: 2020-10-26 | End: 2021-02-01 | Stop reason: SDUPTHER

## 2021-02-01 RX ORDER — ISOSORBIDE MONONITRATE 20 MG/1
20 TABLET ORAL 2 TIMES DAILY
Qty: 180 TABLET | Refills: 1 | Status: SHIPPED | OUTPATIENT
Start: 2021-02-01 | End: 2021-02-01 | Stop reason: SDUPTHER

## 2021-02-01 RX ORDER — ISOSORBIDE MONONITRATE 20 MG/1
20 TABLET ORAL 2 TIMES DAILY
Qty: 180 TABLET | Refills: 1 | Status: SHIPPED | OUTPATIENT
Start: 2021-02-01 | End: 2021-10-21

## 2021-02-01 NOTE — TELEPHONE ENCOUNTER
Patient's  left voicemail that patient needs refill of Isosorbide 20 mg. He would like that called in to a [facility] pharmacy in [place].   Phone # is .

## 2021-02-10 RX ORDER — OMEPRAZOLE 40 MG/1
CAPSULE, DELAYED RELEASE ORAL
Qty: 30 CAPSULE | Refills: 0 | Status: SHIPPED | OUTPATIENT
Start: 2021-02-10

## 2021-02-15 ENCOUNTER — TELEPHONE (OUTPATIENT)
Dept: SURGERY | Facility: CLINIC | Age: 76
End: 2021-02-15

## 2021-02-15 NOTE — TELEPHONE ENCOUNTER
They are in [place] for winter. Ashlie needs Omeprazole refilled, requesting 90 day supply.  [facility], [phone].

## 2021-02-15 NOTE — TELEPHONE ENCOUNTER
Attempt made to call RF in.  [facility] phone line is not working.  Spoke with pt's  and informed.

## 2021-02-18 ENCOUNTER — TELEPHONE (OUTPATIENT)
Dept: SURGERY | Facility: CLINIC | Age: 76
End: 2021-02-18

## 2021-02-18 NOTE — TELEPHONE ENCOUNTER
Omeprazole 40 mg 30 1 tab PO daily with 3 RF called to [facility] [place] [facility] per pt's previous request.  Phone call to pt and spoke with  and he was informed Rx has been called to Rx line.

## 2021-04-21 ENCOUNTER — OFFICE VISIT (OUTPATIENT)
Dept: SURGERY | Facility: CLINIC | Age: 76
End: 2021-04-21

## 2021-04-21 VITALS
BODY MASS INDEX: 29.45 KG/M2 | HEIGHT: 60 IN | TEMPERATURE: 97.1 F | HEART RATE: 60 BPM | RESPIRATION RATE: 16 BRPM | SYSTOLIC BLOOD PRESSURE: 122 MMHG | WEIGHT: 150 LBS | DIASTOLIC BLOOD PRESSURE: 78 MMHG

## 2021-04-21 DIAGNOSIS — R10.13 DYSPEPSIA: Primary | ICD-10-CM

## 2021-04-21 DIAGNOSIS — R15.9 INCONTINENCE OF FECES, UNSPECIFIED FECAL INCONTINENCE TYPE: ICD-10-CM

## 2021-04-21 DIAGNOSIS — R11.10 DRY HEAVES: ICD-10-CM

## 2021-04-21 DIAGNOSIS — R14.0 BLOATING: ICD-10-CM

## 2021-04-21 DIAGNOSIS — Z86.010 HISTORY OF COLON POLYPS: ICD-10-CM

## 2021-04-21 DIAGNOSIS — R11.0 NAUSEA: ICD-10-CM

## 2021-04-21 PROBLEM — Z86.0100 HISTORY OF COLON POLYPS: Status: ACTIVE | Noted: 2021-04-21

## 2021-04-21 PROCEDURE — 99214 OFFICE O/P EST MOD 30 MIN: CPT | Performed by: SURGERY

## 2021-04-21 NOTE — H&P
Aslhie Levi 75 y.o. female presents with c/o bloating, change in bowel habits, and diarrhea.  Pt would like to discuss c-scope.   Chief Complaint   Patient presents with   • Colonoscopy             HPI   Above-noted agree.  Ashlie is known to my service.  I performed an EGD and colonoscopy on her back in June 2019.  She had colon polyps as well as gastritis.  After that she was hospitalized at [facility] with a partial small bowel obstruction.  She recovered with conservative therapy.  She has been suffering all winter with dyspepsia and bloating.  She is also had incontinence of feces.  She was supposed see me last week but had dry heaves so bad she felt like she cracked a rib.  She does not smoke or use tobacco products.  She drinks only water.  She does not use any Metamucil.  She has no chest pain or shortness of breath.  She has no fevers or chills.  She has no complaints.      Review of Systems   All other systems reviewed and are negative.            Current Outpatient Medications:   •  amLODIPine (NORVASC) 5 MG tablet, Take 1 tablet by mouth Daily., Disp: 90 tablet, Rfl: 2  •  aspirin 81 MG chewable tablet, Chew 81 mg Daily., Disp: , Rfl:   •  carvedilol (COREG) 6.25 MG tablet, Take 1 tablet by mouth twice daily, Disp: 180 tablet, Rfl: 2  •  clobetasol (TEMOVATE) 0.05 % ointment, To affected area twice a day for 4 weeks, then daily for 2 weeks, then twice a week as needed for itching, Disp: 60 g, Rfl: 1  •  Cyanocobalamin (VITAMIN B 12 PO), Take 1 tablet by mouth Daily., Disp: , Rfl:   •  irbesartan (Avapro) 300 MG tablet, Take 1 tablet by mouth Every Night., Disp: 90 tablet, Rfl: 3  •  isosorbide mononitrate (ISMO,MONOKET) 20 MG tablet, Take 1 tablet by mouth 2 (Two) Times a Day., Disp: 180 tablet, Rfl: 1  •  levothyroxine (SYNTHROID, LEVOTHROID) 100 MCG tablet, Take 100 mcg by mouth Daily., Disp: , Rfl:   •  omeprazole (priLOSEC) 40 MG capsule, Take 1 capsule by mouth once daily, Disp: 30 capsule,  Rfl: 0  •  Pyridoxine HCl (VITAMIN B-6 PO), Take 1 tablet by mouth Daily., Disp: , Rfl:   •  sertraline (ZOLOFT) 100 MG tablet, Take 100 mg by mouth daily., Disp: , Rfl:   •  spironolactone (ALDACTONE) 25 MG tablet, Take 1 tablet by mouth once daily (Patient taking differently: Take 12.5 mg by mouth Daily.), Disp: 90 tablet, Rfl: 3  •  Vitamin D, Cholecalciferol, (CHOLECALCIFEROL) 400 units tablet, Take 400 Units by mouth Daily., Disp: , Rfl:         No Known Allergies        Past Medical History:   Diagnosis Date   • Abnormal electrocardiogram    • Arthritis    • Chest discomfort    • Colon polyp    • Coronary artery disease    • Disease of thyroid gland    • GERD (gastroesophageal reflux disease)    • Hyperlipidemia    • Hypertension            Past Surgical History:   Procedure Laterality Date   • APPENDECTOMY     • CARDIAC SURGERY      3 stents   • CHOLECYSTECTOMY     • COLONOSCOPY N/A 2016    Procedure: COLONOSCOPY;  Surgeon: Giovani Avelar MD;  Location: [facility] OR;  Service:    • COLONOSCOPY N/A 2019    Procedure: Colonoscopy with possible biopsy or polypectomy;  Surgeon: Alfreda Soler DO;  Location: [facility] OR;  Service: Gastroenterology   • CORONARY STENT PLACEMENT     • ENDOSCOPY N/A 2017    Procedure: ESOPHAGOGASTRODUODENOSCOPY with CARIN test ;  Surgeon: Alfreda Soler DO;  Location: [facility] OR;  Service:    • ENDOSCOPY N/A 2019    Procedure: Esophagogastroduodenoscopy with possible biopsy, polypectomy, dilation;  Surgeon: Alfreda Sloer DO;  Location: [facility] OR;  Service: Gastroenterology   • EYE SURGERY     • FOOT GANGLION EXCISION Left    • HEMORROIDECTOMY     • HERNIA REPAIR     • HYSTERECTOMY      GREG with OC age 30 for DUB   • OOPHORECTOMY      dx lsc , interval USO           Social History     Tobacco Use   • Smoking status: Former Smoker     Packs/day: 1.00     Years: 50.00     Pack years: 50.00     Quit date: 10/13/2006     Years since quittin.5  "  • Smokeless tobacco: Never Used   • Tobacco comment: quit 2006   Substance Use Topics   • Alcohol use: Yes     Comment: occasional   • Drug use: No             There is no immunization history on file for this patient.        Physical Exam  Vitals and nursing note reviewed.   Constitutional:       Appearance: Normal appearance.   HENT:      Head: Normocephalic and atraumatic.   Cardiovascular:      Rate and Rhythm: Normal rate and regular rhythm.   Pulmonary:      Effort: Pulmonary effort is normal.      Breath sounds: Normal breath sounds.   Abdominal:      General: Bowel sounds are normal.      Palpations: Abdomen is soft.   Musculoskeletal:         General: No swelling or tenderness.   Skin:     General: Skin is warm and dry.   Neurological:      General: No focal deficit present.      Mental Status: She is alert and oriented to person, place, and time.   Psychiatric:         Mood and Affect: Mood normal.         Behavior: Behavior normal.         Debilities/Disabilities Identified: None    Emotional Behavior: Appropriate      /78   Pulse 60   Temp 97.1 °F (36.2 °C)   Resp 16   Ht 152.4 cm (60\")   Wt 68 kg (150 lb)   LMP  (LMP Unknown)   BMI 29.29 kg/m²         Diagnoses and all orders for this visit:    1. Dyspepsia (Primary)    2. Bloating    3. Nausea    4. Dry heaves    5. History of colon polyps    6. Incontinence of feces, unspecified fecal incontinence type    We discussed taking a tablespoon of Metamucil every morning.  I discussed with Ashlie the benefits and risks of performing an EGD and colonoscopy.  Benefits and risks were not limited to but including bleeding, infection, perforation, complications of anesthesia, aspiration.  He appeared to understand and is willing to proceed.    Thank you for allowing me to participate in the care of this interesting patient.            "

## 2021-04-21 NOTE — PROGRESS NOTES
Ashlie Levi 75 y.o. female presents with c/o bloating, change in bowel habits, and diarrhea.  Pt would like to discuss c-scope.   Chief Complaint   Patient presents with   • Colonoscopy             HPI   Above-noted agree.  Ashlie is known to my service.  I performed an EGD and colonoscopy on her back in June 2019.  She had colon polyps as well as gastritis.  After that she was hospitalized at [facility] with a partial small bowel obstruction.  She recovered with conservative therapy.  She has been suffering all winter with dyspepsia and bloating.  She is also had incontinence of feces.  She was supposed see me last week but had dry heaves so bad she felt like she cracked a rib.  She does not smoke or use tobacco products.  She drinks only water.  She does not use any Metamucil.  She has no chest pain or shortness of breath.  She has no fevers or chills.  She has no complaints.      Review of Systems   All other systems reviewed and are negative.            Current Outpatient Medications:   •  amLODIPine (NORVASC) 5 MG tablet, Take 1 tablet by mouth Daily., Disp: 90 tablet, Rfl: 2  •  aspirin 81 MG chewable tablet, Chew 81 mg Daily., Disp: , Rfl:   •  carvedilol (COREG) 6.25 MG tablet, Take 1 tablet by mouth twice daily, Disp: 180 tablet, Rfl: 2  •  clobetasol (TEMOVATE) 0.05 % ointment, To affected area twice a day for 4 weeks, then daily for 2 weeks, then twice a week as needed for itching, Disp: 60 g, Rfl: 1  •  Cyanocobalamin (VITAMIN B 12 PO), Take 1 tablet by mouth Daily., Disp: , Rfl:   •  irbesartan (Avapro) 300 MG tablet, Take 1 tablet by mouth Every Night., Disp: 90 tablet, Rfl: 3  •  isosorbide mononitrate (ISMO,MONOKET) 20 MG tablet, Take 1 tablet by mouth 2 (Two) Times a Day., Disp: 180 tablet, Rfl: 1  •  levothyroxine (SYNTHROID, LEVOTHROID) 100 MCG tablet, Take 100 mcg by mouth Daily., Disp: , Rfl:   •  omeprazole (priLOSEC) 40 MG capsule, Take 1 capsule by mouth once daily, Disp: 30 capsule,  Rfl: 0  •  Pyridoxine HCl (VITAMIN B-6 PO), Take 1 tablet by mouth Daily., Disp: , Rfl:   •  sertraline (ZOLOFT) 100 MG tablet, Take 100 mg by mouth daily., Disp: , Rfl:   •  spironolactone (ALDACTONE) 25 MG tablet, Take 1 tablet by mouth once daily (Patient taking differently: Take 12.5 mg by mouth Daily.), Disp: 90 tablet, Rfl: 3  •  Vitamin D, Cholecalciferol, (CHOLECALCIFEROL) 400 units tablet, Take 400 Units by mouth Daily., Disp: , Rfl:         No Known Allergies        Past Medical History:   Diagnosis Date   • Abnormal electrocardiogram    • Arthritis    • Chest discomfort    • Colon polyp    • Coronary artery disease    • Disease of thyroid gland    • GERD (gastroesophageal reflux disease)    • Hyperlipidemia    • Hypertension            Past Surgical History:   Procedure Laterality Date   • APPENDECTOMY     • CARDIAC SURGERY      3 stents   • CHOLECYSTECTOMY     • COLONOSCOPY N/A 2016    Procedure: COLONOSCOPY;  Surgeon: Giovani Avelar MD;  Location: [facility] OR;  Service:    • COLONOSCOPY N/A 2019    Procedure: Colonoscopy with possible biopsy or polypectomy;  Surgeon: Alfreda Soler DO;  Location: [facility] OR;  Service: Gastroenterology   • CORONARY STENT PLACEMENT     • ENDOSCOPY N/A 2017    Procedure: ESOPHAGOGASTRODUODENOSCOPY with CARIN test ;  Surgeon: Alfreda Soler DO;  Location: [facility] OR;  Service:    • ENDOSCOPY N/A 2019    Procedure: Esophagogastroduodenoscopy with possible biopsy, polypectomy, dilation;  Surgeon: Alfreda Soler DO;  Location: [facility] OR;  Service: Gastroenterology   • EYE SURGERY     • FOOT GANGLION EXCISION Left    • HEMORROIDECTOMY     • HERNIA REPAIR     • HYSTERECTOMY      GREG with OC age 30 for DUB   • OOPHORECTOMY      dx lsc , interval USO           Social History     Tobacco Use   • Smoking status: Former Smoker     Packs/day: 1.00     Years: 50.00     Pack years: 50.00     Quit date: 10/13/2006     Years since quittin.5  "  • Smokeless tobacco: Never Used   • Tobacco comment: quit 2006   Substance Use Topics   • Alcohol use: Yes     Comment: occasional   • Drug use: No             There is no immunization history on file for this patient.        Physical Exam  Vitals and nursing note reviewed.   Constitutional:       Appearance: Normal appearance.   HENT:      Head: Normocephalic and atraumatic.   Cardiovascular:      Rate and Rhythm: Normal rate and regular rhythm.   Pulmonary:      Effort: Pulmonary effort is normal.      Breath sounds: Normal breath sounds.   Abdominal:      General: Bowel sounds are normal.      Palpations: Abdomen is soft.   Musculoskeletal:         General: No swelling or tenderness.   Skin:     General: Skin is warm and dry.   Neurological:      General: No focal deficit present.      Mental Status: She is alert and oriented to person, place, and time.   Psychiatric:         Mood and Affect: Mood normal.         Behavior: Behavior normal.         Debilities/Disabilities Identified: None    Emotional Behavior: Appropriate      /78   Pulse 60   Temp 97.1 °F (36.2 °C)   Resp 16   Ht 152.4 cm (60\")   Wt 68 kg (150 lb)   LMP  (LMP Unknown)   BMI 29.29 kg/m²         Diagnoses and all orders for this visit:    1. Dyspepsia (Primary)    2. Bloating    3. Nausea    4. Dry heaves    5. History of colon polyps    6. Incontinence of feces, unspecified fecal incontinence type    We discussed taking a tablespoon of Metamucil every morning.  I discussed with Ashlie the benefits and risks of performing an EGD and colonoscopy.  Benefits and risks were not limited to but including bleeding, infection, perforation, complications of anesthesia, aspiration.  He appeared to understand and is willing to proceed.    Thank you for allowing me to participate in the care of this interesting patient.        "

## 2021-04-29 ENCOUNTER — TRANSCRIBE ORDERS (OUTPATIENT)
Dept: ADMINISTRATIVE | Facility: HOSPITAL | Age: 76
End: 2021-04-29

## 2021-04-29 DIAGNOSIS — U07.1 COVID-19: Primary | ICD-10-CM

## 2021-05-15 ENCOUNTER — LAB (OUTPATIENT)
Dept: LAB | Facility: HOSPITAL | Age: 76
End: 2021-05-15

## 2021-05-15 DIAGNOSIS — U07.1 COVID-19: ICD-10-CM

## 2021-05-15 LAB — SARS-COV-2 RNA PNL SPEC NAA+PROBE: NOT DETECTED

## 2021-05-15 PROCEDURE — 87635 SARS-COV-2 COVID-19 AMP PRB: CPT | Performed by: OBSTETRICS & GYNECOLOGY

## 2021-05-15 PROCEDURE — C9803 HOPD COVID-19 SPEC COLLECT: HCPCS

## 2021-05-17 ENCOUNTER — ANESTHESIA EVENT (OUTPATIENT)
Dept: PERIOP | Facility: HOSPITAL | Age: 76
End: 2021-05-17

## 2021-05-17 RX ORDER — CARVEDILOL 6.25 MG/1
6.25 TABLET ORAL 2 TIMES DAILY
Qty: 180 TABLET | Refills: 0 | Status: SHIPPED | OUTPATIENT
Start: 2021-05-17 | End: 2021-08-23

## 2021-05-17 RX ORDER — AMLODIPINE BESYLATE 5 MG/1
5 TABLET ORAL DAILY
Qty: 90 TABLET | Refills: 0 | Status: SHIPPED | OUTPATIENT
Start: 2021-05-17 | End: 2021-08-23

## 2021-05-18 ENCOUNTER — ANESTHESIA (OUTPATIENT)
Dept: PERIOP | Facility: HOSPITAL | Age: 76
End: 2021-05-18

## 2021-05-18 ENCOUNTER — HOSPITAL ENCOUNTER (OUTPATIENT)
Facility: HOSPITAL | Age: 76
Setting detail: HOSPITAL OUTPATIENT SURGERY
Discharge: HOME | End: 2021-05-18
Attending: SURGERY | Admitting: SURGERY

## 2021-05-18 VITALS
RESPIRATION RATE: 14 BRPM | SYSTOLIC BLOOD PRESSURE: 128 MMHG | WEIGHT: 154.8 LBS | BODY MASS INDEX: 30.39 KG/M2 | DIASTOLIC BLOOD PRESSURE: 60 MMHG | TEMPERATURE: 97.5 F | HEART RATE: 54 BPM | HEIGHT: 60 IN | OXYGEN SATURATION: 94 %

## 2021-05-18 DIAGNOSIS — R10.13 DYSPEPSIA: ICD-10-CM

## 2021-05-18 DIAGNOSIS — R14.0 BLOATING: ICD-10-CM

## 2021-05-18 DIAGNOSIS — R11.0 NAUSEA: ICD-10-CM

## 2021-05-18 DIAGNOSIS — R11.10 DRY HEAVES: ICD-10-CM

## 2021-05-18 DIAGNOSIS — R15.9 INCONTINENCE OF FECES, UNSPECIFIED FECAL INCONTINENCE TYPE: ICD-10-CM

## 2021-05-18 DIAGNOSIS — Z86.010 HISTORY OF COLON POLYPS: ICD-10-CM

## 2021-05-18 PROCEDURE — 25010000003 LIDOCAINE 1 % SOLUTION: Performed by: NURSE ANESTHETIST, CERTIFIED REGISTERED

## 2021-05-18 PROCEDURE — 87081 CULTURE SCREEN ONLY: CPT | Performed by: SURGERY

## 2021-05-18 PROCEDURE — 88305 TISSUE EXAM BY PATHOLOGIST: CPT | Performed by: SURGERY

## 2021-05-18 PROCEDURE — 45380 COLONOSCOPY AND BIOPSY: CPT | Performed by: SURGERY

## 2021-05-18 PROCEDURE — 43239 EGD BIOPSY SINGLE/MULTIPLE: CPT | Performed by: SURGERY

## 2021-05-18 PROCEDURE — 25010000002 PROPOFOL 10 MG/ML EMULSION: Performed by: NURSE ANESTHETIST, CERTIFIED REGISTERED

## 2021-05-18 RX ORDER — SODIUM CHLORIDE 9 MG/ML
40 INJECTION, SOLUTION INTRAVENOUS AS NEEDED
Status: DISCONTINUED | OUTPATIENT
Start: 2021-05-18 | End: 2021-05-18 | Stop reason: HOSPADM

## 2021-05-18 RX ORDER — SUCRALFATE 1 G/1
1 TABLET ORAL 4 TIMES DAILY
Qty: 120 TABLET | Refills: 1 | Status: SHIPPED | OUTPATIENT
Start: 2021-05-18 | End: 2022-05-16

## 2021-05-18 RX ORDER — SODIUM CHLORIDE 0.9 % (FLUSH) 0.9 %
10 SYRINGE (ML) INJECTION AS NEEDED
Status: DISCONTINUED | OUTPATIENT
Start: 2021-05-18 | End: 2021-05-18 | Stop reason: HOSPADM

## 2021-05-18 RX ORDER — LIDOCAINE HYDROCHLORIDE 10 MG/ML
0.5 INJECTION, SOLUTION EPIDURAL; INFILTRATION; INTRACAUDAL; PERINEURAL ONCE AS NEEDED
Status: COMPLETED | OUTPATIENT
Start: 2021-05-18 | End: 2021-05-18

## 2021-05-18 RX ORDER — MAGNESIUM HYDROXIDE 1200 MG/15ML
LIQUID ORAL AS NEEDED
Status: DISCONTINUED | OUTPATIENT
Start: 2021-05-18 | End: 2021-05-18 | Stop reason: HOSPADM

## 2021-05-18 RX ORDER — PROPOFOL 10 MG/ML
VIAL (ML) INTRAVENOUS AS NEEDED
Status: DISCONTINUED | OUTPATIENT
Start: 2021-05-18 | End: 2021-05-18 | Stop reason: SURG

## 2021-05-18 RX ORDER — SODIUM CHLORIDE, SODIUM LACTATE, POTASSIUM CHLORIDE, CALCIUM CHLORIDE 600; 310; 30; 20 MG/100ML; MG/100ML; MG/100ML; MG/100ML
9 INJECTION, SOLUTION INTRAVENOUS CONTINUOUS PRN
Status: DISCONTINUED | OUTPATIENT
Start: 2021-05-18 | End: 2021-05-18 | Stop reason: HOSPADM

## 2021-05-18 RX ORDER — GLYCOPYRROLATE 0.2 MG/ML
INJECTION INTRAMUSCULAR; INTRAVENOUS AS NEEDED
Status: DISCONTINUED | OUTPATIENT
Start: 2021-05-18 | End: 2021-05-18 | Stop reason: SURG

## 2021-05-18 RX ORDER — LIDOCAINE HYDROCHLORIDE 10 MG/ML
INJECTION, SOLUTION INFILTRATION; PERINEURAL AS NEEDED
Status: DISCONTINUED | OUTPATIENT
Start: 2021-05-18 | End: 2021-05-18 | Stop reason: SURG

## 2021-05-18 RX ORDER — SODIUM CHLORIDE 0.9 % (FLUSH) 0.9 %
10 SYRINGE (ML) INJECTION EVERY 12 HOURS SCHEDULED
Status: DISCONTINUED | OUTPATIENT
Start: 2021-05-18 | End: 2021-05-18 | Stop reason: HOSPADM

## 2021-05-18 RX ORDER — PROPOFOL 10 MG/ML
VIAL (ML) INTRAVENOUS CONTINUOUS PRN
Status: DISCONTINUED | OUTPATIENT
Start: 2021-05-18 | End: 2021-05-18 | Stop reason: SURG

## 2021-05-18 RX ADMIN — PROPOFOL 50 MCG/KG/MIN: 10 INJECTION, EMULSION INTRAVENOUS at 10:02

## 2021-05-18 RX ADMIN — PROPOFOL 50 MG: 10 INJECTION, EMULSION INTRAVENOUS at 10:21

## 2021-05-18 RX ADMIN — PROPOFOL 50 MG: 10 INJECTION, EMULSION INTRAVENOUS at 10:07

## 2021-05-18 RX ADMIN — GLYCOPYRROLATE 0.1 MG: 0.2 INJECTION INTRAMUSCULAR; INTRAVENOUS at 10:03

## 2021-05-18 RX ADMIN — SODIUM CHLORIDE, POTASSIUM CHLORIDE, SODIUM LACTATE AND CALCIUM CHLORIDE 9 ML/HR: 600; 310; 30; 20 INJECTION, SOLUTION INTRAVENOUS at 09:15

## 2021-05-18 RX ADMIN — PROPOFOL 50 MG: 10 INJECTION, EMULSION INTRAVENOUS at 10:14

## 2021-05-18 RX ADMIN — PROPOFOL 50 MG: 10 INJECTION, EMULSION INTRAVENOUS at 10:29

## 2021-05-18 RX ADMIN — PROPOFOL 50 MG: 10 INJECTION, EMULSION INTRAVENOUS at 10:02

## 2021-05-18 RX ADMIN — LIDOCAINE HYDROCHLORIDE 40 MG: 10 INJECTION, SOLUTION INFILTRATION; PERINEURAL at 10:01

## 2021-05-18 RX ADMIN — LIDOCAINE HYDROCHLORIDE 0.2 ML: 10 INJECTION, SOLUTION EPIDURAL; INFILTRATION; INTRACAUDAL; PERINEURAL at 09:15

## 2021-05-18 RX ADMIN — SODIUM CHLORIDE, POTASSIUM CHLORIDE, SODIUM LACTATE AND CALCIUM CHLORIDE: 600; 310; 30; 20 INJECTION, SOLUTION INTRAVENOUS at 09:58

## 2021-05-18 RX ADMIN — PROPOFOL 50 MG: 10 INJECTION, EMULSION INTRAVENOUS at 10:39

## 2021-05-18 NOTE — ANESTHESIA PREPROCEDURE EVALUATION
Anesthesia Evaluation     Patient summary reviewed and Nursing notes reviewed   no history of anesthetic complications:  NPO Solid Status: > 8 hours  NPO Liquid Status: > 2 hours           Airway   Mallampati: I  TM distance: >3 FB  Neck ROM: full  No difficulty expected  Dental    (+) implants    Comment: Most are implants    Pulmonary     breath sounds clear to auscultation  (+) a smoker (none since 2006) Former,   Cardiovascular - normal exam  Exercise tolerance: good (4-7 METS)    ECG reviewed  Rhythm: regular  Rate: normal    (+) hypertension 2 medications or greater, CAD, cardiac stents (one stentX3 early 2000s) more than 12 months ago hyperlipidemia,     ROS comment: Al Granado III, MD     8/15/2019  2:30 PM     ECG 12 Lead   Date/Time: 8/15/2019 2:28 PM   Performed by: Al Granado III, MD   Authorized by: Al Granado III, MD   Comparison: compared with previous ECG   Similar to previous ECG   Rhythm: sinus rhythm   Rate: normal   Conduction: conduction normal   ST Segments: ST segments normal   T Waves: T waves normal   QRS axis: normal   Other: no other findings     Clinical impression: normal ECG     Neuro/Psych- negative ROS  GI/Hepatic/Renal/Endo    (+)  GERD poorly controlled,      Musculoskeletal     (+) neck pain,   Abdominal  - normal exam   Substance History      OB/GYN negative ob/gyn ROS         Other   arthritis (generalized),      ROS/Med Hx Other: Sip water with meds 0700                Anesthesia Plan    ASA 2     MAC     intravenous induction     Anesthetic plan, all risks, benefits, and alternatives have been provided, discussed and informed consent has been obtained with: patient and spouse/significant other.

## 2021-05-18 NOTE — ANESTHESIA POSTPROCEDURE EVALUATION
Patient: Ashlie Levi    Procedure Summary     Date: 05/18/21 Room / Location: [facility] ENDOSCOPY 1 / [facility] OR    Anesthesia Start: 0958 Anesthesia Stop: 1044    Procedures:       Esophagogastroduodenoscopy with biopsies (N/A Esophagus)      Colonoscopy with polypectomy, biopsy (N/A ) Diagnosis:       Dyspepsia      Bloating      Nausea      Dry heaves      History of colon polyps      Incontinence of feces, unspecified fecal incontinence type      (Dyspepsia [R10.13])      (Bloating [R14.0])      (Nausea [R11.0])      (Dry heaves [R11.10])      (History of colon polyps [Z86.010])      (Incontinence of feces, unspecified fecal incontinence type [R15.9])    Surgeons: Alfreda Soler DO Provider: [place] CRNA    Anesthesia Type: MAC ASA Status: 2          Anesthesia Type: MAC    Vitals  Vitals Value Taken Time   /76 05/18/21 1100   Temp 97.5 °F (36.4 °C) 05/18/21 1048   Pulse 59 05/18/21 1100   Resp 14 05/18/21 1100   SpO2 94 % 05/18/21 1100           Post Anesthesia Care and Evaluation    Patient location during evaluation: PHASE II  Patient participation: complete - patient participated  Level of consciousness: awake and alert  Pain score: 0  Pain management: adequate  Airway patency: patent  Anesthetic complications: No anesthetic complications  PONV Status: none  Cardiovascular status: acceptable  Respiratory status: acceptable  Hydration status: acceptable

## 2021-05-19 LAB — UREASE TISS QL: POSITIVE

## 2021-05-20 LAB
LAB AP CASE REPORT: NORMAL
LAB AP DIAGNOSIS COMMENT: NORMAL
PATH REPORT.FINAL DX SPEC: NORMAL
PATH REPORT.GROSS SPEC: NORMAL

## 2021-06-24 ENCOUNTER — OFFICE VISIT (OUTPATIENT)
Dept: CARDIOLOGY | Facility: CLINIC | Age: 76
End: 2021-06-24

## 2021-06-24 VITALS
HEART RATE: 58 BPM | DIASTOLIC BLOOD PRESSURE: 76 MMHG | OXYGEN SATURATION: 98 % | RESPIRATION RATE: 16 BRPM | WEIGHT: 161 LBS | HEIGHT: 60 IN | SYSTOLIC BLOOD PRESSURE: 118 MMHG | BODY MASS INDEX: 31.61 KG/M2

## 2021-06-24 DIAGNOSIS — I25.118 CORONARY ARTERY DISEASE OF NATIVE ARTERY OF NATIVE HEART WITH STABLE ANGINA PECTORIS (HCC): Primary | Chronic | ICD-10-CM

## 2021-06-24 DIAGNOSIS — I10 ESSENTIAL HYPERTENSION: Chronic | ICD-10-CM

## 2021-06-24 DIAGNOSIS — E78.2 MIXED HYPERLIPIDEMIA: Chronic | ICD-10-CM

## 2021-06-24 PROCEDURE — 99214 OFFICE O/P EST MOD 30 MIN: CPT | Performed by: INTERNAL MEDICINE

## 2021-06-24 PROCEDURE — 93000 ELECTROCARDIOGRAM COMPLETE: CPT | Performed by: INTERNAL MEDICINE

## 2021-06-24 RX ORDER — SPIRONOLACTONE 25 MG/1
25 TABLET ORAL DAILY
COMMUNITY
End: 2022-04-14 | Stop reason: SDUPTHER

## 2021-06-24 NOTE — PROGRESS NOTES
Subjective:     Encounter Date: 8/15/2019      Patient ID: Ashlie Levi is a 75 y.o. female.    Chief Complaint: CAD  Coronary Artery Disease  Pertinent negatives include no muscle weakness.       Dear Dr. Conway,    I had the pleasure seeing this patient in the office today.  She comes in for 1 year follow-up. She has a history of CAD and had a stent placed in her LAD in 2006.  This was performed by Dr. Caldwell at [facility].  She had a stress test performed  5/2019 that demonstrated normal left ventricular and no ischemia.    She did have fatigue but no other symptoms.  No chest pain or chest discomfort.  No shortness of breath.  No palpitations.  No lower extremity edema.    She has not had Covid.  No Covid symptoms.  No cough.  No loss of taste or smell.  She is not any chills or fevers.  No orthopnea or PND.    The following portions of the patient's history were reviewed and updated as appropriate: allergies, current medications, past family history, past medical history, past social history, past surgical history and problem list.    Past Medical History:   Diagnosis Date   • Abnormal electrocardiogram    • Arthritis    • Chest discomfort    • Colon polyp    • Coronary artery disease    • Disease of thyroid gland    • GERD (gastroesophageal reflux disease)    • Hyperlipidemia    • Hypertension        Past Surgical History:   Procedure Laterality Date   • APPENDECTOMY     • CARDIAC SURGERY      3 stents   • CHOLECYSTECTOMY     • COLONOSCOPY N/A 7/13/2016    Procedure: COLONOSCOPY;  Surgeon: Giovani Avelar MD;  Location: [facility] OR;  Service:    • COLONOSCOPY N/A 6/13/2019    Procedure: Colonoscopy with possible biopsy or polypectomy;  Surgeon: Alfreda Soler DO;  Location: [facility] OR;  Service: Gastroenterology   • COLONOSCOPY N/A 5/18/2021    Procedure: Colonoscopy with polypectomy, biopsy;  Surgeon: Alfreda Soler DO;  Location: [facility] OR;  Service: Gastroenterology;   "Laterality: N/A;  diverticulosis  cecal biopsy  right colon polyp  left colon polyps x2   • CORONARY STENT PLACEMENT     • ENDOSCOPY N/A 11/21/2017    Procedure: ESOPHAGOGASTRODUODENOSCOPY with CARIN test ;  Surgeon: Alfreda Soler DO;  Location:  [facility] OR;  Service:    • ENDOSCOPY N/A 6/13/2019    Procedure: Esophagogastroduodenoscopy with possible biopsy, polypectomy, dilation;  Surgeon: Alfreda Soler DO;  Location:  [facility] OR;  Service: Gastroenterology   • ENDOSCOPY N/A 5/18/2021    Procedure: Esophagogastroduodenoscopy with biopsies;  Surgeon: Alfreda Soler DO;  Location:  [facility] OR;  Service: Gastroenterology;  Laterality: N/A;  CARIN test  gastritis  duodenitis   • EYE SURGERY     • FOOT GANGLION EXCISION Left    • HEMORROIDECTOMY     • HERNIA REPAIR     • HYSTERECTOMY      GREG with OC age 30 for DUB   • OOPHORECTOMY      dx lsc , interval USO             ECG 12 Lead    Date/Time: 6/24/2021 10:41 AM  Performed by: Al Granado III, MD  Authorized by: Al Granado III, MD   Comparison: compared with previous ECG   Similar to previous ECG  Rhythm: sinus rhythm  Rate: normal  Conduction: conduction normal  QRS axis: normal  Other findings: non-specific ST-T wave changes    Clinical impression: non-specific ECG               Objective:     Vitals:    06/24/21 1015   BP: 118/76   BP Location: Right arm   Patient Position: Sitting   Cuff Size: Adult   Pulse: 58   Resp: 16   SpO2: 98%   Weight: 73 kg (161 lb)   Height: 152.4 cm (60\")           General Appearance:    Alert, cooperative, in no acute distress   Head:    Normocephalic, without obvious abnormality, atraumatic   Eyes:            Lids and lashes normal, conjunctivae and sclerae normal, no   icterus, no pallor, corneas clear, PERRLA   Ears:    Ears appear intact with no abnormalities noted   Throat:   No oral lesions, no thrush, oral mucosa moist   Neck:   No adenopathy, supple, trachea midline, no thyromegaly, no   carotid bruit, no JVD "   Back:     No kyphosis present, no scoliosis present, no skin lesions, erythema or scars, no tenderness to percussion or palpation, range of motion normal   Lungs:     Clear to auscultation,respirations regular, even and unlabored    Heart:    Regular rhythm and normal rate, normal S1 and S2, no murmur, no gallop, no rub, no click   Chest Wall:    No abnormalities observed   Abdomen:     Normal bowel sounds, no masses, no organomegaly, soft        non-tender, non-distended, no guarding, no rebound  tenderness   Extremities:   Moves all extremities well, no edema, no cyanosis, no redness   Pulses:   Pulses palpable and equal bilaterally. Normal radial, carotid, femoral, dorsalis pedis and posterior tibial pulses bilaterally. Normal abdominal aorta   Skin:  Psychiatric:   No bleeding, bruising or rash    Alert and oriented x 3, normal mood and affect       Lab Review:                Lab Results   Component Value Date    GLUCOSE 104 (H) 07/19/2019    BUN 9 07/19/2019    CREATININE 0.80 07/19/2019    EGFRIFNONA 70 07/19/2019    BCR 11.3 07/19/2019    K 4.3 07/19/2019    CO2 28.0 07/19/2019    CALCIUM 9.9 07/19/2019    ALBUMIN 4.60 07/19/2019    AST 35 (H) 08/02/2019    ALT 29 08/02/2019               Assessment:          Diagnosis Plan   1. Coronary artery disease of native artery of native heart with stable angina pectoris (CMS/HCC)  ECG 12 Lead   2. Mixed hyperlipidemia  ECG 12 Lead   3. Essential hypertension  ECG 12 Lead          Plan:       1. Coronary Artery Disease  Assessment  • The patient has no angina    Plan  • Lifestyle modifications discussed include adhering to a heart healthy diet, avoidance of tobacco products, maintenance of a healthy weight, medication compliance, regular exercise and regular monitoring of cholesterol and blood pressure    Subjective - Objective  • There has been a previous stent procedure using HERBERTH  • Current antiplatelet therapy includes aspirin 81 mg    2.  Hyperlipidemia-continue  current diet and therapy  3.  Hypertensive heart disease-blood pressure well controlled on amlodipine carvedilol irbesartan, creatinine BUN potassium reviewed, I understand lab was just performed, will obtain.      Thank you very much for allowing us to participate in the care of this pleasant patient.  Please don't hesitate to call if I can be of assistance in any way.      Current Outpatient Medications:   •  amLODIPine (NORVASC) 5 MG tablet, Take 1 tablet by mouth Daily., Disp: 90 tablet, Rfl: 0  •  aspirin 81 MG chewable tablet, Chew 81 mg Daily., Disp: , Rfl:   •  carvedilol (COREG) 6.25 MG tablet, Take 1 tablet by mouth 2 (Two) Times a Day., Disp: 180 tablet, Rfl: 0  •  Cyanocobalamin (VITAMIN B 12 PO), Take 1 tablet by mouth Daily., Disp: , Rfl:   •  irbesartan (Avapro) 300 MG tablet, Take 1 tablet by mouth Every Night., Disp: 90 tablet, Rfl: 3  •  isosorbide mononitrate (ISMO,MONOKET) 20 MG tablet, Take 1 tablet by mouth 2 (Two) Times a Day., Disp: 180 tablet, Rfl: 1  •  levothyroxine (SYNTHROID, LEVOTHROID) 100 MCG tablet, Take 100 mcg by mouth Daily., Disp: , Rfl:   •  omeprazole (priLOSEC) 40 MG capsule, Take 1 capsule by mouth once daily, Disp: 30 capsule, Rfl: 0  •  Pyridoxine HCl (VITAMIN B-6 PO), Take 1 tablet by mouth Daily., Disp: , Rfl:   •  sertraline (ZOLOFT) 100 MG tablet, Take 100 mg by mouth daily., Disp: , Rfl:   •  spironolactone (ALDACTONE) 25 MG tablet, Take 25 mg by mouth Daily., Disp: , Rfl:   •  sucralfate (Carafate) 1 g tablet, Take 1 tablet by mouth 4 (Four) Times a Day., Disp: 120 tablet, Rfl: 1  •  Vitamin D, Cholecalciferol, (CHOLECALCIFEROL) 400 units tablet, Take 400 Units by mouth Daily., Disp: , Rfl:   •  clobetasol (TEMOVATE) 0.05 % ointment, To affected area twice a day for 4 weeks, then daily for 2 weeks, then twice a week as needed for itching, Disp: 60 g, Rfl: 1         EMR Dragon/Transcription disclaimer:    Much of this encounter note is an electronic  transcription/translation of spoken language to printed text. The electronic translation of spoken language may permit erroneous, or at times, nonsensical words or phrases to be inadvertently transcribed; Although I have reviewed the note for such errors, some may still exist.

## 2021-07-20 RX ORDER — IRBESARTAN 300 MG/1
TABLET ORAL
Qty: 90 TABLET | Refills: 3 | Status: SHIPPED | OUTPATIENT
Start: 2021-07-20 | End: 2022-07-11

## 2021-08-11 ENCOUNTER — TRANSCRIBE ORDERS (OUTPATIENT)
Dept: ADMINISTRATIVE | Facility: HOSPITAL | Age: 76
End: 2021-08-11

## 2021-08-11 DIAGNOSIS — Z12.31 VISIT FOR SCREENING MAMMOGRAM: Primary | ICD-10-CM

## 2021-08-17 ENCOUNTER — APPOINTMENT (OUTPATIENT)
Dept: SLEEP MEDICINE | Facility: HOSPITAL | Age: 76
End: 2021-08-17

## 2021-08-23 RX ORDER — AMLODIPINE BESYLATE 5 MG/1
TABLET ORAL
Qty: 90 TABLET | Refills: 0 | Status: SHIPPED | OUTPATIENT
Start: 2021-08-23 | End: 2021-11-22

## 2021-08-23 RX ORDER — CARVEDILOL 6.25 MG/1
TABLET ORAL
Qty: 180 TABLET | Refills: 0 | Status: SHIPPED | OUTPATIENT
Start: 2021-08-23 | End: 2021-11-22

## 2021-08-30 ENCOUNTER — HOSPITAL ENCOUNTER (EMERGENCY)
Facility: HOSPITAL | Age: 76
Discharge: HOME | End: 2021-08-30
Attending: EMERGENCY MEDICINE | Admitting: EMERGENCY MEDICINE

## 2021-08-30 ENCOUNTER — APPOINTMENT (OUTPATIENT)
Dept: GENERAL RADIOLOGY | Facility: HOSPITAL | Age: 76
End: 2021-08-30

## 2021-08-30 ENCOUNTER — APPOINTMENT (OUTPATIENT)
Dept: CT IMAGING | Facility: HOSPITAL | Age: 76
End: 2021-08-30

## 2021-08-30 VITALS
TEMPERATURE: 97.5 F | HEIGHT: 60 IN | BODY MASS INDEX: 30.63 KG/M2 | WEIGHT: 156 LBS | SYSTOLIC BLOOD PRESSURE: 129 MMHG | RESPIRATION RATE: 16 BRPM | DIASTOLIC BLOOD PRESSURE: 70 MMHG | OXYGEN SATURATION: 95 % | HEART RATE: 56 BPM

## 2021-08-30 DIAGNOSIS — M47.812 SPONDYLOSIS OF CERVICAL REGION WITHOUT MYELOPATHY OR RADICULOPATHY: ICD-10-CM

## 2021-08-30 DIAGNOSIS — R10.10 PAIN OF UPPER ABDOMEN: ICD-10-CM

## 2021-08-30 DIAGNOSIS — R09.1 PLEURISY: Primary | ICD-10-CM

## 2021-08-30 LAB
ALBUMIN SERPL-MCNC: 4.2 G/DL (ref 3.5–5.2)
ALBUMIN/GLOB SERPL: 1.6 G/DL
ALP SERPL-CCNC: 95 U/L (ref 39–117)
ALT SERPL W P-5'-P-CCNC: 30 U/L (ref 1–33)
ANION GAP SERPL CALCULATED.3IONS-SCNC: 9.9 MMOL/L (ref 5–15)
AST SERPL-CCNC: 43 U/L (ref 1–32)
BASOPHILS # BLD AUTO: 0.07 10*3/MM3 (ref 0–0.2)
BASOPHILS NFR BLD AUTO: 0.7 % (ref 0–1.5)
BILIRUB SERPL-MCNC: 0.4 MG/DL (ref 0–1.2)
BILIRUB UR QL STRIP: NEGATIVE
BUN SERPL-MCNC: 14 MG/DL (ref 8–23)
BUN/CREAT SERPL: 17.5 (ref 7–25)
CALCIUM SPEC-SCNC: 10.1 MG/DL (ref 8.6–10.5)
CHLORIDE SERPL-SCNC: 104 MMOL/L (ref 98–107)
CLARITY UR: CLEAR
CO2 SERPL-SCNC: 24.1 MMOL/L (ref 22–29)
COLOR UR: YELLOW
CREAT SERPL-MCNC: 0.8 MG/DL (ref 0.57–1)
DEPRECATED RDW RBC AUTO: 43.2 FL (ref 37–54)
EOSINOPHIL # BLD AUTO: 0.35 10*3/MM3 (ref 0–0.4)
EOSINOPHIL NFR BLD AUTO: 3.6 % (ref 0.3–6.2)
ERYTHROCYTE [DISTWIDTH] IN BLOOD BY AUTOMATED COUNT: 12.9 % (ref 12.3–15.4)
GFR SERPL CREATININE-BSD FRML MDRD: 70 ML/MIN/1.73
GLOBULIN UR ELPH-MCNC: 2.7 GM/DL
GLUCOSE SERPL-MCNC: 138 MG/DL (ref 65–99)
GLUCOSE UR STRIP-MCNC: NEGATIVE MG/DL
HCT VFR BLD AUTO: 39.1 % (ref 34–46.6)
HGB BLD-MCNC: 13 G/DL (ref 12–15.9)
HGB UR QL STRIP.AUTO: NEGATIVE
IMM GRANULOCYTES # BLD AUTO: 0.07 10*3/MM3 (ref 0–0.05)
IMM GRANULOCYTES NFR BLD AUTO: 0.7 % (ref 0–0.5)
KETONES UR QL STRIP: NEGATIVE
LEUKOCYTE ESTERASE UR QL STRIP.AUTO: NEGATIVE
LYMPHOCYTES # BLD AUTO: 3.08 10*3/MM3 (ref 0.7–3.1)
LYMPHOCYTES NFR BLD AUTO: 32 % (ref 19.6–45.3)
MCH RBC QN AUTO: 30.7 PG (ref 26.6–33)
MCHC RBC AUTO-ENTMCNC: 33.2 G/DL (ref 31.5–35.7)
MCV RBC AUTO: 92.2 FL (ref 79–97)
MONOCYTES # BLD AUTO: 0.59 10*3/MM3 (ref 0.1–0.9)
MONOCYTES NFR BLD AUTO: 6.1 % (ref 5–12)
NEUTROPHILS NFR BLD AUTO: 5.45 10*3/MM3 (ref 1.7–7)
NEUTROPHILS NFR BLD AUTO: 56.9 % (ref 42.7–76)
NITRITE UR QL STRIP: NEGATIVE
NRBC BLD AUTO-RTO: 0 /100 WBC (ref 0–0.2)
PH UR STRIP.AUTO: 5.5 [PH] (ref 4.5–8)
PLATELET # BLD AUTO: 218 10*3/MM3 (ref 140–450)
PMV BLD AUTO: 10.3 FL (ref 6–12)
POTASSIUM SERPL-SCNC: 4.3 MMOL/L (ref 3.5–5.2)
PROT SERPL-MCNC: 6.9 G/DL (ref 6–8.5)
PROT UR QL STRIP: NEGATIVE
QT INTERVAL: 434 MS
RBC # BLD AUTO: 4.24 10*6/MM3 (ref 3.77–5.28)
SODIUM SERPL-SCNC: 138 MMOL/L (ref 136–145)
SP GR UR STRIP: 1.03 (ref 1–1.03)
TROPONIN T SERPL-MCNC: <0.01 NG/ML (ref 0–0.03)
UROBILINOGEN UR QL STRIP: NORMAL
WBC # BLD AUTO: 9.61 10*3/MM3 (ref 3.4–10.8)

## 2021-08-30 PROCEDURE — 72050 X-RAY EXAM NECK SPINE 4/5VWS: CPT

## 2021-08-30 PROCEDURE — 0 DIATRIZOATE MEGLUMINE & SODIUM PER 1 ML: Performed by: EMERGENCY MEDICINE

## 2021-08-30 PROCEDURE — 85025 COMPLETE CBC W/AUTO DIFF WBC: CPT | Performed by: EMERGENCY MEDICINE

## 2021-08-30 PROCEDURE — 99283 EMERGENCY DEPT VISIT LOW MDM: CPT

## 2021-08-30 PROCEDURE — 84484 ASSAY OF TROPONIN QUANT: CPT | Performed by: EMERGENCY MEDICINE

## 2021-08-30 PROCEDURE — 74177 CT ABD & PELVIS W/CONTRAST: CPT

## 2021-08-30 PROCEDURE — 80053 COMPREHEN METABOLIC PANEL: CPT | Performed by: EMERGENCY MEDICINE

## 2021-08-30 PROCEDURE — 99283 EMERGENCY DEPT VISIT LOW MDM: CPT | Performed by: EMERGENCY MEDICINE

## 2021-08-30 PROCEDURE — 81003 URINALYSIS AUTO W/O SCOPE: CPT | Performed by: EMERGENCY MEDICINE

## 2021-08-30 PROCEDURE — 71046 X-RAY EXAM CHEST 2 VIEWS: CPT

## 2021-08-30 PROCEDURE — 0 IOPAMIDOL PER 1 ML: Performed by: EMERGENCY MEDICINE

## 2021-08-30 PROCEDURE — 93010 ELECTROCARDIOGRAM REPORT: CPT | Performed by: INTERNAL MEDICINE

## 2021-08-30 PROCEDURE — 93005 ELECTROCARDIOGRAM TRACING: CPT | Performed by: EMERGENCY MEDICINE

## 2021-08-30 RX ORDER — ACETAMINOPHEN 500 MG
1000 TABLET ORAL ONCE
Status: COMPLETED | OUTPATIENT
Start: 2021-08-30 | End: 2021-08-30

## 2021-08-30 RX ORDER — SODIUM CHLORIDE 0.9 % (FLUSH) 0.9 %
10 SYRINGE (ML) INJECTION AS NEEDED
Status: DISCONTINUED | OUTPATIENT
Start: 2021-08-30 | End: 2021-08-30 | Stop reason: HOSPADM

## 2021-08-30 RX ADMIN — IOPAMIDOL 100 ML: 755 INJECTION, SOLUTION INTRAVENOUS at 17:19

## 2021-08-30 RX ADMIN — ACETAMINOPHEN 1000 MG: 500 TABLET ORAL at 15:10

## 2021-08-30 RX ADMIN — DIATRIZOATE MEGLUMINE AND DIATRIZOATE SODIUM 30 ML: 600; 100 SOLUTION ORAL; RECTAL at 16:12

## 2021-10-07 ENCOUNTER — HOSPITAL ENCOUNTER (OUTPATIENT)
Dept: MAMMOGRAPHY | Facility: HOSPITAL | Age: 76
Discharge: HOME | End: 2021-10-07
Admitting: INTERNAL MEDICINE

## 2021-10-07 DIAGNOSIS — Z12.31 VISIT FOR SCREENING MAMMOGRAM: ICD-10-CM

## 2021-10-07 PROCEDURE — 77063 BREAST TOMOSYNTHESIS BI: CPT

## 2021-10-07 PROCEDURE — 77067 SCR MAMMO BI INCL CAD: CPT

## 2021-10-21 RX ORDER — ISOSORBIDE MONONITRATE 20 MG/1
TABLET ORAL
Qty: 180 TABLET | Refills: 0 | Status: SHIPPED | OUTPATIENT
Start: 2021-10-21 | End: 2022-01-14

## 2021-11-21 ENCOUNTER — APPOINTMENT (OUTPATIENT)
Dept: GENERAL RADIOLOGY | Facility: HOSPITAL | Age: 76
End: 2021-11-21

## 2021-11-21 ENCOUNTER — HOSPITAL ENCOUNTER (EMERGENCY)
Facility: HOSPITAL | Age: 76
Discharge: HOME | End: 2021-11-21
Attending: EMERGENCY MEDICINE | Admitting: EMERGENCY MEDICINE

## 2021-11-21 VITALS
DIASTOLIC BLOOD PRESSURE: 73 MMHG | SYSTOLIC BLOOD PRESSURE: 129 MMHG | WEIGHT: 151 LBS | TEMPERATURE: 98.3 F | HEART RATE: 70 BPM | RESPIRATION RATE: 18 BRPM | OXYGEN SATURATION: 95 % | BODY MASS INDEX: 25.78 KG/M2 | HEIGHT: 64 IN

## 2021-11-21 DIAGNOSIS — R07.9 CHEST PAIN, UNSPECIFIED TYPE: Primary | ICD-10-CM

## 2021-11-21 LAB
ALBUMIN SERPL-MCNC: 4 G/DL (ref 3.5–5.2)
ALBUMIN/GLOB SERPL: 1.2 G/DL
ALP SERPL-CCNC: 93 U/L (ref 39–117)
ALT SERPL W P-5'-P-CCNC: 52 U/L (ref 1–33)
ANION GAP SERPL CALCULATED.3IONS-SCNC: 8 MMOL/L (ref 5–15)
AST SERPL-CCNC: 50 U/L (ref 1–32)
BASOPHILS # BLD AUTO: 0.03 10*3/MM3 (ref 0–0.2)
BASOPHILS NFR BLD AUTO: 0.2 % (ref 0–1.5)
BILIRUB SERPL-MCNC: 0.6 MG/DL (ref 0–1.2)
BUN SERPL-MCNC: 15 MG/DL (ref 8–23)
BUN/CREAT SERPL: 15.8 (ref 7–25)
CALCIUM SPEC-SCNC: 9.9 MG/DL (ref 8.6–10.5)
CHLORIDE SERPL-SCNC: 98 MMOL/L (ref 98–107)
CO2 SERPL-SCNC: 29 MMOL/L (ref 22–29)
CREAT SERPL-MCNC: 0.95 MG/DL (ref 0.57–1)
DEPRECATED RDW RBC AUTO: 41.8 FL (ref 37–54)
EOSINOPHIL # BLD AUTO: 0.31 10*3/MM3 (ref 0–0.4)
EOSINOPHIL NFR BLD AUTO: 2.4 % (ref 0.3–6.2)
ERYTHROCYTE [DISTWIDTH] IN BLOOD BY AUTOMATED COUNT: 12.2 % (ref 12.3–15.4)
GFR SERPL CREATININE-BSD FRML MDRD: 57 ML/MIN/1.73
GLOBULIN UR ELPH-MCNC: 3.3 GM/DL
GLUCOSE SERPL-MCNC: 153 MG/DL (ref 65–99)
HCT VFR BLD AUTO: 47.3 % (ref 34–46.6)
HGB BLD-MCNC: 15.2 G/DL (ref 12–15.9)
IMM GRANULOCYTES # BLD AUTO: 0.12 10*3/MM3 (ref 0–0.05)
IMM GRANULOCYTES NFR BLD AUTO: 0.9 % (ref 0–0.5)
LIPASE SERPL-CCNC: 19 U/L (ref 13–60)
LYMPHOCYTES # BLD AUTO: 3.38 10*3/MM3 (ref 0.7–3.1)
LYMPHOCYTES NFR BLD AUTO: 26.2 % (ref 19.6–45.3)
MCH RBC QN AUTO: 29.3 PG (ref 26.6–33)
MCHC RBC AUTO-ENTMCNC: 32.1 G/DL (ref 31.5–35.7)
MCV RBC AUTO: 91.3 FL (ref 79–97)
MONOCYTES # BLD AUTO: 0.72 10*3/MM3 (ref 0.1–0.9)
MONOCYTES NFR BLD AUTO: 5.6 % (ref 5–12)
NEUTROPHILS NFR BLD AUTO: 64.7 % (ref 42.7–76)
NEUTROPHILS NFR BLD AUTO: 8.33 10*3/MM3 (ref 1.7–7)
PLATELET # BLD AUTO: 273 10*3/MM3 (ref 140–450)
PMV BLD AUTO: 9.8 FL (ref 6–12)
POTASSIUM SERPL-SCNC: 4.5 MMOL/L (ref 3.5–5.2)
PROT SERPL-MCNC: 7.3 G/DL (ref 6–8.5)
RBC # BLD AUTO: 5.18 10*6/MM3 (ref 3.77–5.28)
SODIUM SERPL-SCNC: 135 MMOL/L (ref 136–145)
TROPONIN T SERPL-MCNC: <0.01 NG/ML (ref 0–0.03)
TROPONIN T SERPL-MCNC: <0.01 NG/ML (ref 0–0.03)
WBC NRBC COR # BLD: 12.89 10*3/MM3 (ref 3.4–10.8)

## 2021-11-21 PROCEDURE — 36415 COLL VENOUS BLD VENIPUNCTURE: CPT

## 2021-11-21 PROCEDURE — 84484 ASSAY OF TROPONIN QUANT: CPT | Performed by: EMERGENCY MEDICINE

## 2021-11-21 PROCEDURE — 71046 X-RAY EXAM CHEST 2 VIEWS: CPT

## 2021-11-21 PROCEDURE — 99282 EMERGENCY DEPT VISIT SF MDM: CPT | Performed by: EMERGENCY MEDICINE

## 2021-11-21 PROCEDURE — 93005 ELECTROCARDIOGRAM TRACING: CPT | Performed by: EMERGENCY MEDICINE

## 2021-11-21 PROCEDURE — 99283 EMERGENCY DEPT VISIT LOW MDM: CPT

## 2021-11-21 PROCEDURE — 85025 COMPLETE CBC W/AUTO DIFF WBC: CPT | Performed by: EMERGENCY MEDICINE

## 2021-11-21 PROCEDURE — 83690 ASSAY OF LIPASE: CPT | Performed by: EMERGENCY MEDICINE

## 2021-11-21 PROCEDURE — 93010 ELECTROCARDIOGRAM REPORT: CPT | Performed by: INTERNAL MEDICINE

## 2021-11-21 PROCEDURE — 80053 COMPREHEN METABOLIC PANEL: CPT | Performed by: EMERGENCY MEDICINE

## 2021-11-21 RX ORDER — SUCRALFATE 1 G/1
1 TABLET ORAL ONCE
Status: COMPLETED | OUTPATIENT
Start: 2021-11-21 | End: 2021-11-21

## 2021-11-21 RX ORDER — NITROGLYCERIN 0.4 MG/1
0.4 TABLET SUBLINGUAL
Qty: 20 TABLET | Refills: 0 | Status: SHIPPED | OUTPATIENT
Start: 2021-11-21

## 2021-11-21 RX ADMIN — SUCRALFATE 1 G: 1 TABLET ORAL at 14:30

## 2021-11-21 NOTE — DISCHARGE INSTRUCTIONS
Call Dr. Granado Monday morning to be seen without fail.  Return to the emergency department if there is increased pain, shortness of breath, worse in any way at all.

## 2021-11-21 NOTE — ED PROVIDER NOTES
Subjective   History of Present Illness  History of Present Illness    Chief complaint: Chest pain    Location: Substernal, nonradiating    Quality/Severity: 9/10 is worst, 0/10 currently, pressure and burning    Timing/Onset/Duration: Acute onset at 11 AM, she states that the pain resolved on her way to the ED.    Modifying Factors: Improved with Tums and baby    Associated Symptoms: No headache.  No fever chills or cough.  No sore throat earache or nasal congestion.  Patient denies shortness of breath.  She did get diaphoretic.  No abdominal pain.  No diarrhea or burning when she urinates.    Narrative: This 76-year-old white female with a history of coronary artery disease, with a stent placed in 2006, presents with substernal chest pain.  The patient had acute onset of pain while she was stirring gravy this morning.  The patient has never had a blood clot in her leg or lung.  No recent long trips or surgeries.  No bleeding or clotting problems.  Patient is not on hormone therapy.  She has no history of cancer.  Her last stress test was 2 years ago and was reportedly normal per the patient.    PCP: Shira    Cardiology: [facility]      Review of Systems   Constitutional: Negative for chills and fever.   HENT: Negative for congestion, ear pain and sore throat.    Respiratory: Negative for shortness of breath.    Cardiovascular: Positive for chest pain.   Gastrointestinal: Negative for abdominal pain, diarrhea, nausea and vomiting.   Genitourinary: Negative for dysuria.   Musculoskeletal: Negative for back pain.   Neurological: Negative for headaches.        Medication List      ASK your doctor about these medications    amLODIPine 5 MG tablet  Commonly known as: NORVASC  Take 1 tablet by mouth once daily     aspirin 81 MG chewable tablet     carvedilol 6.25 MG tablet  Commonly known as: COREG  Take 1 tablet by mouth twice daily     clobetasol 0.05 % ointment  Commonly known as: TEMOVATE  To affected area twice a day  for 4 weeks, then daily for 2 weeks, then twice a week as needed for itching     irbesartan 300 MG tablet  Commonly known as: AVAPRO  TAKE 1 TABLET BY MOUTH ONCE DAILY AT NIGHT     isosorbide mononitrate 20 MG tablet  Commonly known as: ISMO,MONOKET  Take 1 tablet by mouth twice daily     levothyroxine 100 MCG tablet  Commonly known as: SYNTHROID, LEVOTHROID     omeprazole 40 MG capsule  Commonly known as: priLOSEC  Take 1 capsule by mouth once daily     sertraline 100 MG tablet  Commonly known as: ZOLOFT     spironolactone 25 MG tablet  Commonly known as: ALDACTONE     sucralfate 1 g tablet  Commonly known as: Carafate  Take 1 tablet by mouth 4 (Four) Times a Day.     VITAMIN B 12 PO     VITAMIN B-6 PO     Vitamin D (Cholecalciferol) 10 MCG (400 UNIT) tablet  Commonly known as: CHOLECALCIFEROL            Past Medical History:   Diagnosis Date   • Abnormal electrocardiogram    • Arthritis    • Chest discomfort    • Colon polyp    • Coronary artery disease    • Disease of thyroid gland    • GERD (gastroesophageal reflux disease)    • Hyperlipidemia    • Hypertension        No Known Allergies    Past Surgical History:   Procedure Laterality Date   • APPENDECTOMY     • CARDIAC SURGERY      3 stents   • CHOLECYSTECTOMY     • COLONOSCOPY N/A 7/13/2016    Procedure: COLONOSCOPY;  Surgeon: Giovani Avelar MD;  Location: [facility] OR;  Service:    • COLONOSCOPY N/A 6/13/2019    Procedure: Colonoscopy with possible biopsy or polypectomy;  Surgeon: Alfreda Soler DO;  Location: [facility] OR;  Service: Gastroenterology   • COLONOSCOPY N/A 5/18/2021    Procedure: Colonoscopy with polypectomy, biopsy;  Surgeon: Alfreda Soler DO;  Location: [facility] OR;  Service: Gastroenterology;  Laterality: N/A;  diverticulosis  cecal biopsy  right colon polyp  left colon polyps x2   • CORONARY STENT PLACEMENT     • ENDOSCOPY N/A 11/21/2017    Procedure: ESOPHAGOGASTRODUODENOSCOPY with CARIN test ;  Surgeon: Alfreda Soler  ;  Location:  [facility] OR;  Service:    • ENDOSCOPY N/A 6/13/2019    Procedure: Esophagogastroduodenoscopy with possible biopsy, polypectomy, dilation;  Surgeon: Alfreda Soler DO;  Location:  [facility] OR;  Service: Gastroenterology   • ENDOSCOPY N/A 5/18/2021    Procedure: Esophagogastroduodenoscopy with biopsies;  Surgeon: Alfreda Soler DO;  Location:  [facility] OR;  Service: Gastroenterology;  Laterality: N/A;  CARIN test  gastritis  duodenitis   • EYE SURGERY     • FOOT GANGLION EXCISION Left    • HEMORROIDECTOMY     • HERNIA REPAIR     • HYSTERECTOMY      GREG with OC age 30 for DUB   • OOPHORECTOMY      dx lsc , interval USO       Family History   Problem Relation Age of Onset   • Hypertension Mother    • Stroke Mother    • Diabetes Father    • Hypertension Father    • Heart disease Father    • Stroke Father    • Heart disease Brother    • Colon cancer Brother         dx > 50   • Breast cancer Neg Hx    • Ovarian cancer Neg Hx    • Deep vein thrombosis Neg Hx    • Pulmonary embolism Neg Hx        Social History     Socioeconomic History   • Marital status:    Tobacco Use   • Smoking status: Former Smoker     Packs/day: 1.00     Years: 50.00     Pack years: 50.00     Quit date: 10/13/2006     Years since quitting: 15.1   • Smokeless tobacco: Never Used   • Tobacco comment: quit 2006   Vaping Use   • Vaping Use: Never used   Substance and Sexual Activity   • Alcohol use: Yes     Comment: occasional   • Drug use: No   • Sexual activity: Not Currently     Partners: Male     Birth control/protection: Surgical, Post-menopausal     Comment: GREG with OC           Objective   Physical Exam  Vitals (The temperature is 98.3 °F, pulse 70, respirations 18, /73, room air pulse ox 95%.) and nursing note reviewed.   Constitutional:       Appearance: She is well-developed.   HENT:      Head: Normocephalic and atraumatic.   Cardiovascular:      Rate and Rhythm: Normal rate and regular rhythm.      Heart sounds:  No murmur heard.  No friction rub. No gallop.    Pulmonary:      Effort: Pulmonary effort is normal.      Breath sounds: Normal breath sounds.   Chest:      Chest wall: No tenderness.   Abdominal:      General: Bowel sounds are normal.      Palpations: Abdomen is soft. There is no mass.      Tenderness: There is no abdominal tenderness. There is no guarding or rebound.   Musculoskeletal:         General: Normal range of motion.      Cervical back: Normal range of motion and neck supple.      Right lower leg: No edema.      Left lower leg: No edema.   Skin:     General: Skin is warm and dry.   Neurological:      General: No focal deficit present.      Mental Status: She is alert and oriented to person, place, and time.         Procedures           ED Course  ED Course as of 11/21/21 1522   Sun Nov 21, 2021   1408 The laboratory values reviewed by me. The serum glucose is 153. The sodium is 135. The ALT is 52, the AST is 50, GFR 57, white blood cell count is 12.8. The hematocrit is 47. Laboratory values are otherwise unremarkable. [RC]   1517 The repeat troponin is normal. [RC]      ED Course User Index  [RC] Jsoh Hines MD      12:32 EST, 11/21/21:  The EKG was obtained at 1225 and read by me at 1225.  The EKG shows a normal sinus rhythm with a rate of 65.  There is a normal axis with no hypertrophy.  The AZ, QRS, QT intervals are unremarkable.     14:50 EST, 11/21/21:  The patient had a repeat EKG done at 1438.  The EKG shows normal sinus rhythm with rate of 59.  There is a normal axis with no hypertrophy.  The AZ, QRS, QT intervals are unremarkable.  There is no ectopy.  There is no acute ST elevation or depression.  EKG was compared to EKG obtained earlier at 1225 on 11/21/2021 and is essentially unchanged.     15:24 EST, 11/21/21:  The patient's diagnosis of chest pain was discussed with her.  She has 2 sets of negative enzymes with unchanged EKGs.  The patient has been advised to call Dr. Granado office in  the morning for a follow-up appointment on Monday.  She should return to emergency department if there is worsening pain, shortness of breath, worse in any way at all.  The patient would like a refill on her nitroglycerin she has been advised that if she takes a nitroglycerin for chest pain she should call EMS.                           MDM    Final diagnoses:   None       ED Disposition  ED Disposition     None          No follow-up provider specified.       Medication List      No changes were made to your prescriptions during this visit.          Josh Hines MD  11/21/21 [id number]

## 2021-11-22 LAB
QT INTERVAL: 409 MS
QT INTERVAL: 436 MS

## 2021-11-22 RX ORDER — AMLODIPINE BESYLATE 5 MG/1
TABLET ORAL
Qty: 90 TABLET | Refills: 1 | Status: SHIPPED | OUTPATIENT
Start: 2021-11-22 | End: 2022-05-18

## 2021-11-22 RX ORDER — CARVEDILOL 6.25 MG/1
TABLET ORAL
Qty: 180 TABLET | Refills: 1 | Status: SHIPPED | OUTPATIENT
Start: 2021-11-22 | End: 2022-07-11

## 2022-01-14 RX ORDER — ISOSORBIDE MONONITRATE 20 MG/1
TABLET ORAL
Qty: 180 TABLET | Refills: 1 | Status: SHIPPED | OUTPATIENT
Start: 2022-01-14 | End: 2022-07-11

## 2022-01-14 NOTE — TELEPHONE ENCOUNTER
Rx Refill Note  Requested Prescriptions     Pending Prescriptions Disp Refills   • isosorbide mononitrate (ISMO,MONOKET) 20 MG tablet [Pharmacy Med Name: Isosorbide Mononitrate 20 MG Oral Tablet] 180 tablet 0     Sig: Take 1 tablet by mouth twice daily      Last office visit with prescribing clinician: 6/24/2021 ANNE-MARIE    Next office visit with prescribing clinician: 6/29/2022 ANNE-MARIE Watson MA  01/14/22, 15:52 EST

## 2022-04-14 ENCOUNTER — LAB (OUTPATIENT)
Dept: LAB | Facility: HOSPITAL | Age: 77
End: 2022-04-14

## 2022-04-14 ENCOUNTER — TRANSCRIBE ORDERS (OUTPATIENT)
Dept: ADMINISTRATIVE | Facility: HOSPITAL | Age: 77
End: 2022-04-14

## 2022-04-14 DIAGNOSIS — E03.9 MYXEDEMA HEART DISEASE: Primary | ICD-10-CM

## 2022-04-14 DIAGNOSIS — E03.9 MYXEDEMA HEART DISEASE: ICD-10-CM

## 2022-04-14 DIAGNOSIS — I51.9 MYXEDEMA HEART DISEASE: Primary | ICD-10-CM

## 2022-04-14 DIAGNOSIS — I51.9 MYXEDEMA HEART DISEASE: ICD-10-CM

## 2022-04-14 DIAGNOSIS — R79.89 HYPOURICEMIA: ICD-10-CM

## 2022-04-14 LAB
ALT SERPL W P-5'-P-CCNC: 30 U/L (ref 1–33)
AST SERPL-CCNC: 31 U/L (ref 1–32)
T4 FREE SERPL-MCNC: 2 NG/DL (ref 0.93–1.7)
TSH SERPL DL<=0.05 MIU/L-ACNC: 0.08 UIU/ML (ref 0.27–4.2)

## 2022-04-14 PROCEDURE — 84460 ALANINE AMINO (ALT) (SGPT): CPT

## 2022-04-14 PROCEDURE — 84450 TRANSFERASE (AST) (SGOT): CPT

## 2022-04-14 PROCEDURE — 84443 ASSAY THYROID STIM HORMONE: CPT

## 2022-04-14 PROCEDURE — 36415 COLL VENOUS BLD VENIPUNCTURE: CPT

## 2022-04-14 PROCEDURE — 84439 ASSAY OF FREE THYROXINE: CPT

## 2022-04-14 RX ORDER — SPIRONOLACTONE 25 MG/1
25 TABLET ORAL DAILY
Qty: 90 TABLET | Refills: 0 | Status: SHIPPED | OUTPATIENT
Start: 2022-04-14 | End: 2022-10-26

## 2022-04-28 ENCOUNTER — APPOINTMENT (OUTPATIENT)
Dept: CT IMAGING | Facility: HOSPITAL | Age: 77
End: 2022-04-28

## 2022-04-28 ENCOUNTER — APPOINTMENT (OUTPATIENT)
Dept: MRI IMAGING | Facility: HOSPITAL | Age: 77
End: 2022-04-28

## 2022-04-28 ENCOUNTER — HOSPITAL ENCOUNTER (EMERGENCY)
Facility: HOSPITAL | Age: 77
Discharge: HOME | End: 2022-04-28
Attending: EMERGENCY MEDICINE | Admitting: EMERGENCY MEDICINE

## 2022-04-28 ENCOUNTER — APPOINTMENT (OUTPATIENT)
Dept: GENERAL RADIOLOGY | Facility: HOSPITAL | Age: 77
End: 2022-04-28

## 2022-04-28 VITALS
HEIGHT: 60 IN | DIASTOLIC BLOOD PRESSURE: 86 MMHG | OXYGEN SATURATION: 94 % | TEMPERATURE: 98 F | HEART RATE: 70 BPM | RESPIRATION RATE: 18 BRPM | SYSTOLIC BLOOD PRESSURE: 116 MMHG | WEIGHT: 145 LBS | BODY MASS INDEX: 28.47 KG/M2

## 2022-04-28 DIAGNOSIS — H53.9 VISUAL CHANGES: Primary | ICD-10-CM

## 2022-04-28 DIAGNOSIS — H43.392 VITREOUS FLOATERS OF LEFT EYE: ICD-10-CM

## 2022-04-28 DIAGNOSIS — F41.8 ANXIETY ABOUT HEALTH: ICD-10-CM

## 2022-04-28 LAB
ALBUMIN SERPL-MCNC: 3.9 G/DL (ref 3.5–5.2)
ALBUMIN/GLOB SERPL: 1.2 G/DL
ALP SERPL-CCNC: 61 U/L (ref 39–117)
ALT SERPL W P-5'-P-CCNC: 18 U/L (ref 1–33)
ANION GAP SERPL CALCULATED.3IONS-SCNC: 9.5 MMOL/L (ref 5–15)
AST SERPL-CCNC: 21 U/L (ref 1–32)
BASOPHILS # BLD AUTO: 0.04 10*3/MM3 (ref 0–0.2)
BASOPHILS NFR BLD AUTO: 0.5 % (ref 0–1.5)
BILIRUB SERPL-MCNC: 0.5 MG/DL (ref 0–1.2)
BUN SERPL-MCNC: 18 MG/DL (ref 8–23)
BUN/CREAT SERPL: 17 (ref 7–25)
CALCIUM SPEC-SCNC: 9.5 MG/DL (ref 8.6–10.5)
CHLORIDE SERPL-SCNC: 99 MMOL/L (ref 98–107)
CO2 SERPL-SCNC: 26.5 MMOL/L (ref 22–29)
CREAT SERPL-MCNC: 1.06 MG/DL (ref 0.57–1)
DEPRECATED RDW RBC AUTO: 41.8 FL (ref 37–54)
EGFRCR SERPLBLD CKD-EPI 2021: 53.9 ML/MIN/1.73
EOSINOPHIL # BLD AUTO: 0.33 10*3/MM3 (ref 0–0.4)
EOSINOPHIL NFR BLD AUTO: 3.9 % (ref 0.3–6.2)
ERYTHROCYTE [DISTWIDTH] IN BLOOD BY AUTOMATED COUNT: 12.8 % (ref 12.3–15.4)
FLUAV RNA RESP QL NAA+PROBE: NOT DETECTED
FLUBV RNA RESP QL NAA+PROBE: NOT DETECTED
GLOBULIN UR ELPH-MCNC: 3.3 GM/DL
GLUCOSE BLDC GLUCOMTR-MCNC: 124 MG/DL (ref 70–130)
GLUCOSE SERPL-MCNC: 119 MG/DL (ref 65–99)
HCT VFR BLD AUTO: 37.8 % (ref 34–46.6)
HGB BLD-MCNC: 13 G/DL (ref 12–15.9)
HOLD SPECIMEN: NORMAL
HOLD SPECIMEN: NORMAL
IMM GRANULOCYTES # BLD AUTO: 0.05 10*3/MM3 (ref 0–0.05)
IMM GRANULOCYTES NFR BLD AUTO: 0.6 % (ref 0–0.5)
INR PPP: 1.01 (ref 0.9–1.1)
LYMPHOCYTES # BLD AUTO: 3.02 10*3/MM3 (ref 0.7–3.1)
LYMPHOCYTES NFR BLD AUTO: 35.4 % (ref 19.6–45.3)
MCH RBC QN AUTO: 30.9 PG (ref 26.6–33)
MCHC RBC AUTO-ENTMCNC: 34.4 G/DL (ref 31.5–35.7)
MCV RBC AUTO: 89.8 FL (ref 79–97)
MONOCYTES # BLD AUTO: 0.89 10*3/MM3 (ref 0.1–0.9)
MONOCYTES NFR BLD AUTO: 10.4 % (ref 5–12)
NEUTROPHILS NFR BLD AUTO: 4.19 10*3/MM3 (ref 1.7–7)
NEUTROPHILS NFR BLD AUTO: 49.2 % (ref 42.7–76)
NRBC BLD AUTO-RTO: 0 /100 WBC (ref 0–0.2)
PLATELET # BLD AUTO: 221 10*3/MM3 (ref 140–450)
PMV BLD AUTO: 10.6 FL (ref 6–12)
POTASSIUM SERPL-SCNC: 3.8 MMOL/L (ref 3.5–5.2)
PROT SERPL-MCNC: 7.2 G/DL (ref 6–8.5)
PROTHROMBIN TIME: 13.5 SECONDS (ref 12.1–15)
QT INTERVAL: 432 MS
RBC # BLD AUTO: 4.21 10*6/MM3 (ref 3.77–5.28)
SARS-COV-2 RNA RESP QL NAA+PROBE: NOT DETECTED
SODIUM SERPL-SCNC: 135 MMOL/L (ref 136–145)
WBC NRBC COR # BLD: 8.52 10*3/MM3 (ref 3.4–10.8)
WHOLE BLOOD HOLD SPECIMEN: NORMAL
WHOLE BLOOD HOLD SPECIMEN: NORMAL

## 2022-04-28 PROCEDURE — 71045 X-RAY EXAM CHEST 1 VIEW: CPT

## 2022-04-28 PROCEDURE — 70450 CT HEAD/BRAIN W/O DYE: CPT

## 2022-04-28 PROCEDURE — 87636 SARSCOV2 & INF A&B AMP PRB: CPT | Performed by: EMERGENCY MEDICINE

## 2022-04-28 PROCEDURE — A9577 INJ MULTIHANCE: HCPCS | Performed by: EMERGENCY MEDICINE

## 2022-04-28 PROCEDURE — 85025 COMPLETE CBC W/AUTO DIFF WBC: CPT | Performed by: EMERGENCY MEDICINE

## 2022-04-28 PROCEDURE — 85610 PROTHROMBIN TIME: CPT | Performed by: EMERGENCY MEDICINE

## 2022-04-28 PROCEDURE — 99284 EMERGENCY DEPT VISIT MOD MDM: CPT

## 2022-04-28 PROCEDURE — 93005 ELECTROCARDIOGRAM TRACING: CPT | Performed by: EMERGENCY MEDICINE

## 2022-04-28 PROCEDURE — 99283 EMERGENCY DEPT VISIT LOW MDM: CPT | Performed by: EMERGENCY MEDICINE

## 2022-04-28 PROCEDURE — 93010 ELECTROCARDIOGRAM REPORT: CPT | Performed by: INTERNAL MEDICINE

## 2022-04-28 PROCEDURE — 70553 MRI BRAIN STEM W/O & W/DYE: CPT

## 2022-04-28 PROCEDURE — 93005 ELECTROCARDIOGRAM TRACING: CPT

## 2022-04-28 PROCEDURE — 0 GADOBENATE DIMEGLUMINE 529 MG/ML SOLUTION: Performed by: EMERGENCY MEDICINE

## 2022-04-28 PROCEDURE — 36415 COLL VENOUS BLD VENIPUNCTURE: CPT

## 2022-04-28 PROCEDURE — 82962 GLUCOSE BLOOD TEST: CPT

## 2022-04-28 PROCEDURE — 25010000002 LORAZEPAM PER 2 MG: Performed by: EMERGENCY MEDICINE

## 2022-04-28 PROCEDURE — 96374 THER/PROPH/DIAG INJ IV PUSH: CPT

## 2022-04-28 PROCEDURE — 80053 COMPREHEN METABOLIC PANEL: CPT | Performed by: EMERGENCY MEDICINE

## 2022-04-28 RX ORDER — SODIUM CHLORIDE 0.9 % (FLUSH) 0.9 %
10 SYRINGE (ML) INJECTION AS NEEDED
Status: DISCONTINUED | OUTPATIENT
Start: 2022-04-28 | End: 2022-04-28 | Stop reason: HOSPADM

## 2022-04-28 RX ORDER — HYDROXYZINE HYDROCHLORIDE 10 MG/1
10 TABLET, FILM COATED ORAL EVERY 8 HOURS PRN
Qty: 15 TABLET | Refills: 0 | Status: SHIPPED | OUTPATIENT
Start: 2022-04-28 | End: 2022-05-16

## 2022-04-28 RX ORDER — ASPIRIN 325 MG
325 TABLET ORAL ONCE
Status: COMPLETED | OUTPATIENT
Start: 2022-04-28 | End: 2022-04-28

## 2022-04-28 RX ORDER — LORAZEPAM 2 MG/ML
1 INJECTION INTRAMUSCULAR ONCE
Status: COMPLETED | OUTPATIENT
Start: 2022-04-28 | End: 2022-04-28

## 2022-04-28 RX ADMIN — ASPIRIN 325 MG ORAL TABLET 325 MG: 325 PILL ORAL at 18:42

## 2022-04-28 RX ADMIN — GADOBENATE DIMEGLUMINE 12 ML: 529 INJECTION, SOLUTION INTRAVENOUS at 18:19

## 2022-04-28 RX ADMIN — LORAZEPAM 1 MG: 2 INJECTION INTRAMUSCULAR; INTRAVENOUS at 17:52

## 2022-05-12 ENCOUNTER — TRANSCRIBE ORDERS (OUTPATIENT)
Dept: BONE DENSITY | Facility: HOSPITAL | Age: 77
End: 2022-05-12

## 2022-05-12 DIAGNOSIS — Z78.0 ASYMPTOMATIC MENOPAUSAL STATE: Primary | ICD-10-CM

## 2022-05-13 ENCOUNTER — TELEPHONE (OUTPATIENT)
Dept: SURGERY | Facility: CLINIC | Age: 77
End: 2022-05-13

## 2022-05-13 NOTE — TELEPHONE ENCOUNTER
Provider:   Caller: ADAMA BOOKER  Relationship to Patient: SELF  Phone Number: [phone]  Reason for Call: PT CALLED IN TO CHECK WHEN HER LAST COLONOSCOPY WAS; CONFIRMED IT WAS 2019 AND SHE HAS F/U APPT 5.16.22 Monday; HOWEVER THERE MAYBE A DISCREPANCY IN HER CHART FOR RECALL INFO LISTED AS NOTIFY DATE 2024 AND HUB SUGGESTED TO KEEP HER APPT AS THIS MIGHT BE AN ERROR AND TOLD HER WE WOULD NOTIFY STAFF TO LOOK INTO IT.

## 2022-05-16 ENCOUNTER — OFFICE VISIT (OUTPATIENT)
Dept: SURGERY | Facility: CLINIC | Age: 77
End: 2022-05-16

## 2022-05-16 VITALS
HEART RATE: 72 BPM | BODY MASS INDEX: 27.75 KG/M2 | RESPIRATION RATE: 16 BRPM | DIASTOLIC BLOOD PRESSURE: 60 MMHG | SYSTOLIC BLOOD PRESSURE: 102 MMHG | HEIGHT: 61 IN | WEIGHT: 147 LBS

## 2022-05-16 DIAGNOSIS — R10.11 RUQ PAIN: Primary | ICD-10-CM

## 2022-05-16 PROCEDURE — 99213 OFFICE O/P EST LOW 20 MIN: CPT | Performed by: SURGERY

## 2022-05-16 RX ORDER — DULOXETIN HYDROCHLORIDE 60 MG/1
60 CAPSULE, DELAYED RELEASE ORAL DAILY
COMMUNITY

## 2022-05-16 RX ORDER — LEVOTHYROXINE SODIUM 88 UG/1
88 TABLET ORAL DAILY
COMMUNITY

## 2022-05-16 RX ORDER — ARIPIPRAZOLE 5 MG/1
5 TABLET ORAL DAILY
COMMUNITY

## 2022-05-16 NOTE — PROGRESS NOTES
Ashlie Levi 76 y.o. female presents TO DISCUSS C-SCOPE.  Per chart, pt is due 05/2024.  Pt reports + RUQ pain and would like to discuss eval of liver. Pt denies all c/o.   Chief Complaint   Patient presents with   • Abdominal Pain     RUQ             HPI   Above-noted agree.  Ashlie thought she was due for colonoscopy.  When she found out she was not she began to discuss issues she believes she is having with her liver.  She says she has having right upper quadrant pain.  She says that it comes about every other month and last for 1 to 2 weeks.  It is a transient ache and she will then occasionally have sharp pains.  She alternates between constipation and diarrhea.  She is not taking the Metamucil like we discussed.  She has no chest pain or shortness of breath.  She is tolerating a regular diet without nausea or vomiting.  She does not smoke or use tobacco products.      Review of Systems          Current Outpatient Medications:   •  amLODIPine (NORVASC) 5 MG tablet, Take 1 tablet by mouth once daily, Disp: 90 tablet, Rfl: 1  •  ARIPiprazole (ABILIFY) 5 MG tablet, Take 5 mg by mouth Daily., Disp: , Rfl:   •  aspirin 81 MG chewable tablet, Chew 81 mg Daily., Disp: , Rfl:   •  carvedilol (COREG) 6.25 MG tablet, Take 1 tablet by mouth twice daily, Disp: 180 tablet, Rfl: 1  •  clobetasol (TEMOVATE) 0.05 % ointment, To affected area twice a day for 4 weeks, then daily for 2 weeks, then twice a week as needed for itching, Disp: 60 g, Rfl: 1  •  DULoxetine (CYMBALTA) 60 MG capsule, Take 60 mg by mouth Daily., Disp: , Rfl:   •  irbesartan (AVAPRO) 300 MG tablet, TAKE 1 TABLET BY MOUTH ONCE DAILY AT NIGHT, Disp: 90 tablet, Rfl: 3  •  isosorbide mononitrate (ISMO,MONOKET) 20 MG tablet, Take 1 tablet by mouth twice daily, Disp: 180 tablet, Rfl: 1  •  levothyroxine (SYNTHROID, LEVOTHROID) 88 MCG tablet, Take 88 mcg by mouth Daily., Disp: , Rfl:   •  nitroglycerin (NITROSTAT) 0.4 MG SL tablet, Place 1 tablet under the tongue  Every 5 (Five) Minutes As Needed for Chest Pain. Take no more than 3 doses in 15 minutes and call EMS., Disp: 20 tablet, Rfl: 0  •  omeprazole (priLOSEC) 40 MG capsule, Take 1 capsule by mouth once daily, Disp: 30 capsule, Rfl: 0  •  spironolactone (ALDACTONE) 25 MG tablet, Take 1 tablet by mouth Daily., Disp: 90 tablet, Rfl: 0  •  Vitamin D, Cholecalciferol, (CHOLECALCIFEROL) 400 units tablet, Take 400 Units by mouth Daily., Disp: , Rfl:         No Known Allergies        Past Medical History:   Diagnosis Date   • Abnormal electrocardiogram    • Arthritis    • Chest discomfort    • Colon polyp    • Coronary artery disease    • Disease of thyroid gland    • GERD (gastroesophageal reflux disease)    • Hyperlipidemia    • Hypertension            Past Surgical History:   Procedure Laterality Date   • APPENDECTOMY     • CARDIAC SURGERY      3 stents   • CHOLECYSTECTOMY     • COLONOSCOPY N/A 7/13/2016    Procedure: COLONOSCOPY;  Surgeon: Giovani Avelar MD;  Location: [facility] OR;  Service:    • COLONOSCOPY N/A 6/13/2019    Procedure: Colonoscopy with possible biopsy or polypectomy;  Surgeon: Alfreda Soler DO;  Location: [facility] OR;  Service: Gastroenterology   • COLONOSCOPY N/A 5/18/2021    Procedure: Colonoscopy with polypectomy, biopsy;  Surgeon: Alfreda Soler DO;  Location: [facility] OR;  Service: Gastroenterology;  Laterality: N/A;  diverticulosis  cecal biopsy  right colon polyp  left colon polyps x2   • CORONARY STENT PLACEMENT     • ENDOSCOPY N/A 11/21/2017    Procedure: ESOPHAGOGASTRODUODENOSCOPY with CARIN test ;  Surgeon: Alfreda Soler DO;  Location: [facility] OR;  Service:    • ENDOSCOPY N/A 6/13/2019    Procedure: Esophagogastroduodenoscopy with possible biopsy, polypectomy, dilation;  Surgeon: Alfreda Soler DO;  Location: [facility] OR;  Service: Gastroenterology   • ENDOSCOPY N/A 5/18/2021    Procedure: Esophagogastroduodenoscopy with biopsies;  Surgeon: Alfreda Soler DO;   "Location: [facility] OR;  Service: Gastroenterology;  Laterality: N/A;  CARIN test  gastritis  duodenitis   • EYE SURGERY     • FOOT GANGLION EXCISION Left    • HEMORROIDECTOMY     • HERNIA REPAIR     • HYSTERECTOMY      GREG with OC age 30 for DUB   • OOPHORECTOMY      dx lsc , interval USO           Social History     Tobacco Use   • Smoking status: Former Smoker     Packs/day: 1.00     Years: 50.00     Pack years: 50.00     Quit date: 10/13/2006     Years since quitting: 15.6   • Smokeless tobacco: Never Used   • Tobacco comment: quit 2006   Vaping Use   • Vaping Use: Never used   Substance Use Topics   • Alcohol use: Yes     Comment: occasional   • Drug use: No             There is no immunization history on file for this patient.        Physical Exam  Vitals and nursing note reviewed.   Constitutional:       Appearance: Normal appearance.   HENT:      Head: Normocephalic and atraumatic.   Cardiovascular:      Rate and Rhythm: Normal rate and regular rhythm.   Pulmonary:      Effort: Pulmonary effort is normal.      Breath sounds: Normal breath sounds.   Abdominal:      General: Bowel sounds are normal.      Palpations: Abdomen is soft.   Musculoskeletal:         General: No swelling or tenderness.   Skin:     General: Skin is warm and dry.   Neurological:      General: No focal deficit present.      Mental Status: She is alert and oriented to person, place, and time.   Psychiatric:         Mood and Affect: Mood normal.         Behavior: Behavior normal.         Debilities/Disabilities Identified: None    Emotional Behavior: Appropriate      /60   Pulse 72   Resp 16   Ht 154.9 cm (61\")   Wt 66.7 kg (147 lb)   LMP  (LMP Unknown)   BMI 27.78 kg/m²         Diagnoses and all orders for this visit:    1. RUQ pain (Primary)    We will check a right upper quadrant ultrasound and obtain a CMP.    Thank you for allowing me to participate in the care of this interesting patient.        "

## 2022-05-17 DIAGNOSIS — R10.11 RUQ PAIN: ICD-10-CM

## 2022-05-17 DIAGNOSIS — R11.0 NAUSEA: ICD-10-CM

## 2022-05-17 DIAGNOSIS — R14.0 BLOATING: Primary | ICD-10-CM

## 2022-05-18 ENCOUNTER — TELEPHONE (OUTPATIENT)
Dept: CARDIOLOGY | Facility: CLINIC | Age: 77
End: 2022-05-18

## 2022-05-18 RX ORDER — AMLODIPINE BESYLATE 5 MG/1
TABLET ORAL
Qty: 90 TABLET | Refills: 0 | Status: SHIPPED | OUTPATIENT
Start: 2022-05-18 | End: 2022-08-04

## 2022-05-19 ENCOUNTER — APPOINTMENT (OUTPATIENT)
Dept: BONE DENSITY | Facility: HOSPITAL | Age: 77
End: 2022-05-19

## 2022-05-24 ENCOUNTER — LAB (OUTPATIENT)
Dept: LAB | Facility: HOSPITAL | Age: 77
End: 2022-05-24

## 2022-05-24 ENCOUNTER — APPOINTMENT (OUTPATIENT)
Dept: BONE DENSITY | Facility: HOSPITAL | Age: 77
End: 2022-05-24

## 2022-05-24 ENCOUNTER — HOSPITAL ENCOUNTER (OUTPATIENT)
Dept: ULTRASOUND IMAGING | Facility: HOSPITAL | Age: 77
Discharge: HOME | End: 2022-05-24

## 2022-05-24 DIAGNOSIS — R11.0 NAUSEA: ICD-10-CM

## 2022-05-24 DIAGNOSIS — R10.11 RUQ PAIN: ICD-10-CM

## 2022-05-24 DIAGNOSIS — R14.0 BLOATING: ICD-10-CM

## 2022-05-24 DIAGNOSIS — Z78.0 ASYMPTOMATIC MENOPAUSAL STATE: ICD-10-CM

## 2022-05-24 LAB
ALBUMIN SERPL-MCNC: 4.2 G/DL (ref 3.5–5.2)
ALBUMIN/GLOB SERPL: 1.2 G/DL
ALP SERPL-CCNC: 85 U/L (ref 39–117)
ALT SERPL W P-5'-P-CCNC: 27 U/L (ref 1–33)
ANION GAP SERPL CALCULATED.3IONS-SCNC: 10.2 MMOL/L (ref 5–15)
AST SERPL-CCNC: 30 U/L (ref 1–32)
BILIRUB SERPL-MCNC: 0.5 MG/DL (ref 0–1.2)
BUN SERPL-MCNC: 13 MG/DL (ref 8–23)
BUN/CREAT SERPL: 17.6 (ref 7–25)
CALCIUM SPEC-SCNC: 10.1 MG/DL (ref 8.6–10.5)
CHLORIDE SERPL-SCNC: 101 MMOL/L (ref 98–107)
CO2 SERPL-SCNC: 26.8 MMOL/L (ref 22–29)
CREAT SERPL-MCNC: 0.74 MG/DL (ref 0.57–1)
EGFRCR SERPLBLD CKD-EPI 2021: 82.9 ML/MIN/1.73
GLOBULIN UR ELPH-MCNC: 3.4 GM/DL
GLUCOSE SERPL-MCNC: 114 MG/DL (ref 65–99)
POTASSIUM SERPL-SCNC: 4.1 MMOL/L (ref 3.5–5.2)
PROT SERPL-MCNC: 7.6 G/DL (ref 6–8.5)
SODIUM SERPL-SCNC: 138 MMOL/L (ref 136–145)

## 2022-05-24 PROCEDURE — 76705 ECHO EXAM OF ABDOMEN: CPT

## 2022-05-24 PROCEDURE — 36415 COLL VENOUS BLD VENIPUNCTURE: CPT

## 2022-05-24 PROCEDURE — 80053 COMPREHEN METABOLIC PANEL: CPT

## 2022-05-24 PROCEDURE — 77080 DXA BONE DENSITY AXIAL: CPT

## 2022-06-29 ENCOUNTER — TRANSCRIBE ORDERS (OUTPATIENT)
Dept: MEDSURG UNIT | Facility: HOSPITAL | Age: 77
End: 2022-06-29

## 2022-06-29 ENCOUNTER — LAB (OUTPATIENT)
Dept: LAB | Facility: HOSPITAL | Age: 77
End: 2022-06-29

## 2022-06-29 ENCOUNTER — OFFICE VISIT (OUTPATIENT)
Dept: CARDIOLOGY | Facility: CLINIC | Age: 77
End: 2022-06-29

## 2022-06-29 VITALS
HEIGHT: 61 IN | SYSTOLIC BLOOD PRESSURE: 118 MMHG | BODY MASS INDEX: 27.94 KG/M2 | WEIGHT: 148 LBS | DIASTOLIC BLOOD PRESSURE: 62 MMHG | HEART RATE: 67 BPM

## 2022-06-29 DIAGNOSIS — I25.118 CORONARY ARTERY DISEASE OF NATIVE ARTERY OF NATIVE HEART WITH STABLE ANGINA PECTORIS: Chronic | ICD-10-CM

## 2022-06-29 DIAGNOSIS — E03.9 HYPOTHYROIDISM, UNSPECIFIED TYPE: Primary | ICD-10-CM

## 2022-06-29 DIAGNOSIS — I10 PRIMARY HYPERTENSION: Primary | Chronic | ICD-10-CM

## 2022-06-29 DIAGNOSIS — E03.9 HYPOTHYROIDISM, UNSPECIFIED TYPE: ICD-10-CM

## 2022-06-29 DIAGNOSIS — E78.2 MIXED HYPERLIPIDEMIA: Chronic | ICD-10-CM

## 2022-06-29 LAB
T4 FREE SERPL-MCNC: 1.32 NG/DL (ref 0.93–1.7)
TSH SERPL DL<=0.05 MIU/L-ACNC: 6.53 UIU/ML (ref 0.27–4.2)

## 2022-06-29 PROCEDURE — 84443 ASSAY THYROID STIM HORMONE: CPT

## 2022-06-29 PROCEDURE — 99214 OFFICE O/P EST MOD 30 MIN: CPT | Performed by: INTERNAL MEDICINE

## 2022-06-29 PROCEDURE — 84439 ASSAY OF FREE THYROXINE: CPT

## 2022-06-29 PROCEDURE — 36415 COLL VENOUS BLD VENIPUNCTURE: CPT

## 2022-06-29 RX ORDER — ATORVASTATIN CALCIUM 20 MG/1
TABLET, FILM COATED ORAL
COMMUNITY
Start: 2022-06-15

## 2022-06-29 RX ORDER — LEVOTHYROXINE SODIUM 100 UG/1
TABLET ORAL
COMMUNITY
Start: 2022-05-18 | End: 2022-06-29

## 2022-06-29 NOTE — PROGRESS NOTES
Subjective:     Encounter Date: 8/15/2019      Patient ID: Ashlie Levi is a 76 y.o. female.    Chief Complaint: CAD  Coronary Artery Disease  Pertinent negatives include no muscle weakness.       Dear Dr. Conway,    I had the pleasure seeing this patient in the office today.  She comes in for 1 year follow-up. She has a history of CAD and had a stent placed in her LAD in 2006.  This was performed by Dr. Caldwell at [facility].  She had a stress test performed  5/2019 that demonstrated normal left ventricular and no ischemia.    Today she feels fine without any complaints.  She denies any chest pain, pressure, tightness, squeezing, or heartburn.  She has not experienced any feeling of palpitations, tachycardia or heart racing and no presyncope or syncope.  There have not been any problems with dizziness or lightheadedness.  There have not been any orthopnea or PND, and no problems with lower extremity edema.  She denies any shortness of breath at rest or with activity and has not had any wheezing.  She has not had any problems with unexplained nausea or vomiting. She has continued to perform daily activities of living without any specific problem or change in the level of activity.  She has not been recently hospitalized for any reason.    The following portions of the patient's history were reviewed and updated as appropriate: allergies, current medications, past family history, past medical history, past social history, past surgical history and problem list.    Past Medical History:   Diagnosis Date   • Abnormal electrocardiogram    • Arthritis    • Chest discomfort    • Colon polyp    • Coronary artery disease    • Disease of thyroid gland    • GERD (gastroesophageal reflux disease)    • Hyperlipidemia    • Hypertension        Past Surgical History:   Procedure Laterality Date   • APPENDECTOMY     • CARDIAC SURGERY      3 stents   • CHOLECYSTECTOMY     • COLONOSCOPY N/A 7/13/2016    Procedure:  "COLONOSCOPY;  Surgeon: Giovani Avelar MD;  Location:  [facility] OR;  Service:    • COLONOSCOPY N/A 6/13/2019    Procedure: Colonoscopy with possible biopsy or polypectomy;  Surgeon: Alfreda Soler DO;  Location:  [facility] OR;  Service: Gastroenterology   • COLONOSCOPY N/A 5/18/2021    Procedure: Colonoscopy with polypectomy, biopsy;  Surgeon: Alfreda Soler DO;  Location:  [facility] OR;  Service: Gastroenterology;  Laterality: N/A;  diverticulosis  cecal biopsy  right colon polyp  left colon polyps x2   • CORONARY STENT PLACEMENT     • ENDOSCOPY N/A 11/21/2017    Procedure: ESOPHAGOGASTRODUODENOSCOPY with CARIN test ;  Surgeon: Alfreda Soler DO;  Location:  [facility] OR;  Service:    • ENDOSCOPY N/A 6/13/2019    Procedure: Esophagogastroduodenoscopy with possible biopsy, polypectomy, dilation;  Surgeon: Alfreda Soler DO;  Location:  [facility] OR;  Service: Gastroenterology   • ENDOSCOPY N/A 5/18/2021    Procedure: Esophagogastroduodenoscopy with biopsies;  Surgeon: Alfreda Soler DO;  Location:  [facility] OR;  Service: Gastroenterology;  Laterality: N/A;  CARIN test  gastritis  duodenitis   • EYE SURGERY     • FOOT GANGLION EXCISION Left    • HEMORROIDECTOMY     • HERNIA REPAIR     • HYSTERECTOMY      GREG with OC age 30 for DUB   • OOPHORECTOMY      dx lsc , interval USO           Procedures       Objective:     Vitals:    06/29/22 1032   BP: 118/62   Pulse: 67   Weight: 67.1 kg (148 lb)   Height: 154.9 cm (61\")           General Appearance:    Alert, cooperative, in no acute distress   Head:    Normocephalic, without obvious abnormality, atraumatic   Eyes:            Lids and lashes normal, conjunctivae and sclerae normal, no   icterus, no pallor, corneas clear, PERRLA   Ears:    Ears appear intact with no abnormalities noted   Throat:   No oral lesions, no thrush, oral mucosa moist   Neck:   No adenopathy, supple, trachea midline, no thyromegaly, no   carotid bruit, no JVD   Back:     No kyphosis " present, no scoliosis present, no skin lesions, erythema or scars, no tenderness to percussion or palpation, range of motion normal   Lungs:     Clear to auscultation,respirations regular, even and unlabored    Heart:    Regular rhythm and normal rate, normal S1 and S2, no murmur, no gallop, no rub, no click   Chest Wall:    No abnormalities observed   Abdomen:     Normal bowel sounds, no masses, no organomegaly, soft        non-tender, non-distended, no guarding, no rebound  tenderness   Extremities:   Moves all extremities well, no edema, no cyanosis, no redness   Pulses:   Pulses palpable and equal bilaterally. Normal radial, carotid, femoral, dorsalis pedis and posterior tibial pulses bilaterally. Normal abdominal aorta   Skin:  Psychiatric:   No bleeding, bruising or rash    Alert and oriented x 3, normal mood and affect       Lab Review:                Lab Results   Component Value Date    GLUCOSE 114 (H) 05/24/2022    BUN 13 05/24/2022    CREATININE 0.74 05/24/2022    EGFRIFNONA 57 (L) 11/21/2021    BCR 17.6 05/24/2022    K 4.1 05/24/2022    CO2 26.8 05/24/2022    CALCIUM 10.1 05/24/2022    ALBUMIN 4.20 05/24/2022    AST 30 05/24/2022    ALT 27 05/24/2022               Assessment:          Diagnosis Plan   1. Primary hypertension     2. Mixed hyperlipidemia     3. Coronary artery disease of native artery of native heart with stable angina pectoris (HCC)            Plan:       1. Coronary Artery Disease  Assessment  • The patient has no angina    Plan  • Lifestyle modifications discussed include adhering to a heart healthy diet, avoidance of tobacco products, maintenance of a healthy weight, medication compliance, regular exercise and regular monitoring of cholesterol and blood pressure    Subjective - Objective  • There has been a previous stent procedure using HERBERTH  • Current antiplatelet therapy includes aspirin 81 mg    2.  Hyperlipidemia-continue current diet and therapy, AST, ALT reviewed  3.   Hypertensive heart disease-blood pressure well controlled on amlodipine carvedilol irbesartan, BUN and creatinine are reviewed    Thank you very much for allowing us to participate in the care of this pleasant patient.  Please don't hesitate to call if I can be of assistance in any way.      Current Outpatient Medications:   •  amLODIPine (NORVASC) 5 MG tablet, Take 1 tablet by mouth once daily, Disp: 90 tablet, Rfl: 0  •  ARIPiprazole (ABILIFY) 5 MG tablet, Take 5 mg by mouth Daily., Disp: , Rfl:   •  aspirin 81 MG chewable tablet, Chew 81 mg Daily., Disp: , Rfl:   •  carvedilol (COREG) 6.25 MG tablet, Take 1 tablet by mouth twice daily, Disp: 180 tablet, Rfl: 1  •  clobetasol (TEMOVATE) 0.05 % ointment, To affected area twice a day for 4 weeks, then daily for 2 weeks, then twice a week as needed for itching, Disp: 60 g, Rfl: 1  •  DULoxetine (CYMBALTA) 60 MG capsule, Take 60 mg by mouth Daily., Disp: , Rfl:   •  irbesartan (AVAPRO) 300 MG tablet, TAKE 1 TABLET BY MOUTH ONCE DAILY AT NIGHT, Disp: 90 tablet, Rfl: 3  •  isosorbide mononitrate (ISMO,MONOKET) 20 MG tablet, Take 1 tablet by mouth twice daily, Disp: 180 tablet, Rfl: 1  •  levothyroxine (SYNTHROID, LEVOTHROID) 88 MCG tablet, Take 88 mcg by mouth Daily., Disp: , Rfl:   •  nitroglycerin (NITROSTAT) 0.4 MG SL tablet, Place 1 tablet under the tongue Every 5 (Five) Minutes As Needed for Chest Pain. Take no more than 3 doses in 15 minutes and call EMS., Disp: 20 tablet, Rfl: 0  •  omeprazole (priLOSEC) 40 MG capsule, Take 1 capsule by mouth once daily, Disp: 30 capsule, Rfl: 0  •  spironolactone (ALDACTONE) 25 MG tablet, Take 1 tablet by mouth Daily., Disp: 90 tablet, Rfl: 0  •  atorvastatin (LIPITOR) 20 MG tablet, , Disp: , Rfl:   •  Vitamin D, Cholecalciferol, (CHOLECALCIFEROL) 400 units tablet, Take 400 Units by mouth Daily., Disp: , Rfl:          EMR Dragon/Transcription disclaimer:    Much of this encounter note is an electronic transcription/translation  of spoken language to printed text. The electronic translation of spoken language may permit erroneous, or at times, nonsensical words or phrases to be inadvertently transcribed; Although I have reviewed the note for such errors, some may still exist.

## 2022-07-11 RX ORDER — IRBESARTAN 300 MG/1
TABLET ORAL
Qty: 90 TABLET | Refills: 0 | Status: SHIPPED | OUTPATIENT
Start: 2022-07-11 | End: 2022-10-05

## 2022-07-11 RX ORDER — ISOSORBIDE MONONITRATE 20 MG/1
TABLET ORAL
Qty: 180 TABLET | Refills: 0 | Status: SHIPPED | OUTPATIENT
Start: 2022-07-11 | End: 2022-10-05

## 2022-07-11 RX ORDER — CARVEDILOL 6.25 MG/1
TABLET ORAL
Qty: 180 TABLET | Refills: 0 | Status: SHIPPED | OUTPATIENT
Start: 2022-07-11 | End: 2022-10-05

## 2022-08-04 RX ORDER — AMLODIPINE BESYLATE 5 MG/1
TABLET ORAL
Qty: 90 TABLET | Refills: 2 | Status: SHIPPED | OUTPATIENT
Start: 2022-08-04

## 2022-09-13 ENCOUNTER — TRANSCRIBE ORDERS (OUTPATIENT)
Dept: ADMINISTRATIVE | Facility: HOSPITAL | Age: 77
End: 2022-09-13

## 2022-09-13 DIAGNOSIS — Z12.31 VISIT FOR SCREENING MAMMOGRAM: Primary | ICD-10-CM

## 2022-10-05 RX ORDER — ISOSORBIDE MONONITRATE 20 MG/1
TABLET ORAL
Qty: 180 TABLET | Refills: 2 | Status: SHIPPED | OUTPATIENT
Start: 2022-10-05 | End: 2023-03-28

## 2022-10-05 RX ORDER — CARVEDILOL 6.25 MG/1
TABLET ORAL
Qty: 180 TABLET | Refills: 2 | Status: SHIPPED | OUTPATIENT
Start: 2022-10-05 | End: 2023-03-28

## 2022-10-05 RX ORDER — IRBESARTAN 300 MG/1
TABLET ORAL
Qty: 90 TABLET | Refills: 2 | Status: SHIPPED | OUTPATIENT
Start: 2022-10-05

## 2022-10-25 ENCOUNTER — HOSPITAL ENCOUNTER (OUTPATIENT)
Dept: MAMMOGRAPHY | Facility: HOSPITAL | Age: 77
Discharge: HOME | End: 2022-10-25
Admitting: INTERNAL MEDICINE

## 2022-10-25 DIAGNOSIS — Z12.31 VISIT FOR SCREENING MAMMOGRAM: ICD-10-CM

## 2022-10-25 PROCEDURE — 77067 SCR MAMMO BI INCL CAD: CPT

## 2022-10-25 PROCEDURE — 77063 BREAST TOMOSYNTHESIS BI: CPT

## 2022-10-26 RX ORDER — SPIRONOLACTONE 25 MG/1
TABLET ORAL
Qty: 90 TABLET | Refills: 0 | Status: SHIPPED | OUTPATIENT
Start: 2022-10-26

## 2023-03-28 RX ORDER — CARVEDILOL 6.25 MG/1
TABLET ORAL
Qty: 180 TABLET | Refills: 0 | Status: SHIPPED | OUTPATIENT
Start: 2023-03-28

## 2023-03-28 RX ORDER — ISOSORBIDE MONONITRATE 20 MG/1
TABLET ORAL
Qty: 180 TABLET | Refills: 0 | Status: SHIPPED | OUTPATIENT
Start: 2023-03-28

## 2023-03-28 NOTE — TELEPHONE ENCOUNTER
Rx Refill Note  Requested Prescriptions     Pending Prescriptions Disp Refills   • isosorbide mononitrate (ISMO,MONOKET) 20 MG tablet [Pharmacy Med Name: Isosorbide Mononitrate 20 MG Oral Tablet] 180 tablet 0     Sig: Take 1 tablet by mouth twice daily   • carvedilol (COREG) 6.25 MG tablet [Pharmacy Med Name: Carvedilol 6.25 MG Oral Tablet] 180 tablet 0     Sig: Take 1 tablet by mouth twice daily      Last office visit with prescribing clinician: 6/29/2022   Last telemedicine visit with prescribing clinician: Visit date not found   Next office visit with prescribing clinician: 7/5/2023                         Would you like a call back once the refill request has been completed: [] Yes [] No    If the office needs to give you a call back, can they leave a voicemail: [] Yes [] No    Marlene Watson MA  03/28/23, 10:25 EDT

## 2023-04-25 ENCOUNTER — OFFICE VISIT (OUTPATIENT)
Dept: ORTHOPEDIC SURGERY | Facility: CLINIC | Age: 78
End: 2023-04-25
Payer: MEDICARE

## 2023-04-25 VITALS
HEIGHT: 61 IN | HEART RATE: 65 BPM | SYSTOLIC BLOOD PRESSURE: 94 MMHG | DIASTOLIC BLOOD PRESSURE: 53 MMHG | BODY MASS INDEX: 29.83 KG/M2 | WEIGHT: 158 LBS

## 2023-04-25 DIAGNOSIS — M16.12 PRIMARY OSTEOARTHRITIS OF LEFT HIP: ICD-10-CM

## 2023-04-25 DIAGNOSIS — M16.11 PRIMARY OSTEOARTHRITIS OF RIGHT HIP: Primary | ICD-10-CM

## 2023-04-25 RX ORDER — CELECOXIB 200 MG/1
CAPSULE ORAL
COMMUNITY
Start: 2023-04-14

## 2023-04-25 NOTE — PROGRESS NOTES
Subjective:     Patient ID: Ashlie Levi is a 77 y.o. female.    Chief Complaint:    History of Present Illness  Ashlie Levi presents to clinic today for evaluation of right greater than left hip pain.  The patient states the pain has been ongoing for quite some time.  No recently living down in [place] and she underwent bilateral intra-articular hip injections.  She states that this alleviated all of her symptoms from the time she had it done on 2022 until about 2 weeks ago.  The patient states that the pain is located anteriorly in the groin and posterior laterally in the buttocks and radiating down the posterior lateral aspect of bilateral legs.  She states she has difficult time with shoes and socks getting up from a low chair.  Patient states that her groin pain is worse when walking and her buttock pain is worse when sitting.  She is also tried Tylenol which has not adequately alleviated her symptoms.     Social History     Occupational History   • Not on file   Tobacco Use   • Smoking status: Former     Packs/day: 1.00     Years: 50.00     Pack years: 50.00     Types: Cigarettes     Quit date: 10/13/2006     Years since quittin.5   • Smokeless tobacco: Never   • Tobacco comments:     quit 2006   Vaping Use   • Vaping Use: Never used   Substance and Sexual Activity   • Alcohol use: Yes     Comment: occasional   • Drug use: No   • Sexual activity: Not Currently     Partners: Male     Birth control/protection: Surgical, Post-menopausal     Comment: GREG with OC      Past Medical History:   Diagnosis Date   • Abnormal electrocardiogram    • Arthritis    • Chest discomfort    • Colon polyp    • Coronary artery disease    • Disease of thyroid gland    • GERD (gastroesophageal reflux disease)    • Hyperlipidemia    • Hypertension      Past Surgical History:   Procedure Laterality Date   • APPENDECTOMY     • CARDIAC SURGERY      3 stents   • CHOLECYSTECTOMY     • COLONOSCOPY N/A 2016    Procedure:  "COLONOSCOPY;  Surgeon: Giovani Avelar MD;  Location:  [facility] OR;  Service:    • COLONOSCOPY N/A 6/13/2019    Procedure: Colonoscopy with possible biopsy or polypectomy;  Surgeon: Alfreda Soler DO;  Location:  [facility] OR;  Service: Gastroenterology   • COLONOSCOPY N/A 5/18/2021    Procedure: Colonoscopy with polypectomy, biopsy;  Surgeon: Alfreda Soler DO;  Location:  [facility] OR;  Service: Gastroenterology;  Laterality: N/A;  diverticulosis  cecal biopsy  right colon polyp  left colon polyps x2   • CORONARY STENT PLACEMENT     • ENDOSCOPY N/A 11/21/2017    Procedure: ESOPHAGOGASTRODUODENOSCOPY with CARIN test ;  Surgeon: Alfreda Soler DO;  Location:  [facility] OR;  Service:    • ENDOSCOPY N/A 6/13/2019    Procedure: Esophagogastroduodenoscopy with possible biopsy, polypectomy, dilation;  Surgeon: Alfreda Soler DO;  Location:  [facility] OR;  Service: Gastroenterology   • ENDOSCOPY N/A 5/18/2021    Procedure: Esophagogastroduodenoscopy with biopsies;  Surgeon: Alfreda Soler DO;  Location:  [facility] OR;  Service: Gastroenterology;  Laterality: N/A;  CARIN test  gastritis  duodenitis   • EYE SURGERY     • FOOT GANGLION EXCISION Left    • HEMORROIDECTOMY     • HERNIA REPAIR     • HYSTERECTOMY      GREG with OC age 30 for DUB   • OOPHORECTOMY      dx lsc , interval USO       Family History   Problem Relation Age of Onset   • Hypertension Mother    • Stroke Mother    • Diabetes Father    • Hypertension Father    • Heart disease Father    • Stroke Father    • Heart disease Brother    • Colon cancer Brother         dx > 50   • Breast cancer Neg Hx    • Ovarian cancer Neg Hx    • Deep vein thrombosis Neg Hx    • Pulmonary embolism Neg Hx                  Objective:  Vitals:    04/25/23 1316   Weight: 71.7 kg (158 lb)   Height: 154.9 cm (61\")         04/25/23  1316   Weight: 71.7 kg (158 lb)     Body mass index is 29.85 kg/m².        Right Hip Exam     Tenderness   The patient is experiencing tenderness " in the anterior and posterior.    Range of Motion   Flexion: 90   External rotation: 30   Internal rotation: 5     Muscle Strength   Flexion: 5/5     Tests   LAKSHMI: positive  Fadir:  Positive FADIR test    Other   Erythema: absent  Scars: absent  Sensation: normal  Pulse: present      Left Hip Exam     Tenderness   The patient is experiencing tenderness in the anterior and posterior.    Range of Motion   Flexion: 100   External rotation: 40   Internal rotation: 10     Muscle Strength   Flexion: 5/5     Tests   LAKSHMI: negative  Fadir:  Negative FADIR test    Other   Erythema: absent  Scars: absent  Sensation: normal  Pulse: present               Imaging: AP pelvis and lateral of bilateral hips was ordered and reviewed by myself in the office today  Indication: Right greater than left hip pain  Findings: X-rays demonstrate moderate right hip osteoarthritis with joint space narrowing subchondral acetabular cystic changes and sclerosis.  Left hip joint space demonstrates mild narrowing without significant degenerative changes.  No acute fracture dislocation or subluxation  Comparative studies: None    Assessment:        1. Bilateral hip pain           Plan:          1. Discussed treatment options at length with patient at today's visit.  I discussed with the patient that she has developed right greater than left hip osteoarthritis. I discussed with the patient that the mainstays of conservative treatment for hip OA include physical therapy, nonsteroidal anti-inflammatories, injections, activity modification, and home exercises.  The patient states that they  would like to try another round of intra-articular corticosteroid injections definitely for the right and possibly for the left as well since she had roughly 4 months of 100% pain relief from the last round in [place].  I discussed disease progression and progression of arthritis which is inevitable.  I discussed with her that at this point time we are treating the  symptoms of the arthritis which is mainly pain weakness and stiffness until ultimately she may decide to proceed with hip replacement surgery.  The patient is not interested in surgery at this point time which I think is reasonable.  I will refer the patient to Dr. Storey for a right hip and possible left hip intra-articular corticosteroid injection.  I will plan to see the patient back in 6 weeks for follow-up  2. Follow up: 6 weeks with AP and lateral of the lumbar spine      Ashlie Levi was in agreement with plan and had all questions answered.     Medications:  No orders of the defined types were placed in this encounter.      Followup:  No follow-ups on file.    Diagnoses and all orders for this visit:    1. Bilateral hip pain (Primary)  -     XR Hips Bilateral With or Without Pelvis 2 View          Dictated utilizing Dragon dictation

## 2023-05-08 ENCOUNTER — OFFICE VISIT (OUTPATIENT)
Dept: SPORTS MEDICINE | Facility: CLINIC | Age: 78
End: 2023-05-08
Payer: MEDICARE

## 2023-05-08 VITALS
HEART RATE: 67 BPM | WEIGHT: 158 LBS | SYSTOLIC BLOOD PRESSURE: 110 MMHG | HEIGHT: 61 IN | DIASTOLIC BLOOD PRESSURE: 70 MMHG | OXYGEN SATURATION: 97 % | RESPIRATION RATE: 16 BRPM | BODY MASS INDEX: 29.83 KG/M2

## 2023-05-08 DIAGNOSIS — M16.0 PRIMARY OSTEOARTHRITIS OF BOTH HIPS: Primary | ICD-10-CM

## 2023-05-08 RX ORDER — TRIAMCINOLONE ACETONIDE 40 MG/ML
80 INJECTION, SUSPENSION INTRA-ARTICULAR; INTRAMUSCULAR ONCE
Status: SHIPPED | OUTPATIENT
Start: 2023-05-08

## 2023-05-08 NOTE — PROGRESS NOTES
"Here for bilateral hip injections requested by Dr. Wallace    Ultrasound-Guided Hip Injection Procedure Note    Bilateral hip injection was discussed with the patient in detail, including indication, risks, benefits, and alternatives. Verbal consent was given for the procedure. Injection was performed by physician.  Injection site was identified by ultrasound examination, then cleaned with Betadine and alcohol swabs. Prior to needle insertion, ethyl chloride spray was used for surface anesthesia. Sterile technique was used. Ultrasound guidance was indicated due to proximity of neurovascular structures and injection accuracy.  A 22-gauge, 3.5\" spinal needle was guided to the anterior joint capsule under continuous direct ultrasound visualization. Injectate was seen filing the capsule and passed without difficulty. The needle was removed and a simple bandage was applied. The procedure was tolerated well without difficulty.    Injection mixture:  1% lidocaine without epinephrine: 2 mL  40 mg/mL triamcinolone acetonide: 1 mL       Diagnoses and all orders for this visit:    Primary osteoarthritis of both hips  -     triamcinolone acetonide (KENALOG-40) injection 80 mg       Tolerated well. F/u with Dr. Wallace as planned; pt wants to discuss pros/cons of waiting/injecting vs moving forward with joint replacement.   "

## 2023-05-12 ENCOUNTER — PATIENT ROUNDING (BHMG ONLY) (OUTPATIENT)
Dept: SPORTS MEDICINE | Facility: CLINIC | Age: 78
End: 2023-05-12
Payer: MEDICARE

## 2023-05-12 NOTE — PROGRESS NOTES
May 12, 2023    A Entrec[employer] Message has been sent to the patient for PATIENT ROUNDING with [facility]

## 2023-05-15 RX ORDER — SPIRONOLACTONE 25 MG/1
TABLET ORAL
Qty: 90 TABLET | Refills: 0 | Status: SHIPPED | OUTPATIENT
Start: 2023-05-15

## 2023-06-02 RX ORDER — AMLODIPINE BESYLATE 5 MG/1
TABLET ORAL
Qty: 90 TABLET | Refills: 0 | Status: SHIPPED | OUTPATIENT
Start: 2023-06-02

## 2023-06-13 ENCOUNTER — OFFICE VISIT (OUTPATIENT)
Dept: ORTHOPEDIC SURGERY | Facility: CLINIC | Age: 78
End: 2023-06-13
Payer: MEDICARE

## 2023-06-13 VITALS — BODY MASS INDEX: 29.83 KG/M2 | WEIGHT: 158 LBS | HEIGHT: 61 IN

## 2023-06-13 DIAGNOSIS — M16.11 PRIMARY OSTEOARTHRITIS OF RIGHT HIP: Primary | ICD-10-CM

## 2023-06-13 DIAGNOSIS — M16.12 PRIMARY OSTEOARTHRITIS OF LEFT HIP: ICD-10-CM

## 2023-06-13 RX ORDER — LEVOTHYROXINE SODIUM 112 UG/1
1 TABLET ORAL DAILY
COMMUNITY
Start: 2023-05-23

## 2023-06-13 RX ORDER — MELOXICAM 7.5 MG/1
7.5 TABLET ORAL DAILY
Qty: 30 TABLET | Refills: 0 | Status: SHIPPED | OUTPATIENT
Start: 2023-06-13

## 2023-06-13 NOTE — PROGRESS NOTES
Subjective:     Patient ID: Ashlie Levi is a 77 y.o. female.    Chief Complaint:    History of Present Illness  Ashlie Levi returns  to clinic today for evaluation of right and left anterior groin pain and posterior buttock pain.  The patient was seen by myself in April and at that time she had had an intra-articular injection of bilateral hips in [place] which helped significantly.  She was referred to Dr. Storey for intra-articular hip injections.  She states that they did not last even more than a few days.  She returns today for further evaluation and management.  She states she is not able to walk significant distances due to pain and tiredness.  She states she has to sit down and the pain resolves.     Social History     Occupational History    Not on file   Tobacco Use    Smoking status: Former     Packs/day: 1.00     Years: 50.00     Pack years: 50.00     Types: Cigarettes     Quit date: 10/13/2006     Years since quittin.6    Smokeless tobacco: Never    Tobacco comments:     quit 2006   Vaping Use    Vaping Use: Never used   Substance and Sexual Activity    Alcohol use: Yes     Comment: occasional    Drug use: No    Sexual activity: Not Currently     Partners: Male     Birth control/protection: Surgical, Post-menopausal     Comment: GREG with OC      Past Medical History:   Diagnosis Date    Abnormal electrocardiogram     Arthritis     Chest discomfort     Colon polyp     Coronary artery disease     Disease of thyroid gland     GERD (gastroesophageal reflux disease)     Hyperlipidemia     Hypertension      Past Surgical History:   Procedure Laterality Date    APPENDECTOMY      CARDIAC SURGERY      3 stents    CHOLECYSTECTOMY      COLONOSCOPY N/A 2016    Procedure: COLONOSCOPY;  Surgeon: Giovani Avelar MD;  Location: [facility];  Service:     COLONOSCOPY N/A 2019    Procedure: Colonoscopy with possible biopsy or polypectomy;  Surgeon: Alfreda Soler DO;  Location: [facility] OR;  " Service: Gastroenterology    COLONOSCOPY N/A 5/18/2021    Procedure: Colonoscopy with polypectomy, biopsy;  Surgeon: Alfreda Soler DO;  Location: [facility] OR;  Service: Gastroenterology;  Laterality: N/A;  diverticulosis  cecal biopsy  right colon polyp  left colon polyps x2    CORONARY STENT PLACEMENT      ENDOSCOPY N/A 11/21/2017    Procedure: ESOPHAGOGASTRODUODENOSCOPY with CARIN test ;  Surgeon: Alfreda Soler DO;  Location:  [facility] OR;  Service:     ENDOSCOPY N/A 6/13/2019    Procedure: Esophagogastroduodenoscopy with possible biopsy, polypectomy, dilation;  Surgeon: Alfreda Soler DO;  Location: [facility] OR;  Service: Gastroenterology    ENDOSCOPY N/A 5/18/2021    Procedure: Esophagogastroduodenoscopy with biopsies;  Surgeon: Alfreda Soler DO;  Location:  [facility] OR;  Service: Gastroenterology;  Laterality: N/A;  CARIN test  gastritis  duodenitis    EYE SURGERY      FOOT GANGLION EXCISION Left     HEMORROIDECTOMY      HERNIA REPAIR      HYSTERECTOMY      GREG with OC age 30 for DUB    OOPHORECTOMY      dx lsc , interval USO       Family History   Problem Relation Age of Onset    Hypertension Mother     Stroke Mother     Diabetes Father     Hypertension Father     Heart disease Father     Stroke Father     Heart disease Brother     Colon cancer Brother         dx > 50    Breast cancer Neg Hx     Ovarian cancer Neg Hx     Deep vein thrombosis Neg Hx     Pulmonary embolism Neg Hx                  Objective:  Vitals:    06/13/23 1130   Weight: 71.7 kg (158 lb)   Height: 154.9 cm (60.98\")         06/13/23  1130   Weight: 71.7 kg (158 lb)     Body mass index is 29.87 kg/m².        Right Hip Exam     Tenderness   The patient is experiencing tenderness in the anterior and posterior.    Range of Motion   Flexion:  90   External rotation:  30   Internal rotation:  0     Muscle Strength   Flexion: 5/5     Tests   Fadir:  Positive FADIR test    Other   Erythema: absent  Scars: absent  Sensation: " normal  Pulse: present      Left Hip Exam     Tenderness   The patient is experiencing tenderness in the anterior and posterior.    Range of Motion   Flexion:  100   External rotation:  40   Internal rotation: 10     Muscle Strength   Flexion: 5/5     Other   Erythema: absent  Scars: absent  Sensation: normal  Pulse: present             Imaging: AP and lateral of the lumbar spine were ordered and reviewed by myself in the office today  Indication: Buttock pain  Findings: X-rays demonstrate severe multilevel spondylosis with no significant spondylolisthesis.  Additionally there is significant posterior facet arthritis at multiple levels.  Comparative studies: None    Assessment:        1. Primary osteoarthritis of right hip    2. Primary osteoarthritis of left hip           Plan:          Discussed treatment options at length with patient at today's visit.   I believe her picture is somewhat clouded by the fact that I think she has hip arthritis and superimposed lumbar radiculopathy.  I discussed with her that for her lumbar spine we do not treat spine conditions in this office so I could order a lumbar spine MRI and refer her to either pain management or spine surgery.  Her  is not interested in those treatment options for her.  Additionally for her hips I recommended physical therapy and a trial of meloxicam 7.5 mg p.o. daily.  The patient's  states that he does not think that she needs physical therapy and the patient states that she would like to try the meloxicam.  Additionally I discussed with her that if you are going to consider total hip arthroplasty in the setting of x-rays that are not overwhelmingly arthritic I would like to order an MRI to evaluate her cartilaginous surface to help discern whether this is coming from her back or her hips.  Follow up: 6 weeks with standing AP pelvis      Ashlie Levi was in agreement with plan and had all questions answered.     Medications:  No orders of  the defined types were placed in this encounter.      Followup:  No follow-ups on file.    Diagnoses and all orders for this visit:    1. Primary osteoarthritis of right hip (Primary)  -     XR Spine Lumbar 2 or 3 View    2. Primary osteoarthritis of left hip  -     XR Spine Lumbar 2 or 3 View          Dictated utilizing Dragon dictation

## 2023-07-25 ENCOUNTER — OFFICE VISIT (OUTPATIENT)
Dept: ORTHOPEDIC SURGERY | Facility: CLINIC | Age: 78
End: 2023-07-25
Payer: MEDICARE

## 2023-07-25 VITALS — WEIGHT: 160 LBS | HEIGHT: 60 IN | BODY MASS INDEX: 31.41 KG/M2

## 2023-07-25 DIAGNOSIS — R79.9 ABNORMAL FINDING OF BLOOD CHEMISTRY, UNSPECIFIED: ICD-10-CM

## 2023-07-25 DIAGNOSIS — M16.11 PRIMARY OSTEOARTHRITIS OF RIGHT HIP: Primary | ICD-10-CM

## 2023-07-25 DIAGNOSIS — M16.12 PRIMARY OSTEOARTHRITIS OF LEFT HIP: ICD-10-CM

## 2023-07-25 DIAGNOSIS — Z47.1 AFTERCARE FOLLOWING RIGHT HIP JOINT REPLACEMENT SURGERY: ICD-10-CM

## 2023-07-25 DIAGNOSIS — Z96.641 AFTERCARE FOLLOWING RIGHT HIP JOINT REPLACEMENT SURGERY: ICD-10-CM

## 2023-07-25 PROBLEM — M16.9 OA (OSTEOARTHRITIS) OF HIP: Status: ACTIVE | Noted: 2023-07-25

## 2023-07-25 RX ORDER — PREGABALIN 150 MG/1
150 CAPSULE ORAL ONCE
OUTPATIENT
Start: 2023-07-25 | End: 2023-07-25

## 2023-07-25 RX ORDER — MELOXICAM 7.5 MG/1
15 TABLET ORAL ONCE
OUTPATIENT
Start: 2023-07-25 | End: 2023-07-25

## 2023-07-25 RX ORDER — CHLORHEXIDINE GLUCONATE 500 MG/1
CLOTH TOPICAL ONCE
OUTPATIENT
Start: 2023-07-25

## 2023-07-25 NOTE — PROGRESS NOTES
Subjective:     Patient ID: Ashlie Levi is a 77 y.o. female.    Chief Complaint:    History of Present Illness  Ashlie Levi returns to clinic today for evaluation of right greater than left bilateral hip pain.  The patient states that since last visit she has noticed increasing groin pain on the right side and the left side.  She states that it is worse with activity and with leg movement and better with rest.  She denies any posterior buttock pain at this time.  She has had 2 intra-articular injections going forward which helped her pain significantly and well with Dr. Storey which only lasted about 2 weeks.  She states that it is significantly affecting her quality of life and she would like to have her hip replaced.  She is interested in getting both done at the same time but I discussed with her that I would not recommend that.     Social History     Occupational History    Not on file   Tobacco Use    Smoking status: Former     Packs/day: 1.00     Years: 50.00     Pack years: 50.00     Types: Cigarettes     Quit date: 10/13/2006     Years since quittin.7    Smokeless tobacco: Never    Tobacco comments:     quit    Vaping Use    Vaping Use: Never used   Substance and Sexual Activity    Alcohol use: Yes     Comment: occasional    Drug use: No    Sexual activity: Not Currently     Partners: Male     Birth control/protection: Surgical, Post-menopausal     Comment: GREG with OC      Past Medical History:   Diagnosis Date    Abnormal electrocardiogram     Arthritis     Chest discomfort     Colon polyp     Coronary artery disease     Disease of thyroid gland     GERD (gastroesophageal reflux disease)     Hyperlipidemia     Hypertension      Past Surgical History:   Procedure Laterality Date    APPENDECTOMY      CARDIAC SURGERY      3 stents    CHOLECYSTECTOMY      COLONOSCOPY N/A 2016    Procedure: COLONOSCOPY;  Surgeon: Giovani Avelar MD;  Location: [facility];  Service:     COLONOSCOPY N/A  "6/13/2019    Procedure: Colonoscopy with possible biopsy or polypectomy;  Surgeon: Alfreda Soler DO;  Location:  [facility] OR;  Service: Gastroenterology    COLONOSCOPY N/A 5/18/2021    Procedure: Colonoscopy with polypectomy, biopsy;  Surgeon: Alfreda Soler DO;  Location:  [facility] OR;  Service: Gastroenterology;  Laterality: N/A;  diverticulosis  cecal biopsy  right colon polyp  left colon polyps x2    CORONARY STENT PLACEMENT      ENDOSCOPY N/A 11/21/2017    Procedure: ESOPHAGOGASTRODUODENOSCOPY with CARIN test ;  Surgeon: Alfreda Soler DO;  Location: [facility] OR;  Service:     ENDOSCOPY N/A 6/13/2019    Procedure: Esophagogastroduodenoscopy with possible biopsy, polypectomy, dilation;  Surgeon: Alfreda Soler DO;  Location:  [facility] OR;  Service: Gastroenterology    ENDOSCOPY N/A 5/18/2021    Procedure: Esophagogastroduodenoscopy with biopsies;  Surgeon: Alfreda Soler DO;  Location:  [facility];  Service: Gastroenterology;  Laterality: N/A;  CARIN test  gastritis  duodenitis    EYE SURGERY      FOOT GANGLION EXCISION Left     HEMORROIDECTOMY      HERNIA REPAIR      HYSTERECTOMY      GREG with OC age 30 for DUB    OOPHORECTOMY      dx lsc , interval USO       Family History   Problem Relation Age of Onset    Hypertension Mother     Stroke Mother     Diabetes Father     Hypertension Father     Heart disease Father     Stroke Father     Heart disease Brother     Colon cancer Brother         dx > 50    Breast cancer Neg Hx     Ovarian cancer Neg Hx     Deep vein thrombosis Neg Hx     Pulmonary embolism Neg Hx                  Objective:  Vitals:    07/25/23 1111   Weight: 72.6 kg (160 lb)   Height: 152.4 cm (60\")         07/25/23  1111   Weight: 72.6 kg (160 lb)     Body mass index is 31.25 kg/m².        Right Hip Exam     Tenderness   The patient is experiencing tenderness in the anterior and lateral.    Range of Motion   Flexion:  110   External rotation:  30   Internal rotation:  5     Muscle " Strength   Flexion: 4/5     Tests   LAKSHMI: negative  Fadir:  Positive FADIR test    Other   Erythema: absent  Scars: absent  Sensation: normal  Pulse: present    Comments:  Positive Stinchfield and logroll      Left Hip Exam     Tenderness   The patient is experiencing tenderness in the anterior and lateral.    Range of Motion   Flexion:  110   External rotation:  30   Internal rotation: 5     Muscle Strength   Flexion: 5/5     Tests   LAKSHMI: negative  Fadir:  Positive FADIR test    Other   Erythema: absent  Scars: absent  Sensation: normal  Pulse: present    Comments:  Positive Stinchfield and logroll               Imaging: Standing AP pelvis was ordered and reviewed by myself in the office today  Indication: Preoperative templating  Findings: X-rays demonstrate progression of right and left hip arthritis as with progressive narrowing of her joint space superior medially as well as increased sclerosis at the superior lateral aspect of the acetabulum.  There is no acute fracture dislocation or subluxation noted  Comparative studies: X-rays on 4/25/2023    Assessment:        1. Primary osteoarthritis of right hip    2. Primary osteoarthritis of left hip           Plan:          She has failed conservative management of right hip osteoarthritis including multiple intra-articular injections, activity modification, nonsteroidal anti-inflammatories and low impact exercise.  We discussed further can conservative treatments including but not limited to physical therapy, intra-articular injections, nonsteroidal anti-inflammatories, topical creams, use of an assistive device, weight loss, and low impact exercises.  Shevoiced understanding of further nonoperative treatment options but She is interested in proceeding with hip replacement surgery.  I discussed with her that based on her age I would not recommend bilateral total hip arthroplasties.  I did discuss that I recommend waiting 3 months between sides.    The spectrum  of treatment options were discussed with the patient in detail including both the nonoperative and operative treatment modalities and their respective risks and benefits.  After thorough discussion, the patient has elected to undergo surgical treatment.  The details of the surgical procedure were explained including the location of probable incisions and a description of the likely implants to be used.  Models and diagrams were used as educational resources. The patient understands the likely convalescence after surgery, as well as the rehabilitation required.  We thoroughly discussed the risks, benefits, and alternatives to surgery.  The risks include but are not limited to the risk of infection, joint stiffness, blood clots (including DVT and/or pulmonary embolus along with the risk of death), neurologic and/or vascular injury, fracture, dislocation, nonunion, malunion, need for further surgery including hardware failure requiring revision, and continued pain.  It was explained that if tissue has been repaired or reconstructed, there is also a chance of failure which may require further management.  Following the completion of the discussion, the patient expressed understanding of this planned course of care, all their questions were answered and consent will be obtained preoperatively.    Plan for right total hip arthroplasty, anterior approach.    Implants: Depuy Actis Press Fit   Anticoagulation: Asprin  Antibiotics: Cefazolin  Admission Type: 23 HR Obs  TXA: Yes          Ashlie Levi was in agreement with plan and had all questions answered.     Medications:  No orders of the defined types were placed in this encounter.      Followup:  No follow-ups on file.    Diagnoses and all orders for this visit:    1. Primary osteoarthritis of right hip (Primary)  -     XR Pelvis 1 or 2 View    2. Primary osteoarthritis of left hip  -     XR Pelvis 1 or 2 View          Dictated utilizing Dragon dictation

## 2023-07-27 ENCOUNTER — TELEPHONE (OUTPATIENT)
Dept: CARDIOLOGY | Facility: CLINIC | Age: 78
End: 2023-07-27
Payer: MEDICARE

## 2023-07-27 NOTE — TELEPHONE ENCOUNTER
Bia with Dr. Crowell's orthopaedics office called m wanting to know if a cardiac clearance appointment is needed for patient, as she is getting a total rt hip replacement. Pt was seen in office by Dr. Granado 7/5/23. If no cardiac clearance appointment is needed, would be okay to send clearance note?    Barb Hernandez MA  7/27/23 357O

## 2023-07-28 NOTE — TELEPHONE ENCOUNTER
Faxed cardiac sx clearance to Bia with Dr. Wallace's office at [phone]. Received fax confirmation.    Barb Hernandez MA  7/28/23 579w

## 2023-08-03 ENCOUNTER — PRE-ADMISSION TESTING (OUTPATIENT)
Dept: PREADMISSION TESTING | Facility: HOSPITAL | Age: 78
End: 2023-08-03
Payer: MEDICARE

## 2023-08-03 ENCOUNTER — HOSPITAL ENCOUNTER (OUTPATIENT)
Dept: PHYSICAL THERAPY | Facility: HOSPITAL | Age: 78
Discharge: HOME | End: 2023-08-03
Payer: MEDICARE

## 2023-08-03 VITALS
SYSTOLIC BLOOD PRESSURE: 115 MMHG | HEART RATE: 61 BPM | DIASTOLIC BLOOD PRESSURE: 62 MMHG | HEIGHT: 62 IN | OXYGEN SATURATION: 96 % | RESPIRATION RATE: 16 BRPM | WEIGHT: 161.38 LBS | BODY MASS INDEX: 29.7 KG/M2

## 2023-08-03 DIAGNOSIS — R79.9 ABNORMAL FINDING OF BLOOD CHEMISTRY, UNSPECIFIED: ICD-10-CM

## 2023-08-03 DIAGNOSIS — M16.11 PRIMARY OSTEOARTHRITIS OF RIGHT HIP: ICD-10-CM

## 2023-08-03 DIAGNOSIS — Z96.641 AFTERCARE FOLLOWING RIGHT HIP JOINT REPLACEMENT SURGERY: ICD-10-CM

## 2023-08-03 DIAGNOSIS — Z47.1 AFTERCARE FOLLOWING RIGHT HIP JOINT REPLACEMENT SURGERY: ICD-10-CM

## 2023-08-03 LAB
ABO GROUP BLD: NORMAL
ABO GROUP BLD: NORMAL
ANION GAP SERPL CALCULATED.3IONS-SCNC: 13 MMOL/L (ref 5–15)
BACTERIA UR QL AUTO: ABNORMAL /HPF
BILIRUB UR QL STRIP: NEGATIVE
BLD GP AB SCN SERPL QL: NEGATIVE
BUN SERPL-MCNC: 11 MG/DL (ref 8–23)
BUN/CREAT SERPL: 10.9 (ref 7–25)
CALCIUM SPEC-SCNC: 9.7 MG/DL (ref 8.6–10.5)
CHLORIDE SERPL-SCNC: 101 MMOL/L (ref 98–107)
CLARITY UR: ABNORMAL
CO2 SERPL-SCNC: 23 MMOL/L (ref 22–29)
COLOR UR: ABNORMAL
CREAT SERPL-MCNC: 1.01 MG/DL (ref 0.57–1)
EGFRCR SERPLBLD CKD-EPI 2021: 56.7 ML/MIN/1.73
GLUCOSE SERPL-MCNC: 117 MG/DL (ref 65–99)
GLUCOSE UR STRIP-MCNC: NEGATIVE MG/DL
HBA1C MFR BLD: 6.4 % (ref 4.8–5.6)
HGB UR QL STRIP.AUTO: NEGATIVE
HYALINE CASTS UR QL AUTO: ABNORMAL /LPF
KETONES UR QL STRIP: ABNORMAL
LEUKOCYTE ESTERASE UR QL STRIP.AUTO: ABNORMAL
NITRITE UR QL STRIP: NEGATIVE
PH UR STRIP.AUTO: 5.5 [PH] (ref 5–8)
POTASSIUM SERPL-SCNC: 4.2 MMOL/L (ref 3.5–5.2)
PROT UR QL STRIP: ABNORMAL
RBC # UR STRIP: ABNORMAL /HPF
REF LAB TEST METHOD: ABNORMAL
RH BLD: POSITIVE
RH BLD: POSITIVE
SODIUM SERPL-SCNC: 137 MMOL/L (ref 136–145)
SP GR UR STRIP: 1.03 (ref 1–1.03)
SQUAMOUS #/AREA URNS HPF: ABNORMAL /HPF
T&S EXPIRATION DATE: NORMAL
TRANS CELLS #/AREA URNS HPF: ABNORMAL /HPF
UROBILINOGEN UR QL STRIP: ABNORMAL
WBC # UR STRIP: ABNORMAL /HPF

## 2023-08-03 PROCEDURE — 86850 RBC ANTIBODY SCREEN: CPT | Performed by: INTERNAL MEDICINE

## 2023-08-03 PROCEDURE — 83036 HEMOGLOBIN GLYCOSYLATED A1C: CPT | Performed by: INTERNAL MEDICINE

## 2023-08-03 PROCEDURE — 86901 BLOOD TYPING SEROLOGIC RH(D): CPT

## 2023-08-03 PROCEDURE — 86901 BLOOD TYPING SEROLOGIC RH(D): CPT | Performed by: INTERNAL MEDICINE

## 2023-08-03 PROCEDURE — 87086 URINE CULTURE/COLONY COUNT: CPT | Performed by: INTERNAL MEDICINE

## 2023-08-03 PROCEDURE — 86900 BLOOD TYPING SEROLOGIC ABO: CPT

## 2023-08-03 PROCEDURE — 36415 COLL VENOUS BLD VENIPUNCTURE: CPT

## 2023-08-03 PROCEDURE — 86900 BLOOD TYPING SEROLOGIC ABO: CPT | Performed by: INTERNAL MEDICINE

## 2023-08-03 PROCEDURE — 81001 URINALYSIS AUTO W/SCOPE: CPT | Performed by: INTERNAL MEDICINE

## 2023-08-03 PROCEDURE — 87081 CULTURE SCREEN ONLY: CPT | Performed by: INTERNAL MEDICINE

## 2023-08-03 PROCEDURE — 85025 COMPLETE CBC W/AUTO DIFF WBC: CPT | Performed by: INTERNAL MEDICINE

## 2023-08-03 PROCEDURE — 80048 BASIC METABOLIC PNL TOTAL CA: CPT | Performed by: INTERNAL MEDICINE

## 2023-08-03 NOTE — PAT
Pt here for PAT visit.  Pre-op tests completed, chg soap given, and instructions reviewed.  Instructed clears until 2 hrs prior to arrival time, voiced understanding. No reaction to any metals or MRSA noted per patient. Pt and spouse to attend joint camp today at 11am. Pt and spouse verbalized understanding to information.

## 2023-08-03 NOTE — DISCHARGE INSTRUCTIONS
PRE-ADMISSION TESTING INSTRUCTIONS FOR ADULTS    Take these medications the morning of surgery with a small sip of water: Isosorbide Mononitrate, Citalopram, Duloxetine, Carvedilol, Amlodipine, Levothyroxine, Omeprazole      Do not take any insulin or diabetes medications the morning of surgery.      No aspirin, advil, aleve, ibuprofen, naproxen, diet pills, decongestants, or herbal/vitamins for a week prior to surgery.       Tylenol/Acetaminophen is okay to take if needed.    General Instructions:    DO NOT EAT SOLID FOOD AFTER MIDNIGHT THE NIGHT BEFORE SURGERY. No gum, mints, or hard candy after midnight the night before surgery.  You may drink clear liquids the day of surgery up until 2 hours before your arrival time.  Clear liquids are liquids you can see through. Nothing RED in color.    Plain water    Sports drinks      Gelatin (Jell-O)  Fruit juices without pulp such as white grape juice and apple juice  Popsicles that contain no fruit or yogurt  Tea or coffee (no cream or milk added)    It is beneficial for you to have a clear drink that contains carbohydrates 2 hours before your arrival time.  We suggest a 20 ounce bottle of Gatorade or Powerade for non-diabetic patients or a 20 ounce bottle of Gatorade Zero or Powerade Zero for diabetic patients.     Patients who avoid smoking, chewing tobacco and alcohol for 4 weeks prior to surgery have a reduced risk of post-operative complications.  If at all possible, quit smoking as many days before surgery as you can.    Do not smoke, use chewing tobacco or drink alcohol the day of surgery    Bring your C-PAP/ BI-PAP machine if you use one.  Wear clean comfortable clothes.  Do not wear contact lenses, lotion, deodorant, or make-up.  Bring a case for your glasses if applicable. You may brush your teeth the morning of surgery.  You may wear dentures/partials, do not put adhesive/glue on them.  Leave all other jewelry and valuables at home.      Preventing a Surgical  Site Infection:    Shower the night before and on the morning of surgery using the chlorhexidine soap you were given.  Use a clean washcloth with the soap.  Place clean sheets on your bed after showering the night before surgery. Do not use the CHG soap on your hair, face, or private areas. Wash your body gently for five (5) minutes. Do not scrub your skin.  Dry with a clean towel and dress in clean clothing.  Do not shave the surgical area for 10 days-2 weeks prior to surgery  because the razor can irritate skin and make it easier to develop an infection.  Make sure you, your family, and all healthcare providers clean their hands with soap and water or an alcohol based hand  before caring for you or your wound.      Day of surgery:    Your surgeon's office will advise you of your arrival time for the day of surgery.    Upon arrival, a Pre-op nurse and Anesthesia provider will review your health history, obtain vital signs, and answer questions you may have. The anesthesia provider will also discuss the type of anesthesia that will be needed for your procedure, which may include general anesthesia. The only belongings needed at this time will be your home medications and if applicable your C-PAP/BI-PAP machine.  If you are staying overnight your family can leave the rest of your belongings in the car and bring them to your room later.  A Pre-op nurse will start an IV and you may receive medication in preparation for surgery, including something to help you relax.  Your family will be able to see you in the Pre-op area.  While you are in surgery your family should notify the waiting room  if they leave the waiting room area and provide a contact phone number.    IF you have any questions, you can call the Pre-Admission Department at [phone] or your surgeon's office.  Notify your surgeon if  you become sick, have a fever, productive cough, or cannot be here the day of surgery    Please be  aware that surgery does come with discomfort.  We want to make every effort to control your discomfort so please discuss any uncontrolled symptoms with your nurse.   Your doctor will most likely have prescribed pain medications.      If you are going home after surgery, you will receive individualized written care instructions before being discharged.  A responsible adult (over the age of 18) must drive you to and from the hospital on the day of your surgery and stay with you for 24 hours after anesthesia.    If you are staying overnight following surgery, you will be transported to your hospital room following the recovery period.  [facility] has all private rooms.    You may receive a survey regarding the care you received. Your feedback is very important and will be used to collect the necessary data to help us to continue to provide excellent care.     Deductibles and co-payments are collected on the day of service. Please be prepared to pay the required co-pay, deductible or deposit on the day of service as defined by your plan.

## 2023-08-04 LAB
BACTERIA SPEC AEROBE CULT: NORMAL
BASOPHILS # BLD AUTO: 0.09 10*3/MM3 (ref 0–0.2)
BASOPHILS NFR BLD AUTO: 1 % (ref 0–1.5)
DEPRECATED RDW RBC AUTO: 47.1 FL (ref 37–54)
EOSINOPHIL # BLD AUTO: 0.27 10*3/MM3 (ref 0–0.4)
EOSINOPHIL NFR BLD AUTO: 3.1 % (ref 0.3–6.2)
ERYTHROCYTE [DISTWIDTH] IN BLOOD BY AUTOMATED COUNT: 13.4 % (ref 12.3–15.4)
HCT VFR BLD AUTO: 39.2 % (ref 34–46.6)
HGB BLD-MCNC: 13 G/DL (ref 12–15.9)
IMM GRANULOCYTES # BLD AUTO: 0.03 10*3/MM3 (ref 0–0.05)
IMM GRANULOCYTES NFR BLD AUTO: 0.3 % (ref 0–0.5)
LYMPHOCYTES # BLD AUTO: 3.37 10*3/MM3 (ref 0.7–3.1)
LYMPHOCYTES NFR BLD AUTO: 38.8 % (ref 19.6–45.3)
MCH RBC QN AUTO: 31.5 PG (ref 26.6–33)
MCHC RBC AUTO-ENTMCNC: 33.2 G/DL (ref 31.5–35.7)
MCV RBC AUTO: 94.9 FL (ref 79–97)
MONOCYTES # BLD AUTO: 0.61 10*3/MM3 (ref 0.1–0.9)
MONOCYTES NFR BLD AUTO: 7 % (ref 5–12)
NEUTROPHILS NFR BLD AUTO: 4.32 10*3/MM3 (ref 1.7–7)
NEUTROPHILS NFR BLD AUTO: 49.8 % (ref 42.7–76)
NRBC BLD AUTO-RTO: 0.1 /100 WBC (ref 0–0.2)
PLATELET # BLD AUTO: 238 10*3/MM3 (ref 140–450)
PMV BLD AUTO: 11 FL (ref 6–12)
RBC # BLD AUTO: 4.13 10*6/MM3 (ref 3.77–5.28)
WBC NRBC COR # BLD: 8.69 10*3/MM3 (ref 3.4–10.8)

## 2023-08-04 RX ORDER — MELOXICAM 7.5 MG/1
TABLET ORAL
Qty: 30 TABLET | Refills: 0 | Status: SHIPPED | OUTPATIENT
Start: 2023-08-04

## 2023-08-05 LAB — MRSA SPEC QL CULT: NORMAL

## 2023-08-16 NOTE — CASE MANAGEMENT/SOCIAL WORK
Continued Stay Note  YAMILE Adams     Patient Name: Ashlie Levi  MRN: [id number]  Today's Date: 8/16/2023    Admit Date: (Not on file)    Plan: Home with  and [facility]   Discharge Plan       Row Name 08/16/23 1346       Plan    Plan Home with  and [facility]    Patient/Family in Agreement with Plan yes    Plan Comments CM called to speak with patient today regarding pre admission discharge planning for her surgery with Dr. Wallace on 8/30/23 and her  answered the phone and states she is asleep and he would be able to answer questions.  states patient lives with him in a single story house with two steps and hand rail to gain entry and states she normally has no issues entering the home or maneuvering inside. He states he will be able to assist with needs at home and provide ride at discharge. We then discussed her plan for physical therapy, home health versus OP PT and he states she wants home health for the first two weeks and  has no preference in agency and is agreeable for  to schedule this service. We then discussed DME and he states she has a rolling walker, shower chair and straight cane and will need a BSC at discharge and is agreeable for  to arrange this through [facility]. Patient's  states she will plan on returning home at discharge and he can help as needed and  for PT. He had no other questions or concerns regarding discharge plans. CM called and left message for Chloe with [facility] regarding referral. CM will follow.                   Discharge Codes    No documentation.                       Myriam Avalos RN

## 2023-08-16 NOTE — CASE MANAGEMENT/SOCIAL WORK
Continued Stay Note  YAMILE Adams     Patient Name: Ashlie Levi  MRN: [id number]  Today's Date: 8/16/2023    Admit Date: (Not on file)    Plan: Home with  and [facility]   Discharge Plan       Row Name 08/16/23 1446       Plan    Plan Comments Chloe with [facility] called back and states they can accept. CM will follow.      Row Name 08/16/23 1346       Plan    Plan Home with  and [facility]    Patient/Family in Agreement with Plan yes    Plan Comments CM called to speak with patient today regarding pre admission discharge planning for her surgery with Dr. Wallace on 8/30/23 and her  answered the phone and states she is asleppt and he would be able to answer questions.  states patient lives with him in a single story house with two steps and hand rail to gain entry and states she normally has no issues entering the home or manuvering inside. He states he will be able to assist with needs at home and provide ride at discharge. We then discussed her plan for physical therapy, home health versus OP PT and he statee she wants home health for the first two weeks and  has no preference in agency and is agreeable for  to schedule this service. We then discussed DME and he states she has a rolling walker, shower chair and straight cane and will need a BSC at discharge and is agreeable for  to arrange this through [facility]. Patient's  states she will plan on returning home at discharge and he can help as needed and  for PT. He had no other questions or concerns regarding discharge plans. CM called and left message for Chloe with [facility] regarding referral. CM will follow.                   Discharge Codes    No documentation.                       Myriam Avalos, RN

## 2023-08-16 NOTE — DISCHARGE PLACEMENT REQUEST
"Gurmeet Adama BARROW (77 y.o. Female)       Date of Birth   1945    Social Security Number       Address   [address] [facility] [address] [address]    Home Phone       MRN   [id number]       Christianity   None    Marital Status                               Admission Date       Admission Type   Elective    Admitting Provider   Aidan Wallace MD    Attending Provider   Aidan Wallace MD    Department, Room/Bed   [facility], --/--       Discharge Date       Discharge Disposition       Discharge Destination                                 Attending Provider: Aidan Wallace MD    Allergies: Penicillins    Isolation: None   Infection: None   Code Status: Prior    Ht: 156.2 cm (61.5\")   Wt: 73.2 kg (161 lb 6 oz)    Admission Cmt: None   Principal Problem: OA (osteoarthritis) of hip [M16.9]                   Active Insurance as of 8/30/2023       Primary Coverage       Payor Plan Insurance Group Employer/Plan Group    MEDICARE MEDICARE A & B        Payor Plan Address Payor Plan Phone Number Payor Plan Fax Number Effective Dates    [address] [phone]  10/1/2008 - None Entered    [facility] [id number]         Subscriber Name Subscriber Birth Date Member ID       LEVIADAMA 1945 [id number]               Secondary Coverage       Payor Plan Insurance Group Employer/Plan Group    [facility] SUPP [id number]       Payor Plan Address Payor Plan Phone Number Payor Plan Fax Number Effective Dates    [address]   12/1/2016 - None Entered    [facility] [address]         Subscriber Name Subscriber Birth Date Member ID       GURMEETADAMA PUSHPA 1945 [id number]                     Emergency Contacts        (Rel.) Home Phone Work Phone Mobile Phone    Torres Levi \"AL\" (Spouse) [phone] -- [phone]                "

## 2023-08-21 ENCOUNTER — OFFICE VISIT (OUTPATIENT)
Dept: ORTHOPEDIC SURGERY | Facility: CLINIC | Age: 78
End: 2023-08-21
Payer: MEDICARE

## 2023-08-21 VITALS — WEIGHT: 161 LBS | HEIGHT: 62 IN | BODY MASS INDEX: 29.63 KG/M2

## 2023-08-21 DIAGNOSIS — M16.11 PRIMARY OSTEOARTHRITIS OF RIGHT HIP: Primary | ICD-10-CM

## 2023-08-21 PROCEDURE — 99024 POSTOP FOLLOW-UP VISIT: CPT | Performed by: INTERNAL MEDICINE

## 2023-08-21 RX ORDER — LEVOTHYROXINE SODIUM 0.1 MG/1
1 TABLET ORAL DAILY
COMMUNITY
Start: 2023-08-18

## 2023-08-21 NOTE — H&P
Psychiatric   HISTORY AND PHYSICAL    Patient Name:Ashlie Levi  : 1945  MRN: [id number]  Primary Care Physician: Kole Silva MD  Date of admission: (Not on file)    Subjective   Subjective     Chief Complaint: right hip pain and OA    History of Present Illness   Ashlie Levi is a 77 y.o. female with longstanding history of right hip and groin pain.  The patient was last seen by me at the end of July and had had multiple intra-articular injections in the past.  The most recent was with Dr. Storey but states that only provided pain relief for about 2 weeks.  She states that the hip and groin pain has significantly affected her quality of life.  She is also tried oral anti-inflammatories and activity modification.  The pain is sharp and quite debilitating and prevents her from doing the things that she likes to do.  She was seen on 2023 and scheduled for total hip arthroplasty.    Review of Systems   Musculoskeletal:  Positive for arthralgias, back pain, gait problem, joint swelling and myalgias.   All other systems reviewed and are negative.      Personal History     Past Medical History:   Diagnosis Date    Abnormal electrocardiogram      heart attack    Arthritis     Chest discomfort     Colon polyp     Coronary artery disease     Disease of thyroid gland     Diverticulitis     Elevated cholesterol     GERD (gastroesophageal reflux disease)     Hyperlipidemia     Hypertension        Past Surgical History:   Procedure Laterality Date    APPENDECTOMY      CARDIAC SURGERY      3 stents    CHOLECYSTECTOMY      COLONOSCOPY N/A 2016    Procedure: COLONOSCOPY;  Surgeon: Giovani Avelar MD;  Location: [facility] OR;  Service:     COLONOSCOPY N/A 2019    Procedure: Colonoscopy with possible biopsy or polypectomy;  Surgeon: Alfreda Soler DO;  Location: [facility] OR;  Service: Gastroenterology    COLONOSCOPY N/A 2021    Procedure: Colonoscopy with polypectomy, biopsy;   Surgeon: Alfreda Soler DO;  Location:  [facility] OR;  Service: Gastroenterology;  Laterality: N/A;  diverticulosis  cecal biopsy  right colon polyp  left colon polyps x2    CORONARY STENT PLACEMENT      ENDOSCOPY N/A 11/21/2017    Procedure: ESOPHAGOGASTRODUODENOSCOPY with CARIN test ;  Surgeon: Alfreda Soler DO;  Location:  [facility] OR;  Service:     ENDOSCOPY N/A 6/13/2019    Procedure: Esophagogastroduodenoscopy with possible biopsy, polypectomy, dilation;  Surgeon: Alfreda Soler DO;  Location: [facility] OR;  Service: Gastroenterology    ENDOSCOPY N/A 5/18/2021    Procedure: Esophagogastroduodenoscopy with biopsies;  Surgeon: Alfreda Soler DO;  Location:  [facility] OR;  Service: Gastroenterology;  Laterality: N/A;  CARIN test  gastritis  duodenitis    EYE SURGERY      FOOT GANGLION EXCISION Left     HEMORROIDECTOMY      HERNIA REPAIR      HYSTERECTOMY      GREG with OC age 30 for DUB    OOPHORECTOMY      dx lsc , interval USO       Family History: Her family history includes Colon cancer in her brother; Diabetes in her father; Heart disease in her brother and father; Hypertension in her father and mother; Stroke in her father and mother.     Social History: She  reports that she quit smoking about 16 years ago. Her smoking use included cigarettes. She has a 50.00 pack-year smoking history. She has never used smokeless tobacco. She reports current alcohol use. She reports that she does not use drugs.    Home Medications:  ARIPiprazole, DULoxetine, amLODIPine, aspirin, atorvastatin, carvedilol, citalopram, irbesartan, isosorbide mononitrate, levothyroxine, meloxicam, nitroglycerin, omeprazole, and spironolactone    Allergies:  She is allergic to penicillins.    Objective    Objective     Vitals:         Physical Exam   Right Hip Exam      Tenderness   The patient is experiencing tenderness in the anterior and lateral.     Range of Motion   Flexion:  110   External rotation:  30   Internal rotation:  5       Muscle Strength   Flexion: 4/5      Tests   LAKSHMI: negative  Fadir:  Positive FADIR test     Other   Erythema: absent  Scars: absent  Sensation: normal  Pulse: present     Comments:  Positive Stinchfield and logroll        Left Hip Exam      Tenderness   The patient is experiencing tenderness in the anterior and lateral.     Range of Motion   Flexion:  110   External rotation:  30   Internal rotation: 5      Muscle Strength   Flexion: 5/5      Tests   LAKSHMI: negative  Fadir:  Positive FADIR test     Other   Erythema: absent  Scars: absent  Sensation: normal  Pulse: present     Comments:  Positive Stinchfield and logroll    Result Review    Imaging: Standing AP pelvis  Indication: Preoperative templating  Findings: X-rays demonstrate progression of right and left hip arthritis as with progressive narrowing of her joint space superior medially as well as increased sclerosis at the superior lateral aspect of the acetabulum.  There is no acute fracture dislocation or subluxation noted  Comparative studies: X-rays on 4/25/2023    Assessment & Plan   Assessment / Plan     Brief Patient Summary:  Ashlie Levi is a 77 y.o. female with right hip osteoarthritis    Active Hospital Problems:  There are no active hospital problems to display for this patient.    Plan:   Cc: f/u right hip pain, DJD    Patient presented to clinic today for preoperative history and physical visit in anticipation of upcoming scheduled right total hip arthroplasty.    I reviewed anatomy and a model of a total hip arthroplasty, as well as typical postoperative recovery of 6-12 months before maximal recovery, and possible need for rehabilitation stay after hospitalization. We also discussed risks, benefits, and alternatives of procedure with risks including but not limited to neurovascular damage, bleeding, infection, malalignment, chronic pain, leg length discrepancy, failure of implants, osteolysis, loosening of implants, loss of motion, weakness,  stiffness, instability, DVT, pulmonary embolus, death, stroke, complex regional pain syndrome, myocardial infarction, and need for additional procedures. Patient understood all these and had all questions answered before consenting to the procedure. No guarantees were given in regards to results from the surgery.  Patient has been medically optimize by his primary care physician.    Implants: Depuy Actis Press Fit   Anticoagulation: Asprin  Antibiotics: Cefazolin  Post op ABX: No  TXA: Yes  Admission Type: 23 HR Obs    All remaining questions answered today.      DVT prophylaxis:  No DVT prophylaxis order currently exists.    Aidan Wallace MD

## 2023-08-21 NOTE — H&P (VIEW-ONLY)
[facility]   HISTORY AND PHYSICAL    Patient Name:Ashlie Levi  : 1945  MRN: [id number]  Primary Care Physician: Kole Silva MD  Date of admission: (Not on file)    Subjective   Subjective     Chief Complaint: right hip pain and OA    History of Present Illness   Ashlie Levi is a 77 y.o. female with longstanding history of right hip and groin pain.  The patient was last seen by me at the end of July and had had multiple intra-articular injections in the past.  The most recent was with Dr. Storey but states that only provided pain relief for about 2 weeks.  She states that the hip and groin pain has significantly affected her quality of life.  She is also tried oral anti-inflammatories and activity modification.  The pain is sharp and quite debilitating and prevents her from doing the things that she likes to do.  She was seen on 2023 and scheduled for total hip arthroplasty.    Review of Systems   Musculoskeletal:  Positive for arthralgias, back pain, gait problem, joint swelling and myalgias.   All other systems reviewed and are negative.      Personal History     Past Medical History:   Diagnosis Date    Abnormal electrocardiogram      heart attack    Arthritis     Chest discomfort     Colon polyp     Coronary artery disease     Disease of thyroid gland     Diverticulitis     Elevated cholesterol     GERD (gastroesophageal reflux disease)     Hyperlipidemia     Hypertension        Past Surgical History:   Procedure Laterality Date    APPENDECTOMY      CARDIAC SURGERY      3 stents    CHOLECYSTECTOMY      COLONOSCOPY N/A 2016    Procedure: COLONOSCOPY;  Surgeon: Giovani Avelar MD;  Location: [facility] OR;  Service:     COLONOSCOPY N/A 2019    Procedure: Colonoscopy with possible biopsy or polypectomy;  Surgeon: Alfreda Soler DO;  Location: [facility] OR;  Service: Gastroenterology    COLONOSCOPY N/A 2021    Procedure: Colonoscopy with polypectomy, biopsy;   Surgeon: Alfreda Soler DO;  Location:  [facility] OR;  Service: Gastroenterology;  Laterality: N/A;  diverticulosis  cecal biopsy  right colon polyp  left colon polyps x2    CORONARY STENT PLACEMENT      ENDOSCOPY N/A 11/21/2017    Procedure: ESOPHAGOGASTRODUODENOSCOPY with CARIN test ;  Surgeon: Alfreda Soler DO;  Location:  [facility] OR;  Service:     ENDOSCOPY N/A 6/13/2019    Procedure: Esophagogastroduodenoscopy with possible biopsy, polypectomy, dilation;  Surgeon: Alfreda Soler DO;  Location: [facility] OR;  Service: Gastroenterology    ENDOSCOPY N/A 5/18/2021    Procedure: Esophagogastroduodenoscopy with biopsies;  Surgeon: Alfreda Sloer DO;  Location:  [facility] OR;  Service: Gastroenterology;  Laterality: N/A;  CARIN test  gastritis  duodenitis    EYE SURGERY      FOOT GANGLION EXCISION Left     HEMORROIDECTOMY      HERNIA REPAIR      HYSTERECTOMY      GREG with OC age 30 for DUB    OOPHORECTOMY      dx lsc , interval USO       Family History: Her family history includes Colon cancer in her brother; Diabetes in her father; Heart disease in her brother and father; Hypertension in her father and mother; Stroke in her father and mother.     Social History: She  reports that she quit smoking about 16 years ago. Her smoking use included cigarettes. She has a 50.00 pack-year smoking history. She has never used smokeless tobacco. She reports current alcohol use. She reports that she does not use drugs.    Home Medications:  ARIPiprazole, DULoxetine, amLODIPine, aspirin, atorvastatin, carvedilol, citalopram, irbesartan, isosorbide mononitrate, levothyroxine, meloxicam, nitroglycerin, omeprazole, and spironolactone    Allergies:  She is allergic to penicillins.    Objective    Objective     Vitals:         Physical Exam   Right Hip Exam      Tenderness   The patient is experiencing tenderness in the anterior and lateral.     Range of Motion   Flexion:  110   External rotation:  30   Internal rotation:  5       Muscle Strength   Flexion: 4/5      Tests   LAKSHMI: negative  Fadir:  Positive FADIR test     Other   Erythema: absent  Scars: absent  Sensation: normal  Pulse: present     Comments:  Positive Stinchfield and logroll        Left Hip Exam      Tenderness   The patient is experiencing tenderness in the anterior and lateral.     Range of Motion   Flexion:  110   External rotation:  30   Internal rotation: 5      Muscle Strength   Flexion: 5/5      Tests   LAKSHMI: negative  Fadir:  Positive FADIR test     Other   Erythema: absent  Scars: absent  Sensation: normal  Pulse: present     Comments:  Positive Stinchfield and logroll    Result Review    Imaging: Standing AP pelvis  Indication: Preoperative templating  Findings: X-rays demonstrate progression of right and left hip arthritis as with progressive narrowing of her joint space superior medially as well as increased sclerosis at the superior lateral aspect of the acetabulum.  There is no acute fracture dislocation or subluxation noted  Comparative studies: X-rays on 4/25/2023    Assessment & Plan   Assessment / Plan     Brief Patient Summary:  Ashlie Levi is a 77 y.o. female with right hip osteoarthritis    Active Hospital Problems:  There are no active hospital problems to display for this patient.    Plan:   Cc: f/u right hip pain, DJD    Patient presented to clinic today for preoperative history and physical visit in anticipation of upcoming scheduled right total hip arthroplasty.    I reviewed anatomy and a model of a total hip arthroplasty, as well as typical postoperative recovery of 6-12 months before maximal recovery, and possible need for rehabilitation stay after hospitalization. We also discussed risks, benefits, and alternatives of procedure with risks including but not limited to neurovascular damage, bleeding, infection, malalignment, chronic pain, leg length discrepancy, failure of implants, osteolysis, loosening of implants, loss of motion, weakness,  stiffness, instability, DVT, pulmonary embolus, death, stroke, complex regional pain syndrome, myocardial infarction, and need for additional procedures. Patient understood all these and had all questions answered before consenting to the procedure. No guarantees were given in regards to results from the surgery.  Patient has been medically optimize by his primary care physician.    Implants: Depuy Actis Press Fit   Anticoagulation: Asprin  Antibiotics: Cefazolin  Post op ABX: No  TXA: Yes  Admission Type: 23 HR Obs    All remaining questions answered today.      DVT prophylaxis:  No DVT prophylaxis order currently exists.    Aidan Wallace MD

## 2023-08-21 NOTE — PROGRESS NOTES
Subjective:     Patient ID: Ashlie Levi is a 77 y.o. female.    Chief Complaint:    History of Present Illness  Ashlie Levi {presents/returns:[id number]} to clinic today for evaluation of ***     Social History     Occupational History    Not on file   Tobacco Use    Smoking status: Former     Packs/day: 1.00     Years: 50.00     Pack years: 50.00     Types: Cigarettes     Quit date: 10/13/2006     Years since quittin.8    Smokeless tobacco: Never    Tobacco comments:     quit    Vaping Use    Vaping Use: Never used   Substance and Sexual Activity    Alcohol use: Yes     Comment: occasional    Drug use: No    Sexual activity: Not Currently     Partners: Male     Birth control/protection: Surgical, Post-menopausal     Comment: GREG with OC      Past Medical History:   Diagnosis Date    Abnormal electrocardiogram      heart attack    Arthritis     Chest discomfort     Colon polyp     Coronary artery disease     Disease of thyroid gland     Diverticulitis     Elevated cholesterol     GERD (gastroesophageal reflux disease)     Hyperlipidemia     Hypertension      Past Surgical History:   Procedure Laterality Date    APPENDECTOMY      CARDIAC SURGERY      3 stents    CHOLECYSTECTOMY      COLONOSCOPY N/A 2016    Procedure: COLONOSCOPY;  Surgeon: Giovani Avelar MD;  Location: [facility] OR;  Service:     COLONOSCOPY N/A 2019    Procedure: Colonoscopy with possible biopsy or polypectomy;  Surgeon: Alfreda Soler DO;  Location: [facility] OR;  Service: Gastroenterology    COLONOSCOPY N/A 2021    Procedure: Colonoscopy with polypectomy, biopsy;  Surgeon: Alfreda Soler DO;  Location: [facility] OR;  Service: Gastroenterology;  Laterality: N/A;  diverticulosis  cecal biopsy  right colon polyp  left colon polyps x2    CORONARY STENT PLACEMENT      ENDOSCOPY N/A 2017    Procedure: ESOPHAGOGASTRODUODENOSCOPY with CARIN test ;  Surgeon: Alfreda Soler DO;  Location: [facility] OR;  Service:  "    ENDOSCOPY N/A 6/13/2019    Procedure: Esophagogastroduodenoscopy with possible biopsy, polypectomy, dilation;  Surgeon: Alfreda Soler DO;  Location:  [facility] OR;  Service: Gastroenterology    ENDOSCOPY N/A 5/18/2021    Procedure: Esophagogastroduodenoscopy with biopsies;  Surgeon: Alfreda Soler DO;  Location:  [facility] OR;  Service: Gastroenterology;  Laterality: N/A;  CARIN test  gastritis  duodenitis    EYE SURGERY      FOOT GANGLION EXCISION Left     HEMORROIDECTOMY      HERNIA REPAIR      HYSTERECTOMY      GREG with OC age 30 for DUB    OOPHORECTOMY      dx lsc , interval USO       Family History   Problem Relation Age of Onset    Hypertension Mother     Stroke Mother     Diabetes Father     Hypertension Father     Heart disease Father     Stroke Father     Heart disease Brother     Colon cancer Brother         dx > 50    Breast cancer Neg Hx     Ovarian cancer Neg Hx     Deep vein thrombosis Neg Hx     Pulmonary embolism Neg Hx                  Objective:  Vitals:    08/21/23 1112   Weight: 73 kg (161 lb)   Height: 156.2 cm (61.5\")         08/21/23  1112   Weight: 73 kg (161 lb)     Body mass index is 29.93 kg/mý.  ***      Ortho Exam     ***  Imaging: ***  Indication: ***  Findings: ***  Comparative studies: ***    Assessment:      No diagnosis found.       Plan:          Discussed treatment options at length with patient at today's visit. ***  Follow up: ***      Ashlie Levi was in agreement with plan and had all questions answered.     Medications:  No orders of the defined types were placed in this encounter.      Followup:  No follow-ups on file.    There are no diagnoses linked to this encounter.      Dictated utilizing Dragon dictation   "

## 2023-08-22 ENCOUNTER — ANESTHESIA EVENT (OUTPATIENT)
Dept: PERIOP | Facility: HOSPITAL | Age: 78
End: 2023-08-22
Payer: MEDICARE

## 2023-08-23 ENCOUNTER — APPOINTMENT (OUTPATIENT)
Dept: GENERAL RADIOLOGY | Facility: HOSPITAL | Age: 78
End: 2023-08-23
Payer: MEDICARE

## 2023-08-23 ENCOUNTER — ANESTHESIA (OUTPATIENT)
Dept: PERIOP | Facility: HOSPITAL | Age: 78
End: 2023-08-23
Payer: MEDICARE

## 2023-08-23 ENCOUNTER — HOSPITAL ENCOUNTER (OUTPATIENT)
Facility: HOSPITAL | Age: 78
Discharge: HOME | End: 2023-08-24
Attending: INTERNAL MEDICINE | Admitting: INTERNAL MEDICINE
Payer: MEDICARE

## 2023-08-23 DIAGNOSIS — M16.11 PRIMARY OSTEOARTHRITIS OF RIGHT HIP: Primary | ICD-10-CM

## 2023-08-23 PROCEDURE — 25010000002 KETOROLAC TROMETHAMINE PER 15 MG: Performed by: INTERNAL MEDICINE

## 2023-08-23 PROCEDURE — 25010000002 ONDANSETRON PER 1 MG: Performed by: NURSE ANESTHETIST, CERTIFIED REGISTERED

## 2023-08-23 PROCEDURE — A9270 NON-COVERED ITEM OR SERVICE: HCPCS | Performed by: INTERNAL MEDICINE

## 2023-08-23 PROCEDURE — 94761 N-INVAS EAR/PLS OXIMETRY MLT: CPT

## 2023-08-23 PROCEDURE — 63710000001 PREGABALIN 75 MG CAPSULE: Performed by: INTERNAL MEDICINE

## 2023-08-23 PROCEDURE — 0 CEFAZOLIN SODIUM-DEXTROSE 2-3 GM-%(50ML) RECONSTITUTED SOLUTION: Performed by: INTERNAL MEDICINE

## 2023-08-23 PROCEDURE — 97165 OT EVAL LOW COMPLEX 30 MIN: CPT

## 2023-08-23 PROCEDURE — 63710000001 CARVEDILOL 6.25 MG TABLET: Performed by: INTERNAL MEDICINE

## 2023-08-23 PROCEDURE — 63710000001 DULOXETINE 60 MG CAPSULE DELAYED-RELEASE PARTICLES: Performed by: INTERNAL MEDICINE

## 2023-08-23 PROCEDURE — G0378 HOSPITAL OBSERVATION PER HR: HCPCS

## 2023-08-23 PROCEDURE — 63710000001: Performed by: INTERNAL MEDICINE

## 2023-08-23 PROCEDURE — 97161 PT EVAL LOW COMPLEX 20 MIN: CPT

## 2023-08-23 PROCEDURE — 73502 X-RAY EXAM HIP UNI 2-3 VIEWS: CPT

## 2023-08-23 PROCEDURE — 63710000001 ATORVASTATIN 20 MG TABLET: Performed by: INTERNAL MEDICINE

## 2023-08-23 PROCEDURE — C1776 JOINT DEVICE (IMPLANTABLE): HCPCS | Performed by: INTERNAL MEDICINE

## 2023-08-23 PROCEDURE — 63710000001 HYDROCODONE-ACETAMINOPHEN 7.5-325 MG TABLET: Performed by: INTERNAL MEDICINE

## 2023-08-23 PROCEDURE — 63710000001 POVIDONE-IODINE 10 % SOLUTION 118 ML BOTTLE: Performed by: INTERNAL MEDICINE

## 2023-08-23 PROCEDURE — 25010000002 DEXAMETHASONE PER 1 MG: Performed by: NURSE ANESTHETIST, CERTIFIED REGISTERED

## 2023-08-23 PROCEDURE — 72170 X-RAY EXAM OF PELVIS: CPT

## 2023-08-23 PROCEDURE — 25010000002 FENTANYL CITRATE (PF) 50 MCG/ML SOLUTION: Performed by: ANESTHESIOLOGY

## 2023-08-23 PROCEDURE — 63710000001 LEVOTHYROXINE 50 MCG TABLET: Performed by: INTERNAL MEDICINE

## 2023-08-23 PROCEDURE — 25010000002 EPINEPHRINE 1 MG/ML SOLUTION 1 ML VIAL: Performed by: INTERNAL MEDICINE

## 2023-08-23 PROCEDURE — 63710000001 LOSARTAN 50 MG TABLET: Performed by: INTERNAL MEDICINE

## 2023-08-23 PROCEDURE — 63710000001 ARIPIPRAZOLE 5 MG TABLET: Performed by: INTERNAL MEDICINE

## 2023-08-23 PROCEDURE — 25010000002 BUPIVACAINE 0.5 % SOLUTION: Performed by: INTERNAL MEDICINE

## 2023-08-23 PROCEDURE — 63710000001 CITALOPRAM 20 MG TABLET: Performed by: INTERNAL MEDICINE

## 2023-08-23 PROCEDURE — 25010000002 ROPIVACAINE PER 1 MG: Performed by: INTERNAL MEDICINE

## 2023-08-23 PROCEDURE — 63710000001 AMLODIPINE 5 MG TABLET: Performed by: INTERNAL MEDICINE

## 2023-08-23 PROCEDURE — 63710000001 MELOXICAM 7.5 MG TABLET: Performed by: INTERNAL MEDICINE

## 2023-08-23 PROCEDURE — 25010000002 PROPOFOL 200 MG/20ML EMULSION: Performed by: ANESTHESIOLOGY

## 2023-08-23 PROCEDURE — 27130 TOTAL HIP ARTHROPLASTY: CPT | Performed by: INTERNAL MEDICINE

## 2023-08-23 DEVICE — PINNACLE HIP SOLUTIONS DUAL MOBILITY LINER PINNACLE ACETABULAR SHELL BI-MENTUM PE LINER 50/43 50MM 43/22.2
Type: IMPLANTABLE DEVICE | Site: HIP | Status: FUNCTIONAL
Brand: PINNACLE HIP SOLUTIONS

## 2023-08-23 DEVICE — DEV WND/CLS CONTRL TISS STRATAFIX SYMM PDS PLS CTX 60CM VIL: Type: IMPLANTABLE DEVICE | Site: HIP | Status: FUNCTIONAL

## 2023-08-23 DEVICE — ACTIS DUOFIX HIP PROSTHESIS (FEMORAL STEM 12/14 TAPER CEMENTLESS SIZE 5 STD COLLAR)  CE
Type: IMPLANTABLE DEVICE | Site: HIP | Status: FUNCTIONAL
Brand: ACTIS

## 2023-08-23 DEVICE — CP TOTL HIP 2/MOBL HD/MTL: Type: IMPLANTABLE DEVICE | Site: HIP | Status: FUNCTIONAL

## 2023-08-23 DEVICE — BI-MENTUM DUAL MOBILITY SYSTEM -BI-MENTUM PE LINER POLYETHYLENE LINER
Type: IMPLANTABLE DEVICE | Site: HIP | Status: FUNCTIONAL
Brand: BI-MENTUM PE LINER

## 2023-08-23 DEVICE — ARTICUL/EZE FEMORAL HEAD DIAMETER 22.225MM +4 12/14 TAPER
Type: IMPLANTABLE DEVICE | Site: HIP | Status: FUNCTIONAL
Brand: ARTICUL/EZE

## 2023-08-23 DEVICE — PINNACLE GRIPTION ACETABULAR SHELL SECTOR 50MM OD
Type: IMPLANTABLE DEVICE | Site: HIP | Status: FUNCTIONAL
Brand: PINNACLE GRIPTION

## 2023-08-23 DEVICE — PINNACLE CANCELLOUS BONE SCREW 6.5MM X 20MM
Type: IMPLANTABLE DEVICE | Site: HIP | Status: FUNCTIONAL
Brand: PINNACLE

## 2023-08-23 DEVICE — DEV CONTRL TISS STRATAFIX SPIRAL MNCRYL UD 3/0 PLS 45CM: Type: IMPLANTABLE DEVICE | Site: HIP | Status: FUNCTIONAL

## 2023-08-23 RX ORDER — PANTOPRAZOLE SODIUM 40 MG/1
40 TABLET, DELAYED RELEASE ORAL
Status: DISCONTINUED | OUTPATIENT
Start: 2023-08-24 | End: 2023-08-24 | Stop reason: HOSPADM

## 2023-08-23 RX ORDER — CHLORHEXIDINE GLUCONATE 500 MG/1
CLOTH TOPICAL ONCE
Status: DISCONTINUED | OUTPATIENT
Start: 2023-08-23 | End: 2023-08-24 | Stop reason: HOSPADM

## 2023-08-23 RX ORDER — ASPIRIN 81 MG/1
81 TABLET ORAL 2 TIMES DAILY
Qty: 60 TABLET | Refills: 0 | Status: SHIPPED | OUTPATIENT
Start: 2023-08-23

## 2023-08-23 RX ORDER — CITALOPRAM 20 MG/1
40 TABLET ORAL DAILY
Status: DISCONTINUED | OUTPATIENT
Start: 2023-08-23 | End: 2023-08-24 | Stop reason: HOSPADM

## 2023-08-23 RX ORDER — HYDROCODONE BITARTRATE AND ACETAMINOPHEN 5; 325 MG/1; MG/1
1 TABLET ORAL EVERY 4 HOURS PRN
Qty: 45 TABLET | Refills: 0 | Status: SHIPPED | OUTPATIENT
Start: 2023-08-23

## 2023-08-23 RX ORDER — EPHEDRINE SULFATE 50 MG/ML
INJECTION INTRAVENOUS AS NEEDED
Status: DISCONTINUED | OUTPATIENT
Start: 2023-08-23 | End: 2023-08-23 | Stop reason: SURG

## 2023-08-23 RX ORDER — ONDANSETRON 2 MG/ML
4 INJECTION INTRAMUSCULAR; INTRAVENOUS ONCE AS NEEDED
Status: COMPLETED | OUTPATIENT
Start: 2023-08-23 | End: 2023-08-23

## 2023-08-23 RX ORDER — BUPIVACAINE HYDROCHLORIDE 5 MG/ML
INJECTION, SOLUTION PERINEURAL
Status: COMPLETED | OUTPATIENT
Start: 2023-08-23 | End: 2023-08-23

## 2023-08-23 RX ORDER — CARVEDILOL 6.25 MG/1
6.25 TABLET ORAL 2 TIMES DAILY
Status: DISCONTINUED | OUTPATIENT
Start: 2023-08-23 | End: 2023-08-24 | Stop reason: HOSPADM

## 2023-08-23 RX ORDER — SODIUM CHLORIDE, SODIUM LACTATE, POTASSIUM CHLORIDE, CALCIUM CHLORIDE 600; 310; 30; 20 MG/100ML; MG/100ML; MG/100ML; MG/100ML
100 INJECTION, SOLUTION INTRAVENOUS CONTINUOUS
Status: DISCONTINUED | OUTPATIENT
Start: 2023-08-23 | End: 2023-08-24

## 2023-08-23 RX ORDER — NALOXONE HCL 0.4 MG/ML
0.4 VIAL (ML) INJECTION
Status: DISCONTINUED | OUTPATIENT
Start: 2023-08-23 | End: 2023-08-24 | Stop reason: HOSPADM

## 2023-08-23 RX ORDER — ASPIRIN 81 MG/1
81 TABLET ORAL EVERY 12 HOURS SCHEDULED
Status: DISCONTINUED | OUTPATIENT
Start: 2023-08-24 | End: 2023-08-24 | Stop reason: HOSPADM

## 2023-08-23 RX ORDER — PROPOFOL 10 MG/ML
INJECTION, EMULSION INTRAVENOUS AS NEEDED
Status: DISCONTINUED | OUTPATIENT
Start: 2023-08-23 | End: 2023-08-23 | Stop reason: SURG

## 2023-08-23 RX ORDER — HYDROCODONE BITARTRATE AND ACETAMINOPHEN 7.5; 325 MG/1; MG/1
2 TABLET ORAL EVERY 4 HOURS PRN
Status: DISCONTINUED | OUTPATIENT
Start: 2023-08-23 | End: 2023-08-24 | Stop reason: HOSPADM

## 2023-08-23 RX ORDER — AMLODIPINE BESYLATE 5 MG/1
5 TABLET ORAL DAILY
Status: DISCONTINUED | OUTPATIENT
Start: 2023-08-23 | End: 2023-08-24 | Stop reason: HOSPADM

## 2023-08-23 RX ORDER — FENTANYL CITRATE 50 UG/ML
INJECTION, SOLUTION INTRAMUSCULAR; INTRAVENOUS AS NEEDED
Status: DISCONTINUED | OUTPATIENT
Start: 2023-08-23 | End: 2023-08-23 | Stop reason: SURG

## 2023-08-23 RX ORDER — SODIUM CHLORIDE 0.9 % (FLUSH) 0.9 %
10 SYRINGE (ML) INJECTION EVERY 12 HOURS SCHEDULED
Status: DISCONTINUED | OUTPATIENT
Start: 2023-08-23 | End: 2023-08-23 | Stop reason: HOSPADM

## 2023-08-23 RX ORDER — ONDANSETRON 2 MG/ML
4 INJECTION INTRAMUSCULAR; INTRAVENOUS EVERY 6 HOURS PRN
Status: DISCONTINUED | OUTPATIENT
Start: 2023-08-23 | End: 2023-08-24 | Stop reason: HOSPADM

## 2023-08-23 RX ORDER — SODIUM CHLORIDE 9 MG/ML
INJECTION, SOLUTION INTRAVENOUS AS NEEDED
Status: DISCONTINUED | OUTPATIENT
Start: 2023-08-23 | End: 2023-08-23 | Stop reason: HOSPADM

## 2023-08-23 RX ORDER — MELOXICAM 7.5 MG/1
15 TABLET ORAL ONCE
Status: COMPLETED | OUTPATIENT
Start: 2023-08-23 | End: 2023-08-23

## 2023-08-23 RX ORDER — SODIUM CHLORIDE, SODIUM LACTATE, POTASSIUM CHLORIDE, CALCIUM CHLORIDE 600; 310; 30; 20 MG/100ML; MG/100ML; MG/100ML; MG/100ML
9 INJECTION, SOLUTION INTRAVENOUS CONTINUOUS PRN
Status: DISCONTINUED | OUTPATIENT
Start: 2023-08-23 | End: 2023-08-23 | Stop reason: HOSPADM

## 2023-08-23 RX ORDER — ISOSORBIDE MONONITRATE 10 MG/1
20 TABLET ORAL 2 TIMES DAILY
Status: DISCONTINUED | OUTPATIENT
Start: 2023-08-23 | End: 2023-08-24 | Stop reason: HOSPADM

## 2023-08-23 RX ORDER — ATORVASTATIN CALCIUM 20 MG/1
20 TABLET, FILM COATED ORAL NIGHTLY
Status: DISCONTINUED | OUTPATIENT
Start: 2023-08-23 | End: 2023-08-24 | Stop reason: HOSPADM

## 2023-08-23 RX ORDER — ONDANSETRON 4 MG/1
4 TABLET, FILM COATED ORAL EVERY 6 HOURS PRN
Status: DISCONTINUED | OUTPATIENT
Start: 2023-08-23 | End: 2023-08-24 | Stop reason: HOSPADM

## 2023-08-23 RX ORDER — LOSARTAN POTASSIUM 50 MG/1
100 TABLET ORAL
Status: DISCONTINUED | OUTPATIENT
Start: 2023-08-23 | End: 2023-08-24 | Stop reason: HOSPADM

## 2023-08-23 RX ORDER — DULOXETIN HYDROCHLORIDE 60 MG/1
60 CAPSULE, DELAYED RELEASE ORAL DAILY
Status: DISCONTINUED | OUTPATIENT
Start: 2023-08-23 | End: 2023-08-24 | Stop reason: HOSPADM

## 2023-08-23 RX ORDER — MORPHINE SULFATE 2 MG/ML
2 INJECTION, SOLUTION INTRAMUSCULAR; INTRAVENOUS
Status: DISCONTINUED | OUTPATIENT
Start: 2023-08-23 | End: 2023-08-24 | Stop reason: HOSPADM

## 2023-08-23 RX ORDER — ONDANSETRON 2 MG/ML
4 INJECTION INTRAMUSCULAR; INTRAVENOUS ONCE AS NEEDED
Status: DISCONTINUED | OUTPATIENT
Start: 2023-08-23 | End: 2023-08-23 | Stop reason: HOSPADM

## 2023-08-23 RX ORDER — DEXAMETHASONE SODIUM PHOSPHATE 4 MG/ML
4 INJECTION, SOLUTION INTRA-ARTICULAR; INTRALESIONAL; INTRAMUSCULAR; INTRAVENOUS; SOFT TISSUE ONCE AS NEEDED
Status: COMPLETED | OUTPATIENT
Start: 2023-08-23 | End: 2023-08-23

## 2023-08-23 RX ORDER — FAMOTIDINE 10 MG/ML
20 INJECTION, SOLUTION INTRAVENOUS
Status: COMPLETED | OUTPATIENT
Start: 2023-08-23 | End: 2023-08-23

## 2023-08-23 RX ORDER — ONDANSETRON 4 MG/1
4 TABLET, FILM COATED ORAL EVERY 8 HOURS PRN
Qty: 15 TABLET | Refills: 0 | Status: SHIPPED | OUTPATIENT
Start: 2023-08-23

## 2023-08-23 RX ORDER — CEFAZOLIN SODIUM 2 G/50ML
2 SOLUTION INTRAVENOUS ONCE
Status: COMPLETED | OUTPATIENT
Start: 2023-08-23 | End: 2023-08-23

## 2023-08-23 RX ORDER — TRANEXAMIC ACID 10 MG/ML
1000 INJECTION, SOLUTION INTRAVENOUS ONCE
Status: COMPLETED | OUTPATIENT
Start: 2023-08-23 | End: 2023-08-23

## 2023-08-23 RX ORDER — ARIPIPRAZOLE 5 MG/1
5 TABLET ORAL DAILY
Status: DISCONTINUED | OUTPATIENT
Start: 2023-08-23 | End: 2023-08-24 | Stop reason: HOSPADM

## 2023-08-23 RX ORDER — CEFAZOLIN SODIUM 2 G/50ML
2000 SOLUTION INTRAVENOUS EVERY 8 HOURS
Status: COMPLETED | OUTPATIENT
Start: 2023-08-23 | End: 2023-08-24

## 2023-08-23 RX ORDER — LEVOTHYROXINE SODIUM 0.05 MG/1
100 TABLET ORAL DAILY
Status: DISCONTINUED | OUTPATIENT
Start: 2023-08-23 | End: 2023-08-24 | Stop reason: HOSPADM

## 2023-08-23 RX ORDER — SODIUM CHLORIDE 0.9 % (FLUSH) 0.9 %
10 SYRINGE (ML) INJECTION AS NEEDED
Status: DISCONTINUED | OUTPATIENT
Start: 2023-08-23 | End: 2023-08-23 | Stop reason: HOSPADM

## 2023-08-23 RX ORDER — LEVOTHYROXINE SODIUM 112 UG/1
112 TABLET ORAL DAILY
Status: DISCONTINUED | OUTPATIENT
Start: 2023-08-23 | End: 2023-08-24 | Stop reason: HOSPADM

## 2023-08-23 RX ORDER — MIDAZOLAM HYDROCHLORIDE 2 MG/2ML
0.5 INJECTION, SOLUTION INTRAMUSCULAR; INTRAVENOUS
Status: DISCONTINUED | OUTPATIENT
Start: 2023-08-23 | End: 2023-08-23 | Stop reason: HOSPADM

## 2023-08-23 RX ORDER — HYDROCODONE BITARTRATE AND ACETAMINOPHEN 7.5; 325 MG/1; MG/1
1 TABLET ORAL EVERY 4 HOURS PRN
Status: DISCONTINUED | OUTPATIENT
Start: 2023-08-23 | End: 2023-08-24 | Stop reason: HOSPADM

## 2023-08-23 RX ORDER — SODIUM CHLORIDE 9 MG/ML
40 INJECTION, SOLUTION INTRAVENOUS AS NEEDED
Status: DISCONTINUED | OUTPATIENT
Start: 2023-08-23 | End: 2023-08-23 | Stop reason: HOSPADM

## 2023-08-23 RX ORDER — SPIRONOLACTONE 25 MG/1
25 TABLET ORAL DAILY
Status: DISCONTINUED | OUTPATIENT
Start: 2023-08-23 | End: 2023-08-24 | Stop reason: HOSPADM

## 2023-08-23 RX ORDER — PREGABALIN 75 MG/1
150 CAPSULE ORAL ONCE
Status: COMPLETED | OUTPATIENT
Start: 2023-08-23 | End: 2023-08-23

## 2023-08-23 RX ORDER — AMOXICILLIN 250 MG
1 CAPSULE ORAL DAILY
Qty: 30 TABLET | Refills: 0 | Status: SHIPPED | OUTPATIENT
Start: 2023-08-23

## 2023-08-23 RX ORDER — MAGNESIUM HYDROXIDE 1200 MG/15ML
LIQUID ORAL AS NEEDED
Status: DISCONTINUED | OUTPATIENT
Start: 2023-08-23 | End: 2023-08-23 | Stop reason: HOSPADM

## 2023-08-23 RX ADMIN — HYDROCODONE BITARTRATE AND ACETAMINOPHEN 1 TABLET: 7.5; 325 TABLET ORAL at 18:24

## 2023-08-23 RX ADMIN — FAMOTIDINE 20 MG: 10 INJECTION, SOLUTION INTRAVENOUS at 09:31

## 2023-08-23 RX ADMIN — BUPIVACAINE HYDROCHLORIDE 1.4 ML: 5 INJECTION, SOLUTION PERINEURAL at 10:58

## 2023-08-23 RX ADMIN — CITALOPRAM HYDROBROMIDE 40 MG: 20 TABLET ORAL at 17:37

## 2023-08-23 RX ADMIN — LEVOTHYROXINE SODIUM 100 MCG: 50 TABLET ORAL at 17:45

## 2023-08-23 RX ADMIN — SODIUM CHLORIDE, POTASSIUM CHLORIDE, SODIUM LACTATE AND CALCIUM CHLORIDE 100 ML/HR: 600; 310; 30; 20 INJECTION, SOLUTION INTRAVENOUS at 14:46

## 2023-08-23 RX ADMIN — CEFAZOLIN SODIUM 2 G: 2 SOLUTION INTRAVENOUS at 11:23

## 2023-08-23 RX ADMIN — ISOSORBIDE MONONITRATE 20 MG: 10 TABLET ORAL at 20:00

## 2023-08-23 RX ADMIN — LOSARTAN POTASSIUM 100 MG: 50 TABLET, FILM COATED ORAL at 17:37

## 2023-08-23 RX ADMIN — AMLODIPINE BESYLATE 5 MG: 5 TABLET ORAL at 17:37

## 2023-08-23 RX ADMIN — LEVOTHYROXINE SODIUM 112 MCG: 50 TABLET ORAL at 17:37

## 2023-08-23 RX ADMIN — ONDANSETRON 4 MG: 2 INJECTION INTRAMUSCULAR; INTRAVENOUS at 09:31

## 2023-08-23 RX ADMIN — FENTANYL CITRATE 25 MCG: 50 INJECTION, SOLUTION INTRAMUSCULAR; INTRAVENOUS at 12:53

## 2023-08-23 RX ADMIN — DEXAMETHASONE SODIUM PHOSPHATE 4 MG: 4 INJECTION, SOLUTION INTRAMUSCULAR; INTRAVENOUS at 09:31

## 2023-08-23 RX ADMIN — PROPOFOL INJECTABLE EMULSION 100 MG: 10 INJECTION, EMULSION INTRAVENOUS at 11:19

## 2023-08-23 RX ADMIN — EPHEDRINE SULFATE 10 MG: 50 INJECTION INTRAVENOUS at 12:06

## 2023-08-23 RX ADMIN — EPHEDRINE SULFATE 10 MG: 50 INJECTION INTRAVENOUS at 11:42

## 2023-08-23 RX ADMIN — ARIPIPRAZOLE 5 MG: 5 TABLET ORAL at 17:37

## 2023-08-23 RX ADMIN — FENTANYL CITRATE 25 MCG: 50 INJECTION, SOLUTION INTRAMUSCULAR; INTRAVENOUS at 12:28

## 2023-08-23 RX ADMIN — CARVEDILOL 6.25 MG: 6.25 TABLET, FILM COATED ORAL at 20:00

## 2023-08-23 RX ADMIN — SODIUM CHLORIDE, POTASSIUM CHLORIDE, SODIUM LACTATE AND CALCIUM CHLORIDE 9 ML/HR: 600; 310; 30; 20 INJECTION, SOLUTION INTRAVENOUS at 09:54

## 2023-08-23 RX ADMIN — CEFAZOLIN SODIUM 2000 MG: 2 SOLUTION INTRAVENOUS at 20:00

## 2023-08-23 RX ADMIN — MELOXICAM 15 MG: 7.5 TABLET ORAL at 09:31

## 2023-08-23 RX ADMIN — EPHEDRINE SULFATE 10 MG: 50 INJECTION INTRAVENOUS at 11:32

## 2023-08-23 RX ADMIN — ATORVASTATIN CALCIUM 20 MG: 20 TABLET, FILM COATED ORAL at 20:00

## 2023-08-23 RX ADMIN — TRANEXAMIC ACID 1000 MG: 10 INJECTION, SOLUTION INTRAVENOUS at 11:26

## 2023-08-23 RX ADMIN — EPHEDRINE SULFATE 10 MG: 50 INJECTION INTRAVENOUS at 11:25

## 2023-08-23 RX ADMIN — DULOXETINE 60 MG: 60 CAPSULE, DELAYED RELEASE ORAL at 17:37

## 2023-08-23 RX ADMIN — TRANEXAMIC ACID 1000 MG: 10 INJECTION, SOLUTION INTRAVENOUS at 12:53

## 2023-08-23 RX ADMIN — PREGABALIN 150 MG: 75 CAPSULE ORAL at 09:31

## 2023-08-23 NOTE — ANESTHESIA POSTPROCEDURE EVALUATION
Patient: Ashlie Levi    Procedure Summary       Date: 08/23/23 Room / Location:  [facility] OR 3 / [facility] OR    Anesthesia Start: 1108 Anesthesia Stop: 1326    Procedure: TOTAL HIP ARTHROPLASTY ANTERIOR WITH HANA TABLE (Right: Hip) Diagnosis:       Primary osteoarthritis of right hip      (Primary osteoarthritis of right hip [M16.11])    Surgeons: Aidan Wallace MD Provider: Moraima Nelson MD    Anesthesia Type: spinal, general ASA Status: 2            Anesthesia Type: spinal, general    Vitals  Vitals Value Taken Time   /56 08/23/23 1430   Temp 97.4 øF (36.3 øC) 08/23/23 1427   Pulse 71 08/23/23 1440   Resp 18 08/23/23 1427   SpO2 90 % 08/23/23 1440   Vitals shown include unvalidated device data.        Post Anesthesia Care and Evaluation    Patient location during evaluation: PACU  Patient participation: complete - patient participated  Level of consciousness: awake  Pain score: 0  Pain management: adequate  Anesthetic complications: No anesthetic complications  PONV Status: none  Cardiovascular status: acceptable  Respiratory status: acceptable  Hydration status: acceptable

## 2023-08-23 NOTE — ANESTHESIA PREPROCEDURE EVALUATION
Anesthesia Evaluation     Patient summary reviewed and Nursing notes reviewed   no history of anesthetic complications:   NPO Solid Status: > 8 hours  NPO Liquid Status: > 8 hours           Airway   Mallampati: I  TM distance: >3 FB  Neck ROM: full  No difficulty expected  Dental - normal exam     Pulmonary - normal exam    breath sounds clear to auscultation  (+) a smoker Former,recent URI (bronchitis in july 2023, resolved now.) resolved  Cardiovascular   Exercise tolerance: poor (<4 METS)    PT is on anticoagulation therapy  Patient on routine beta blocker and Beta blocker given within 24 hours of surgery  Rhythm: regular  Rate: normal    (+) hypertension 2 medications or greater, past MI (2006) , CAD, cardiac stents (1 stent in 2006. no chest/sob since) more than 12 months ago , hyperlipidemia    ROS comment: Stress test 2019 Stress Findings No ECG evidence of myocardial ischemia.Indeterminate clinical evidence of myocardial ischemia.  - NORMAL ECG -  Sinus rhythm  When compared with ECG of 28-Apr-2022 16:16:02,  No significant change  Takes 81 mg aspirin daily    Neuro/Psych- negative ROS  GI/Hepatic/Renal/Endo    (+) GERD well controlled, thyroid problem hyperthyroidism    Musculoskeletal     (+) back pain  Abdominal     Abdomen: soft.   Substance History   (+) alcohol use      Comment: Alcohol once a month   OB/GYN          Other   arthritis,                     Anesthesia Plan    ASA 2     spinal     intravenous induction     Anesthetic plan, risks, benefits, and alternatives have been provided, discussed and informed consent has been obtained with: patient and spouse/significant other.    Use of blood products discussed with spouse/significant other and patient  Consented to blood products.    Plan discussed with resident.      CODE STATUS:

## 2023-08-23 NOTE — OP NOTE
OPERATIVE REPORT    Date of Surgery:   08/23/23    Attending Surgeon:   Aidan Wallace MD, MSE    ASSISTANTS:    Assistant: (Lakisha Thorne CSA) was responsible for performing the following activities: Retraction, Suction, Closing, and Placing Dressing and their skilled assistance was necessary for the success of this case.     ANESTHESIA:   Spinal    PREOPERATIVE DIAGNOSIS:   Right hip osteoarthritis    POSTOPERATIVE DIAGNOSIS:   Same as above     PROCEDURE:   1. Right primary total hip arthroplasty    SURGICAL APPROACH:        IMPLANTS:   1. DePuy [place] Cup : GRIPTION coating, cluster Acetabular Shell, 50 mm OD  2. DePuy 6.5 mm acetabular screws, 2 20 and 20 mm  3. DePuy [place] acetabular liner, Dual Mobility  43  mm ID, neutral  4. Depuy Actis standard offset, size 5  5. Biolox Delta Femoral Head, metal 22 +4 inner ball, outer ball 43 mm outer poly     CLOSURE:  #5 Ethibond  #1 PDS STRATAFIX  2-0 Monocryl  3-0 Monocryl STRATAFIX    INDICATIONS:   This patient has a history of Right end stage hip osteoarthritis. The patient has failed conservative treatment. She is therefore indicated for total hip arthroplasty.  Risks of surgery, including pain, bleeding, infection, scar, injury to nearby neurovascular structures, fracture, venous thromboembolism, limb length discrepancy, dislocation, need for more procedures, implant failure, implant loosening, and implant subsidence were discussed preoperatively.  The risks of anesthesia including heart attack, stroke, blood clots and even death were discussed. The benefits of surgery including pain relief and restoration of function were discussed. Alternatives to surgery, including nonoperative care were also discussed and the patient elects to proceed with PUJA.    OPERATIVE FINDINGS:  Right full thickness cartilage loss on the femoral head and acetabulum and osteophytes    DESCRIPTION OF PROCEDURE:   The patient was met in the preoperative holding area, evaluated,  consent was obtained, and the Right hip was marked.  The patient was brought to the operating room and placed on the operating room table in the supine position.  After anesthesia was established, the patient was prepped and draped in the usual sterile fashion. All bony prominences and peripheral nerves were padded.  A surgical time out was taken to confirm the operative side and details of the operative procedure.  The patient was given prophylactic antibiotics prior to skin incision.      We utilized an anterior oblique incision and the Levi-Lynne interval to access the hip. An oblique anterior incision was made starting 2 centimeters lateral and distal to the anterior-superior iliac spine and extending distal and slight lateral in line with the tensor fascia tanya.  The position of the incision was confirmed with fluoroscopy prior to incision. This incision was carried through subcutaneous tissue to the underlying fascia of the tensor fascia tanya muscle.  The fascia was incised.  The tensor fascia muscle belly was reflected superolaterally with a curved cobra retractor over the femoral neck.  The rectus was reflected medially with a curved Cobra retractor, inferior to the femoral neck.  A third retractor was carefully placed under the rectus onto the anterior hip capsule directly on bone.    The anterior hip capsule was exposed.  The reflected head of the rectus tendon was exposed.  The capsulotomy was performed along the axis of the superior femoral neck.  A superior flap was raised off the greater trochanter and off the acetabular rim and reflected superiorly underneath our curved Cobra retractor.  An inferior flap was created by peeling the capsule off the intertrochanteric ridge inferomedially to the top of the lesser trochanter, and again reflected anteriorly with our curved Cobra retractor.  The capsular flaps were tagged. The femoral neck cut was then made.  This was based off our preoperative plan  using anatomic landmarks and confirmed with fluoroscopic guidance.  The femoral neck cut was made in two places.  The central segment of the neck was removed separately, and then the femoral head was removed.      The acetabulum was exposed.  A bent Hohmann retractor was placed anterior-inferior between the capsule and the labrum.  A Cobra retractor was placed posteriorly between the capsule and the labrum. The inferior capsule was incised. The acetabular labrum was excised.  Overhanging soft tissue was excised to provide excellent exposure of the acetabulum. The acetabulum was then sequentially reamed under direct visualization and fluoroscopic guidance with increased size reamers until our final reamer, which gave a 1 millimeter press-fit.  The acetabulum was then placed and impacted into position.  The acetabular component placement goal was approximately 42.5 degrees of abduction, with 15 degrees of anteversion.  We used our preoperative planning, bony landmarks and fluoroscopy to assess position.  The acetabular component was impacted into the pelvis and then secured to the pelvis with 2 screws.  The acetabular liner was placed, impacted into position; it locked.  The wound was then irrigated.     We then turned to the proximal femur.  The retractors were removed, and a new set of retractors was placed.  The OmniTract femoral elevator hook was placed around the vastus lateralis and proximal femur between the vastus tubercle and gluteus daniela tendon. The hip was hyperextended by putting the bed in Trendelenberg and lowering the foot of the bed. The hook was assembled to its mounting frame and a gentle amount of tension was placed on the hook.    The double pronged retractor was placed on the posteromedial proximal femur and the double toothed retractor was placed in the interval between the capsule and gluteus minimus. Femoral releases of the capsule, piriformis and conjoint tendon were sequentially performed  as needed until adequate exposure was obtained.  The femur was then extended, adducted, and externally rotated.  The proximal femur was mobilized into the wound to create adequate exposure of the proximal femur.  After exposure was obtained, the femur was prepared with a box osteotome, curved awl, and then broached up to the final broach size. The hook was de-tensioned and the hook and retractors were then removed.    AP and lateral fluoroscopy of the femur were obtained to confirm good fit and alignment of the stem, as well as no fractures or cortical perforations were present. The trial neck and head were then placed, and the hip was reduced. Fluoroscopy was used to confirm length and offset were correct. Leg lengths were assessed clinically on the table.  The hip was assessed in extension, external rotation to check anterior stability.  It was stable.  Posterior stability was assessed with flexion, adduction, internal rotation. The hip was stable.  We flexed to 90 with internal rotation, stable.  Full flexion stable.  The hip was then dislocated.  The femur was exposed with the hook and retractors. The trial implants were removed.  The final hip implants were placed, impacted into position.  The stem had a good fit and fixation.  The femoral head was placed.  The hip was reduced.  With the final implants in position, we trialed the hip again.  Fluoroscopic images were again obtained, including AP and lateral views, which demonstrated excellent component position and no intraoperative fractures. The leg lengths, anterior stability, and posterior stability were all excellent.  The hip was then copiously irrigated with dilute Betadine and normal saline using a pulsatile lavage.  The hip capsular flaps were closed with a #5 Ethibond. The tensor fascia was closed with #1 PDS STRATAFIX.  The hip capsular flaps as well as fascia and subcutaneous tissue were injected with a mixture consisting of 30 mL of 0.5%  ropivacaine, 30 mg of ketorolac, and epinephrine 0.2 mg solution diluted in 30 mL of normal saline.  Subcutaneous tissue closed with 2-0 Monocryl.  The skin was closed with 3-0 Monocryl, STRATAFIX, and Steri-Strips and a sterile herb dressing was placed.    The patient tolerated the procedure well and was taken to the recovery room in stable condition.  Following completion of the surgery all sponge instrument needle counts were correc    POSTOPERATIVE PLAN:   1. Weightbearing as tolerated on operative extremity  2. Anterior hip precautions x 4 weeks  3. DVT prophylaxis    ESTIMATED BLOOD LOSS:   200 ccs    INTRAOPERATIVE FLUIDS:   Per anesthesia record    SPONGE/INSTRUMENT/NEEDLE COUNTS:   Correct x 2    CONDITION ON DISCHARGE:   Stable    COMPLICATIONS:   none    Aidan Wallace MD, MSE

## 2023-08-23 NOTE — PLAN OF CARE
Goal Outcome Evaluation:           Progress: no change  Outcome Evaluation: Pt arrived to unit awake/alert and oriented, spouse waiting in room.  pt is pod#0 rtha, pic drsg c/d/i. neurovascular checks completed, scd's applied. pt denies pain at this time,, iv fluids infusing an pt advanced to regular diet.

## 2023-08-23 NOTE — THERAPY EVALUATION
Patient Name: Ashlie Levi  : 1945    MRN: [id number]                              Today's Date: 2023       Admit Date: 2023    Visit Dx:     ICD-10-CM ICD-9-CM   1. Primary osteoarthritis of right hip  M16.11 715.15     Patient Active Problem List   Diagnosis    Coronary artery disease    Hyperlipidemia    Hypertension    GERD (gastroesophageal reflux disease)    Vulvar itching    Dyspepsia    Bloating    Nausea    Dry heaves    History of colon polyps    Incontinence of feces    OA (osteoarthritis) of hip    Osteoarthritis of right hip     Past Medical History:   Diagnosis Date    Abnormal electrocardiogram      heart attack    Arthritis     Chest discomfort     Colon polyp     Coronary artery disease     Disease of thyroid gland     Diverticulitis     Elevated cholesterol     GERD (gastroesophageal reflux disease)     Hyperlipidemia     Hypertension      Past Surgical History:   Procedure Laterality Date    APPENDECTOMY      CARDIAC SURGERY      3 stents    CHOLECYSTECTOMY      COLONOSCOPY N/A 2016    Procedure: COLONOSCOPY;  Surgeon: Giovani Avelar MD;  Location: [facility];  Service:     COLONOSCOPY N/A 2019    Procedure: Colonoscopy with possible biopsy or polypectomy;  Surgeon: Alfreda Soler DO;  Location: [facility];  Service: Gastroenterology    COLONOSCOPY N/A 2021    Procedure: Colonoscopy with polypectomy, biopsy;  Surgeon: Alfreda Soler DO;  Location: [facility];  Service: Gastroenterology;  Laterality: N/A;  diverticulosis  cecal biopsy  right colon polyp  left colon polyps x2    CORONARY STENT PLACEMENT      ENDOSCOPY N/A 2017    Procedure: ESOPHAGOGASTRODUODENOSCOPY with CARIN test ;  Surgeon: Alfreda Soler DO;  Location: [facility];  Service:     ENDOSCOPY N/A 2019    Procedure: Esophagogastroduodenoscopy with possible biopsy, polypectomy, dilation;  Surgeon: Alfreda Solre DO;  Location: [facility];  Service:  Gastroenterology    ENDOSCOPY N/A 5/18/2021    Procedure: Esophagogastroduodenoscopy with biopsies;  Surgeon: Alfreda Soler DO;  Location: [facility] OR;  Service: Gastroenterology;  Laterality: N/A;  CARIN test  gastritis  duodenitis    EYE SURGERY      FOOT GANGLION EXCISION Left     HEMORROIDECTOMY      HERNIA REPAIR      HYSTERECTOMY      GREG with OC age 30 for DUB    OOPHORECTOMY      dx lsc , interval USO      General Information       Row Name 08/23/23 1529          OT Time and Intention    Document Type evaluation  -SD     Mode of Treatment occupational therapy  -SD       Row Name 08/23/23 1529          General Information    Patient Profile Reviewed other (see comments)  Pt s/p right PUJA. Pt lives with spouse and was independent with  mobility and adl's.  -SD     Prior Level of Function independent:;ADL's;all household mobility  -SD     Existing Precautions/Restrictions fall  anterior hip/posterior hip precautions.  -SD     Barriers to Rehab none identified  -SD       Row Name 08/23/23 1529          Occupational Profile    Reason for Services/Referral (Occupational Profile) s/p PUJA  -SD     Successful Occupations (Occupational Profile) I with daily tasks prior to surgery  -SD     Environmental Supports and Barriers (Occupational Profile) lives with spouse  -SD     Patient Goals (Occupational Profile) go home  -SD       Row Name 08/23/23 1529          Living Environment    People in Home spouse  -SD       Row Name 08/23/23 1529          Home Main Entrance    Number of Stairs, Main Entrance two  -SD     Stair Railings, Main Entrance other (see comments)  one hand rail  -SD       Row Name 08/23/23 1529          Stairs Within Home, Primary    Stairs, Within Home, Primary one story home  -SD       Row Name 08/23/23 1529          Cognition    Orientation Status (Cognition) oriented x 3  -SD       Row Name 08/23/23 1529          Safety Issues, Functional Mobility    Comment, Safety Issues/Impairments (Mobility)  Pt with intermittent knee buckling.  -SD               User Key  (r) = Recorded By, (t) = Taken By, (c) = Cosigned By      Initials Name Provider Type    Chi Moser OTR Occupational Therapist                     Mobility/ADL's       Row Name 08/23/23 1533          Bed Mobility    Bed Mobility supine-sit;sit-supine  -SD     Supine-Sit [place] (Bed Mobility) minimum assist (75% patient effort)  -SD     Sit-Supine [place] (Bed Mobility) minimum assist (75% patient effort);verbal cues  -SD     Assistive Device (Bed Mobility) head of bed elevated;bed rails  -SD       Row Name 08/23/23 1533          Transfers    Transfers sit-stand transfer;stand-sit transfer  -SD       Row Name 08/23/23 1533          Sit-Stand Transfer    Sit-Stand [place] (Transfers) minimum assist (75% patient effort)  -SD     Assistive Device (Sit-Stand Transfers) walker, front-wheeled  -SD       Row Name 08/23/23 1533          Stand-Sit Transfer    Stand-Sit [place] (Transfers) minimum assist (75% patient effort);verbal cues  -SD     Assistive Device (Stand-Sit Transfers) walker, front-wheeled  -SD       Row Name 08/23/23 1533          Functional Mobility    Functional Mobility- Ind. Level minimum assist (75% patient effort);verbal cues required  -SD     Functional Mobility- Device walker, front-wheeled  -SD     Functional Mobility-Distance (Feet) 40  -SD       Row Name 08/23/23 1533          Activities of Daily Living    BADL Assessment/Intervention bathing;lower body dressing  -SD       Row Name 08/23/23 1533          Bathing Assessment/Intervention    [place] Level (Bathing) moderate assist (50% patient effort)  -SD     Comment, (Bathing) Education regarding precautions after PUJA. Pt states she will have assistance at home as needed. WIll follow up on 8-24-23 for further education regarding compensatory strategies/AE  -SD       Row Name 08/23/23 1533          Lower Body Dressing Assessment/Training     [place] Level (Lower Body Dressing) lower body dressing skills;moderate assist (50% patient effort)  -SD     Comment, (Lower Body Dressing) Education regarding precautions after PUJA. Pt states she will have assistance at home as needed. WIll follow up on 8-24-23 for further education regarding compensatory strategies/AE  -SD               User Key  (r) = Recorded By, (t) = Taken By, (c) = Cosigned By      Initials Name Provider Type    Chi Moser OTR Occupational Therapist                   Obj/Interventions       Row Name 08/23/23 1535          Sensory Assessment (Somatosensory)    Sensory Assessment (Somatosensory) sensation intact  -SD       Row Name 08/23/23 1535          Range of Motion Comprehensive    General Range of Motion bilateral upper extremity ROM WNL  -SD       Row Name 08/23/23 1535          Strength Comprehensive (MMT)    Comment, General Manual Muscle Testing (MMT) Assessment BUE strength wfl  -SD       Row Name 08/23/23 1535          Balance    Comment, Balance CGA for sitting balance and CGA/min assist for standing balance using rolling walker for support  -SD               User Key  (r) = Recorded By, (t) = Taken By, (c) = Cosigned By      Initials Name Provider Type    Chi Moser OTR Occupational Therapist                   Goals/Plan       Row Name 08/23/23 1540          Bathing Goal 1 (OT)    Activity/Device (Bathing Goal 1, OT) lower body bathing;long-handled sponge  -SD     [place] Level/Cues Needed (Bathing Goal 1, OT) modified independence  demonstrate use of LH sponge for LB adl's  -SD     Time Frame (Bathing Goal 1, OT) by discharge  -SD     Progress/Outcomes (Bathing Goal 1, OT) new goal  -SD       Row Name 08/23/23 1540          Problem Specific Goal 1 (OT)    Problem Specific Goal 1 (OT) Pt to verbalize hip precautions to increase safety with daily tasks.  -SD     Time Frame (Problem Specific Goal 1, OT) by discharge  -SD     Progress/Outcome (Problem  Specific Goal 1, OT) new goal  -SD               User Key  (r) = Recorded By, (t) = Taken By, (c) = Cosigned By      Initials Name Provider Type    Chi Moser OTR Occupational Therapist                   Clinical Impression       Row Name 08/23/23 1536          Pain Assessment    Pretreatment Pain Rating 2/10  -SD     Posttreatment Pain Rating 2/10  -SD     Pain Location - Side/Orientation Right  -SD     Pain Location - hip  -SD     Pre/Posttreatment Pain Comment nursing in room and aware of pt's pain level  -SD     Pain Intervention(s) Repositioned;Ambulation/increased activity  -SD       Row Name 08/23/23 1536          Plan of Care Review    Plan of Care Reviewed With patient;spouse  -SD     Outcome Evaluation OT evaluation completed. Pt s/p right PUJA. Pt lives with her spouse. Pt required min assist for bed moblity, transfers and functional mobility x 40 feet using a rolling walker for support. Pt currently needs mod assist for management of lower body adl's. Education provided regarding hip precautions and impact on daily tasks. Rec OT services for continued education regarding compensatory stratieges. Plan is for home with  services.  -SD       Row Name 08/23/23 1536          Therapy Assessment/Plan (OT)    Patient/Family Therapy Goal Statement (OT) go home  -SD     Rehab Potential (OT) good, to achieve stated therapy goals  -SD     Criteria for Skilled Therapeutic Interventions Met (OT) yes;skilled treatment is necessary  -SD     Therapy Frequency (OT) other (see comments)  1-2 visits for education with compensatory strategies/use of AE  -SD     Predicted Duration of Therapy Intervention (OT) anticipate discharge to home on 8-24-23 -SD       Row Name 08/23/23 1536          Therapy Plan Review/Discharge Plan (OT)    Equipment Needs Upon Discharge (OT) commode chair;walker, rolling  will assess need for lh AE for adl's  -SD     Anticipated Discharge Disposition (OT) home with [facility]  -SD        Row Name 08/23/23 1536          Positioning and Restraints    Pre-Treatment Position in bed  -SD     Post Treatment Position bed  -SD     In Bed supine;call light within reach;encouraged to call for assist;with family/caregiver;with nsg  -SD               User Key  (r) = Recorded By, (t) = Taken By, (c) = Cosigned By      Initials Name Provider Type    Chi Moser, OTR Occupational Therapist                   Outcome Measures       Row Name 08/23/23 1529          How much help from another is currently needed...    Putting on and taking off regular lower body clothing? 2  -SD     Bathing (including washing, rinsing, and drying) 2  -SD     Toileting (which includes using toilet bed pan or urinal) 2  -SD     Putting on and taking off regular upper body clothing 4  -SD     Taking care of personal grooming (such as brushing teeth) 4  -SD     Eating meals 4  -SD     AM-PAC 6 Clicks Score (OT) 18  -SD       Row Name 08/23/23 1538          How much help from another person do you currently need...    Turning from your back to your side while in flat bed without using bedrails? 3  -BP     Moving from lying on back to sitting on the side of a flat bed without bedrails? 3  -BP     Moving to and from a bed to a chair (including a wheelchair)? 3  -BP     Standing up from a chair using your arms (e.g., wheelchair, bedside chair)? 3  -BP     Climbing 3-5 steps with a railing? 2  -BP     To walk in hospital room? 3  -BP     AM-PAC 6 Clicks Score (PT) 17  -BP     Highest level of mobility 5 --> Static standing  -BP       Row Name 08/23/23 1538 08/23/23 1529       Functional Assessment    Outcome Measure Options AM-PAC 6 Clicks Basic Mobility (PT)  -BP AM-PAC 6 Clicks Daily Activity (OT)  -SD              User Key  (r) = Recorded By, (t) = Taken By, (c) = Cosigned By      Initials Name Provider Type    Chi Moser, OTR Occupational Therapist    Charlene Raymundo, PT Physical Therapist                     Occupational Therapy Education       Title: PT OT SLP Therapies (In Progress)       Topic: Occupational Therapy (In Progress)       Point: ADL training (In Progress)       Description:   Instruct learner(s) on proper safety adaptation and remediation techniques during self care or transfers.   Instruct in proper use of assistive devices.                  Learning Progress Summary             Patient Acceptance, E, NR by SD at 8/23/2023 9327    Comment: Education regarding OT services, impact of PUJA on adl's and safety with bed mobility, transfers and mobility. Rec continue education regarding hip precautions.                         Point: Precautions (Not Started)       Description:   Instruct learner(s) on prescribed precautions during self-care and functional transfers.                  Learner Progress:  Not documented in this visit.                              User Key       Initials Effective Dates Name Provider Type Discipline    SD 06/16/21 -  Chi Whitehead, OTR Occupational Therapist OT                  OT Recommendation and Plan  Therapy Frequency (OT): other (see comments) (1-2 visits for education with compensatory strategies/use of AE)  Plan of Care Review  Plan of Care Reviewed With: patient, spouse  Outcome Evaluation: OT evaluation completed. Pt s/p right PUJA. Pt lives with her spouse. Pt required min assist for bed moblity, transfers and functional mobility x 40 feet using a rolling walker for support. Pt currently needs mod assist for management of lower body adl's. Education provided regarding hip precautions and impact on daily tasks. Rec OT services for continued education regarding compensatory stratieges. Plan is for home with  services.     Time Calculation:   Evaluation Complexity (OT)  Review Occupational Profile/Medical/Therapy History Complexity: brief/low complexity  Assessment, Occupational Performance/Identification of Deficit Complexity: 1-3 performance deficits  Clinical  Decision Making Complexity (OT): problem focused assessment/low complexity  Overall Complexity of Evaluation (OT): low complexity     Time Calculation- OT       Row Name 08/23/23 1543             Time Calculation- OT    OT Start Time 1505  -SD      OT Stop Time 1530  -SD      OT Time Calculation (min) 25 min  -SD         Untimed Charges    OT Eval/Re-eval Minutes 25  -SD         Total Minutes    Untimed Charges Total Minutes 25  -SD       Total Minutes 25  -SD                User Key  (r) = Recorded By, (t) = Taken By, (c) = Cosigned By      Initials Name Provider Type    SD Chi Whitehead OTR Occupational Therapist                  Therapy Charges for Today       Code Description Service Date Service Provider Modifiers Qty    [id number] HC OT EVAL LOW COMPLEXITY 2 8/23/2023 Chi Whitehead OTR GO 1                 SY Mayen  8/23/2023

## 2023-08-23 NOTE — ANESTHESIA PROCEDURE NOTES
Spinal Block    Pre-sedation assessment completed: 8/23/2023 10:57 AM    Patient reassessed immediately prior to procedure    Start Time: 8/23/2023 10:58 AM  Stop Time: 8/23/2023 10:59 AM  Indication:at surgeon's request and post-op pain management  Performed By  CRNA/BULL: Deepa Cornejo CRNASRNA: Tulio Marin SRNA  Preanesthetic Checklist  Completed: patient identified, IV checked, site marked, risks and benefits discussed, surgical consent, monitors and equipment checked, pre-op evaluation and timeout performed  Spinal Block Prep:  Patient Position:sitting  Sterile Tech:cap, gloves, mask and sterile barriers  Prep:Chloraprep  Patient Monitoring:blood pressure monitoring, continuous pulse oximetry and EKG    Spinal Block Procedure  Approach:midline  Guidance:landmark technique and palpation technique  Location:L3-L4  Needle Type:Sprotte  Needle Gauge:25 G  Placement of Spinal needle event:cerebrospinal fluid aspirated  Paresthesia: no  Fluid Appearance:clear  Medications: bupivacaine (MARCAINE) injection 0.5% - Injection   1.4 mL - 8/23/2023 10:58:00 AM   Post Assessment  Patient Tolerance:patient tolerated the procedure well with no apparent complications  Complications no

## 2023-08-23 NOTE — THERAPY EVALUATION
Patient Name: Ashlie Levi  : 1945    MRN: [id number]                              Today's Date: 2023       Admit Date: 2023    Visit Dx:     ICD-10-CM ICD-9-CM   1. Primary osteoarthritis of right hip  M16.11 715.15     Patient Active Problem List   Diagnosis    Coronary artery disease    Hyperlipidemia    Hypertension    GERD (gastroesophageal reflux disease)    Vulvar itching    Dyspepsia    Bloating    Nausea    Dry heaves    History of colon polyps    Incontinence of feces    OA (osteoarthritis) of hip    Osteoarthritis of right hip     Past Medical History:   Diagnosis Date    Abnormal electrocardiogram      heart attack    Arthritis     Chest discomfort     Colon polyp     Coronary artery disease     Disease of thyroid gland     Diverticulitis     Elevated cholesterol     GERD (gastroesophageal reflux disease)     Hyperlipidemia     Hypertension      Past Surgical History:   Procedure Laterality Date    APPENDECTOMY      CARDIAC SURGERY      3 stents    CHOLECYSTECTOMY      COLONOSCOPY N/A 2016    Procedure: COLONOSCOPY;  Surgeon: Giovani Avelar MD;  Location: [facility] OR;  Service:     COLONOSCOPY N/A 2019    Procedure: Colonoscopy with possible biopsy or polypectomy;  Surgeon: Alfreda Soler DO;  Location: [facility] OR;  Service: Gastroenterology    COLONOSCOPY N/A 2021    Procedure: Colonoscopy with polypectomy, biopsy;  Surgeon: Alfreda Soler DO;  Location: [facility] OR;  Service: Gastroenterology;  Laterality: N/A;  diverticulosis  cecal biopsy  right colon polyp  left colon polyps x2    CORONARY STENT PLACEMENT      ENDOSCOPY N/A 2017    Procedure: ESOPHAGOGASTRODUODENOSCOPY with CARIN test ;  Surgeon: Alfreda Soler DO;  Location: [facility] OR;  Service:     ENDOSCOPY N/A 2019    Procedure: Esophagogastroduodenoscopy with possible biopsy, polypectomy, dilation;  Surgeon: Alfreda Soler DO;  Location: [facility] OR;  Service:  Gastroenterology    ENDOSCOPY N/A 5/18/2021    Procedure: Esophagogastroduodenoscopy with biopsies;  Surgeon: Alfreda Soler DO;  Location: [facility];  Service: Gastroenterology;  Laterality: N/A;  CARIN test  gastritis  duodenitis    EYE SURGERY      FOOT GANGLION EXCISION Left     HEMORROIDECTOMY      HERNIA REPAIR      HYSTERECTOMY      GREG with OC age 30 for DUB    OOPHORECTOMY      dx lsc , interval USO      General Information       Row Name 08/23/23 1527          Physical Therapy Time and Intention    Document Type evaluation  -BP     Mode of Treatment physical therapy  -BP       Row Name 08/23/23 1527          General Information    Patient Profile Reviewed yes  Patient admitted s/p R PUJA.  -BP     Prior Level of Function --  patient reports independence with all mobility and ADL's at baseline.  -BP     Existing Precautions/Restrictions fall  -BP     Barriers to Rehab none identified  -BP       Row Name 08/23/23 1527          Living Environment    People in Home spouse  -BP       Row Name 08/23/23 1527          Home Main Entrance    Number of Stairs, Main Entrance two  -BP     Stair Railings, Main Entrance --  one handrail, does not specify which side  -BP       Row Name 08/23/23 1527          Stairs Within Home, Primary    Stairs, Within Home, Primary one story home  -BP       Row Name 08/23/23 1527          Cognition    Orientation Status (Cognition) oriented x 3  -BP       Row Name 08/23/23 1527          Safety Issues, Functional Mobility    Comment, Safety Issues/Impairments (Mobility) Patient with decreased coordination during gait with intermittent knee buckling noted.  -BP               User Key  (r) = Recorded By, (t) = Taken By, (c) = Cosigned By      Initials Name Provider Type    BP Charlene Collier, PT Physical Therapist                   Mobility       Row Name 08/23/23 1529          Bed Mobility    Bed Mobility supine-sit;sit-supine  -BP     Supine-Sit [place] (Bed Mobility) minimum  assist (75% patient effort)  -BP     Sit-Supine [place] (Bed Mobility) minimum assist (75% patient effort)  -BP     Assistive Device (Bed Mobility) head of bed elevated;bed rails  -BP       Row Name 08/23/23 1529          Transfers    Comment, (Transfers) Verbal cues for hand placement  -BP       Row Name 08/23/23 1529          Sit-Stand Transfer    Sit-Stand [place] (Transfers) minimum assist (75% patient effort)  -BP     Assistive Device (Sit-Stand Transfers) walker, front-wheeled  -BP     Comment, (Sit-Stand Transfer) bilateral knee buckling noted initially following sit to stand transfer  -BP       Row Name 08/23/23 1529          Gait/Stairs (Locomotion)    [place] Level (Gait) contact guard;minimum assist (75% patient effort)  -BP     Assistive Device (Gait) walker, front-wheeled  -BP     Patient was Unable to Ambulate --  Patient ambulated day of surgery  -BP     Distance in Feet (Gait) 40  -BP     Deviations/Abnormal Patterns (Gait) gait speed decreased;stride length decreased  -BP     Bilateral Gait Deviations forward flexed posture;knee buckling bilaterally  -BP     Comment, (Gait/Stairs) Patient with intermittent bilateral knee buckling, increased with directional changes.  -BP               User Key  (r) = Recorded By, (t) = Taken By, (c) = Cosigned By      Initials Name Provider Type    BP Charlene Collier, PT Physical Therapist                   Obj/Interventions       [facility] Name 08/23/23 1532          Range of Motion Comprehensive    Comment, General Range of Motion L LE AROM WFL. R and ankle AROM WFL. R hip not assessed.  -BP       Row Name 08/23/23 1532          Strength Comprehensive (MMT)    Comment, General Manual Muscle Testing (MMT) Assessment L LE gross MMT 5/5. R knee gross MMT 4/5. R hip not formerly assessed however able to perform SLR in supine.  -BP       Row Name 08/23/23 1532          Balance    Comment, Balance sitting balance-CGA. Static standing balance-CGA/min A with  device  -BP       Row Name 08/23/23 1532          Sensory Assessment (Somatosensory)    Sensory Assessment (Somatosensory) sensation intact  -BP               User Key  (r) = Recorded By, (t) = Taken By, (c) = Cosigned By      Initials Name Provider Type    BP Charlene Collier, PT Physical Therapist                   Goals/Plan       Row Name 08/23/23 1537          Bed Mobility Goal 1 (PT)    Activity/Assistive Device (Bed Mobility Goal 1, PT) bed mobility activities, all  -BP     [place] Level/Cues Needed (Bed Mobility Goal 1, PT) supervision required  -BP     Time Frame (Bed Mobility Goal 1, PT) 2 days  -BP     Progress/Outcomes (Bed Mobility Goal 1, PT) new goal  -BP       Row Name 08/23/23 1537          Transfer Goal 1 (PT)    Activity/Assistive Device (Transfer Goal 1, PT) transfers, all;walker, rolling  -BP     [place] Level/Cues Needed (Transfer Goal 1, PT) supervision required  -BP     Time Frame (Transfer Goal 1, PT) 2 days  -BP     Progress/Outcome (Transfer Goal 1, PT) new goal  -BP       Row Name 08/23/23 1537          Gait Training Goal 1 (PT)    Activity/Assistive Device (Gait Training Goal 1, PT) gait (walking locomotion);walker, rolling  -BP     [place] Level (Gait Training Goal 1, PT) supervision required  -BP     Distance (Gait Training Goal 1, PT) 100  -BP     Time Frame (Gait Training Goal 1, PT) 2 days  -BP     Progress/Outcome (Gait Training Goal 1, PT) new goal  -BP       Row Name 08/23/23 1537          Stairs Goal 1 (PT)    Activity/Assistive Device (Stairs Goal 1, PT) ascending stairs;descending stairs  -BP     [place] Level/Cues Needed (Stairs Goal 1, PT) contact guard required  -BP     Number of Stairs (Stairs Goal 1, PT) 2 with one handrail  -BP     Time Frame (Stairs Goal 1, PT) 2 days  -BP     Progress/Outcome (Stairs Goal 1, PT) new goal  -BP       Row Name 08/23/23 1537          Therapy Assessment/Plan (PT)    Planned Therapy Interventions (PT) balance  training;bed mobility training;gait training;home exercise program;patient/family education;transfer training;strengthening;stair training  -BP               User Key  (r) = Recorded By, (t) = Taken By, (c) = Cosigned By      Initials Name Provider Type    BP Charlene Collier, PT Physical Therapist                   Clinical Impression       Row Name 08/23/23 1535          Pain    Pretreatment Pain Rating 2/10  -BP     Posttreatment Pain Rating 2/10  -BP     Pain Location - Side/Orientation Right  -BP     Pain Location incisional  -BP     Pain Location - hip  -BP     Pain Intervention(s) Repositioned;Ambulation/increased activity  -BP     Additional Documentation Pain Scale: Numbers Pre/Post-Treatment (Group)  -BP       Row Name 08/23/23 2325          Plan of Care Review    Plan of Care Reviewed With patient  -BP     Outcome Evaluation PT Evaluation Complete: Patient performs supine to/from sit transfer with min A, sit to/from stand transfers with min A, and gait x 40 feet with CGA/min A x 2 with use of FWW. Patient with intermittent bilateral knee buckling noted in static standing and with gait training. Patient with decreased coordination noted during swing phase of gait. Patient would benefit from physical therapy to address deficits in functional mobility and LE strength. Plan to see patient 1-2 additional visits. Patient plans to discharge home with assist from  and home health PT.  Patient reports she does need a FWW and BSC at discharge.  -BP       Row Name 08/23/23 6068          Therapy Assessment/Plan (PT)    Rehab Potential (PT) good, to achieve stated therapy goals  -BP     Criteria for Skilled Interventions Met (PT) yes;meets criteria  -BP     Therapy Frequency (PT) other (see comments)  1-2 visits  -BP     Predicted Duration of Therapy Intervention (PT) 2 days  -BP       Row Name 08/23/23 8452          Positioning and Restraints    Pre-Treatment Position in bed  -BP     Post Treatment Position  bed  -BP     In Bed notified nsg;supine;encouraged to call for assist;call light within reach;with family/caregiver;with nsg  -BP               User Key  (r) = Recorded By, (t) = Taken By, (c) = Cosigned By      Initials Name Provider Type    Charlene Raymundo, PT Physical Therapist                   Outcome Measures       Row Name 08/23/23 1538          How much help from another person do you currently need...    Turning from your back to your side while in flat bed without using bedrails? 3  -BP     Moving from lying on back to sitting on the side of a flat bed without bedrails? 3  -BP     Moving to and from a bed to a chair (including a wheelchair)? 3  -BP     Standing up from a chair using your arms (e.g., wheelchair, bedside chair)? 3  -BP     Climbing 3-5 steps with a railing? 2  -BP     To walk in hospital room? 3  -BP     AM-PAC 6 Clicks Score (PT) 17  -BP     Highest level of mobility 5 --> Static standing  -BP       Row Name 08/23/23 1538 08/23/23 1529       Functional Assessment    Outcome Measure Options AM-PAC 6 Clicks Basic Mobility (PT)  -BP AM-PAC 6 Clicks Daily Activity (OT)  -SD              User Key  (r) = Recorded By, (t) = Taken By, (c) = Cosigned By      Initials Name Provider Type    Chi Moser, OTR Occupational Therapist    Charlene Raymundo, PT Physical Therapist                                 Physical Therapy Education       Title: PT OT SLP Therapies (In Progress)       Topic: Physical Therapy (In Progress)       Point: Mobility training (Done)       Learning Progress Summary             Patient Acceptance, E,TB, VU by BP at 8/23/2023 1539                         Point: Home exercise program (Not Started)       Learner Progress:  Not documented in this visit.              Point: Precautions (Not Started)       Learner Progress:  Not documented in this visit.                              User Key       Initials Effective Dates Name Provider Type Discipline    BP 06/16/21 -   Charlene Collier, PT Physical Therapist PT                  PT Recommendation and Plan  Planned Therapy Interventions (PT): balance training, bed mobility training, gait training, home exercise program, patient/family education, transfer training, strengthening, stair training  Plan of Care Reviewed With: patient  Outcome Evaluation: PT Evaluation Complete: Patient performs supine to/from sit transfer with min A, sit to/from stand transfers with min A, and gait x 40 feet with CGA/min A x 2 with use of FWW. Patient with intermittent bilateral knee buckling noted in static standing and with gait training. Patient with decreased coordination noted during swing phase of gait. Patient would benefit from physical therapy to address deficits in functional mobility and LE strength. Plan to see patient 1-2 additional visits. Patient plans to discharge home with assist from  and home health PT.  Patient reports she does need a FWW and BSC at discharge.     Time Calculation:   PT Evaluation Complexity  History, PT Evaluation Complexity: 3 or more personal factors and/or comorbidities  Examination of Body Systems (PT Eval Complexity): 1-2 elements  Clinical Presentation (PT Evaluation Complexity): stable  Clinical Decision Making (PT Evaluation Complexity): low complexity  Overall Complexity (PT Evaluation Complexity): low complexity     PT Charges       Row Name 08/23/23 1539             Time Calculation    Start Time 1505  -BP      Stop Time 1530  -BP      Time Calculation (min) 25 min  -BP      PT Received On 08/23/23  -BP      PT - Next Appointment 08/24/23  -BP                User Key  (r) = Recorded By, (t) = Taken By, (c) = Cosigned By      Initials Name Provider Type    BP Charlene Collier, PT Physical Therapist                  Therapy Charges for Today       Code Description Service Date Service Provider Modifiers Qty    [id number]  PT EVAL LOW COMPLEXITY 2 8/23/2023 Charlene Collier, PT GP 1             PT G-Codes  Outcome Measure Options: AM-PAC 6 Clicks Basic Mobility (PT)  AM-PAC 6 Clicks Score (PT): 17  AM-PAC 6 Clicks Score (OT): 18  PT Discharge Summary  Anticipated Discharge Disposition (PT): home with home health    Charlene Collier, PT  8/23/2023

## 2023-08-23 NOTE — INTERVAL H&P NOTE
H&P reviewed. The patient was examined and there are no changes to the H&P.  /65 (BP Location: Right arm, Patient Position: Lying)   Pulse 62   Temp 98.5 øF (36.9 øC) (Oral)   Resp 16   Wt 71.3 kg (157 lb 3.2 oz)   LMP  (LMP Unknown)   SpO2 95%   BMI 29.22 kg/mý

## 2023-08-23 NOTE — PLAN OF CARE
Goal Outcome Evaluation:  Plan of Care Reviewed With: patient           Outcome Evaluation: PT Evaluation Complete: Patient performs supine to/from sit transfer with min A, sit to/from stand transfers with min A, and gait x 40 feet with CGA/min A x 2 with use of FWW. Patient with intermittent bilateral knee buckling noted in static standing and with gait training. Patient with decreased coordination noted during swing phase of gait. Patient would benefit from physical therapy to address deficits in functional mobility and LE strength. Plan to see patient 1-2 additional visits. Patient plans to discharge home with assist from  and home health PT.  Patient reports she does need a FWW and BSC at discharge.      Anticipated Discharge Disposition (PT): home with home health

## 2023-08-23 NOTE — PLAN OF CARE
Goal Outcome Evaluation:  Plan of Care Reviewed With: patient, spouse           Outcome Evaluation: OT evaluation completed. Pt s/p right PUJA. Pt lives with her spouse. Pt required min assist for bed mobility, transfers and functional mobility x 40 feet using a rolling walker for support. Pt currently needs mod assist for management of lower body adl's. Education provided regarding hip precautions and impact on daily tasks. Rec OT services for continued education regarding compensatory strategies/home safety. Plan is for home with  services.      Anticipated Discharge Disposition (OT): home with home health

## 2023-08-23 NOTE — DISCHARGE INSTRUCTIONS
Total Hip Replacement Discharge Instructions:    I. ACTIVITIES:  1. Exercises:  Complete exercise program as taught by the hospital physical therapist 2 times per day  Exercise program will be advanced by the physical therapist  During the day be up ambulating every 2 hours (while awake) for short distances  Complete the ankle pump exercises at least 10 times per hour (while awake)  Elevate legs when in bed and for at least 30 minutes during the day.Use cold packs 20-30 minutes approximately 5 times per day. This should be done before and after completing your exercises and at any time you are experiencing pain/ stiffness in your operative extremity.      2. Activities of Daily Living:  No tub baths, hot tubs, or swimming pools for 4 weeks  May shower and let water run over the incision on post-operative day #5 if no drainage. Do not scrub or rub the incision. Simply let the water run over the incision and pat dry.    II. Restrictions  Continue hip precautions as taught at the hospital  Your surgeon will discuss with you when you will be able to drive again.  Weight bearing is as tolerated  First week stay inside on even terrain. May go up and down stairs one stair at a time utilizing the hand rail once cleared by physical therapy to do so.  After one week, you may venture outside (if cleared to do so by physical therapist).    III. Precautions:  Everyone that comes near you should wash their hands  No elective dental, genital-urinary, or colon procedures or surgical procedures for 12 weeks after surgery unless absolutely necessary.   If dental work or surgical procedure is deemed absolutely necessary, you will need to contact your surgeon as you will need to take antibiotics 1 hour prior to any dental work (including teeth cleanings).  Please discuss with your surgeon prophylactic antibiotics as the length of time this intervention will be necessary for you varies with each patient's health history and  situation.  Avoid sick people. If you must be around someone who is ill, they should wear a mask.  Avoid visits to the Emergency Room or Urgent Care unless you are having a life threatening event.   If ordered stockings are to be placed on in the morning and removed at night. Monitor the stockings to ensure that any swelling is not causing the stockings to become too tight. In this case, remove stockings immediately.    IV. INCISION CARE:  The dressing placed during surgery is waterproof and should remain in place for 5 to 7 days.  May shower with waterproof dressing on and allowed water to run over the dressing  Remove the dressing around day 5-7  There is Steri-Strips underneath your dressing which may stay on and will eventually fall off on their own  After dressing removal on day 7 May shower and allow water to run over the incision  No soaking or submersion of the incision in tubs pools hot tubs etc.  No creams or ointments to the incision  Do not touch or pick at the incision  Check incision every day and notify surgeon immediately if any of the following signs or symptoms are noted:  Increase in redness  Increase in swelling around the incision and of the entire extremity  Increase in pain  Drainage oozing from the incision  Pulling apart of the edges of the incision  Increase in overall body temperature (greater than 100.5 degrees)  Your surgeon will instruct you regarding suture or staple removal    V. Medications:   1. Anticoagulants: You will be discharged on an anticoagulant. This is a prophylactic medication that helps prevent blood clots during your post-operative period. The type and length of dosage varies based on your individual needs, procedure performed, and surgeon's preference.  While taking the anticoagulant, you should avoid taking any additional aspirin, ibuprofen (Advil or Motrin), Aleve (Naprosyn) or other non-steroidal anti-inflammatory medications.   Notify surgeon immediately if any  roxy bleeding is noted in the urine, stool, emesis, or from the nose or the incision. Blood in the stool will often appear as black rather than red. Blood in urine may appear as pink. Blood in emesis may appear as brown/black like coffee grounds.  You will need to apply pressure for longer periods of time to any cuts or abrasions to stop bleeding  Avoid alcohol while taking anticoagulants    2. Stool Softeners: You will be at greater risk of constipation after surgery due to being less mobile and the pain medications.   Take stool softeners as instructed by your surgeon while on pain medications. Over the counter Colace 100 mg 1-2 capsules twice daily.   If stools become too loose or too frequent, please decreases the dosage or stop the stool softener.  If constipation occurs despite use of stool softeners, you are to continue the stool softeners and add a laxative (Milk of Magnesia 1 ounce daily as needed)  Drink plenty of fluids, and eat fruits and vegetables during your recovery time    3. Pain Medications utilized after surgery are narcotics and the law requires that the following information be given to all patients that are prescribed narcotics:  CLASSIFICATION: Pain medications are called Opioids and are narcotics  LEGALITIES: It is illegal to share narcotics with others and to drive within 24 hours of taking narcotics  POTENTIAL SIDE EFFECTS: Potential side effects of opioids include: nausea, vomiting, itching, dizziness, drowsiness, dry mouth, constipation, and difficulty urinating.  POTENTIAL ADVERSE EFFECTS:   Opioid tolerance can develop with use of pain medications and this simply means that it requires more and more of the medication to control pain; however, this is seen more in patients that use opioids for longer periods of time.  Opioid dependence can develop with use of Opioids and this simply means that to stop the medication can cause withdrawal symptoms; however, this is seen with patients  that use Opioids for longer periods of time.  Opioid addiction can develop with use of Opioids and the incidence of this is very unlikely in patients who take the medications as ordered and stop the medications as instructed.  Opioid overdose can be dangerous, but is unlikely when the medication is taken as ordered and stopped when ordered. It is important not to mix opioids with alcohol or with and type of sedative such as Benadryl as this can lead to over sedation and respiratory difficulty.  DOSAGE:   Pain medications will need to be taken consistently for the first week to decrease pain and promote adequate pain relief and participation in physical therapy.  After the initial surgical pain begins to resolve, you may begin to decrease the pain medication. By the end of 6 weeks, you should be off of pain medications.  Refills will not be given by the office during evening hours, on weekends, or after 6 weeks post-op.  To seek refills on pain medications during the initial 6 week post-operative period, you must call the office 48 hours in advance to request the refill. The office will then notify you when to  the prescription. DO NOT wait until you are out of the medication to request a refill.    V. FOLLOW-UP VISITS:  You will need to follow up in the office with your surgeon in  2 weeks. Please call this number [phone]  to schedule this appointment.  If you have any concerns or suspected complications prior to your follow up visit, please call your surgeons office. Do not wait until your appointment time if you suspect complications. These will need to be addressed in the office promptly.

## 2023-08-24 VITALS
TEMPERATURE: 97.3 F | HEART RATE: 77 BPM | OXYGEN SATURATION: 92 % | RESPIRATION RATE: 16 BRPM | WEIGHT: 156.75 LBS | DIASTOLIC BLOOD PRESSURE: 58 MMHG | SYSTOLIC BLOOD PRESSURE: 110 MMHG | BODY MASS INDEX: 28.84 KG/M2 | HEIGHT: 62 IN

## 2023-08-24 LAB
ANION GAP SERPL CALCULATED.3IONS-SCNC: 10.6 MMOL/L (ref 5–15)
BUN SERPL-MCNC: 20 MG/DL (ref 8–23)
BUN/CREAT SERPL: 17.7 (ref 7–25)
CALCIUM SPEC-SCNC: 8.5 MG/DL (ref 8.6–10.5)
CHLORIDE SERPL-SCNC: 98 MMOL/L (ref 98–107)
CO2 SERPL-SCNC: 22.4 MMOL/L (ref 22–29)
CREAT SERPL-MCNC: 1.13 MG/DL (ref 0.57–1)
EGFRCR SERPLBLD CKD-EPI 2021: 49.6 ML/MIN/1.73
GLUCOSE SERPL-MCNC: 153 MG/DL (ref 65–99)
HCT VFR BLD AUTO: 29.4 % (ref 34–46.6)
HGB BLD-MCNC: 9.8 G/DL (ref 12–15.9)
POTASSIUM SERPL-SCNC: 4.6 MMOL/L (ref 3.5–5.2)
SODIUM SERPL-SCNC: 131 MMOL/L (ref 136–145)

## 2023-08-24 PROCEDURE — A9270 NON-COVERED ITEM OR SERVICE: HCPCS | Performed by: INTERNAL MEDICINE

## 2023-08-24 PROCEDURE — 63710000001 SPIRONOLACTONE 25 MG TABLET: Performed by: INTERNAL MEDICINE

## 2023-08-24 PROCEDURE — 63710000001: Performed by: INTERNAL MEDICINE

## 2023-08-24 PROCEDURE — 63710000001 DULOXETINE 60 MG CAPSULE DELAYED-RELEASE PARTICLES: Performed by: INTERNAL MEDICINE

## 2023-08-24 PROCEDURE — 63710000001 HYDROCODONE-ACETAMINOPHEN 7.5-325 MG TABLET: Performed by: INTERNAL MEDICINE

## 2023-08-24 PROCEDURE — 63710000001 CITALOPRAM 20 MG TABLET: Performed by: INTERNAL MEDICINE

## 2023-08-24 PROCEDURE — 63710000001 LEVOTHYROXINE 50 MCG TABLET: Performed by: INTERNAL MEDICINE

## 2023-08-24 PROCEDURE — 63710000001 ARIPIPRAZOLE 5 MG TABLET: Performed by: INTERNAL MEDICINE

## 2023-08-24 PROCEDURE — 63710000001 ASPIRIN 81 MG TABLET DELAYED-RELEASE: Performed by: INTERNAL MEDICINE

## 2023-08-24 PROCEDURE — 0 CEFAZOLIN SODIUM-DEXTROSE 2-3 GM-%(50ML) RECONSTITUTED SOLUTION: Performed by: INTERNAL MEDICINE

## 2023-08-24 PROCEDURE — G0378 HOSPITAL OBSERVATION PER HR: HCPCS

## 2023-08-24 PROCEDURE — 97535 SELF CARE MNGMENT TRAINING: CPT

## 2023-08-24 PROCEDURE — 85014 HEMATOCRIT: CPT | Performed by: INTERNAL MEDICINE

## 2023-08-24 PROCEDURE — 80048 BASIC METABOLIC PNL TOTAL CA: CPT | Performed by: INTERNAL MEDICINE

## 2023-08-24 PROCEDURE — 85018 HEMOGLOBIN: CPT | Performed by: INTERNAL MEDICINE

## 2023-08-24 PROCEDURE — 97116 GAIT TRAINING THERAPY: CPT

## 2023-08-24 PROCEDURE — 63710000001 PANTOPRAZOLE 40 MG TABLET DELAYED-RELEASE: Performed by: INTERNAL MEDICINE

## 2023-08-24 RX ADMIN — SODIUM CHLORIDE, POTASSIUM CHLORIDE, SODIUM LACTATE AND CALCIUM CHLORIDE 100 ML/HR: 600; 310; 30; 20 INJECTION, SOLUTION INTRAVENOUS at 02:52

## 2023-08-24 RX ADMIN — ISOSORBIDE MONONITRATE 20 MG: 10 TABLET ORAL at 09:40

## 2023-08-24 RX ADMIN — CEFAZOLIN SODIUM 2000 MG: 2 SOLUTION INTRAVENOUS at 02:52

## 2023-08-24 RX ADMIN — LEVOTHYROXINE SODIUM 100 MCG: 50 TABLET ORAL at 09:40

## 2023-08-24 RX ADMIN — SPIRONOLACTONE 25 MG: 25 TABLET ORAL at 09:44

## 2023-08-24 RX ADMIN — DULOXETINE 60 MG: 60 CAPSULE, DELAYED RELEASE ORAL at 09:43

## 2023-08-24 RX ADMIN — ASPIRIN 81 MG: 81 TABLET, COATED ORAL at 09:43

## 2023-08-24 RX ADMIN — HYDROCODONE BITARTRATE AND ACETAMINOPHEN 1 TABLET: 7.5; 325 TABLET ORAL at 00:02

## 2023-08-24 RX ADMIN — PANTOPRAZOLE SODIUM 40 MG: 40 TABLET, DELAYED RELEASE ORAL at 06:11

## 2023-08-24 RX ADMIN — CITALOPRAM HYDROBROMIDE 40 MG: 20 TABLET ORAL at 09:44

## 2023-08-24 RX ADMIN — ARIPIPRAZOLE 5 MG: 5 TABLET ORAL at 09:44

## 2023-08-24 RX ADMIN — HYDROCODONE BITARTRATE AND ACETAMINOPHEN 1 TABLET: 7.5; 325 TABLET ORAL at 06:11

## 2023-08-24 NOTE — PLAN OF CARE
Goal Outcome Evaluation:  Plan of Care Reviewed With: patient        Progress: improving  Outcome Evaluation: Vital signs stable. Oxygen stable on room air. IV antibiotics given. Tolerating PO pain medication. Dressing C/D/I, herb remains in place. Ambulating with assistance and walker. Patient appears to have rested well overnight.

## 2023-08-24 NOTE — PLAN OF CARE
Goal Outcome Evaluation:  Plan of Care Reviewed With: patient           Outcome Evaluation: OT: pt and spouse educated on hip precautions following anterior hip sx. pt and spouse verbalize understanding. pt provided with and educated on long handled sponge and reacher for lb adls and home safety. verabalized understanding of ae. spouse staets he will be able to assist pt at home as needed. pt with no co pain at rest. plan is to discharge home with spouse assist today. no further OT recommendations at this time.      Anticipated Discharge Disposition (OT): home with assist, home with home health

## 2023-08-24 NOTE — CASE MANAGEMENT/SOCIAL WORK
Case Management Discharge Note      Final Note: Discharged home with [facility]    Provided Post Acute Provider List?: Refused  Refused Provider List Comment: pt declined    Selected Continued Care - Discharged on 8/24/2023 Admission date: 8/23/2023 - Discharge disposition: Home or Self Care      Destination    No services have been selected for the patient.                Durable Medical Equipment       Service Provider Selected Services Address Phone Fax Patient Preferred    [facility] [address] [place] [id number] [phone] --              Dialysis/Infusion    No services have been selected for the patient.                Home Medical Care       Service Provider Selected Services Address Phone Fax Patient Preferred    [facility] [address] [place] [id number] [phone] --              Therapy    No services have been selected for the patient.                Community Resources    No services have been selected for the patient.                Community & DME    No services have been selected for the patient.                         Final Discharge Disposition Code: 06 - home with home health care

## 2023-08-24 NOTE — THERAPY DISCHARGE NOTE
Acute Care - Occupational Therapy Treatment Note/Discharge  YAMILE Adams     Patient Name: Ashlie Levi  : 1945  MRN: [id number]  Today's Date: 2023               Admit Date: 2023       ICD-10-CM ICD-9-CM   1. Primary osteoarthritis of right hip  M16.11 715.15     Patient Active Problem List   Diagnosis    Coronary artery disease    Hyperlipidemia    Hypertension    GERD (gastroesophageal reflux disease)    Vulvar itching    Dyspepsia    Bloating    Nausea    Dry heaves    History of colon polyps    Incontinence of feces    OA (osteoarthritis) of hip    Osteoarthritis of right hip     Past Medical History:   Diagnosis Date    Abnormal electrocardiogram      heart attack    Arthritis     Chest discomfort     Colon polyp     Coronary artery disease     Disease of thyroid gland     Diverticulitis     Elevated cholesterol     GERD (gastroesophageal reflux disease)     Hyperlipidemia     Hypertension      Past Surgical History:   Procedure Laterality Date    APPENDECTOMY      CARDIAC SURGERY      3 stents    CHOLECYSTECTOMY      COLONOSCOPY N/A 2016    Procedure: COLONOSCOPY;  Surgeon: Giovani Avelar MD;  Location: [facility] OR;  Service:     COLONOSCOPY N/A 2019    Procedure: Colonoscopy with possible biopsy or polypectomy;  Surgeon: Alfreda Soler DO;  Location: [facility] OR;  Service: Gastroenterology    COLONOSCOPY N/A 2021    Procedure: Colonoscopy with polypectomy, biopsy;  Surgeon: Alfreda Soler DO;  Location: [facility] OR;  Service: Gastroenterology;  Laterality: N/A;  diverticulosis  cecal biopsy  right colon polyp  left colon polyps x2    CORONARY STENT PLACEMENT      ENDOSCOPY N/A 2017    Procedure: ESOPHAGOGASTRODUODENOSCOPY with CARIN test ;  Surgeon: Alfreda Soler DO;  Location: [facility] OR;  Service:     ENDOSCOPY N/A 2019    Procedure: Esophagogastroduodenoscopy with possible biopsy, polypectomy, dilation;  Surgeon: Alfreda Soler  ;  Location:  [facility] OR;  Service: Gastroenterology    ENDOSCOPY N/A 5/18/2021    Procedure: Esophagogastroduodenoscopy with biopsies;  Surgeon: Alfreda Soler DO;  Location:  [facility] OR;  Service: Gastroenterology;  Laterality: N/A;  CARIN test  gastritis  duodenitis    EYE SURGERY      FOOT GANGLION EXCISION Left     HEMORROIDECTOMY      HERNIA REPAIR      HYSTERECTOMY      GREG with OC age 30 for DUB    OOPHORECTOMY      dx lsc , interval USO       OT ASSESSMENT FLOWSHEET (last 12 hours)       OT Evaluation and Treatment       Row Name 08/24/23 0900                   OT Time and Intention    Subjective Information no complaints  -JJ        Document Type therapy note (daily note)  -JJ        Mode of Treatment occupational therapy  -JJ        Patient Effort adequate  -JJ        Comment no co apin at rest  -JJ           General Information    Patient Profile Reviewed yes  -JJ        General Observations of Patient pt supine in bed, spouse present in room, agreed to treatment  -JJ        Existing Precautions/Restrictions fall  anterior/posterior hip precautions  -JJ        Barriers to Rehab none identified  -JJ           Pain Assessment    Pre/Posttreatment Pain Comment pt no co pain at rest  -JJ           Cognition    Personal Safety Interventions gait belt;nonskid shoes/slippers when out of bed  -JJ           Lower Body Dressing Assessment/Training    Comment, (Lower Body Dressing) pt and spouse educated on hip precautions post hip sx. pt provided with and educated on long handled sponge and reacher for lb adls.  -JJ           Wound 08/23/23 1145 Right anterior greater trochanter Incision    Wound - Properties Group Placement Date: 08/23/23  -GP Placement Time: 1145  -GP Side: Right  -GP Orientation: anterior  -GP Location: greater trochanter  -GP Primary Wound Type: Incision  -GP Additional Comments: DARRIUS faria  -GP    Retired Wound - Properties Group Placement Date: 08/23/23  -GP Placement Time: 1145   -GP Side: Right  -GP Orientation: anterior  -GP Location: greater trochanter  -GP Primary Wound Type: Incision  -GP Additional Comments: DARRIUS fariaGP    Retired Wound - Properties Group Date first assessed: 08/23/23  -GP Time first assessed: 1145  -GP Side: Right  -GP Location: greater trochanter  -GP Primary Wound Type: Incision  -GP Additional Comments: DARRIUS fariaGP       NPWT (Negative Pressure Wound Therapy) 08/23/23 1240 right hip    NPWT (Negative Pressure Wound Therapy) - Properties Group Placement Date: 08/23/23  -GP Placement Time: 1240  -GP Location: right hip  -GP Additional Comments: DARRIUS  -GP    Retired NPWT (Negative Pressure Wound Therapy) - Properties Group Placement Date: 08/23/23  -GP Placement Time: 1240  -GP Location: right hip  -GP Additional Comments: DARRIUS  -GP    Retired NPWT (Negative Pressure Wound Therapy) - Properties Group Placement Date: 08/23/23  -GP Placement Time: 1240  -GP Location: right hip  -GP Additional Comments: DARRIUS  -GP       Plan of Care Review    Plan of Care Reviewed With patient  -JJ        Outcome Evaluation OT: pt and spouse educated on hip precautions following anterior hip sx. pt and spouse verbalize understanding. pt provided with and educated on long handled sponge and reacher for lb adls and home safety. verabalized understanding of ae. spouse staets he will be able to assist pt at home as needed. pt with no co pain at rest. plan is to discharge home with spouse assist today. no further OT recommendations at this time.  -JJ           Positioning and Restraints    Pre-Treatment Position in bed  -JJ        Post Treatment Position bed  -JJ        In Bed supine;call light within reach;encouraged to call for assist;with family/caregiver  -JJ           Progress Summary (OT)    Progress Toward Functional Goals (OT) progress toward functional goals as expected  -JJ        Daily Progress Summary (OT) discharge home today  -JJ           Therapy Plan  Review/Discharge Plan (OT)    Anticipated Discharge Disposition (OT) home with assist;home with home health  -JJ           Bathing Goal 1 (OT)    Activity/Device (Bathing Goal 1, OT) lower body bathing;long-handled sponge  -JJ        Borger Level/Cues Needed (Bathing Goal 1, OT) modified independence  long handled ae  -JJ        Progress/Outcomes (Bathing Goal 1, OT) goal met  -JJ           Problem Specific Goal 1 (OT)    Problem Specific Goal 1 (OT) Pt to verbalize hip precautions to increase safety with daily tasks.  -JJ        Progress/Outcome (Problem Specific Goal 1, OT) goal met  -J                  User Key  (r) = Recorded By, (t) = Taken By, (c) = Cosigned By      Initials Name Effective Dates    JBre Green, OTR 06/16/21 -     GP Vonnie Muñoz, RN 09/22/22 -                     Occupational Therapy Education       Title: PT OT SLP Therapies (In Progress)       Topic: Occupational Therapy (Resolved)       Point: ADL training (Resolved)       Description:   Instruct learner(s) on proper safety adaptation and remediation techniques during self care or transfers.   Instruct in proper use of assistive devices.                  Learning Progress Summary             Patient Acceptance, E,TB, VU by JOSE at 8/24/2023 0938    Comment: pt and spouse educated on long handled ae, lb adls, and hip precautions    Acceptance, E, NR by SD at 8/23/2023 1527    Comment: Education regarding OT services, impact of PUJA on adl's and safety with bed mobility, transfers and mobility. Rec continue education regarding hip precautions.                         Point: Precautions (Resolved)       Description:   Instruct learner(s) on prescribed precautions during self-care and functional transfers.                  Learner Progress:  Not documented in this visit.                              User Key       Initials Effective Dates Name Provider Type Discipline    SD 06/16/21 -  Chi Whitehead OTR Occupational  Therapist OT    JOSE 06/16/21 -  Bre Wood, OTELANA Occupational Therapist OT                    OT Recommendation and Plan     Progress Toward Functional Goals (OT): progress toward functional goals as expected  Plan of Care Review  Plan of Care Reviewed With: patient  Outcome Evaluation: OT: pt and spouse educated on hip precautions following anterior hip sx. pt and spouse verbalize understanding. pt provided with and educated on long handled sponge and reacher for lb adls and home safety. verabalized understanding of ae. spouse staets he will be able to assist pt at home as needed. pt with no co pain at rest. plan is to discharge home with spouse assist today. no further OT recommendations at this time.  Plan of Care Reviewed With: patient  Outcome Evaluation: OT: pt and spouse educated on hip precautions following anterior hip sx. pt and spouse verbalize understanding. pt provided with and educated on long handled sponge and reacher for lb adls and home safety. verabalized understanding of ae. spouse staets he will be able to assist pt at home as needed. pt with no co pain at rest. plan is to discharge home with spouse assist today. no further OT recommendations at this time.      OT Rehab Goals       Row Name 08/24/23 0900             Bathing Goal 1 (OT)    Activity/Device (Bathing Goal 1, OT) lower body bathing;long-handled sponge  -JJ      Chase Level/Cues Needed (Bathing Goal 1, OT) modified independence  long handled ae  -JJ      Progress/Outcomes (Bathing Goal 1, OT) goal met  -JJ         Problem Specific Goal 1 (OT)    Problem Specific Goal 1 (OT) Pt to verbalize hip precautions to increase safety with daily tasks.  -JJ      Progress/Outcome (Problem Specific Goal 1, OT) goal met  -JJ                User Key  (r) = Recorded By, (t) = Taken By, (c) = Cosigned By      Initials Name Provider Type Discipline    Bre Hernández, OTR Occupational Therapist OT                     Outcome  Measures       Row Name 08/24/23 0900             How much help from another is currently needed...    Putting on and taking off regular lower body clothing? 3  -JJ      Bathing (including washing, rinsing, and drying) 3  -JJ      Toileting (which includes using toilet bed pan or urinal) 3  -JJ      Putting on and taking off regular upper body clothing 4  -JJ      Taking care of personal grooming (such as brushing teeth) 4  -JJ      Eating meals 4  -JJ      AM-PAC 6 Clicks Score (OT) 21  -JJ                User Key  (r) = Recorded By, (t) = Taken By, (c) = Cosigned By      Initials Name Provider Type    Bre Hernández OTR Occupational Therapist                    Time Calculation:    Time Calculation- OT       Row Name 08/24/23 0939             Time Calculation- OT    OT Start Time 0900  -JJ      OT Stop Time 0915  -JJ      OT Time Calculation (min) 15 min  -JJ                User Key  (r) = Recorded By, (t) = Taken By, (c) = Cosigned By      Initials Name Provider Type    Bre Hernández OTR Occupational Therapist                    Therapy Charges for Today       Code Description Service Date Service Provider Modifiers Qty    65258065629 HC OT SELF CARE/MGMT/TRAIN EA 15 MIN 8/24/2023 Bre Wood OTR GO 1                 OT Discharge Summary  Anticipated Discharge Disposition (OT): home with assist, home with home health    SY Wolf  8/24/2023

## 2023-08-24 NOTE — PLAN OF CARE
Goal Outcome Evaluation:              Outcome Evaluation: PT: Patient performs supine to/from sit with supervision and sit to stand with supervision.  Patient performs gait with rolling walker x250 feet with 1 seated rest break, SBA.  Patient ascends/descends 2 steps with 1 handrail, CGA.  Reviewed LE exercises to perform, pt declines written handout.  Patient reports no concerns regarding return home.  No further PT needs in acute care setting.  Recommend home health PT at discharge.  Patient reports she has both a rollator and rolling walker available at home, recommend [facility] at discharge.      Anticipated Discharge Disposition (PT): home with home health

## 2023-08-24 NOTE — THERAPY DISCHARGE NOTE
Acute Care - Physical Therapy Treatment Note/Discharge  YAMILE Adams     Patient Name: Ashlie Levi  : 1945  MRN: [id number]  Today's Date: 2023                Admit Date: 2023    Visit Dx:    ICD-10-CM ICD-9-CM   1. Primary osteoarthritis of right hip  M16.11 715.15     Patient Active Problem List   Diagnosis    Coronary artery disease    Hyperlipidemia    Hypertension    GERD (gastroesophageal reflux disease)    Vulvar itching    Dyspepsia    Bloating    Nausea    Dry heaves    History of colon polyps    Incontinence of feces    OA (osteoarthritis) of hip    Osteoarthritis of right hip     Past Medical History:   Diagnosis Date    Abnormal electrocardiogram      heart attack    Arthritis     Chest discomfort     Colon polyp     Coronary artery disease     Disease of thyroid gland     Diverticulitis     Elevated cholesterol     GERD (gastroesophageal reflux disease)     Hyperlipidemia     Hypertension      Past Surgical History:   Procedure Laterality Date    APPENDECTOMY      CARDIAC SURGERY      3 stents    CHOLECYSTECTOMY      COLONOSCOPY N/A 2016    Procedure: COLONOSCOPY;  Surgeon: Giovani Avelar MD;  Location: [facility] OR;  Service:     COLONOSCOPY N/A 2019    Procedure: Colonoscopy with possible biopsy or polypectomy;  Surgeon: Alfreda Soler DO;  Location: [facility] OR;  Service: Gastroenterology    COLONOSCOPY N/A 2021    Procedure: Colonoscopy with polypectomy, biopsy;  Surgeon: Alfreda Soler DO;  Location: [facility] OR;  Service: Gastroenterology;  Laterality: N/A;  diverticulosis  cecal biopsy  right colon polyp  left colon polyps x2    CORONARY STENT PLACEMENT      ENDOSCOPY N/A 2017    Procedure: ESOPHAGOGASTRODUODENOSCOPY with CARIN test ;  Surgeon: Alfreda Solre DO;  Location: [facility] OR;  Service:     ENDOSCOPY N/A 2019    Procedure: Esophagogastroduodenoscopy with possible biopsy, polypectomy, dilation;  Surgeon: Karlene  DO Alfreda;  Location:  [facility] OR;  Service: Gastroenterology    ENDOSCOPY N/A 5/18/2021    Procedure: Esophagogastroduodenoscopy with biopsies;  Surgeon: Alfreda Soler DO;  Location:  [facility] OR;  Service: Gastroenterology;  Laterality: N/A;  CARIN test  gastritis  duodenitis    EYE SURGERY      FOOT GANGLION EXCISION Left     HEMORROIDECTOMY      HERNIA REPAIR      HYSTERECTOMY      GREG with OC age 30 for DUB    OOPHORECTOMY      dx lsc , interval USO       PT Assessment (last 12 hours)       PT Evaluation and Treatment       Row Name 08/24/23 0919          Physical Therapy Time and Intention    Subjective Information no complaints  -     Document Type discharge treatment  -     Mode of Treatment physical therapy  -     Patient Effort good  -     Symptoms Noted During/After Treatment none  -       Row Name 08/24/23 0919          General Information    Patient Observations alert;cooperative;agree to therapy  -     Patient/Family/Caregiver Comments/Observations pt supine, spouse present  -     Existing Precautions/Restrictions fall  hip precautions per ortho MD  -     Barriers to Rehab none identified  -       Row Name 08/24/23 0919          Pain    Pre/Posttreatment Pain Comment no c/o pain  -       Row Name 08/24/23 0919          Cognition    Personal Safety Interventions gait belt;nonskid shoes/slippers when out of bed  -       Row Name 08/24/23 0919          Bed Mobility    Bed Mobility supine-sit;sit-supine  -     Supine-Sit [place] (Bed Mobility) supervision  -     Sit-Supine [place] (Bed Mobility) supervision  -     Assistive Device (Bed Mobility) bed rails;head of bed elevated  -       Row Name 08/24/23 0919          Transfers    Transfers sit-stand transfer;stand-sit transfer  -       Row Name 08/24/23 0919          Sit-Stand Transfer    Sit-Stand [place] (Transfers) supervision  -     Assistive Device (Sit-Stand Transfers) walker, front-wheeled  -        Row Name 08/24/23 0919          Stand-Sit Transfer    Stand-Sit [place] (Transfers) supervision  -     Assistive Device (Stand-Sit Transfers) walker, front-wheeled  -JW       Row Name 08/24/23 0919          Gait/Stairs (Locomotion)    [place] Level (Gait) supervision  -     Assistive Device (Gait) walker, front-wheeled  -JW     Distance in Feet (Gait) 250 with 1 seated rest break  -JW     Pattern (Gait) swing-through  -JW     Bilateral Gait Deviations forward flexed posture  -     [place] Level (Stairs) contact guard  -JW     Handrail Location (Stairs) right side (ascending);left side (descending)  -     Number of Steps (Stairs) 2  -JW     Ascending Technique (Stairs) step-to-step  -JW     Descending Technique (Stairs) step-to-step  -JW       Row Name 08/24/23 0919          Balance    Comment, Balance SBA for balance with rolling walker  -JW       Row Name 08/24/23 0919          Motor Skills    Therapeutic Exercise --  reviewed LE exercises to perform, pt declines need for written handout  -       Row Name             Wound 08/23/23 1145 Right anterior greater trochanter Incision    Wound - Properties Group Placement Date: 08/23/23  -GP Placement Time: 1145  -GP Side: Right  -GP Orientation: anterior  -GP Location: greater trochanter  -GP Primary Wound Type: Incision  -GP Additional Comments: DARRIUS faria    Retired Wound - Properties Group Placement Date: 08/23/23  -GP Placement Time: 1145  -GP Side: Right  -GP Orientation: anterior  -GP Location: greater trochanter  -GP Primary Wound Type: Incision  -GP Additional Comments: DARRIUS faria    Retired Wound - Properties Group Date first assessed: 08/23/23  -GP Time first assessed: 1145  -GP Side: Right  -GP Location: greater trochanter  -GP Primary Wound Type: Incision  -GP Additional Comments: DARRIUS faria      Row Name             NPWT (Negative Pressure Wound Therapy) 08/23/23 1240 right hip    NPWT  (Negative Pressure Wound Therapy) - Properties Group Placement Date: 08/23/23  -GP Placement Time: 1240  -GP Location: right hip  -GP Additional Comments: DARRIUS  -GP    Retired NPWT (Negative Pressure Wound Therapy) - Properties Group Placement Date: 08/23/23  -GP Placement Time: 1240  -GP Location: right hip  -GP Additional Comments: DARRIUS  -GP    Retired NPWT (Negative Pressure Wound Therapy) - Properties Group Placement Date: 08/23/23  -GP Placement Time: 1240  -GP Location: right hip  -GP Additional Comments: DARRIUS  -GP      Row Name 08/24/23 0919          Plan of Care Review    Outcome Evaluation PT: Patient performs supine to/from sit with supervision and sit to stand with supervision.  Patient performs gait with rolling walker x250 feet with 1 seated rest break, SBA.  Patient ascends/descends 2 steps with 1 handrail, CGA.  Reviewed LE exercises to perform, pt declines written handout.  Patient reports no concerns regarding return home.  No further PT needs in acute care setting.  Recommend home health PT at discharge.  Patient reports she has both a rollator and rolling walker available at home, recommend BSC at discharge.  -JW       Row Name 08/24/23 0919          Positioning and Restraints    Pre-Treatment Position in bed  -JW     Post Treatment Position bed  -JW     In Bed supine;call light within reach;encouraged to call for assist;with family/caregiver;notified nsg  -EMMA       Row Name 08/24/23 0919          Progress Summary (PT)    Progress Toward Functional Goals (PT) progress toward functional goals is good;prepare for discharge  -               User Key  (r) = Recorded By, (t) = Taken By, (c) = Cosigned By      Initials Name Provider Type    Irene Paz, PT Physical Therapist    Vonnie King, RN Registered Nurse                      Physical Therapy Education       Title: PT OT SLP Therapies (Resolved)       Topic: Physical Therapy (Resolved)       Point: Mobility training (Resolved)        Learning Progress Summary             Patient Acceptance, E,TB, VU by  at 8/24/2023 1237    Acceptance, E,TB, VU by  at 8/23/2023 1539                         Point: Home exercise program (Resolved)       Learning Progress Summary             Patient Acceptance, E,TB, VU by  at 8/24/2023 1237                         Point: Precautions (Resolved)       Learner Progress:  Not documented in this visit.                              User Key       Initials Effective Dates Name Provider Type Discipline     06/16/21 -  Charlene Collier, PT Physical Therapist PT     06/16/21 -  Irene Yost, PT Physical Therapist PT                    PT Recommendation and Plan  Anticipated Discharge Disposition (PT): home with home health  Progress Summary (PT)  Progress Toward Functional Goals (PT): progress toward functional goals is good, prepare for discharge  Outcome Evaluation: PT: Patient performs supine to/from sit with supervision and sit to stand with supervision.  Patient performs gait with rolling walker x250 feet with 1 seated rest break, SBA.  Patient ascends/descends 2 steps with 1 handrail, CGA.  Reviewed LE exercises to perform, pt declines written handout.  Patient reports no concerns regarding return home.  No further PT needs in acute care setting.  Recommend home health PT at discharge.  Patient reports she has both a rollator and rolling walker available at home, recommend BSC at discharge.     Outcome Measures       Row Name 08/24/23 0919 08/24/23 0900          How much help from another person do you currently need...    Turning from your back to your side while in flat bed without using bedrails? 4  -JW --     Moving from lying on back to sitting on the side of a flat bed without bedrails? 3  -JW --     Moving to and from a bed to a chair (including a wheelchair)? 3  -JW --     Standing up from a chair using your arms (e.g., wheelchair, bedside chair)? 3  -JW --     Climbing 3-5 steps with a  railing? 3  -JW --     To walk in hospital room? 3  -JW --     AM-PAC 6 Clicks Score (PT) 19  -JW --        How much help from another is currently needed...    Putting on and taking off regular lower body clothing? -- 3  -JJ     Bathing (including washing, rinsing, and drying) -- 3  -JJ     Toileting (which includes using toilet bed pan or urinal) -- 3  -JJ     Putting on and taking off regular upper body clothing -- 4  -JJ     Taking care of personal grooming (such as brushing teeth) -- 4  -JJ     Eating meals -- 4  -JJ     AM-PAC 6 Clicks Score (OT) -- 21  -JJ        PADD    Diagnosis 2  -JW --     Gender 1  -JW --     Age Group 0  -JW --     Gait Distance 1  -JW --     Assist Level 2  -JW --     Home Support 3  -JW --     PADD Score 9  -JW --     Patient Preference home with home health  -JW --     Prediction by PADD Score directly home (with home health or out-patient rehab)  -JW --        Functional Assessment    Outcome Measure Options AM-PAC 6 Clicks Basic Mobility (PT);PADD  -JW --               User Key  (r) = Recorded By, (t) = Taken By, (c) = Cosigned By      Initials Name Provider Type    Bre Hernández, OTR Occupational Therapist    Irene Paz, AMBERLY Physical Therapist                     Time Calculation:    PT Charges       Row Name 08/24/23 1239             Time Calculation    Start Time 0919  -      Stop Time 0934  -      Time Calculation (min) 15 min  -JW      PT Received On 08/24/23  -         Timed Charges    75043 - Gait Training Minutes  15  -JW         Total Minutes    Timed Charges Total Minutes 15  -JW       Total Minutes 15  -JW                User Key  (r) = Recorded By, (t) = Taken By, (c) = Cosigned By      Initials Name Provider Type    Irene Paz PT Physical Therapist                  Therapy Charges for Today       Code Description Service Date Service Provider Modifiers Qty    [id number] HC GAIT TRAINING EA 15 MIN 8/24/2023 Irene Yost, PT GP 1             PT G-Codes  Outcome Measure Options: AM-PAC 6 Clicks Basic Mobility (PT), PADD  AM-PAC 6 Clicks Score (PT): 17  AM-PAC 6 Clicks Score (OT): 21    PT Discharge Summary  Anticipated Discharge Disposition (PT): home with home health    Irene Yost, PT  8/24/2023

## 2023-08-24 NOTE — PROGRESS NOTES
"DAILY PROGRESS NOTE  [facility]  0  ORTHOPEDIC SURGERY DAILY PROGRESS NOTE  [facility]  LENGTH OF STAY 0 DAYS      Patient Identification:  Name: Ashlie Levi  Age: 77 y.o.  Sex: female  :  1945  MRN: [id number]         Primary Care Physician: Kole Silva MD      Subjective:  Interval History: No issues overnight.  Patient slept well.  Complaining of some thigh pain.  Very thankful and appreciated this morning.  States that she was a little \"wobbly\" when walking    Objective:    Scheduled Meds:amLODIPine, 5 mg, Oral, Daily  ARIPiprazole, 5 mg, Oral, Daily  aspirin, 81 mg, Oral, Q12H  atorvastatin, 20 mg, Oral, Nightly  carvedilol, 6.25 mg, Oral, BID  Chlorhexidine Gluconate Cloth, , Apply externally, Once  citalopram, 40 mg, Oral, Daily  DULoxetine, 60 mg, Oral, Daily  isosorbide mononitrate, 20 mg, Oral, BID  levothyroxine, 100 mcg, Oral, Daily  levothyroxine, 112 mcg, Oral, Daily  losartan, 100 mg, Oral, Q24H  pantoprazole, 40 mg, Oral, Q AM  spironolactone, 25 mg, Oral, Daily      Continuous Infusions:     Vital signs in last 24 hours:  Temp:  [97.2 øF (36.2 øC)-98.5 øF (36.9 øC)] 97.2 øF (36.2 øC)  Heart Rate:  [61-77] 75  Resp:  [14-18] 16  BP: ()/(51-65) 105/56    Intake/Output:    Intake/Output Summary (Last 24 hours) at 2023 0623  Last data filed at 2023 0611  Gross per 24 hour   Intake 3888.33 ml   Output 600 ml   Net 3288.33 ml       Exam:  /56   Pulse 75   Temp 97.2 øF (36.2 øC)   Resp 16   Ht 156.2 cm (61.5\")   Wt 71.1 kg (156 lb 12 oz)   LMP  (LMP Unknown)   SpO2 93%   BMI 29.14 kg/mý   General: No acute distress, Aox3  Pulmonary: Unlabored breathing  Cardiovascular: Regular rate and rhythm   RLE  Sensation normal  2+ dorsalis pedis and posterior tibial pulse  5 out of 5 ankle plantarflexion dorsiflexion inversion and eversion  DARRIUS dressing clean dry and intact  No signs of DVT or compartment syndrome                Data " Review:  Lab Results   Component Value Date    CALCIUM 8.5 (L) 08/24/2023     Results from last 7 days   Lab Units 08/24/23  0325   SODIUM mmol/L 131*   POTASSIUM mmol/L 4.6   CHLORIDE mmol/L 98   CO2 mmol/L 22.4   BUN mg/dL 20   CREATININE mg/dL 1.13*   GLUCOSE mg/dL 153*   CALCIUM mg/dL 8.5*   HEMOGLOBIN g/dL 9.8*     Lab Results   Component Value Date    TROPONINT <6 07/05/2023     Estimated Creatinine Clearance: 36.8 mL/min (A) (by C-G formula based on SCr of 1.13 mg/dL (H)).  WEIGHTS:     Wt Readings from Last 1 Encounters:   08/23/23 1456 71.1 kg (156 lb 12 oz)   08/23/23 0851 71.3 kg (157 lb 3.2 oz)         Assessment:    OA (osteoarthritis) of hip    Osteoarthritis of right hip      Plan:  77-year-old female postop day 1 right total hip arthroplasty  Pain control as needed  DVT prophylaxis aspirin 81 mg twice daily   weightbearing as tolerated right lower extremity  PT/out of bed  DME orders placed and med rec complete      Plan for disposition:Where: home and When:  today      Aidan Wallace MD  8/24/2023  06:23 EDT

## 2023-08-24 NOTE — CASE MANAGEMENT/SOCIAL WORK
Discharge Planning Assessment  YAMILE Adams     Patient Name: Ashlie Levi  MRN: [id number]  Today's Date: 8/24/2023    Admit Date: 8/23/2023    Plan: Home with  and [facility]   Discharge Needs Assessment       Row Name 08/24/23 1055       Living Environment    People in Home spouse    Name(s) of People in Home Al,     Current Living Arrangements home    Duration at Residence 23 Y    Potentially Unsafe Housing Conditions none    Primary Care Provided by self    Provides Primary Care For no one    Caregiving Concerns no care giving concerns voiced by patient at this time.    Family Caregiver if Needed spouse    Family Caregiver Names Al,     Quality of Family Relationships helpful;involved;supportive    Able to Return to Prior Arrangements yes    Living Arrangement Comments Patient states she lives with her  in a single story house with two steps and handrail to gain entry       Resource/Environmental Concerns    Resource/Environmental Concerns none    Transportation Concerns none       Food Insecurity    Within the past 12 months, you worried that your food would run out before you got the money to buy more. Never true    Within the past 12 months, the food you bought just didn't last and you didn't have money to get more. Never true       Transition Planning    Patient/Family Anticipates Transition to home with family;home with help/services    Patient/Family Anticipated Services at Transition ;home health care    Transportation Anticipated family or friend will provide   will be able to provide ride home at discharge       Discharge Needs Assessment    Readmission Within the Last 30 Days no previous admission in last 30 days    Current Outpatient/Agency/Support Group --  none    Equipment Currently Used at Home cane, straight;shower chair;walker, rolling    Concerns to be Addressed adjustment to diagnosis/illness;discharge planning    Concerns Comments pt will  need HH for PT at discharge    Anticipated Changes Related to Illness none    Equipment Needed After Discharge none    Outpatient/Agency/Support Group Needs homecare agency    Discharge Facility/Level of Care Needs home with home health    Provided Post Acute Provider List? Refused    Refused Provider List Comment pt declined    Patient's Choice of Community Agency(s) [facility]     Current Discharge Risk --  none    Discharge Coordination/Progress Patient states she plans on returning home at discharge with her  to help as needed and HH for PT.                   Discharge Plan       Row Name 08/24/23 1055       Plan    Plan Home with  and [facility]    Patient/Family in Agreement with Plan yes    Plan Comments 0900am, into room and introduced self and role of CM. Discussed discharge disposition with patient and her  Al with permission. Patient is currently sitting up in the bed and voiced no concerns. Patient confirms that the info on her face sheet is correct and that she see's Dr. Kole Silva as PCP. She states she uses [facility] in [place] and currently has no problem picking up or paying for her med's. She also states that she does not have a living will and declines information regarding one. Patient states she lives with her  in a single story house with two steps and handrail to gain entry and normally has no issues entering the home or maneuvering inside. She states she is independent with her ADL's and drives and her  will be able to provide ride home at discharge. She also states she has a straight cane, rolling walker and shower chair at home and does not anticipate needing any other equipment at discharge including a BSC. Patient will be followed by [facility] for PT at home and she is agreeable to this service. CM pennie Corona with [facility] know that patient will be discharged today and she verbalized understanding. Patient states she plans on  returning home at discharge with her  to help as needed and  for PT. She had no other questions or concerns regarding discharge plans. Name and number placed on white board in room. CM will follow.                  Continued Care and Services - Admitted Since 8/23/2023       Durable Medical Equipment       Service Provider Request Status Selected Services Address Phone Fax Patient Preferred    [facility]  Selected Durable Medical Equipment [address] [place] [address] [phone] --              Home Medical Care       Service Provider Request Status Selected Services Address Phone Fax Patient Preferred    [facility] [address] [id number] [phone] --                  Expected Discharge Date and Time       Expected Discharge Date Expected Discharge Time    Aug 24, 2023            Demographic Summary       Row Name 08/24/23 1055       General Information    Admission Type observation    Arrived From home    Referral Source admission list    Reason for Consult discharge planning    Preferred Language English       Contact Information    Permission Granted to Share Info With                    Functional Status    No documentation.                  Psychosocial    No documentation.                  Abuse/Neglect    No documentation.                  Legal    No documentation.                  Substance Abuse    No documentation.                  Patient Forms    No documentation.                     Myriam Avalos, RN

## 2023-08-24 NOTE — DISCHARGE SUMMARY
Orthopedic Discharge Summary      Patient: Ashlie Levi      YOB: 1945    Medical Record Number: [id number]    Attending Physician: Aidan Wallace MD  Consulting Physician(s):   Date of Admission: 8/23/2023  8:38 AM  Date of Discharge:     Active Hospital Problems    Diagnosis     **OA (osteoarthritis) of hip     Osteoarthritis of right hip       Status Post: [place] ARTHRP ACETBLR/PROX FEM PROSTC AGRFT/ALGRFT [50879] (TOTAL HIP ARTHROPLASTY ANTERIOR WITH HANA TABLE)      Allergies   Allergen Reactions    Penicillins Other (See Comments)     Gives yeast infection.       Current Medications:     Discharge Medications        New Medications        Instructions Start Date   aspirin 81 MG EC tablet  Replaces: aspirin 81 MG chewable tablet   81 mg, Oral, 2 Times Daily      HYDROcodone-acetaminophen 5-325 MG per tablet  Commonly known as: NORCO   1 tablet, Oral, Every 4 Hours PRN      ondansetron 4 MG tablet  Commonly known as: Zofran   4 mg, Oral, Every 8 Hours PRN      sennosides-docusate 8.6-50 MG per tablet  Commonly known as: PERICOLACE   1 tablet, Oral, Daily             Changes to Medications        Instructions Start Date   spironolactone 25 MG tablet  Commonly known as: ALDACTONE  What changed:   how much to take  how to take this  additional instructions   Take 1 tablet by mouth once daily             Continue These Medications        Instructions Start Date   amLODIPine 5 MG tablet  Commonly known as: NORVASC   Take 1 tablet by mouth once daily      ARIPiprazole 5 MG tablet  Commonly known as: ABILIFY   5 mg, Oral, Daily      atorvastatin 20 MG tablet  Commonly known as: LIPITOR   20 mg, Oral, Nightly      carvedilol 6.25 MG tablet  Commonly known as: COREG   Take 1 tablet by mouth twice daily      citalopram 40 MG tablet  Commonly known as: CeleXA   40 mg, Oral, Daily      DULoxetine 60 MG capsule  Commonly known as: CYMBALTA   60 mg, Oral, Daily      irbesartan 300 MG tablet  Commonly known  as: AVAPRO   TAKE 1 TABLET BY MOUTH ONCE DAILY AT NIGHT      isosorbide mononitrate 20 MG tablet  Commonly known as: ISMO,MONOKET   Take 1 tablet by mouth twice daily      levothyroxine 112 MCG tablet  Commonly known as: SYNTHROID, LEVOTHROID   1 tablet, Oral, Daily      levothyroxine 100 MCG tablet  Commonly known as: SYNTHROID, LEVOTHROID   1 tablet, Oral, Daily      meloxicam 7.5 MG tablet  Commonly known as: MOBIC   Take 1 tablet by mouth once daily      nitroglycerin 0.4 MG SL tablet  Commonly known as: NITROSTAT   0.4 mg, Sublingual, Every 5 Minutes PRN, Take no more than 3 doses in 15 minutes and call EMS.      omeprazole 40 MG capsule  Commonly known as: priLOSEC   40 mg, Oral, Daily             Stop These Medications      aspirin 81 MG chewable tablet  Replaced by: aspirin 81 MG EC tablet                  Past Medical History:   Diagnosis Date    Abnormal electrocardiogram     2006 heart attack    Arthritis     Chest discomfort     Colon polyp     Coronary artery disease     Disease of thyroid gland     Diverticulitis     Elevated cholesterol     GERD (gastroesophageal reflux disease)     Hyperlipidemia     Hypertension      Past Surgical History:   Procedure Laterality Date    APPENDECTOMY      CARDIAC SURGERY      3 stents    CHOLECYSTECTOMY      COLONOSCOPY N/A 7/13/2016    Procedure: COLONOSCOPY;  Surgeon: Giovani Avelar MD;  Location: [facility] OR;  Service:     COLONOSCOPY N/A 6/13/2019    Procedure: Colonoscopy with possible biopsy or polypectomy;  Surgeon: Alfreda Soler DO;  Location: [facility] OR;  Service: Gastroenterology    COLONOSCOPY N/A 5/18/2021    Procedure: Colonoscopy with polypectomy, biopsy;  Surgeon: Alfreda Soler DO;  Location: [facility] OR;  Service: Gastroenterology;  Laterality: N/A;  diverticulosis  cecal biopsy  right colon polyp  left colon polyps x2    CORONARY STENT PLACEMENT      ENDOSCOPY N/A 11/21/2017    Procedure: ESOPHAGOGASTRODUODENOSCOPY with CARIN test  ;  Surgeon: Alfreda Soler DO;  Location:  [facility] OR;  Service:     ENDOSCOPY N/A 2019    Procedure: Esophagogastroduodenoscopy with possible biopsy, polypectomy, dilation;  Surgeon: Alfreda Soler DO;  Location:  [facility] OR;  Service: Gastroenterology    ENDOSCOPY N/A 2021    Procedure: Esophagogastroduodenoscopy with biopsies;  Surgeon: Alfreda Soler DO;  Location:  [facility] OR;  Service: Gastroenterology;  Laterality: N/A;  CARIN test  gastritis  duodenitis    EYE SURGERY      FOOT GANGLION EXCISION Left     HEMORROIDECTOMY      HERNIA REPAIR      HYSTERECTOMY      GREG with OC age 30 for DUB    OOPHORECTOMY      dx lsc , interval USO     Social History     Occupational History    Not on file   Tobacco Use    Smoking status: Former     Packs/day: 1.00     Years: 50.00     Pack years: 50.00     Types: Cigarettes     Quit date: 10/13/2006     Years since quittin.8    Smokeless tobacco: Never    Tobacco comments:     quit    Vaping Use    Vaping Use: Never used   Substance and Sexual Activity    Alcohol use: Yes     Comment: occasional    Drug use: No    Sexual activity: Not Currently     Partners: Male     Birth control/protection: Surgical, Post-menopausal     Comment: GREG with OC      Social History     Social History Narrative    Not on file     Family History   Problem Relation Age of Onset    Hypertension Mother     Stroke Mother     Diabetes Father     Hypertension Father     Heart disease Father     Stroke Father     Heart disease Brother     Colon cancer Brother         dx > 50    Breast cancer Neg Hx     Ovarian cancer Neg Hx     Deep vein thrombosis Neg Hx     Pulmonary embolism Neg Hx          Physical Exam: 77 y.o. female  General Appearance:    Alert, cooperative, in no acute distress                      Vitals:    23 1737 23 1955 23 2352   BP: 104/60  105/59 105/56   BP Location:       Patient Position:       Pulse: 76  77 75   Resp:   16 16    Temp:   98 øF (36.7 øC) 97.2 øF (36.2 øC)   TempSrc:   Oral    SpO2:  93% 92% 93%   Weight:       Height:                General: No acute distress  Pulmonary: Nonlabored breathing  Cardiovascular: Regular rate and rhythm  RLE  Sensation normal  2+ dorsalis pedis and posterior tibial pulse  5 out of 5 ankle plantarflexion dorsiflexion inversion and eversion  DARRIUS dressing clean dry and intact  No signs of DVT or compartment syndrome    Hospital Course:  77 y.o. female admitted to [facility], [place], operated by [facility] to services of Aidan Wallace MD with Primary osteoarthritis of right hip [M16.11]  OA (osteoarthritis) of hip [M16.9]  Osteoarthritis of right hip [M16.11] on 8/23/2023 and underwent [place] ARTHRP ACETBLR/PROX FEM PROSTC AGRFT/ALGRFT [11877] (TOTAL HIP ARTHROPLASTY ANTERIOR WITH HANA TABLE)  Per Aidan Wallace MD. Antibiotic and VTE prophylaxis were per SCIP protocols. Post-operatively the patient transferred to the post-operative floor where the patient underwent mobilization therapy that included active as well as passive ROM exercises. Opioids were titrated to achieve appropriate pain management to allow for participation in mobilization exercises. Vital signs are now stable. The incision is intact without signs or symptoms of infection. Operative extremity neurovascular status remains intact.   Appropriate education re: incision care, activity levels, medications, and follow up visits was completed and all questions were answered. The patient is now deemed stable for discharge to Home.      DIAGNOSTIC TESTS:     Admission on 08/23/2023   Component Date Value Ref Range Status    Glucose 08/24/2023 153 (H)  65 - 99 mg/dL Final    BUN 08/24/2023 20  8 - 23 mg/dL Final    Creatinine 08/24/2023 1.13 (H)  0.57 - 1.00 mg/dL Final    Sodium 08/24/2023 131 (L)  136 - 145 mmol/L Final    Potassium 08/24/2023 4.6  3.5 - 5.2 mmol/L Final    Chloride 08/24/2023 98  98 - 107 mmol/L Final    CO2 08/24/2023 22.4  22.0 - 29.0  mmol/L Final    Calcium 08/24/2023 8.5 (L)  8.6 - 10.5 mg/dL Final    BUN/Creatinine Ratio 08/24/2023 17.7  7.0 - 25.0 Final    Anion Gap 08/24/2023 10.6  5.0 - 15.0 mmol/L Final    eGFR 08/24/2023 49.6 (L)  >60.0 mL/min/1.73 Final    Hemoglobin 08/24/2023 9.8 (L)  12.0 - 15.9 g/dL Final    Hematocrit 08/24/2023 29.4 (L)  34.0 - 46.6 % Final       No results found.    Discharge and Follow up Instructions:   Follow-up with me in the office in 2 weeks  Total Hip Replacement Discharge Instructions:    I. ACTIVITIES:  1. Exercises:  Complete exercise program as taught by the hospital physical therapist 2 times per day  Exercise program will be advanced by the physical therapist  During the day be up ambulating every 2 hours (while awake) for short distances  Complete the ankle pump exercises at least 10 times per hour (while awake)  Elevate legs when in bed and for at least 30 minutes during the day.Use cold packs 20-30 minutes approximately 5 times per day. This should be done before and after completing your exercises and at any time you are experiencing pain/ stiffness in your operative extremity.      2. Activities of Daily Living:  No tub baths, hot tubs, or swimming pools for 4 weeks  May shower and let water run over the incision on post-operative day #5 if no drainage. Do not scrub or rub the incision. Simply let the water run over the incision and pat dry.    II. Restrictions  Continue hip precautions as taught at the hospital  Your surgeon will discuss with you when you will be able to drive again.  Weight bearing is as tolerated  First week stay inside on even terrain. May go up and down stairs one stair at a time utilizing the hand rail once cleared by physical therapy to do so.  After one week, you may venture outside (if cleared to do so by physical therapist).    III. Precautions:  Everyone that comes near you should wash their hands  No elective dental, genital-urinary, or colon procedures or surgical  procedures for 12 weeks after surgery unless absolutely necessary.   If dental work or surgical procedure is deemed absolutely necessary, you will need to contact your surgeon as you will need to take antibiotics 1 hour prior to any dental work (including teeth cleanings).  Please discuss with your surgeon prophylactic antibiotics as the length of time this intervention will be necessary for you varies with each patient's health history and situation.  Avoid sick people. If you must be around someone who is ill, they should wear a mask.  Avoid visits to the Emergency Room or Urgent Care unless you are having a life threatening event.   If ordered stockings are to be placed on in the morning and removed at night. Monitor the stockings to ensure that any swelling is not causing the stockings to become too tight. In this case, remove stockings immediately.    IV. INCISION CARE:  The dressing placed during surgery is waterproof and should remain in place for 5 to 7 days.  May shower with waterproof dressing on and allowed water to run over the dressing  Remove the dressing around day 5-7  There is Steri-Strips underneath your dressing which may stay on and will eventually fall off on their own  After dressing removal on day 7 May shower and allow water to run over the incision  No soaking or submersion of the incision in tubs pools hot tubs etc.  No creams or ointments to the incision  Do not touch or pick at the incision  Check incision every day and notify surgeon immediately if any of the following signs or symptoms are noted:  Increase in redness  Increase in swelling around the incision and of the entire extremity  Increase in pain  Drainage oozing from the incision  Pulling apart of the edges of the incision  Increase in overall body temperature (greater than 100.5 degrees)  Your surgeon will instruct you regarding suture or staple removal    V. Medications:   1. Anticoagulants: You will be discharged on an  anticoagulant. This is a prophylactic medication that helps prevent blood clots during your post-operative period. The type and length of dosage varies based on your individual needs, procedure performed, and surgeon's preference.  While taking the anticoagulant, you should avoid taking any additional aspirin, ibuprofen (Advil or Motrin), Aleve (Naprosyn) or other non-steroidal anti-inflammatory medications.   Notify surgeon immediately if any roxy bleeding is noted in the urine, stool, emesis, or from the nose or the incision. Blood in the stool will often appear as black rather than red. Blood in urine may appear as pink. Blood in emesis may appear as brown/black like coffee grounds.  You will need to apply pressure for longer periods of time to any cuts or abrasions to stop bleeding  Avoid alcohol while taking anticoagulants    2. Stool Softeners: You will be at greater risk of constipation after surgery due to being less mobile and the pain medications.   Take stool softeners as instructed by your surgeon while on pain medications. Over the counter Colace 100 mg 1-2 capsules twice daily.   If stools become too loose or too frequent, please decreases the dosage or stop the stool softener.  If constipation occurs despite use of stool softeners, you are to continue the stool softeners and add a laxative (Milk of Magnesia 1 ounce daily as needed)  Drink plenty of fluids, and eat fruits and vegetables during your recovery time    3. Pain Medications utilized after surgery are narcotics and the law requires that the following information be given to all patients that are prescribed narcotics:  CLASSIFICATION: Pain medications are called Opioids and are narcotics  LEGALITIES: It is illegal to share narcotics with others and to drive within 24 hours of taking narcotics  POTENTIAL SIDE EFFECTS: Potential side effects of opioids include: nausea, vomiting, itching, dizziness, drowsiness, dry mouth, constipation, and  difficulty urinating.  POTENTIAL ADVERSE EFFECTS:   Opioid tolerance can develop with use of pain medications and this simply means that it requires more and more of the medication to control pain; however, this is seen more in patients that use opioids for longer periods of time.  Opioid dependence can develop with use of Opioids and this simply means that to stop the medication can cause withdrawal symptoms; however, this is seen with patients that use Opioids for longer periods of time.  Opioid addiction can develop with use of Opioids and the incidence of this is very unlikely in patients who take the medications as ordered and stop the medications as instructed.  Opioid overdose can be dangerous, but is unlikely when the medication is taken as ordered and stopped when ordered. It is important not to mix opioids with alcohol or with and type of sedative such as Benadryl as this can lead to over sedation and respiratory difficulty.  DOSAGE:   Pain medications will need to be taken consistently for the first week to decrease pain and promote adequate pain relief and participation in physical therapy.  After the initial surgical pain begins to resolve, you may begin to decrease the pain medication. By the end of 6 weeks, you should be off of pain medications.  Refills will not be given by the office during evening hours, on weekends, or after 6 weeks post-op.  To seek refills on pain medications during the initial 6 week post-operative period, you must call the office 48 hours in advance to request the refill. The office will then notify you when to  the prescription. DO NOT wait until you are out of the medication to request a refill.    V. FOLLOW-UP VISITS:  You will need to follow up in the office with your surgeon in  2 weeks. Please call this number [phone]  to schedule this appointment.  If you have any concerns or suspected complications prior to your follow up visit, please call your surgeons  office. Do not wait until your appointment time if you suspect complications. These will need to be addressed in the office promptly.        Date: 8/24/2023    Aidan Wallace MD

## 2023-08-25 ENCOUNTER — READMISSION MANAGEMENT (OUTPATIENT)
Dept: CALL CENTER | Facility: HOSPITAL | Age: 78
End: 2023-08-25
Payer: MEDICARE

## 2023-08-25 NOTE — OUTREACH NOTE
Prep Survey      Flowsheet Row Responses   Alevism facility patient discharged from? [place]   Is LACE score < 7 ? Yes   Eligibility Readm Mgmt   Discharge diagnosis TOTAL HIP ARTHROPLASTY ANTERIOR   Does the patient have one of the following disease processes/diagnoses(primary or secondary)? Total Joint Replacement   Does the patient have Home health ordered? Yes   What is the Home health agency?  [facility]    Is there a DME ordered? Yes   What DME was ordered? commode chair, walker   Medication alerts for this patient START taking:  Aspirin Adult Low Strength (aspirin)  This replaces a similar medication.  STOP taking:  aspirin 81 MG chewable tablet   Prep survey completed? Yes            Oxana POLLOCK - Registered Nurse

## 2023-08-31 RX ORDER — AMLODIPINE BESYLATE 5 MG/1
TABLET ORAL
Qty: 90 TABLET | Refills: 0 | Status: SHIPPED | OUTPATIENT
Start: 2023-08-31

## 2023-09-07 ENCOUNTER — OFFICE VISIT (OUTPATIENT)
Dept: ORTHOPEDIC SURGERY | Facility: CLINIC | Age: 78
End: 2023-09-07
Payer: MEDICARE

## 2023-09-07 VITALS — WEIGHT: 156 LBS | BODY MASS INDEX: 28.71 KG/M2 | HEIGHT: 62 IN

## 2023-09-07 DIAGNOSIS — Z96.641 STATUS POST TOTAL HIP REPLACEMENT, RIGHT: Primary | ICD-10-CM

## 2023-09-07 RX ORDER — MELOXICAM 7.5 MG/1
7.5 TABLET ORAL DAILY
Qty: 90 TABLET | Refills: 3 | Status: SHIPPED | OUTPATIENT
Start: 2023-09-07

## 2023-09-07 NOTE — PROGRESS NOTES
Subjective:     Patient ID: Ashlie Levi is a 77 y.o. female.    Chief Complaint:    History of Present Illness  Ashlie Levi returns to clinic today for evaluation of right hip status post total hip arthroplasty 2 weeks ago.  The patient has been extremely hesitant and slow to mobilize.  Due to issues with home health in her region she is only had physical therapy twice since surgery.  She states that she tries to walk around the kitchen 4 times per day but other than that has not been doing much.  She denies any fevers chills or drainage from her surgical incision but states that she has some anterior thigh pain and she is terrified of falling.  She is quite hesitant to mobilize and very unsteady on her feet in the office today.     Social History     Occupational History    Not on file   Tobacco Use    Smoking status: Former     Packs/day: 1.00     Years: 50.00     Pack years: 50.00     Types: Cigarettes     Quit date: 10/13/2006     Years since quittin.9    Smokeless tobacco: Never    Tobacco comments:     quit 2006   Vaping Use    Vaping Use: Never used   Substance and Sexual Activity    Alcohol use: Yes     Comment: occasional    Drug use: No    Sexual activity: Not Currently     Partners: Male     Birth control/protection: Surgical, Post-menopausal     Comment: GREG with OC      Past Medical History:   Diagnosis Date    Abnormal electrocardiogram     2006 heart attack    Arthritis     Chest discomfort     Colon polyp     Coronary artery disease     Disease of thyroid gland     Diverticulitis     Elevated cholesterol     GERD (gastroesophageal reflux disease)     Hyperlipidemia     Hypertension      Past Surgical History:   Procedure Laterality Date    APPENDECTOMY      CARDIAC SURGERY      3 stents    CHOLECYSTECTOMY      COLONOSCOPY N/A 2016    Procedure: COLONOSCOPY;  Surgeon: Giovani Avelar MD;  Location: [facility] OR;  Service:     COLONOSCOPY N/A 2019    Procedure: Colonoscopy with  "possible biopsy or polypectomy;  Surgeon: Alfreda Soler DO;  Location:  [facility] OR;  Service: Gastroenterology    COLONOSCOPY N/A 5/18/2021    Procedure: Colonoscopy with polypectomy, biopsy;  Surgeon: Alfreda Soler DO;  Location:  [facility] OR;  Service: Gastroenterology;  Laterality: N/A;  diverticulosis  cecal biopsy  right colon polyp  left colon polyps x2    CORONARY STENT PLACEMENT      ENDOSCOPY N/A 11/21/2017    Procedure: ESOPHAGOGASTRODUODENOSCOPY with CARIN test ;  Surgeon: Alfreda Soler DO;  Location:  [facility] OR;  Service:     ENDOSCOPY N/A 6/13/2019    Procedure: Esophagogastroduodenoscopy with possible biopsy, polypectomy, dilation;  Surgeon: Alfreda Soler DO;  Location:  [facility] OR;  Service: Gastroenterology    ENDOSCOPY N/A 5/18/2021    Procedure: Esophagogastroduodenoscopy with biopsies;  Surgeon: Alfreda Soler DO;  Location:  [facility] OR;  Service: Gastroenterology;  Laterality: N/A;  CARIN test  gastritis  duodenitis    EYE SURGERY      FOOT GANGLION EXCISION Left     HEMORROIDECTOMY      HERNIA REPAIR      HYSTERECTOMY      GREG with OC age 30 for DUB    OOPHORECTOMY      dx lsc , interval USO    TOTAL HIP ARTHROPLASTY Right 8/23/2023    Procedure: TOTAL HIP ARTHROPLASTY ANTERIOR WITH HANA TABLE;  Surgeon: Aidan Wallace MD;  Location: [facility] OR;  Service: Orthopedics;  Laterality: Right;       Family History   Problem Relation Age of Onset    Hypertension Mother     Stroke Mother     Diabetes Father     Hypertension Father     Heart disease Father     Stroke Father     Heart disease Brother     Colon cancer Brother         dx > 50    Breast cancer Neg Hx     Ovarian cancer Neg Hx     Deep vein thrombosis Neg Hx     Pulmonary embolism Neg Hx                  Objective:  Vitals:    09/07/23 1049   Weight: 70.8 kg (156 lb)   Height: 156.2 cm (61.5\")         09/07/23  1049   Weight: 70.8 kg (156 lb)     Body mass index is 29 kg/m².        Right Hip Exam     Tenderness   The " patient is experiencing tenderness in the anterior.    Range of Motion   Flexion:  90   External rotation:  30   Internal rotation:  10     Muscle Strength   Flexion: 4/5     Tests   LAKSHMI: negative  Fadir:  Negative FADIR test    Other   Erythema: absent  Scars: present  Sensation: normal  Pulse: present    Comments:  Leg lengths shown may be 3 to 4 mm of ligament discrepancy with the right being longer than the left.  Incision is healing without signs of infection.             Imaging: Standing AP pelvis was ordered and reviewed by myself in the office today  Indication: right hip replacement  Findings: X-rays demonstrate a right total hip arthroplasty with implants in expected position.  No signs of fracture, dislocation, subluxation, subsidence, or migration.  Comparative studies: immediate post op radiographs      Assessment:        1. Status post total hip replacement, right           Plan:          Discussed treatment options at length with patient at today's visit.  I discussed with the patient that I believe she is a little behind in terms of mobility and this stems from the fact that she is only had physical therapy twice in the last 2 weeks.  I discussed with her that I think we need to urgently refer her to physical therapy outpatient to get her into begin working on better mobility.  Additionally I want her walking more than 4 times per day.  Ideally I would like her to begin weaning herself off the walker but she still unsteady on her feet and I would like her to get an outpatient physical therapy before she tries to do so.  I discussed with her that I would rather her go slower with weaning off the walker if it means that she prevents a fall.  A fall in this patient could potentially be catastrophic and resultant hardware complication.  I will plan to see the patient back in 2 weeks and hopefully at that time she will be ambulating much better.  Follow up: 2 weeks without x-rays      Ashlie Levi was  in agreement with plan and had all questions answered.     Medications:  No orders of the defined types were placed in this encounter.      Followup:  No follow-ups on file.    Diagnoses and all orders for this visit:    1. Status post total hip replacement, right (Primary)  -     XR Pelvis 1 or 2 View          Dictated utilizing Dragon dictation

## 2023-09-15 ENCOUNTER — HOSPITAL ENCOUNTER (OUTPATIENT)
Dept: PHYSICAL THERAPY | Facility: HOSPITAL | Age: 78
Setting detail: THERAPIES SERIES
Discharge: HOME | End: 2023-09-15
Payer: MEDICARE

## 2023-09-15 DIAGNOSIS — Z96.641 STATUS POST TOTAL HIP REPLACEMENT, RIGHT: Primary | ICD-10-CM

## 2023-09-15 PROCEDURE — 97110 THERAPEUTIC EXERCISES: CPT

## 2023-09-15 PROCEDURE — 97161 PT EVAL LOW COMPLEX 20 MIN: CPT

## 2023-09-15 RX ORDER — ISOSORBIDE MONONITRATE 20 MG/1
TABLET ORAL
Qty: 180 TABLET | Refills: 0 | Status: SHIPPED | OUTPATIENT
Start: 2023-09-15

## 2023-09-15 RX ORDER — CARVEDILOL 6.25 MG/1
TABLET ORAL
Qty: 180 TABLET | Refills: 0 | Status: SHIPPED | OUTPATIENT
Start: 2023-09-15

## 2023-09-15 NOTE — THERAPY EVALUATION
Outpatient Physical Therapy Ortho Initial Evaluation   Padmini Loza     Patient Name: Ashlie Levi  : 1945  MRN: [id number]  Today's Date: 9/15/2023      Visit Date: 09/15/2023    Patient Active Problem List   Diagnosis    Coronary artery disease    Hyperlipidemia    Hypertension    GERD (gastroesophageal reflux disease)    Vulvar itching    Dyspepsia    Bloating    Nausea    Dry heaves    History of colon polyps    Incontinence of feces    OA (osteoarthritis) of hip    Osteoarthritis of right hip        Past Medical History:   Diagnosis Date    Abnormal electrocardiogram      heart attack    Arthritis     Chest discomfort     Colon polyp     Coronary artery disease     Disease of thyroid gland     Diverticulitis     Elevated cholesterol     GERD (gastroesophageal reflux disease)     Hyperlipidemia     Hypertension         Past Surgical History:   Procedure Laterality Date    APPENDECTOMY      CARDIAC SURGERY      3 stents    CHOLECYSTECTOMY      COLONOSCOPY N/A 2016    Procedure: COLONOSCOPY;  Surgeon: Giovani Avelar MD;  Location: [facility];  Service:     COLONOSCOPY N/A 2019    Procedure: Colonoscopy with possible biopsy or polypectomy;  Surgeon: Alfreda Soler DO;  Location: [facility] [place];  Service: Gastroenterology    COLONOSCOPY N/A 2021    Procedure: Colonoscopy with polypectomy, biopsy;  Surgeon: Alfreda Soler DO;  Location: [facility] [place];  Service: Gastroenterology;  Laterality: N/A;  diverticulosis  cecal biopsy  right colon polyp  left colon polyps x2    CORONARY STENT PLACEMENT      ENDOSCOPY N/A 2017    Procedure: ESOPHAGOGASTRODUODENOSCOPY with CARIN test ;  Surgeon: Alfreda Soler DO;  Location: [facility] [place] [facility];  Service:     ENDOSCOPY N/A 2019    Procedure: Esophagogastroduodenoscopy with possible biopsy, polypectomy, dilation;  Surgeon: Alfreda Soler DO;  Location: [facility];  Service: Gastroenterology    ENDOSCOPY N/A 2021     "Procedure: Esophagogastroduodenoscopy with biopsies;  Surgeon: Alfreda Soler DO;  Location:  [facility] OR;  Service: Gastroenterology;  Laterality: N/A;  CARIN test  gastritis  duodenitis    EYE SURGERY      FOOT GANGLION EXCISION Left     HEMORROIDECTOMY      HERNIA REPAIR      HYSTERECTOMY      GREG with OC age 30 for DUB    OOPHORECTOMY      dx lsc , interval USO    TOTAL HIP ARTHROPLASTY Right 8/23/2023    Procedure: TOTAL HIP ARTHROPLASTY ANTERIOR WITH HANA TABLE;  Surgeon: Aidan Wallace MD;  Location:  [facility] OR;  Service: Orthopedics;  Laterality: Right;       Visit Dx:     ICD-10-CM ICD-9-CM   1. Status post total hip replacement, right  Z96.641 V43.64          Patient History       Row Name 09/15/23 1000             History    Chief Complaint Pain;Muscle tenderness;Muscle weakness;Joint stiffness;Tightness  -LN      Type of Pain Hip pain  R  -LN      Date Current Problem(s) Began 08/23/23  -LN      Brief Description of Current Complaint Pt s/p R PUJA on 8/23/23. Pt in hospital x 1 day and then received  PT; last visit was on Tuesday, 9/12/23.  Pt now referred to outpatient PT. Pt used a rolling walker and then used cane for 2 days and now not using any AD but has cane beside her bed just in case she needs it in middle of night, when she walks to bathroom. No hip injury reported in past; hx of OA right hip. Pt also has LB issues.  -LN      Previous treatment for THIS PROBLEM Medication;Surgery;Injections;Rehabilitation  had cortisone shots  -LN      Surgery Date: 08/23/23  -LN      Patient/Caregiver Goals Relieve pain;Return to prior level of function;Improve mobility;Improve strength;Know what to do to help the symptoms  -LN      Patient/Caregiver Goals Comment \"be done\"  -LN      Occupation/sports/leisure activities retired  -LN      Patient seeing anyone else for problem(s)? Orthopedic  -LN      How has patient tried to help current problem? pain meds; ice; rest; exercises from  PT; walking  -LN   "    What clinical tests have you had for this problem? X-ray  -LN      Results of Clinical Tests Satisfactory postoperative appearance following right total hip  arthroplasty.  -LN      Related/Recent Hospitalizations Yes  -LN      Date of Hospitalization 08/23/23  -LN      Surgery/Hospitalization s/p R PUJA  -LN      History of Previous Related Injuries none reported  -LN         Pain     Pain Location Hip  right  -LN      Pain at Present 3  3-4/10  -LN      Pain at Best 3  -LN      Pain at Worst 10  -LN      Pain Frequency Constant/continuous  -LN      Pain Description Discomfort  -LN      What Performance Factors Make the Current Problem(s) WORSE? going up and down steps; sitting; lying down  -LN      What Performance Factors Make the Current Problem(s) BETTER? changing positions; shift off right hip in sitting; pain meds  -LN      Tolerance Time- Standing limited  -LN      Tolerance Time- Sitting limited; has to shift weight off right hip at times in sitting  -LN      Tolerance Time- Walking limited  -LN      Tolerance Time- Lying limited; trouble getting comfortable to sleep  -LN      Is your sleep disturbed? Yes  -LN      What position do you sleep in? Right sidelying;Left sidelying  -LN      Difficulties at work? retired  -LN      Difficulties with ADL's? none reported  -LN      Difficulties with recreational activities? want to be able to walk more  -LN         Fall Risk Assessment    Any falls in the past year: Yes  -LN      Number of falls reported in the last 12 months 1  -LN      Factors that contributed to the fall: Other (comment)  fell out of bed  -LN         Services    Prior Rehab/Home Health Experiences Yes  -LN      When was the prior experience with Rehab/Home Health HH PT after recent PUJA  -LN      Where was the prior experience with Rehab/Home Health HH  -LN      Are you currently receiving Home Health services No  -LN      Do you plan to receive Home Health services in the near future No  -LN    "      Daily Activities    Primary Language English  -LN      Are you able to read Yes  -LN      Are you able to write Yes  -LN      How does patient learn best? Reading;Pictures/Video  -LN      Teaching needs identified Home Exercise Program;Other (comment)  Risks and benefits of treatment explained to pt  -LN      Patient is concerned about/has problems with Bed Mobility;Climbing Stairs;Flexibility;Performing home management (household chores, shopping, care of dependents);Performing job responsibilities/community activities (work, school,;Performing sports, recreation, and play activities;Sitting;Standing;Transfers (getting out of a chair, bed);Walking  -LN      Does patient have problems with the following? None  -LN      Barriers to learning None  -LN      Pt Participated in POC and Goals Yes  -LN         Safety    Are you being hurt, hit, or frightened by anyone at home or in your life? No  -LN      Are you being neglected by a caregiver No  -LN      Have you had any of the following issues with N/A  -LN                User Key  (r) = Recorded By, (t) = Taken By, (c) = Cosigned By      Initials Name Provider Type    Moraima Chan, PT Physical Therapist                     PT Ortho       Row Name 09/15/23 1000       Subjective    Subjective Comments Pt c/o a constant discomfort in right hip. Pt also c/o some pain and tightness along lateral thigh. \"I also feel pain and numbness in my right buttocks.\" Pt also with hx of LB issues. \"I have been massaging it.\"  -LN       Precautions and Contraindications    Precautions/Limitations hip precautions- right  anterior hip  -LN Pt reports she was told no twisting of hip and no crossing her legs.        Subjective Pain    Able to rate subjective pain? yes  -LN    Pre-Treatment Pain Level 3  3-4/10  -LN       Posture/Observations    Iliac crests Normal  appear level  -LN    Observations Incision healing;Muscle atrophy  -LN    Posture/Observations Comments well " healing incision noted anterior R hip; does have a few small scabbed areas on incision.  -LN       Hip/Thigh Palpation    Gluteus Medius Right:;Tender  -LN    Gluteus Minimus Right:;Tender  -LN    ITB Right:;Tender;Guarded/taut  moderate tightness palpated along right ITB and lateral to incisional area with tenderness.  -LN       Leg Length Test    True Equal  -LN    Apparent Equal  -LN       General ROM    GENERAL ROM COMMENTS R hip abduction limited. Good hip flexion noted. R knee and ankle AROM WNL. No rotation or extension tested.  -LN       MMT (Manual Muscle Testing)    General MMT Comments L hip grossly 4+/5-5/5  -LN       MMT Right Lower Ext    Rt Hip Flexion MMT, Gross Movement (4/5) good  -LN    Rt Hip Extension MMT, Gross Movement (4+/5) good plus  -LN    Rt Hip ABduction MMT, Gross Movement (4-/5) good minus  -LN    Rt Hip ADduction MMT, Gross Movement (4+/5) good plus  -LN    Rt Knee Extension MMT, Gross Movement (5/5) normal  -LN    Rt Knee Flexion MMT, Gross Movement (5/5) normal  -LN    Rt Ankle Plantarflexion MMT, Gross Movement (5/5) normal  -LN    Rt Ankle Dorsiflexion MMT, Gross Movement (5/5) normal  -LN       Sensation    Sensation WNL? WNL  -LN    Light Touch No apparent deficits  -LN    Additional Comments no c/o any N/T in legs  -LN       Lower Extremity Flexibility    Hamstrings Right:;Mildly limited;Moderately limited  -LN    ITB Right:;Mildly limited;Moderately limited  -LN    Gastrocnemius Right:;Mildly limited  -LN    Soleus Right:;Mildly limited  -LN       Transfers    Sit-Stand Emanuel (Transfers) independent  -LN    Stand-Sit Emanuel (Transfers) independent  -LN    Transfers, Sit-Stand-Sit, Assist Device other (see comments)  none  -LN       Gait/Stairs (Locomotion)    Emanuel Level (Gait) independent  -LN    Assistive Device (Gait) other (see comments)  none  -LN    Deviations/Abnormal Patterns (Gait) right sided deviations;antalgic;gait speed decreased;stride length  decreased  -LN    Comment, (Gait/Stairs) Pt ambulates without any AD with nominal antalgic gait on R noted; minimal decrease in gait speed noted. Good balance noted.  -LN              User Key  (r) = Recorded By, (t) = Taken By, (c) = Cosigned By      Initials Name Provider Type    Moraima Chan, PT Physical Therapist                                Therapy Education  Education Details: Pt to work on HEP 1 x day and use MH/CP PRN (to try MHP on lateral thigh/ITB to help decrease tighness). Pt to continue to ambulate without AD as tolerated.  Given: HEP, Symptoms/condition management, Pain management, Posture/body mechanics, Mobility training, Other (comment) (self massage to right lateral thigh area for muscle tightness/guarding)  Program: New  How Provided: Verbal, Demonstration, Written  Provided to: Patient  Level of Understanding: Teach back education performed, Verbalized, Demonstrated      PT OP Goals       Row Name 09/15/23 1000          PT Short Term Goals    STG Date to Achieve 09/29/23  -LN     STG 1 Pt verbally report decreased right hip pain to <4/10 at worst with ADLs and everyday activities.  -LN     STG 2 Pt independent with initial HEP issued by therapist.  -LN     STG 3 R hip strength improved by 1/2 muscle grade.  -LN     STG 4 Pt to have decreased muscle guarding R ITB/lateral thigh to minimal.  -LN     STG 5 Pt able to sit for 30 minutes without any c/o increased right hip pain and not needing to shift her weight off right side.  -LN        Long Term Goals    LTG Date to Achieve 10/13/23  -LN     LTG 1 Pt verbally report decreased right hip pain to <2/10 at worst with ADLs and everyday activities.  -LN     LTG 2 Pt independent with more advanced HEP issued by therapist.  -LN     LTG 3 R hip strength improved to 4+-5/5.  -LN     LTG 4 R hip AROM WFL & painfree.  -LN     LTG 5 Pt to have decreased muscle guarding R ITB/lateral thigh to nominal.  -LN     LTG 6 No limp noted with  ambulation.  -LN     LTG 7 Pt able to go up and down her steps (forward) at home using reciprocal steps without any issues or c/o any right hip pain.  -LN     LTG 8 Pt able to walk 1 mile without any c/o increased right hip pain.  -LN        Time Calculation    PT Goal Re-Cert Due Date 10/13/23  -LN               User Key  (r) = Recorded By, (t) = Taken By, (c) = Cosigned By      Initials Name Provider Type    Moraima Chan, PT Physical Therapist                     PT Assessment/Plan       Row Name 09/15/23 1000          PT Assessment    Functional Limitations Impaired gait;Impaired locomotion;Limitation in home management;Limitations in community activities;Limitations in functional capacity and performance;Performance in leisure activities  -LN     Impairments Gait;Impaired flexibility;Muscle strength;Pain;Range of motion;Locomotion  -LN     Assessment Comments Pt presents 3 weeks s/p R anterior PUJA with c/o right hip pain; pt has decreased right hip ROM and strength; decreased flexibility R leg; tenderness and muscle tightness noted R ITB/lateral thigh and scar tightness also palpated; pt with gait deficit. She is now ambulating without any AD except does use SPC in middle of night to walk to her bathroom as needed for safety; only nominal limp noted. Overall, pt is recovering nicely but will benefit from ROM/stretching/progressive strengthening exercises to continue to increase her functioning and overall mobility.  -LN     Please refer to paper survey for additional self-reported information Yes  -LN     Rehab Potential Good  -LN     Patient/caregiver participated in establishment of treatment plan and goals Yes  -LN     Patient would benefit from skilled therapy intervention Yes  -LN        PT Plan    PT Frequency 2x/week  -LN     Predicted Duration of Therapy Intervention (PT) 4-6 weeks  -LN     Planned CPT's? PT EVAL LOW COMPLEXITY: 25151;PT THER PROC EA 15 MIN: 42386;PT GAIT TRAINING EA 15  "MIN: 21406;PT HOT OR COLD PACK TREAT MCARE;PT MANUAL THERAPY EA 15 MIN: 05698  -LN     Physical Therapy Interventions (Optional Details) gait training;home exercise program;manual therapy techniques;modalities;patient/family education;ROM (Range of Motion);strengthening;stretching;taping  -LN     PT Plan Comments See pt 2 x week for therapeutic exercises/PUJA exercises with HEP; MH/CP PRN; pt education; gait training; STM/MFR/CFM as needed; kinesiotape PRN.  -LN               User Key  (r) = Recorded By, (t) = Taken By, (c) = Cosigned By      Initials Name Provider Type    Moraima Chan, PT Physical Therapist                     Modalities       Row Name 09/15/23 1000             Ice    Patient denies application of Ice Yes  -LN      Patient reports will apply ice at home to involved area Yes  -LN                User Key  (r) = Recorded By, (t) = Taken By, (c) = Cosigned By      Initials Name Provider Type    Moraima Chan, PT Physical Therapist                   OP Exercises       Row Name 09/15/23 1000             Precautions    Existing Precautions/Restrictions hip, anterior  -LN         Subjective    Subjective Comments Pt c/o a constant discomfort in right hip. Pt also c/o some pain and tightness along lateral thigh. \"I also feel pain and numbness in my right buttocks.\" Pt also with hx of LB issues. \"I have been massaging it.\"  -LN         Subjective Pain    Able to rate subjective pain? yes  -LN      Pre-Treatment Pain Level 3  3-4/10  -LN         Total Minutes    26536 - PT Therapeutic Exercise Minutes 30  -LN         Exercise 1    Exercise Name 1 GS  -LN      Cueing 1 Verbal;Tactile;Demo  -LN      Reps 1 10  -LN      Time 1 5 sec  -LN         Exercise 2    Exercise Name 2 QS over single towel roll  -LN      Cueing 2 Verbal;Tactile;Demo  -LN      Reps 2 15  -LN      Time 2 5 sec  -LN         Exercise 3    Exercise Name 3 Active heelsides  -LN      Cueing 3 Verbal;Tactile;Demo  -LN   "    Reps 3 15  -LN      Additional Comments to try with 1# at home  -LN         Exercise 4    Exercise Name 4 SAQ  -LN      Cueing 4 Verbal;Tactile;Demo  -LN      Reps 4 15  -LN      Time 4 5 sec  -LN      Additional Comments to try with 1# at home  -LN         Exercise 5    Exercise Name 5 SLR  -LN      Cueing 5 Verbal;Tactile;Demo  -LN      Reps 5 10  -LN      Time 5 2 sec  -LN         Exercise 6    Exercise Name 6 LAQ  -LN      Cueing 6 Verbal;Tactile;Demo  -LN      Reps 6 15  -LN      Additional Comments to try with 1# at home  -LN         Exercise 7    Exercise Name 7 standing calf raises  -LN      Cueing 7 Verbal;Tactile;Demo  -LN      Reps 7 15  -LN      Additional Comments at elevated table  -LN         Exercise 8    Exercise Name 8 Mini-squats  -LN      Cueing 8 Verbal;Tactile;Demo  -LN      Reps 8 10  -LN      Additional Comments at elevated table  -LN         Exercise 9    Exercise Name 9 Fwd step ups  -LN      Cueing 9 Verbal;Tactile;Demo  -LN      Reps 9 15 each  -LN      Additional Comments 4 inch step  -LN         Exercise 10    Exercise Name 10 Recumbent bicycle  -LN      Cueing 10 Verbal;Demo  -LN      Time 10 5 minutes  -LN      Additional Comments seat 5  -LN         Exercise 11    Exercise Name 11 sidestepping vs TB  -LN      Cueing 11 Verbal;Tactile;Demo  -LN      Reps 11 5 back and forth  6-8 steps each way  -LN      Additional Comments blue TB  -LN                User Key  (r) = Recorded By, (t) = Taken By, (c) = Cosigned By      Initials Name Provider Type    Moraima Chan, PT Physical Therapist                                  Outcome Measure Options: Lower Extremity Functional Scale (LEFS)  Lower Extremity Functional Index  Any of your usual work, housework or school activities: A little bit of difficulty  Your usual hobbies, recreational or sporting activities: Extreme difficulty or unable to perform activity  Getting into or out of the bath: A little bit of difficulty  Walking  between rooms: A little bit of difficulty  Putting on your shoes or socks: A little bit of difficulty  Squatting: Moderate difficulty  Lifting an object, like a bag of groceries from the floor: Extreme difficulty or unable to perform activity  Performing light activities around your home: A little bit of difficulty  Performing heavy activities around your home: Moderate difficulty  Getting into or out of a car: A little bit of difficulty  Walking 2 blocks: A little bit of difficulty  Walking a mile: Extreme difficulty or unable to perform activity  Going up or down 10 stairs (about 1 flight of stairs): A little bit of difficulty  Standing for 1 hour: A little bit of difficulty  Sitting for 1 hour: Extreme difficulty or unable to perform activity  Running on even ground: Extreme difficulty or unable to perform activity  Running on uneven ground: Extreme difficulty or unable to perform activity  Making sharp turns while running fast: Extreme difficulty or unable to perform activity  Hopping: Extreme difficulty or unable to perform activity  Rolling over in bed: A little bit of difficulty  Total: 34  Lower Extremity Functional Index  Any of your usual work, housework or school activities: A little bit of difficulty  Your usual hobbies, recreational or sporting activities: Extreme difficulty or unable to perform activity  Getting into or out of the bath: A little bit of difficulty  Walking between rooms: A little bit of difficulty  Putting on your shoes or socks: A little bit of difficulty  Squatting: Moderate difficulty  Lifting an object, like a bag of groceries from the floor: Extreme difficulty or unable to perform activity  Performing light activities around your home: A little bit of difficulty  Performing heavy activities around your home: Moderate difficulty  Getting into or out of a car: A little bit of difficulty  Walking 2 blocks: A little bit of difficulty  Walking a mile: Extreme difficulty or unable to  perform activity  Going up or down 10 stairs (about 1 flight of stairs): A little bit of difficulty  Standing for 1 hour: A little bit of difficulty  Sitting for 1 hour: Extreme difficulty or unable to perform activity  Running on even ground: Extreme difficulty or unable to perform activity  Running on uneven ground: Extreme difficulty or unable to perform activity  Making sharp turns while running fast: Extreme difficulty or unable to perform activity  Hopping: Extreme difficulty or unable to perform activity  Rolling over in bed: A little bit of difficulty  Total: 34      Time Calculation:     Start Time: 1100  Stop Time: 1200  Time Calculation (min): 60 min  Timed Charges  46097 - PT Therapeutic Exercise Minutes: 30  Total Minutes  Timed Charges Total Minutes: 30   Total Minutes: 30     Therapy Charges for Today       Code Description Service Date Service Provider Modifiers Qty    22805428454 HC PT THER PROC EA 15 MIN 9/15/2023 Moraima Baer, PT GP 2    [id number]  PT EVAL LOW COMPLEXITY 3 9/15/2023 Moraima Baer, PT GP 1            PT G-Codes  Outcome Measure Options: Lower Extremity Functional Scale (LEFS)  Total: 34         Moraima Baer, PT  9/15/2023

## 2023-09-19 ENCOUNTER — HOSPITAL ENCOUNTER (OUTPATIENT)
Dept: PHYSICAL THERAPY | Facility: HOSPITAL | Age: 78
Setting detail: THERAPIES SERIES
Discharge: HOME | End: 2023-09-19
Payer: MEDICARE

## 2023-09-19 DIAGNOSIS — Z96.641 STATUS POST TOTAL HIP REPLACEMENT, RIGHT: Primary | ICD-10-CM

## 2023-09-19 PROCEDURE — 97110 THERAPEUTIC EXERCISES: CPT

## 2023-09-19 NOTE — THERAPY TREATMENT NOTE
Outpatient Physical Therapy Ortho Treatment Note  YAMILE Adams     Patient Name: Ashlie Levi  : 1945  MRN: [id number]  Today's Date: 2023      Visit Date: 2023    Visit Dx:    ICD-10-CM ICD-9-CM   1. Status post total hip replacement, right  Z96.641 V43.64       Patient Active Problem List   Diagnosis    Coronary artery disease    Hyperlipidemia    Hypertension    GERD (gastroesophageal reflux disease)    Vulvar itching    Dyspepsia    Bloating    Nausea    Dry heaves    History of colon polyps    Incontinence of feces    OA (osteoarthritis) of hip    Osteoarthritis of right hip        Past Medical History:   Diagnosis Date    Abnormal electrocardiogram      heart attack    Arthritis     Chest discomfort     Colon polyp     Coronary artery disease     Disease of thyroid gland     Diverticulitis     Elevated cholesterol     GERD (gastroesophageal reflux disease)     Hyperlipidemia     Hypertension         Past Surgical History:   Procedure Laterality Date    APPENDECTOMY      CARDIAC SURGERY      3 stents    CHOLECYSTECTOMY      COLONOSCOPY N/A 2016    Procedure: COLONOSCOPY;  Surgeon: Giovani Avelar MD;  Location: [facility] OR;  Service:     COLONOSCOPY N/A 2019    Procedure: Colonoscopy with possible biopsy or polypectomy;  Surgeon: Alfreda Soler DO;  Location: [facility] OR;  Service: Gastroenterology    COLONOSCOPY N/A 2021    Procedure: Colonoscopy with polypectomy, biopsy;  Surgeon: Alfreda Soler DO;  Location: [facility] OR;  Service: Gastroenterology;  Laterality: N/A;  diverticulosis  cecal biopsy  right colon polyp  left colon polyps x2    CORONARY STENT PLACEMENT      ENDOSCOPY N/A 2017    Procedure: ESOPHAGOGASTRODUODENOSCOPY with CARIN test ;  Surgeon: Alfreda Soler DO;  Location: [facility] OR;  Service:     ENDOSCOPY N/A 2019    Procedure: Esophagogastroduodenoscopy with possible biopsy, polypectomy, dilation;  Surgeon: Karlene  DO Alfreda;  Location: [facility] OR;  Service: Gastroenterology    ENDOSCOPY N/A 5/18/2021    Procedure: Esophagogastroduodenoscopy with biopsies;  Surgeon: Alfreda Soler DO;  Location:  [facility] OR;  Service: Gastroenterology;  Laterality: N/A;  CARIN test  gastritis  duodenitis    EYE SURGERY      FOOT GANGLION EXCISION Left     HEMORROIDECTOMY      HERNIA REPAIR      HYSTERECTOMY      GREG with OC age 30 for DUB    OOPHORECTOMY      dx lsc , interval USO    TOTAL HIP ARTHROPLASTY Right 8/23/2023    Procedure: TOTAL HIP ARTHROPLASTY ANTERIOR WITH HANA TABLE;  Surgeon: Aidan Wallace MD;  Location:  [facility] OR;  Service: Orthopedics;  Laterality: Right;        PT Ortho       Row Name 09/19/23 1300       Subjective    Subjective Comments pt reports she is doing better overall except continued tightness/discomfort ant hip around her incision - states she has been massaging it a lot; pt continues to have (B) groin pain that she plans to address with her PCP later this week  -       Precautions and Contraindications    Precautions/Limitations hip precautions- right  -    Precautions s/p anterior hip PUJA  -              User Key  (r) = Recorded By, (t) = Taken By, (c) = Cosigned By      Initials Name Provider Type     Carl Boateng, PTA Physical Therapist Assistant                                 PT Assessment/Plan       Row Name 09/19/23 1514          PT Assessment    Assessment Comments pt progressing well with HEP and no longer using AD; pt with discomfort with intiating gait but sudsides within a couple of steps; pt continues with itghtness and discomfort along anterior hip, especially the incision area; pt also continues with (B) groin pain that she feels is unrelated  -        PT Plan    PT Plan Comments Pt to follow up with ortho and she requests to see him prior to scheduling any further appts  -               User Key  (r) = Recorded By, (t) = Taken By, (c) = Cosigned By      Initials Name  "Provider Type     Carl Boateng PTA Physical Therapist Assistant                     Modalities       Row Name 09/19/23 1300             Other Treatment Provided    Taping / Bracing trial of kinesiotape for scar/adhesion care: 2 \"I\" strips applied using space correction over well healed incision.  Pt to leave tape up to 3-5 menendez and trim/remove as needed.  -                User Key  (r) = Recorded By, (t) = Taken By, (c) = Cosigned By      Initials Name Provider Type     Carl Boateng PTA Physical Therapist Assistant                   OP Exercises       Row Name 09/19/23 1513 09/19/23 1300          Precautions    Existing Precautions/Restrictions -- hip, anterior  Pt reports she was told no twisting of hip and no crossing her legs.  -        Subjective    Subjective Comments -- pt reports she is doing better overall except continued tightness/discomfort ant hip around her incision - states she has been massaging it a lot; pt continues to have (B) groin pain that she plans to address with her PCP later this week  -        Total Minutes    51886 - PT Therapeutic Exercise Minutes 35  -MH --        Exercise 1    Exercise Name 1 -- GS  -     Cueing 1 -- Verbal;Tactile;Demo  -     Reps 1 -- 15  -MH     Time 1 -- 5 sec  -        Exercise 2    Exercise Name 2 -- QS over single towel roll  -     Cueing 2 -- Verbal;Tactile;Demo  -     Reps 2 -- 15  -MH     Time 2 -- 5 sec  -MH        Exercise 3    Exercise Name 3 -- Active heelsides  -     Cueing 3 -- Verbal;Tactile;Demo  -     Reps 3 -- 15  -MH        Exercise 4    Exercise Name 4 -- SAQ  -     Cueing 4 -- Verbal;Tactile;Demo  -     Reps 4 -- 15  -MH     Time 4 -- 5 sec  -     Additional Comments -- 1#  -        Exercise 5    Exercise Name 5 -- SLR  -MH     Cueing 5 -- Verbal;Tactile;Demo  -     Reps 5 -- 15  -MH     Time 5 -- 2 sec  -     Additional Comments -- 1#  -        Exercise 6    Exercise Name 6 -- LAQ  -     Cueing 6 -- " "Verbal;Tactile;Health system  -     Reps 6 -- 15  -     Time 6 -- 5 sec  -     Additional Comments -- 1#  -        Exercise 7    Exercise Name 7 -- standing calf raises  -     Cueing 7 -- Verbal;Tactile;Demo  -     Reps 7 -- 15  -        Exercise 8    Exercise Name 8 -- Mini-squats  -     Cueing 8 -- Verbal;Tactile;[facility]     Reps 8 -- 15  -        Exercise 9    Exercise Name 9 -- 4\" Fwd step ups  -     Cueing 9 -- Verbal;Tactile;Health system  -     Reps 9 -- 15 each  -        Exercise 10    Exercise Name 10 -- Recumbent bicycle  -     Cueing 10 -- Verbal;Demo  -     Time 10 -- 5 minutes  -        Exercise 11    Exercise Name 11 -- sidestepping vs TB  -     Cueing 11 -- Verbal;Tactile;Demo  -     Reps 11 -- 5 back and forth  6-8 steps each way  -     Time 11 -- black  -               User Key  (r) = Recorded By, (t) = Taken By, (c) = Cosigned By      Initials Name Provider Type     Carl Boateng PTA Physical Therapist Assistant                                     Therapy Education  Education Details: black theraband issued for home  Given: HEP, Symptoms/condition management  Program: Reinforced, Progressed  How Provided: Verbal, Demonstration  Provided to: Patient  Level of Understanding: Teach back education performed, Verbalized, Demonstrated              Time Calculation:   Start Time: 1302  Stop Time: 1348  Time Calculation (min): 46 min  Timed Charges  67562 - PT Therapeutic Exercise Minutes: 35  Total Minutes  Timed Charges Total Minutes: 35   Total Minutes: 35  Therapy Charges for Today       Code Description Service Date Service Provider Modifiers Qty    18168868902 HC PT THER PROC EA 15 MIN 9/19/2023 Carl Boateng PTA GP 2                      Carl Boateng PTA  9/19/2023     "

## 2023-09-22 ENCOUNTER — OFFICE VISIT (OUTPATIENT)
Dept: ORTHOPEDIC SURGERY | Facility: CLINIC | Age: 78
End: 2023-09-22
Payer: MEDICARE

## 2023-09-22 VITALS — WEIGHT: 156 LBS | HEIGHT: 62 IN | BODY MASS INDEX: 28.71 KG/M2

## 2023-09-22 DIAGNOSIS — Z96.641 STATUS POST TOTAL HIP REPLACEMENT, RIGHT: Primary | ICD-10-CM

## 2023-09-22 NOTE — PROGRESS NOTES
Subjective:     Patient ID: Ashlie Levi is a 77 y.o. female.    Chief Complaint:    History of Present Illness  Ashlie Levi returns to clinic today for evaluation of right hip status post total hip arthroplasty 4 weeks ago.  The patient was last seen by me on 2023 she was quite slow to mobilize and was not ambulating well.  She was still having anterior groin pain but the posterior buttock pain that she was having had improved.  She states that today she still having bilateral groin pain but her posterior lateral buttock pain has completely resolved.  She states that she feels like her right hip is long.  She has worked well with therapy and is ambulatory today briskly without the use of an assistive device.  She denies any fevers chills or drainage from her surgical incision.     Social History     Occupational History    Not on file   Tobacco Use    Smoking status: Former     Packs/day: 1.00     Years: 50.00     Pack years: 50.00     Types: Cigarettes     Quit date: 10/13/2006     Years since quittin.9     Passive exposure: Never    Smokeless tobacco: Never    Tobacco comments:     quit 2006   Vaping Use    Vaping Use: Never used   Substance and Sexual Activity    Alcohol use: Yes     Comment: occasional    Drug use: No    Sexual activity: Not Currently     Partners: Male     Birth control/protection: Surgical, Post-menopausal     Comment: GREG with OC      Past Medical History:   Diagnosis Date    Abnormal electrocardiogram     2006 heart attack    Arthritis     Chest discomfort     Colon polyp     Coronary artery disease     Disease of thyroid gland     Diverticulitis     Elevated cholesterol     GERD (gastroesophageal reflux disease)     Hyperlipidemia     Hypertension      Past Surgical History:   Procedure Laterality Date    APPENDECTOMY      CARDIAC SURGERY      3 stents    CHOLECYSTECTOMY      COLONOSCOPY N/A 2016    Procedure: COLONOSCOPY;  Surgeon: Giovani Avelar MD;  Location:  "[facility] OR;  Service:     COLONOSCOPY N/A 6/13/2019    Procedure: Colonoscopy with possible biopsy or polypectomy;  Surgeon: Alfreda Soler DO;  Location:  [facility] OR;  Service: Gastroenterology    COLONOSCOPY N/A 5/18/2021    Procedure: Colonoscopy with polypectomy, biopsy;  Surgeon: Alfreda Soler DO;  Location:  [facility] OR;  Service: Gastroenterology;  Laterality: N/A;  diverticulosis  cecal biopsy  right colon polyp  left colon polyps x2    CORONARY STENT PLACEMENT      ENDOSCOPY N/A 11/21/2017    Procedure: ESOPHAGOGASTRODUODENOSCOPY with CARIN test ;  Surgeon: Alfreda Soler DO;  Location: [facility] OR;  Service:     ENDOSCOPY N/A 6/13/2019    Procedure: Esophagogastroduodenoscopy with possible biopsy, polypectomy, dilation;  Surgeon: Alfreda Soler DO;  Location:  [facility] OR;  Service: Gastroenterology    ENDOSCOPY N/A 5/18/2021    Procedure: Esophagogastroduodenoscopy with biopsies;  Surgeon: Alfreda Soler DO;  Location:  [facility] OR;  Service: Gastroenterology;  Laterality: N/A;  CARIN test  gastritis  duodenitis    EYE SURGERY      FOOT GANGLION EXCISION Left     HEMORROIDECTOMY      HERNIA REPAIR      HYSTERECTOMY      GREG with OC age 30 for DUB    OOPHORECTOMY      dx lsc , interval USO    TOTAL HIP ARTHROPLASTY Right 8/23/2023    Procedure: TOTAL HIP ARTHROPLASTY ANTERIOR WITH HANA TABLE;  Surgeon: Aidan Wallace MD;  Location:  [facility] OR;  Service: Orthopedics;  Laterality: Right;       Family History   Problem Relation Age of Onset    Hypertension Mother     Stroke Mother     Diabetes Father     Hypertension Father     Heart disease Father     Stroke Father     Heart disease Brother     Colon cancer Brother         dx > 50    Breast cancer Neg Hx     Ovarian cancer Neg Hx     Deep vein thrombosis Neg Hx     Pulmonary embolism Neg Hx                  Objective:  Vitals:    09/22/23 1511   Weight: 70.8 kg (156 lb)   Height: 156.2 cm (61.5\")         09/22/23  1511   Weight: 70.8 kg (156 " lb)     Body mass index is 29 kg/m².        Right Hip Exam     Tenderness   The patient is experiencing no tenderness.     Range of Motion   Flexion:  normal   External rotation:  normal   Internal rotation:  normal     Muscle Strength   Flexion: 5/5     Tests   LAKSHMI: negative  Fadir:  Negative FADIR test    Other   Erythema: absent  Scars: present  Sensation: normal  Pulse: present             Imaging: no new    Assessment:        1. Status post total hip replacement, right           Plan:          Discussed treatment options at length with patient at today's visit.  Discussed with patient that I am happy with her progress and that she is doing so well.  She is scheduled to see her primary care doctor regarding bilateral groin pain which she think may be attributable to some urinary issues that she is having.  I discussed with her that her leg lengths are clinically equal when she is supine based on her heels and her medial malleolar line.  Length is likely attributable to the fact that she was short preoperatively and leg length was added back resulting in which she feels is a leg length discrepancy.  From my standpoint I like her to continue with therapy to continue working on strengthening range of motion and gait training.  They are set to go on a trip to [place] coming up soon.  I will plan to see the patient back when he returns from her trip sometime November.  Follow up: November with standing AP pelvis x-ray      Ashlie Levi was in agreement with plan and had all questions answered.     Medications:  No orders of the defined types were placed in this encounter.      Followup:  No follow-ups on file.    Diagnoses and all orders for this visit:    1. Status post total hip replacement, right (Primary)          Dictated utilizing Dragon dictation

## 2023-09-25 RX ORDER — IRBESARTAN 300 MG/1
TABLET ORAL
Qty: 90 TABLET | Refills: 0 | Status: SHIPPED | OUTPATIENT
Start: 2023-09-25

## 2023-09-25 NOTE — TELEPHONE ENCOUNTER
NOV-not scheduled.  LOV-07/05/23-JA    Plan       1. Coronary Artery Disease  Assessment   The patient has no angina     Plan   Lifestyle modifications discussed include adhering to a heart healthy diet, avoidance of tobacco products, maintenance of a healthy weight, medication compliance, regular exercise and regular monitoring of cholesterol and blood pressure   Stable cardiac exam, normal EKG today.  No symptoms of concern.     Subjective - Objective   There has been a previous stent procedure using HERBERTH    Current antiplatelet therapy includes aspirin 81 mg  2.  Hyperlipidemia-continue current diet and therapy, AST, ALT reviewed  3.  Hypertensive heart disease-blood pressure well controlled on amlodipine carvedilol irbesartan, BUN and creatinine are reviewed  4.  Patient's been having symptoms consistent with bronchitis and despite outpatient antibiotics has been feeling much worse.  I do not hear anything overt on physical exam, they tell me that they are heading from here down to the emergency room both because of the ongoing symptoms of bronchitis that have been really severe as well as the fact that now she has developed a yeast infection after the antibiotic course.

## 2023-10-13 ENCOUNTER — OFFICE VISIT (OUTPATIENT)
Dept: ORTHOPEDIC SURGERY | Facility: CLINIC | Age: 78
End: 2023-10-13
Payer: MEDICARE

## 2023-10-13 VITALS — WEIGHT: 156 LBS | BODY MASS INDEX: 28.71 KG/M2 | HEIGHT: 62 IN

## 2023-10-13 DIAGNOSIS — M70.62 TROCHANTERIC BURSITIS, LEFT HIP: Primary | ICD-10-CM

## 2023-10-13 RX ORDER — TRIAMCINOLONE ACETONIDE 40 MG/ML
80 INJECTION, SUSPENSION INTRA-ARTICULAR; INTRAMUSCULAR
Status: COMPLETED | OUTPATIENT
Start: 2023-10-13 | End: 2023-10-13

## 2023-10-13 RX ORDER — LIDOCAINE HYDROCHLORIDE 10 MG/ML
8 INJECTION, SOLUTION EPIDURAL; INFILTRATION; INTRACAUDAL; PERINEURAL
Status: COMPLETED | OUTPATIENT
Start: 2023-10-13 | End: 2023-10-13

## 2023-10-13 RX ADMIN — LIDOCAINE HYDROCHLORIDE 8 ML: 10 INJECTION, SOLUTION EPIDURAL; INFILTRATION; INTRACAUDAL; PERINEURAL at 14:13

## 2023-10-13 RX ADMIN — TRIAMCINOLONE ACETONIDE 80 MG: 40 INJECTION, SUSPENSION INTRA-ARTICULAR; INTRAMUSCULAR at 14:13

## 2023-10-13 NOTE — PROGRESS NOTES
Subjective:     Patient ID: Ashlie Levi is a 78 y.o. female.    Chief Complaint:    History of Present Illness  Ashlie Levi presents to clinic today for evaluation of left hip and lateral pain.  Patient states that recently returned from a long road trip to [place].  When there in [place] she noticed some increased bruising and swelling in the lateral aspect of her left hip which she attributes to no specific injury.  She states the pain on the lateral aspect of her hip is so severe that she has been taking narcotic pain medication for it.  Her right hip is achy and somatic and doing quite well.  She is hopeful she does not need a left hip replacement.     Social History     Occupational History    Not on file   Tobacco Use    Smoking status: Former     Packs/day: 1.00     Years: 50.00     Additional pack years: 0.00     Total pack years: 50.00     Types: Cigarettes     Quit date: 10/13/2006     Years since quittin.0     Passive exposure: Never    Smokeless tobacco: Never    Tobacco comments:     quit 2006   Vaping Use    Vaping Use: Never used   Substance and Sexual Activity    Alcohol use: Yes     Comment: occasional    Drug use: No    Sexual activity: Not Currently     Partners: Male     Birth control/protection: Surgical, Post-menopausal     Comment: GREG with OC      Past Medical History:   Diagnosis Date    Abnormal electrocardiogram     2006 heart attack    Arthritis     Chest discomfort     Colon polyp     Coronary artery disease     Disease of thyroid gland     Diverticulitis     Elevated cholesterol     GERD (gastroesophageal reflux disease)     Hyperlipidemia     Hypertension      Past Surgical History:   Procedure Laterality Date    APPENDECTOMY      CARDIAC SURGERY      3 stents    CHOLECYSTECTOMY      COLONOSCOPY N/A 2016    Procedure: COLONOSCOPY;  Surgeon: Giovani Avelar MD;  Location: [facility];  Service:     COLONOSCOPY N/A 2019    Procedure: Colonoscopy with possible  "biopsy or polypectomy;  Surgeon: Alfrdea Soler DO;  Location:  [facility] OR;  Service: Gastroenterology    COLONOSCOPY N/A 5/18/2021    Procedure: Colonoscopy with polypectomy, biopsy;  Surgeon: Alfreda Soler DO;  Location:  [facility] OR;  Service: Gastroenterology;  Laterality: N/A;  diverticulosis  cecal biopsy  right colon polyp  left colon polyps x2    CORONARY STENT PLACEMENT      ENDOSCOPY N/A 11/21/2017    Procedure: ESOPHAGOGASTRODUODENOSCOPY with CARIN test ;  Surgeon: Alfreda Soler DO;  Location:  [facility] OR;  Service:     ENDOSCOPY N/A 6/13/2019    Procedure: Esophagogastroduodenoscopy with possible biopsy, polypectomy, dilation;  Surgeon: Alfreda Soler DO;  Location:  [facility] OR;  Service: Gastroenterology    ENDOSCOPY N/A 5/18/2021    Procedure: Esophagogastroduodenoscopy with biopsies;  Surgeon: Alfreda Soler DO;  Location:  [facility] OR;  Service: Gastroenterology;  Laterality: N/A;  CARIN test  gastritis  duodenitis    EYE SURGERY      FOOT GANGLION EXCISION Left     HEMORROIDECTOMY      HERNIA REPAIR      HYSTERECTOMY      GREG with OC age 30 for DUB    OOPHORECTOMY      dx lsc , interval USO    TOTAL HIP ARTHROPLASTY Right 8/23/2023    Procedure: TOTAL HIP ARTHROPLASTY ANTERIOR WITH HANA TABLE;  Surgeon: Aidan Wallace MD;  [facility];  Service: Orthopedics;  Laterality: Right;       Family History   Problem Relation Age of Onset    Hypertension Mother     Stroke Mother     Diabetes Father     Hypertension Father     Heart disease Father     Stroke Father     Heart disease Brother     Colon cancer Brother         dx > 50    Breast cancer Neg Hx     Ovarian cancer Neg Hx     Deep vein thrombosis Neg Hx     Pulmonary embolism Neg Hx                  Objective:  Vitals:    10/13/23 1341   Weight: 70.8 kg (156 lb)   Height: 156.2 cm (61.5\")         10/13/23  1341   Weight: 70.8 kg (156 lb)     Body mass index is 29 kg/mý.        Right Hip Exam     Tenderness   The patient is " experiencing no tenderness.     Range of Motion   Flexion:  normal   External rotation:  normal   Internal rotation:  normal     Muscle Strength   Flexion: 5/5     Tests   LAKSHMI: negative  Fadir:  Negative FADIR test    Other   Erythema: absent  Scars: present  Sensation: normal  Pulse: present      Left Hip Exam     Tenderness   The patient is experiencing tenderness in the lateral and greater trochanter.    Range of Motion   Flexion:  normal   External rotation:  normal   Internal rotation: normal     Muscle Strength   Flexion: 5/5     Tests   LAKSHMI: negative  Fadir:  Negative FADIR test    Other   Erythema: absent  Scars: absent  Sensation: normal  Pulse: present               Imaging: Standing AP pelvis was ordered and reviewed by myself in the office today  Indication: right hip replacement  Findings: X-rays demonstrate a right total hip arthroplasty with implants in expected position. Leg lengths and offset appear clinically equal based off radiographic markers.  No signs of fracture, dislocation, subluxation, subsidence, or migration.  Left hip shows mild degenerative changes.  No signs of fracture.  Comparative studies: 2 week post op radiographs      Assessment:        1. Trochanteric bursitis, left hip           Plan:      Large Joint Arthrocentesis: L greater trochanteric bursa  Date/Time: 10/13/2023 2:13 PM  Consent given by: patient  Site marked: site marked  Timeout: Immediately prior to procedure a time out was called to verify the correct patient, procedure, equipment, support staff and site/side marked as required   Supporting Documentation  Indications: pain   Procedure Details  Location: hip - L greater trochanteric bursa  Needle size: 22 G (SPINAL)  Medications administered: 8 mL lidocaine PF 1% 1 %; 80 mg triamcinolone acetonide 40 MG/ML  Patient tolerance: patient tolerated the procedure well with no immediate complications           Discussed treatment options at length with patient at today's  visit.  Discussed with the patient that I think due to the long road trip and altered gait mechanics from her right total hip arthroplasty she has developed left hip trochanteric bursitis. I discussed with the patient that the mainstays of conservative treatment for left hip trochanteric bursitis include physical therapy, nonsteroidal anti-inflammatories, injections, activity modification, and home exercises.  The patient states that they  would like to try intrabursal corticosteroid injection.  Discussed with her that I would like her to try to wean herself off the narcotics for her trochanteric bursitis on the left side.  Over the injection will provide long-lasting pain relief.  She has a follow-up to see me in November for 3 months postop on her right total hip.  We will evaluate both her left trope bursitis and a right total hip arthroplasty at that time.  Follow up: Maude Levi was in agreement with plan and had all questions answered.     Medications:  No orders of the defined types were placed in this encounter.      Followup:  No follow-ups on file.    Diagnoses and all orders for this visit:    1. Trochanteric bursitis, left hip (Primary)  -     XR Pelvis 1 or 2 View    Other orders  -     Large Joint Arthrocentesis: L greater trochanteric bursa          Dictated utilizing Dragon dictation

## 2023-11-14 ENCOUNTER — OFFICE VISIT (OUTPATIENT)
Dept: ORTHOPEDIC SURGERY | Facility: CLINIC | Age: 78
End: 2023-11-14
Payer: MEDICARE

## 2023-11-14 VITALS — HEIGHT: 62 IN | BODY MASS INDEX: 28.71 KG/M2 | WEIGHT: 156 LBS

## 2023-11-14 DIAGNOSIS — Z96.641 STATUS POST TOTAL HIP REPLACEMENT, RIGHT: Primary | ICD-10-CM

## 2023-11-14 DIAGNOSIS — M54.16 LUMBAR RADICULOPATHY: ICD-10-CM

## 2023-11-14 PROCEDURE — 99024 POSTOP FOLLOW-UP VISIT: CPT | Performed by: INTERNAL MEDICINE

## 2023-11-14 NOTE — PROGRESS NOTES
Subjective:     Patient ID: Ashlie Levi is a 78 y.o. female.    Chief Complaint:    History of Present Illness  Ashlie Levi returns to clinic today for evaluation of right hip status post total hip arthroplasty.  Patient is now 12 weeks out from surgery and very happy with the result.  She states that all of her preoperative anterior groin pain and hip pain has completely resolved.  She denies any numbness or tingling in her foot and states her incision is healed.  She states over the last 3 weeks though she started to develop posterior buttock pain on the right side and low back pain.  She has been wearing a compressive back brace which she states helps.  She states the pain radiates from her low back and into her buttock.     Social History     Occupational History    Not on file   Tobacco Use    Smoking status: Former     Packs/day: 1.00     Years: 50.00     Additional pack years: 0.00     Total pack years: 50.00     Types: Cigarettes     Quit date: 10/13/2006     Years since quittin.0     Passive exposure: Never    Smokeless tobacco: Never    Tobacco comments:     quit 2006   Vaping Use    Vaping Use: Never used   Substance and Sexual Activity    Alcohol use: Yes     Comment: occasional    Drug use: No    Sexual activity: Not Currently     Partners: Male     Birth control/protection: Surgical, Post-menopausal     Comment: GREG with OC      Past Medical History:   Diagnosis Date    Abnormal electrocardiogram     2006 heart attack    Arthritis     Chest discomfort     Colon polyp     Coronary artery disease     Disease of thyroid gland     Diverticulitis     Elevated cholesterol     GERD (gastroesophageal reflux disease)     Hyperlipidemia     Hypertension      Past Surgical History:   Procedure Laterality Date    APPENDECTOMY      CARDIAC SURGERY      3 stents    CHOLECYSTECTOMY      COLONOSCOPY N/A 2016    Procedure: COLONOSCOPY;  Surgeon: Giovani Avelar MD;  Location: [facility];  Service:  "    COLONOSCOPY N/A 6/13/2019    Procedure: Colonoscopy with possible biopsy or polypectomy;  Surgeon: Alfreda Soler DO;  Location: [facility];  Service: Gastroenterology    COLONOSCOPY N/A 5/18/2021    Procedure: Colonoscopy with polypectomy, biopsy;  Surgeon: Alfreda Soler DO;  Location:  [facility];  Service: Gastroenterology;  Laterality: N/A;  diverticulosis  cecal biopsy  right colon polyp  left colon polyps x2    CORONARY STENT PLACEMENT      ENDOSCOPY N/A 11/21/2017    Procedure: ESOPHAGOGASTRODUODENOSCOPY with CARIN test ;  Surgeon: Alfreda Soler DO;  Location: [facility];  Service:     ENDOSCOPY N/A 6/13/2019    Procedure: Esophagogastroduodenoscopy with possible biopsy, polypectomy, dilation;  Surgeon: Alfreda Soler DO;  Location:  [facility];  Service: Gastroenterology    ENDOSCOPY N/A 5/18/2021    Procedure: Esophagogastroduodenoscopy with biopsies;  Surgeon: Alfreda Soler DO;  Location:  [facility];  Service: Gastroenterology;  Laterality: N/A;  CARIN test  gastritis  duodenitis    EYE SURGERY      FOOT GANGLION EXCISION Left     HEMORROIDECTOMY      HERNIA REPAIR      HYSTERECTOMY      GREG with OC age 30 for DUB    OOPHORECTOMY      dx lsc , interval USO    TOTAL HIP ARTHROPLASTY Right 8/23/2023    Procedure: TOTAL HIP ARTHROPLASTY ANTERIOR WITH HANA TABLE;  Surgeon: Aidan Wallace MD;  Location:  [facility];  Service: Orthopedics;  Laterality: Right;       Family History   Problem Relation Age of Onset    Hypertension Mother     Stroke Mother     Diabetes Father     Hypertension Father     Heart disease Father     Stroke Father     Heart disease Brother     Colon cancer Brother         dx > 50    Breast cancer Neg Hx     Ovarian cancer Neg Hx     Deep vein thrombosis Neg Hx     Pulmonary embolism Neg Hx                  Objective:  Vitals:    11/14/23 1301   Weight: 70.8 kg (156 lb)   Height: 156.2 cm (61.5\")         11/14/23  1301   Weight: 70.8 kg (156 lb)     Body mass " index is 29 kg/m².        Right Hip Exam     Tenderness   The patient is experiencing tenderness in the posterior.    Range of Motion   Flexion:  110 normal   External rotation:  30 normal   Internal rotation:  10 normal     Muscle Strength   Flexion: 5/5     Tests   LAKSHMI: negative  Fadir:  Negative FADIR test    Other   Erythema: absent  Scars: absent  Sensation: normal  Pulse: present      Back Exam     Tenderness   The patient is experiencing tenderness in the sacroiliac and lumbar.               Imaging: AP and lateral of the lumbar spine from 6/13/23 were reviewed by myself in the office today  Indication: Buttock pain  Findings: X-rays demonstrate severe multilevel spondylosis with no significant spondylolisthesis.  Additionally there is significant posterior facet arthritis at multiple levels.  Comparative studies: None    Assessment:        1. Status post total hip replacement, right    2. Lumbar radiculopathy           Plan:          Discussed treatment options at length with patient at today's visit.  Pain she is having now is lumbar radiculopathy..  She has low back pain that radiates into her buttock.  Discussed with her I think the best course of action at this point time is to order an MRI of her lumbar spine and potentially refer her to pain management for lumbar epidural injections.  As for her right hip, I do not see the patient back until August 2024 for 1 year follow-up.  Try to remain active and walk as possible and keep her weight down.  Discussed the pain that radiates to her back sooner patient.  We will call her with results of her lumbar spine MRI.  Follow up: August 2024 with 3 views of the right hip and pelvis      Ashlie Levi was in agreement with plan and had all questions answered.     Medications:  No orders of the defined types were placed in this encounter.      Followup:  No follow-ups on file.    Diagnoses and all orders for this visit:    1. Status post total hip replacement,  right (Primary)  -     MRI Lumbar Spine Without Contrast; Future    2. Lumbar radiculopathy  -     Ambulatory Referral to Pain Management          Dictated utilizing Dragon dictation

## 2023-11-15 ENCOUNTER — TRANSCRIBE ORDERS (OUTPATIENT)
Dept: ADMINISTRATIVE | Facility: HOSPITAL | Age: 78
End: 2023-11-15
Payer: MEDICARE

## 2023-11-15 DIAGNOSIS — E78.2 MIXED HYPERLIPIDEMIA: Primary | ICD-10-CM

## 2023-11-15 DIAGNOSIS — E03.9 HYPOTHYROIDISM, UNSPECIFIED TYPE: ICD-10-CM

## 2023-11-24 ENCOUNTER — HOSPITAL ENCOUNTER (OUTPATIENT)
Dept: MRI IMAGING | Facility: HOSPITAL | Age: 78
Discharge: HOME | End: 2023-11-24
Payer: MEDICARE

## 2023-11-24 ENCOUNTER — LAB (OUTPATIENT)
Dept: LAB | Facility: HOSPITAL | Age: 78
End: 2023-11-24
Payer: MEDICARE

## 2023-11-24 DIAGNOSIS — E78.2 MIXED HYPERLIPIDEMIA: ICD-10-CM

## 2023-11-24 DIAGNOSIS — E03.9 HYPOTHYROIDISM, UNSPECIFIED TYPE: ICD-10-CM

## 2023-11-24 DIAGNOSIS — Z96.641 STATUS POST TOTAL HIP REPLACEMENT, RIGHT: ICD-10-CM

## 2023-11-24 LAB
ALBUMIN SERPL-MCNC: 4.4 G/DL (ref 3.5–5.2)
ALBUMIN/GLOB SERPL: 2 G/DL
ALP SERPL-CCNC: 73 U/L (ref 39–117)
ALT SERPL W P-5'-P-CCNC: 23 U/L (ref 1–33)
ANION GAP SERPL CALCULATED.3IONS-SCNC: 9 MMOL/L (ref 5–15)
AST SERPL-CCNC: 24 U/L (ref 1–32)
BASOPHILS # BLD AUTO: 0.06 10*3/MM3 (ref 0–0.2)
BASOPHILS NFR BLD AUTO: 0.6 % (ref 0–1.5)
BILIRUB SERPL-MCNC: 0.2 MG/DL (ref 0–1.2)
BUN SERPL-MCNC: 14 MG/DL (ref 8–23)
BUN/CREAT SERPL: 15.9 (ref 7–25)
CALCIUM SPEC-SCNC: 9.4 MG/DL (ref 8.6–10.5)
CHLORIDE SERPL-SCNC: 102 MMOL/L (ref 98–107)
CHOLEST SERPL-MCNC: 117 MG/DL (ref 0–200)
CO2 SERPL-SCNC: 29 MMOL/L (ref 22–29)
CREAT SERPL-MCNC: 0.88 MG/DL (ref 0.57–1)
DEPRECATED RDW RBC AUTO: 43.1 FL (ref 37–54)
EGFRCR SERPLBLD CKD-EPI 2021: 66.5 ML/MIN/1.73
EOSINOPHIL # BLD AUTO: 0.19 10*3/MM3 (ref 0–0.4)
EOSINOPHIL NFR BLD AUTO: 1.8 % (ref 0.3–6.2)
ERYTHROCYTE [DISTWIDTH] IN BLOOD BY AUTOMATED COUNT: 13.4 % (ref 12.3–15.4)
GLOBULIN UR ELPH-MCNC: 2.2 GM/DL
GLUCOSE SERPL-MCNC: 85 MG/DL (ref 65–99)
HCT VFR BLD AUTO: 37.5 % (ref 34–46.6)
HDLC SERPL-MCNC: 49 MG/DL (ref 40–60)
HGB BLD-MCNC: 12.4 G/DL (ref 12–15.9)
IMM GRANULOCYTES # BLD AUTO: 0.06 10*3/MM3 (ref 0–0.05)
IMM GRANULOCYTES NFR BLD AUTO: 0.6 % (ref 0–0.5)
LDLC SERPL CALC-MCNC: 42 MG/DL (ref 0–100)
LDLC/HDLC SERPL: 0.75 {RATIO}
LYMPHOCYTES # BLD AUTO: 3.43 10*3/MM3 (ref 0.7–3.1)
LYMPHOCYTES NFR BLD AUTO: 33.3 % (ref 19.6–45.3)
MCH RBC QN AUTO: 29.4 PG (ref 26.6–33)
MCHC RBC AUTO-ENTMCNC: 33.1 G/DL (ref 31.5–35.7)
MCV RBC AUTO: 88.9 FL (ref 79–97)
MONOCYTES # BLD AUTO: 0.86 10*3/MM3 (ref 0.1–0.9)
MONOCYTES NFR BLD AUTO: 8.4 % (ref 5–12)
NEUTROPHILS NFR BLD AUTO: 5.69 10*3/MM3 (ref 1.7–7)
NEUTROPHILS NFR BLD AUTO: 55.3 % (ref 42.7–76)
NRBC BLD AUTO-RTO: 0.1 /100 WBC (ref 0–0.2)
PLATELET # BLD AUTO: 276 10*3/MM3 (ref 140–450)
PMV BLD AUTO: 9.9 FL (ref 6–12)
POTASSIUM SERPL-SCNC: 4.1 MMOL/L (ref 3.5–5.2)
PROT SERPL-MCNC: 6.6 G/DL (ref 6–8.5)
RBC # BLD AUTO: 4.22 10*6/MM3 (ref 3.77–5.28)
SODIUM SERPL-SCNC: 140 MMOL/L (ref 136–145)
T4 FREE SERPL-MCNC: 1.55 NG/DL (ref 0.93–1.7)
TRIGL SERPL-MCNC: 157 MG/DL (ref 0–150)
TSH SERPL DL<=0.05 MIU/L-ACNC: 2.36 UIU/ML (ref 0.27–4.2)
VLDLC SERPL-MCNC: 26 MG/DL (ref 5–40)
WBC NRBC COR # BLD AUTO: 10.29 10*3/MM3 (ref 3.4–10.8)

## 2023-11-24 PROCEDURE — 80061 LIPID PANEL: CPT

## 2023-11-24 PROCEDURE — 85025 COMPLETE CBC W/AUTO DIFF WBC: CPT

## 2023-11-24 PROCEDURE — 36415 COLL VENOUS BLD VENIPUNCTURE: CPT

## 2023-11-24 PROCEDURE — 84443 ASSAY THYROID STIM HORMONE: CPT

## 2023-11-24 PROCEDURE — 80053 COMPREHEN METABOLIC PANEL: CPT

## 2023-11-24 PROCEDURE — 72148 MRI LUMBAR SPINE W/O DYE: CPT

## 2023-11-24 PROCEDURE — 84439 ASSAY OF FREE THYROXINE: CPT

## 2023-11-28 ENCOUNTER — TELEPHONE (OUTPATIENT)
Dept: ORTHOPEDIC SURGERY | Facility: CLINIC | Age: 78
End: 2023-11-28
Payer: MEDICARE

## 2023-11-28 DIAGNOSIS — S32.010A COMPRESSION FRACTURE OF L1 VERTEBRA, INITIAL ENCOUNTER: Primary | ICD-10-CM

## 2023-11-28 DIAGNOSIS — M54.16 LUMBAR RADICULOPATHY: ICD-10-CM

## 2023-11-28 NOTE — TELEPHONE ENCOUNTER
Called patient and her  to go over the results of her MRI.  Referral placed to see neurosurgery.    MRI Lumbar Spine Without Contrast    Result Date: 11/24/2023  LUMBAR SPINE MRI WITHOUT         1. There is a mild anterior compression fracture centered at the anterior superior endplate of L1 that appears to be subacute. It is definitely new since August 2021 and it is not currently healed. There is mild associated marrow edema. Please correlate for clinical evidence of osteoporosis. 2. There is multiple level degenerative change with multilevel canal and foraminal impingement. This includes moderate to severe canal stenosis at L3-4. See level-by-level description of findings and correlate with any radicular symptoms.  This report was finalized on 11/24/2023 6:01 PM by Dr. Qing Gleason MD.

## 2023-11-29 ENCOUNTER — TELEPHONE (OUTPATIENT)
Dept: PAIN MEDICINE | Facility: CLINIC | Age: 78
End: 2023-11-29
Payer: MEDICARE

## 2023-11-30 ENCOUNTER — OFFICE VISIT (OUTPATIENT)
Dept: PAIN MEDICINE | Facility: CLINIC | Age: 78
End: 2023-11-30
Payer: MEDICARE

## 2023-11-30 ENCOUNTER — TRANSCRIBE ORDERS (OUTPATIENT)
Dept: SURGERY | Facility: SURGERY CENTER | Age: 78
End: 2023-11-30
Payer: MEDICARE

## 2023-11-30 ENCOUNTER — PREP FOR SURGERY (OUTPATIENT)
Dept: SURGERY | Facility: SURGERY CENTER | Age: 78
End: 2023-11-30
Payer: MEDICARE

## 2023-11-30 VITALS
BODY MASS INDEX: 25.59 KG/M2 | OXYGEN SATURATION: 94 % | HEIGHT: 65 IN | SYSTOLIC BLOOD PRESSURE: 142 MMHG | HEART RATE: 60 BPM | WEIGHT: 153.6 LBS | TEMPERATURE: 97.1 F | DIASTOLIC BLOOD PRESSURE: 70 MMHG

## 2023-11-30 DIAGNOSIS — Z41.9 SURGERY, ELECTIVE: Primary | ICD-10-CM

## 2023-11-30 DIAGNOSIS — G57.03 PIRIFORMIS SYNDROME OF BOTH SIDES: Primary | ICD-10-CM

## 2023-11-30 DIAGNOSIS — M51.36 DDD (DEGENERATIVE DISC DISEASE), LUMBAR: ICD-10-CM

## 2023-11-30 PROCEDURE — 3077F SYST BP >= 140 MM HG: CPT | Performed by: NURSE PRACTITIONER

## 2023-11-30 PROCEDURE — 1160F RVW MEDS BY RX/DR IN RCRD: CPT | Performed by: NURSE PRACTITIONER

## 2023-11-30 PROCEDURE — 1125F AMNT PAIN NOTED PAIN PRSNT: CPT | Performed by: NURSE PRACTITIONER

## 2023-11-30 PROCEDURE — 99204 OFFICE O/P NEW MOD 45 MIN: CPT | Performed by: NURSE PRACTITIONER

## 2023-11-30 PROCEDURE — 1159F MED LIST DOCD IN RCRD: CPT | Performed by: NURSE PRACTITIONER

## 2023-11-30 PROCEDURE — 3078F DIAST BP <80 MM HG: CPT | Performed by: NURSE PRACTITIONER

## 2023-11-30 RX ORDER — DIAZEPAM 5 MG/1
10 TABLET ORAL ONCE
OUTPATIENT
Start: 2023-11-30 | End: 2023-11-30

## 2023-11-30 RX ORDER — ACETAMINOPHEN 500 MG
500 TABLET ORAL EVERY 6 HOURS PRN
COMMUNITY

## 2023-11-30 NOTE — PROGRESS NOTES
CHIEF COMPLAINT  Back pain    Subjective   Ashlie Levi is a 78 y.o. female.   She presents to the office for evaluation of back pain. She was referred here by Dr. New Wallace ([facility]).     Patient presents to clinic with low back pain which has been present for a couple of years.  She underwent a right total hip arthroplasty with Dr. Wallace on 8/23/2023, has had complete resolution of right anterior hip/groin pain.  She has completed her physical therapy following surgery.  No past history of spine surgery.  No previous interventional pain management.      Pain today is rated as a 3 out of 10 VAS.  Location of her most severe pain is in the buttocks bilaterally (right > left).  She is very sore/tender in this area.  The pain is aggravated by prolonged sitting, prolonged walking.  She tolerates standing and walking short distances.    She has been using topical CBD ointment which helps numb the areas for a few hours.  She takes two extra strength tylenol which also helps.  Taking Meloxicam 15 mg daily.      History of Present Illness     PEG Assessment   What number best describes your pain on average in the past week?6  What number best describes how, during the past week, pain has interfered with your enjoyment of life?5  What number best describes how, during the past week, pain has interfered with your general activity?  5        Current Outpatient Medications:     acetaminophen (TYLENOL) 500 MG tablet, Take 1 tablet by mouth Every 6 (Six) Hours As Needed for Mild Pain., Disp: , Rfl:     amLODIPine (NORVASC) 5 MG tablet, Take 1 tablet by mouth once daily, Disp: 90 tablet, Rfl: 0    aspirin 81 MG EC tablet, Take 1 tablet by mouth 2 (Two) Times a Day. (Patient taking differently: Take 1 tablet by mouth Daily.), Disp: 60 tablet, Rfl: 0    atorvastatin (LIPITOR) 20 MG tablet, Take 1 tablet by mouth Every Night., Disp: , Rfl:     carvedilol (COREG) 6.25 MG tablet, Take 1 tablet by  mouth twice daily, Disp: 180 tablet, Rfl: 0    citalopram (CeleXA) 40 MG tablet, Take 1 tablet by mouth Daily., Disp: , Rfl:     irbesartan (AVAPRO) 300 MG tablet, TAKE 1 TABLET BY MOUTH ONCE DAILY AT NIGHT, Disp: 90 tablet, Rfl: 0    isosorbide mononitrate (ISMO,MONOKET) 20 MG tablet, Take 1 tablet by mouth twice daily, Disp: 180 tablet, Rfl: 0    levothyroxine (SYNTHROID, LEVOTHROID) 100 MCG tablet, Take 1 tablet by mouth Daily., Disp: , Rfl:     meloxicam (MOBIC) 7.5 MG tablet, Take 1 tablet by mouth Daily., Disp: 90 tablet, Rfl: 3    nitroglycerin (NITROSTAT) 0.4 MG SL tablet, Place 1 tablet under the tongue Every 5 (Five) Minutes As Needed for Chest Pain. Take no more than 3 doses in 15 minutes and call EMS., Disp: 20 tablet, Rfl: 0    omeprazole (priLOSEC) 40 MG capsule, Take 1 capsule by mouth once daily, Disp: 90 capsule, Rfl: 4    spironolactone (ALDACTONE) 25 MG tablet, Take 1 tablet by mouth once daily (Patient taking differently: Daily. Patient takes 0.5 tablet daily), Disp: 90 tablet, Rfl: 0    ARIPiprazole (ABILIFY) 5 MG tablet, Take 1 tablet by mouth Daily. (Patient not taking: Reported on 11/30/2023), Disp: , Rfl:     DULoxetine (CYMBALTA) 60 MG capsule, Take 1 capsule by mouth Daily. (Patient not taking: Reported on 11/30/2023), Disp: , Rfl:     HYDROcodone-acetaminophen (NORCO) 5-325 MG per tablet, Take 1 tablet by mouth Every 4 (Four) Hours As Needed for Severe Pain. (Patient not taking: Reported on 11/30/2023), Disp: 45 tablet, Rfl: 0    sennosides-docusate (senna-docusate sodium) 8.6-50 MG per tablet, Take 1 tablet by mouth Daily. (Patient not taking: Reported on 11/30/2023), Disp: 30 tablet, Rfl: 0    The following portions of the patient's history were reviewed and updated as appropriate: allergies, current medications, past family history, past medical history, past social history, past surgical history, and problem list.      REVIEW OF PERTINENT MEDICAL DATA    MRI LUMBAR SPINE      Patient  "has been referred to our clinic by orthopedic surgeon Dr. New Wallace.  Patient was last seen by Dr. Wallace on 11/14/2023.  I reviewed this encounter.  Patient was 12 weeks out from right total hip arthroplasty.  Happy with the result.  Reporting new symptoms which his pain over the right posterior buttock.  Recommending MRI of the lumbar spine and potentially referral to pain management for epidural injection.    Review of Systems   Constitutional:  Positive for activity change (decreased) and fatigue. Negative for chills and fever.   HENT:  Negative for congestion.    Eyes:  Negative for visual disturbance.   Respiratory:  Negative for chest tightness and shortness of breath.    Cardiovascular:  Negative for chest pain.   Gastrointestinal:  Negative for abdominal pain, constipation and diarrhea.   Genitourinary:  Positive for frequency. Negative for difficulty urinating, dyspareunia and dysuria.   Musculoskeletal:  Positive for back pain.   Neurological:  Positive for light-headedness. Negative for dizziness, weakness, numbness and headaches.   Psychiatric/Behavioral:  Positive for agitation and sleep disturbance. Negative for self-injury. The patient is nervous/anxious.        I have reviewed and confirmed the accuracy of the ROS as documented by the MA/LPN/RN ALIA Boogie      Vitals:    11/30/23 1253   BP: 142/70   Pulse: 60   Temp: 97.1 °F (36.2 °C)   SpO2: 94%   Weight: 69.7 kg (153 lb 9.6 oz)   Height: 163.8 cm (64.5\")   PainSc:   3   PainLoc: Buttocks         Objective       Physical Exam  Vitals and nursing note reviewed.   Constitutional:       General: She is not in acute distress.     Appearance: Normal appearance. She is not ill-appearing.   Pulmonary:      Effort: Pulmonary effort is normal. No respiratory distress.   Musculoskeletal:      Lumbar back: Tenderness present. Negative right straight leg raise test and negative left straight leg raise test.      Comments: +tenderness " bilateral piriformis    Neurological:      Mental Status: She is alert and oriented to person, place, and time.      Motor: Motor function is intact.      Gait: Gait normal.   Psychiatric:         Mood and Affect: Mood normal.         Behavior: Behavior normal.       Assessment & Plan   Diagnoses and all orders for this visit:    1. Piriformis syndrome of both sides (Primary)    2. DDD (degenerative disc disease), lumbar      --- Bilateral Piriformis injection     Reviewed the procedure at length with the patient.  Included in the review was expectations, complications, risk and benefits.The procedure was described in detail and the risks, benefits and alternatives were discussed with the patient (including but not limited to: bleeding, infection, nerve damage, worsening of pain, inability to perform injection, no pain relief) who agreed to proceed.  The procedure will plan to be performed at [facility] with fluoroscopic guidance(unless ultrasound is indicated) and could potentially have steroids and contrast dye used. Questions were answered and in a way the patient could understand.  Patient verbalized understanding and wishes to proceed.  This intervention will be ordered.  Discussed with patient that all procedures are part of a multimodal plan of care and include either formal PT or a home exercise program.  Patient has no evidence of coagulopathy or current infection.    --- Consider bilateral LTFESI/LESI if not improving   --- Not interested in any potentially sedating medications  --- Piriformis syndrome rehab included in AVS    --- Ashlie M Gurmeet reports a pain score of 3.  Given her pain assessment as noted, treatment options were discussed and the following options were decided upon as a follow-up plan to address the patient's pain:  see plan above .    --- Follow-up for injection and 4 weeks post procedure          FABIAN MARRERO    As the clinician, I personally reviewed the FABIAN  from 11/30/2023 while the patient was in the office today.    Dictated utilizing Dragon dictation.

## 2023-12-14 ENCOUNTER — TRANSCRIBE ORDERS (OUTPATIENT)
Dept: ADMINISTRATIVE | Facility: HOSPITAL | Age: 78
End: 2023-12-14
Payer: MEDICARE

## 2023-12-14 DIAGNOSIS — Z12.31 VISIT FOR SCREENING MAMMOGRAM: Primary | ICD-10-CM

## 2023-12-15 RX ORDER — AMLODIPINE BESYLATE 5 MG/1
TABLET ORAL
Qty: 90 TABLET | Refills: 0 | Status: SHIPPED | OUTPATIENT
Start: 2023-12-15

## 2023-12-27 RX ORDER — IRBESARTAN 300 MG/1
TABLET ORAL
Qty: 90 TABLET | Refills: 0 | Status: SHIPPED | OUTPATIENT
Start: 2023-12-27

## 2023-12-27 RX ORDER — ISOSORBIDE MONONITRATE 20 MG/1
TABLET ORAL
Qty: 180 TABLET | Refills: 0 | Status: SHIPPED | OUTPATIENT
Start: 2023-12-27

## 2023-12-27 RX ORDER — CARVEDILOL 6.25 MG/1
TABLET ORAL
Qty: 180 TABLET | Refills: 0 | Status: SHIPPED | OUTPATIENT
Start: 2023-12-27

## 2024-01-04 ENCOUNTER — HOSPITAL ENCOUNTER (OUTPATIENT)
Dept: MAMMOGRAPHY | Facility: HOSPITAL | Age: 79
Discharge: HOME | End: 2024-01-04
Admitting: FAMILY MEDICINE
Payer: MEDICARE

## 2024-01-04 DIAGNOSIS — Z12.31 VISIT FOR SCREENING MAMMOGRAM: ICD-10-CM

## 2024-01-04 PROCEDURE — 77063 BREAST TOMOSYNTHESIS BI: CPT

## 2024-01-04 PROCEDURE — 77067 SCR MAMMO BI INCL CAD: CPT

## 2024-03-01 ENCOUNTER — OFFICE VISIT (OUTPATIENT)
Dept: ORTHOPEDIC SURGERY | Facility: CLINIC | Age: 79
End: 2024-03-01
Payer: MEDICARE

## 2024-03-01 VITALS — WEIGHT: 153 LBS | BODY MASS INDEX: 25.49 KG/M2 | HEIGHT: 65 IN

## 2024-03-01 DIAGNOSIS — M54.16 LUMBAR RADICULOPATHY: ICD-10-CM

## 2024-03-01 DIAGNOSIS — M70.61 TROCHANTERIC BURSITIS OF RIGHT HIP: ICD-10-CM

## 2024-03-01 DIAGNOSIS — Z96.641 STATUS POST TOTAL HIP REPLACEMENT, RIGHT: Primary | ICD-10-CM

## 2024-03-01 DIAGNOSIS — S32.010A COMPRESSION FRACTURE OF L1 VERTEBRA, INITIAL ENCOUNTER: ICD-10-CM

## 2024-03-01 RX ORDER — TRIAMCINOLONE ACETONIDE 40 MG/ML
80 INJECTION, SUSPENSION INTRA-ARTICULAR; INTRAMUSCULAR
Status: COMPLETED | OUTPATIENT
Start: 2024-03-01 | End: 2024-03-01

## 2024-03-01 RX ORDER — LIDOCAINE HYDROCHLORIDE 10 MG/ML
4 INJECTION, SOLUTION EPIDURAL; INFILTRATION; INTRACAUDAL; PERINEURAL
Status: COMPLETED | OUTPATIENT
Start: 2024-03-01 | End: 2024-03-01

## 2024-03-01 RX ADMIN — TRIAMCINOLONE ACETONIDE 80 MG: 40 INJECTION, SUSPENSION INTRA-ARTICULAR; INTRAMUSCULAR at 10:13

## 2024-03-01 RX ADMIN — LIDOCAINE HYDROCHLORIDE 4 ML: 10 INJECTION, SOLUTION EPIDURAL; INFILTRATION; INTRACAUDAL; PERINEURAL at 10:13

## 2024-03-01 NOTE — PROGRESS NOTES
Subjective:     Patient ID: Ashlie Levi is a 78 y.o. female.    Chief Complaint:    History of Present Illness  Ashlie Levi returns to clinic today for evaluation of right posterior buttock pain and right lateral hip pain.  The patient states that all the anterior groin pain that she had prior to surgery has resolved but she still having lateral hip pain now and continued posterior buttock pain.  Back in November I ordered a MRI of her lumbar spine which demonstrated severe L3-4 right-sided foraminal stenosis and she was referred to pain management.  They went home and read some information online the patient got scared and then canceled her pain management appointment.  She returns to me today for lumbar radiculopathy.     Social History     Occupational History    Not on file   Tobacco Use    Smoking status: Former     Packs/day: 1.00     Years: 50.00     Additional pack years: 0.00     Total pack years: 50.00     Types: Cigarettes     Quit date: 10/13/2006     Years since quittin.3     Passive exposure: Never    Smokeless tobacco: Never    Tobacco comments:     quit    Vaping Use    Vaping Use: Never used   Substance and Sexual Activity    Alcohol use: Yes     Comment: occasional    Drug use: No    Sexual activity: Not Currently     Partners: Male     Birth control/protection: Surgical, Post-menopausal     Comment: GREG with OC      Past Medical History:   Diagnosis Date    Abnormal electrocardiogram     2006 heart attack    Arthritis     Chest discomfort     Colon polyp     Coronary artery disease     Disease of thyroid gland     Diverticulitis     Elevated cholesterol     GERD (gastroesophageal reflux disease)     Hyperlipidemia     Hypertension      Past Surgical History:   Procedure Laterality Date    APPENDECTOMY      CARDIAC SURGERY      3 stents    CHOLECYSTECTOMY      COLONOSCOPY N/A 2016    Procedure: COLONOSCOPY;  Surgeon: Giovani Avelar MD;  Location: [facility];  Service:      "COLONOSCOPY N/A 6/13/2019    Procedure: Colonoscopy with possible biopsy or polypectomy;  Surgeon: Alfreda Soler DO;  Location: [facility];  Service: Gastroenterology    COLONOSCOPY N/A 5/18/2021    Procedure: Colonoscopy with polypectomy, biopsy;  Surgeon: Alfreda Soler DO;  Location:  [facility];  Service: Gastroenterology;  Laterality: N/A;  diverticulosis  cecal biopsy  right colon polyp  left colon polyps x2    CORONARY STENT PLACEMENT      ENDOSCOPY N/A 11/21/2017    Procedure: ESOPHAGOGASTRODUODENOSCOPY with CARIN test ;  Surgeon: Alfreda Soler DO;  Location: [facility];  Service:     ENDOSCOPY N/A 6/13/2019    Procedure: Esophagogastroduodenoscopy with possible biopsy, polypectomy, dilation;  Surgeon: Alfreda Soler DO;  Location:  [facility];  Service: Gastroenterology    ENDOSCOPY N/A 5/18/2021    Procedure: Esophagogastroduodenoscopy with biopsies;  Surgeon: Alfreda Soler DO;  Location:  [facility];  Service: Gastroenterology;  Laterality: N/A;  CARIN test  gastritis  duodenitis    EYE SURGERY      FOOT GANGLION EXCISION Left     HEMORROIDECTOMY      HERNIA REPAIR      HYSTERECTOMY      GREG with OC age 30 for DUB    OOPHORECTOMY      dx lsc , interval USO    TOTAL HIP ARTHROPLASTY Right 8/23/2023    Procedure: TOTAL HIP ARTHROPLASTY ANTERIOR WITH HANA TABLE;  Surgeon: Aidan Wallace MD;  Location:  [facility];  Service: Orthopedics;  Laterality: Right;       Family History   Problem Relation Age of Onset    Hypertension Mother     Stroke Mother     Diabetes Father     Hypertension Father     Heart disease Father     Stroke Father     Heart disease Brother     Colon cancer Brother         dx > 50    Breast cancer Neg Hx     Ovarian cancer Neg Hx     Deep vein thrombosis Neg Hx     Pulmonary embolism Neg Hx                  Objective:  Vitals:    03/01/24 0950   Weight: 69.4 kg (153 lb)   Height: 163.8 cm (64.5\")         03/01/24  0950   Weight: 69.4 kg (153 lb)     Body mass index is " 25.86 kg/m².        Right Hip Exam     Tenderness   The patient is experiencing tenderness in the posterior and greater trochanter.    Range of Motion   Flexion:  normal   External rotation:  normal   Internal rotation:  normal     Muscle Strength   Flexion: 5/5     Tests   LAKSHMI: negative  Fadir:  Negative FADIR test    Other   Erythema: absent  Scars: present  Sensation: normal  Pulse: present               Imaging: 3 views of the right hip and pelvis was ordered and reviewed by myself in the office today  Indication: right hip replacement  Findings: X-rays demonstrate a right total hip arthroplasty with implants in expected position. Leg lengths and offset appear clinically equal based off radiographic markers.  No signs of fracture, dislocation, subluxation, subsidence, or migration.  Comparative studies: last visit radiographs      Assessment:        1. Status post total hip replacement, right    2. Lumbar radiculopathy    3. Compression fracture of L1 vertebra, initial encounter    4. Trochanteric bursitis of right hip           Plan:      Large Joint Arthrocentesis: R greater trochanteric bursa  Date/Time: 3/1/2024 10:13 AM  Consent given by: patient  Site marked: site marked  Timeout: Immediately prior to procedure a time out was called to verify the correct patient, procedure, equipment, support staff and site/side marked as required   Supporting Documentation  Indications: pain   Procedure Details  Location: hip - R greater trochanteric bursa  Preparation: Patient was prepped and draped in the usual sterile fashion (DOUBLE PREPPED)  Needle gauge: 22g spinal.  Approach: lateral  Medications administered: 4 mL lidocaine PF 1% 1 %; 80 mg triamcinolone acetonide 40 MG/ML  Patient tolerance: patient tolerated the procedure well with no immediate complications          Discussed treatment options at length with patient at today's visit.  I discussed with the patient and reviewed her MRI with her again discussed  with her that the posterior buttock pain radiating down the back of her leg again is lumbar radiculopathy.  I would like her to follow-up with pain management or neurosurgery.  She states that she got scared after reading some information online about pain management and then canceled her appointment after last visit.  A new referral was placed as we are set to get a new pain management clinic in [facility].  As for her right lateral hip pain I believe this is a component of trochanteric bursitis likely due to altered gait mechanics from her lumbar radiculopathy. I discussed with the patient that the mainstays of conservative treatment for hip bursitis include physical therapy, nonsteroidal anti-inflammatories, injections, activity modification, and home exercises.  The patient states that they  would like to try intrabursal corticosteroid injection.  I would like her to follow-up with pain management this month for possible lumbar epidural injections.  I will plan to see the patient back in August 2024 for 1 year Follow-up.  Follow up: August 2024 if standing AP pelvis x-ray      Ashlie Levi was in agreement with plan and had all questions answered.     Medications:  No orders of the defined types were placed in this encounter.      Followup:  No follow-ups on file.    Diagnoses and all orders for this visit:    1. Status post total hip replacement, right (Primary)  -     XR Hip With or Without Pelvis 2 - 3 View Right  -     Ambulatory Referral to Pain Management    2. Lumbar radiculopathy  -     Ambulatory Referral to Pain Management    3. Compression fracture of L1 vertebra, initial encounter    4. Trochanteric bursitis of right hip    Other orders  -     Large Joint Arthrocentesis: R greater trochanteric bursa          Dictated utilizing Dragon dictation

## 2024-03-12 ENCOUNTER — OFFICE VISIT (OUTPATIENT)
Age: 79
End: 2024-03-12
Payer: MEDICARE

## 2024-03-12 ENCOUNTER — PREP FOR SURGERY (OUTPATIENT)
Dept: SURGERY | Facility: SURGERY CENTER | Age: 79
End: 2024-03-12
Payer: MEDICARE

## 2024-03-12 VITALS
SYSTOLIC BLOOD PRESSURE: 90 MMHG | HEART RATE: 65 BPM | HEIGHT: 65 IN | OXYGEN SATURATION: 95 % | TEMPERATURE: 97.1 F | BODY MASS INDEX: 25.76 KG/M2 | DIASTOLIC BLOOD PRESSURE: 52 MMHG | WEIGHT: 154.6 LBS

## 2024-03-12 DIAGNOSIS — M54.16 LUMBAR RADICULOPATHY: ICD-10-CM

## 2024-03-12 DIAGNOSIS — M48.062 LUMBAR STENOSIS WITH NEUROGENIC CLAUDICATION: ICD-10-CM

## 2024-03-12 DIAGNOSIS — G57.03 PIRIFORMIS SYNDROME OF BOTH SIDES: Primary | ICD-10-CM

## 2024-03-12 DIAGNOSIS — M47.816 FACET ARTHRITIS OF LUMBAR REGION: ICD-10-CM

## 2024-03-12 DIAGNOSIS — M51.36 DDD (DEGENERATIVE DISC DISEASE), LUMBAR: ICD-10-CM

## 2024-03-12 PROCEDURE — 99214 OFFICE O/P EST MOD 30 MIN: CPT

## 2024-03-12 PROCEDURE — 3074F SYST BP LT 130 MM HG: CPT

## 2024-03-12 PROCEDURE — 1125F AMNT PAIN NOTED PAIN PRSNT: CPT

## 2024-03-12 PROCEDURE — 1160F RVW MEDS BY RX/DR IN RCRD: CPT

## 2024-03-12 PROCEDURE — 3078F DIAST BP <80 MM HG: CPT

## 2024-03-12 PROCEDURE — 1159F MED LIST DOCD IN RCRD: CPT

## 2024-03-12 NOTE — PROGRESS NOTES
CHIEF COMPLAINT  Back pain    Subjective   Ashlie Levi is a 78 y.o. female.   She presents to the office for evaluation of . She was referred here by Dr. Aidan Wallace.     Today pain is 4/10VAS in severity. Her main complaint is buttock pain, right side worse then left. Describes this pain as a nearly continuous ache. Pain is worsened by lying flat, prolonged sitting or standing, and walking long distances. Pain improved with rest/reposition and mediations which includes OTC Tylenol PRN and CBD spray/ointment. She has completed PT with some improvement.     She was seen by ALIA Boogie with our clinic on 11/30/23 with similar complaints of pain. Bilateral piriformis injections were schedule with secondary plan being a LESI/TFLESI. Patient reports that she reviewed this procedure following this office visit and canceled due to being scared.     Right hip replacement with Dr. Wallace on 8/23/23. She had a right GTB injection on 3/1/24 with Dr. Wallace.     Past therapies:  Physical Therapy: Yes   Chiropractor: No  Massage Therapy: No  TENS: No  Neck or back surgery: No  Past pain management: Yes - saw ALIA with our clinic in 2023    Back Pain  This is a chronic problem. The current episode started more than 1 year ago. The problem occurs constantly. The problem is unchanged. The pain is present in the gluteal and lumbar spine. The quality of the pain is described as aching. The pain radiates to the left buttock and right buttock. The pain is at a severity of 4/10. The symptoms are aggravated by standing, sitting, position and lying down (walking long distances,). Associated symptoms include headaches and weakness. Pertinent negatives include no abdominal pain, chest pain, dysuria, fever or numbness. Treatments tried: PT, Tylenol. The treatment provided mild relief.     PEG Assessment   What number best describes your pain on average in the past week?5  What number best describes how, during the past  week, pain has interfered with your enjoyment of life?5  What number best describes how, during the past week, pain has interfered with your general activity?  5      Current Outpatient Medications:     acetaminophen (TYLENOL) 500 MG tablet, Take 1 tablet by mouth Every 6 (Six) Hours As Needed for Mild Pain., Disp: , Rfl:     amLODIPine (NORVASC) 5 MG tablet, Take 1 tablet by mouth once daily, Disp: 90 tablet, Rfl: 0    aspirin 81 MG EC tablet, Take 1 tablet by mouth 2 (Two) Times a Day. (Patient taking differently: Take 1 tablet by mouth Daily.), Disp: 60 tablet, Rfl: 0    atorvastatin (LIPITOR) 20 MG tablet, Take 1 tablet by mouth Every Night., Disp: , Rfl:     carvedilol (COREG) 6.25 MG tablet, Take 1 tablet by mouth twice daily, Disp: 180 tablet, Rfl: 0    citalopram (CeleXA) 40 MG tablet, Take 1 tablet by mouth Daily., Disp: , Rfl:     irbesartan (AVAPRO) 300 MG tablet, TAKE 1 TABLET BY MOUTH ONCE DAILY AT NIGHT, Disp: 90 tablet, Rfl: 0    isosorbide mononitrate (ISMO,MONOKET) 20 MG tablet, Take 1 tablet by mouth twice daily, Disp: 180 tablet, Rfl: 0    levothyroxine (SYNTHROID, LEVOTHROID) 100 MCG tablet, Take 1 tablet by mouth Daily., Disp: , Rfl:     nitroglycerin (NITROSTAT) 0.4 MG SL tablet, Place 1 tablet under the tongue Every 5 (Five) Minutes As Needed for Chest Pain. Take no more than 3 doses in 15 minutes and call EMS., Disp: 20 tablet, Rfl: 0    omeprazole (priLOSEC) 40 MG capsule, Take 1 capsule by mouth once daily, Disp: 90 capsule, Rfl: 4    spironolactone (ALDACTONE) 25 MG tablet, Take 1 tablet by mouth once daily (Patient taking differently: Daily. Patient takes 0.5 tablet daily), Disp: 90 tablet, Rfl: 0    ARIPiprazole (ABILIFY) 5 MG tablet, Take 1 tablet by mouth Daily. (Patient not taking: Reported on 3/12/2024), Disp: , Rfl:     DULoxetine (CYMBALTA) 60 MG capsule, Take 1 capsule by mouth Daily. (Patient not taking: Reported on 3/12/2024), Disp: , Rfl:     HYDROcodone-acetaminophen (NORCO)  5-325 MG per tablet, Take 1 tablet by mouth Every 4 (Four) Hours As Needed for Severe Pain. (Patient not taking: Reported on 3/12/2024), Disp: 45 tablet, Rfl: 0    meloxicam (MOBIC) 7.5 MG tablet, Take 1 tablet by mouth Daily. (Patient not taking: Reported on 3/12/2024), Disp: 90 tablet, Rfl: 3    The following portions of the patient's history were reviewed and updated as appropriate: allergies, current medications, past family history, past medical history, past social history, past surgical history, and problem list.    REVIEW OF PERTINENT MEDICAL DATA  Reviewed office note from Dr. New Wallace from 3/1/2024.  Patient presents with complaints of right posterior buttock pain and right lateral hip pain.  She states that anterior groin pain that was present prior to her hip surgery on 8/23/2023 has resolved.  MRI of lumbar spine ordered November noted severe L3-L4 right-sided foraminal stenosis and she was referred to pain management.  He notes that patient went home and read some information online and got scared and canceled her procedure.  She returns today with continued complaints of lumbar radiculopathy.  A right greater trochanter bursa injection was performed during this office visit.  Dr. Wallace notes he would like patient to follow-up with pain management or neurosurgery.      Review of Systems   Constitutional:  Positive for chills and fatigue. Negative for activity change and fever.   HENT:  Positive for sore throat. Negative for congestion.    Eyes:  Negative for visual disturbance.   Respiratory:  Negative for chest tightness and shortness of breath.    Cardiovascular:  Negative for chest pain.   Gastrointestinal:  Negative for abdominal pain, constipation and diarrhea.   Genitourinary:  Negative for difficulty urinating, dyspareunia and dysuria.   Musculoskeletal:  Positive for back pain.   Neurological:  Positive for dizziness, weakness, light-headedness and headaches. Negative for numbness.  "  Psychiatric/Behavioral:  Positive for agitation and sleep disturbance. Negative for self-injury and suicidal ideas. The patient is nervous/anxious.      I have reviewed and confirmed the accuracy of the ROS as documented by the MA/LPN/RN ALIA Comer    Vitals:    03/12/24 1355   BP: 90/52   Pulse: 65   Temp: 97.1 °F (36.2 °C)   SpO2: 95%   Weight: 70.1 kg (154 lb 9.6 oz)   Height: 163.8 cm (64.5\")   PainSc:   4   PainLoc: Buttocks     Objective     Physical Exam  Constitutional:       Appearance: Normal appearance.   HENT:      Head: Normocephalic.   Cardiovascular:      Rate and Rhythm: Normal rate and regular rhythm.   Pulmonary:      Effort: Pulmonary effort is normal.      Breath sounds: Normal breath sounds.   Musculoskeletal:         General: Normal range of motion.      Cervical back: Normal range of motion.      Lumbar back: Tenderness present.      Comments: Bilateral piriformis tenderness   Skin:     General: Skin is warm and dry.      Capillary Refill: Capillary refill takes less than 2 seconds.   Neurological:      General: No focal deficit present.      Mental Status: She is alert and oriented to person, place, and time.   Psychiatric:         Mood and Affect: Mood normal.         Behavior: Behavior normal.         Thought Content: Thought content normal.         Cognition and Memory: Cognition normal.       Assessment & Plan   Diagnoses and all orders for this visit:    1. Piriformis syndrome of both sides (Primary)    2. DDD (degenerative disc disease), lumbar    3. Facet arthritis of lumbar region    4. Lumbar stenosis with neurogenic claudication      --- Ashlielaura Levi reports a pain score of 4.  Given her pain assessment as noted, treatment options were discussed and the following options were decided upon as a follow-up plan to address the patient's pain: continuation of current treatment plan for pain.    --- Bilateral Piriformis Injections  Reviewed the procedure at length with the " patient.  Included in the review was expectations, complications, risk and benefits.The procedure was described in detail and the risks, benefits and alternatives were discussed with the patient (including but not limited to: bleeding, infection, nerve damage, worsening of pain, inability to perform injection, paralysis, seizures, coma, no pain relief and death) who agreed to proceed.  Discussed the potential for sedation if warranted/wanted.  The procedure will plan to be performed at [facility] with fluoroscopic guidance(unless ultrasound is indicated) and could potentially have steroids and contrast dye used. Questions were answered and in a way the patient could understand.  Patient verbalized understanding and wishes to proceed.  This intervention will be ordered.  Discussed with patient that all procedures are part of a multimodal plan of care and include either formal PT or a home exercise program.  Patient has no evidence of coagulopathy or current infection.  --- Patient was previously scheduled with Dr. Puentes but canceled her procedure.  Patient lives in [place] and request a physician that is closer to this location for further injections.   --- May consider TFLESI vs LESI if no relief from procedure  --- Follow-up for procedure      Pain / Disability Scale  The scale used for measurement of pain and/or disability for this patient was the [place] back pain disability scale.  The score was 54 on 03/12/2024    FABIAN REPORT  As the clinician, I personally reviewed the FABIAN from 3/12/24 while the patient was in the office today.    Dictated utilizing Dragon dictation.

## 2024-03-12 NOTE — PATIENT INSTRUCTIONS
Reviewed the procedure at length with the patient.  Included in the review was expectations, complications, risk and benefits.The procedure was described in detail and the risks, benefits and alternatives were discussed with the patient (including but not limited to: bleeding, infection, nerve damage, worsening of pain, inability to perform injection, paralysis, seizures, coma, no pain relief and death) who agreed to proceed.  Discussed the potential for sedation if warranted/wanted.  The procedure will plan to be performed at [facility] with fluoroscopic guidance(unless ultrasound is indicated) and could potentially have steroids and contrast dye used. Questions were answered and in a way the patient could understand.  Patient verbalized understanding and wishes to proceed.  This intervention will be ordered.  Discussed with patient that all procedures are part of a multimodal plan of care and include either formal PT or a home exercise program.  Patient has no evidence of coagulopathy or current infection.

## 2024-03-15 RX ORDER — AMLODIPINE BESYLATE 5 MG/1
TABLET ORAL
Qty: 90 TABLET | Refills: 0 | Status: SHIPPED | OUTPATIENT
Start: 2024-03-15

## 2024-03-22 NOTE — PATIENT INSTRUCTIONS
[facility] - Post-procedure Instructions          --  While there are no absolute restrictions, it is recommended that you do not perform strenuous activity today. In the morning, you may resume your level of activity as before your block.    --  If you have a band-aid at your injection site, please remove it later today. Observe the area for any redness, swelling, pus-like drainage, or a temperature over 101°. If any of these symptoms occur, please call your doctor at [phone]. If after office hours, leave a message and the on-call provider will return your call.    --  Ice may be applied to your injection site. It is recommended you avoid direct heat (heating pad; hot tub) for 1-2 days.    --  Call [facility] at [phone] if you experience persistent headache, persistent bleeding from the injection site, or severe pain not relieved by heat or oral medication.    --  Do not make important decisions today.    --  Due to the effects of the block and/or the I.V. Sedation, DO NOT drive or operate hazardous machinery for 12 hours.  Local anesthetics may cause numbness after procedure and precautions must be taken with regards to operating equipment as well as with walking, even if ambulating with assistance of another person or with an assistive device.    --  Do not drink alcohol for 12 hours.    -- You may return to work tomorrow, or as directed by your referring doctor.    --  Occasionally you may notice a slight increase in your pain after the procedure. This should start to improve within the next 24-48 hours. Radiofrequency ablation procedure pain may last 3-4 weeks.    --  It may take as long as 3-4 days before you notice a gradual improvement in your pain and/or other symptoms.    -- You may continue to take your prescribed pain medication as needed.    --  Some normal possible side effects of steroid use could include fluid retention, increased blood sugar, dull headache,  increased sweating, increased appetite, mood swings and flushing.    --  Diabetics are recommended to watch their blood glucose level closely for 24-48 hours after the injection.    --  Must stay in PACU for 20 min upon arrival and prove no leg weakness before being discharged.    --  IN THE EVENT OF A LIFE THREATENING EMERGENCY, (CHEST PAIN, BREATHING DIFFICULTIES, PARALYSIS…) YOU SHOULD GO TO YOUR NEAREST EMERGENCY ROOM.    --  You should be contacted by our office within 2-3 days to schedule follow up or next appointment date.  If not contacted within 7 days, please call the office at [phone]

## 2024-03-25 ENCOUNTER — PREP FOR SURGERY (OUTPATIENT)
Dept: OTHER | Facility: HOSPITAL | Age: 79
End: 2024-03-25
Payer: MEDICARE

## 2024-03-26 ENCOUNTER — HOSPITAL ENCOUNTER (OUTPATIENT)
Dept: GENERAL RADIOLOGY | Facility: HOSPITAL | Age: 79
Discharge: HOME | End: 2024-03-26
Payer: MEDICARE

## 2024-03-26 ENCOUNTER — HOSPITAL ENCOUNTER (OUTPATIENT)
Dept: PAIN MEDICINE | Facility: HOSPITAL | Age: 79
Discharge: HOME | End: 2024-03-26
Payer: MEDICARE

## 2024-03-26 VITALS
RESPIRATION RATE: 16 BRPM | SYSTOLIC BLOOD PRESSURE: 149 MMHG | WEIGHT: 153 LBS | DIASTOLIC BLOOD PRESSURE: 70 MMHG | BODY MASS INDEX: 30.04 KG/M2 | OXYGEN SATURATION: 96 % | HEIGHT: 60 IN | HEART RATE: 58 BPM | TEMPERATURE: 97.2 F

## 2024-03-26 DIAGNOSIS — G57.03 PIRIFORMIS SYNDROME OF BOTH SIDES: ICD-10-CM

## 2024-03-26 DIAGNOSIS — M54.16 LUMBAR RADICULOPATHY: ICD-10-CM

## 2024-03-26 DIAGNOSIS — M48.062 LUMBAR STENOSIS WITH NEUROGENIC CLAUDICATION: ICD-10-CM

## 2024-03-26 PROCEDURE — C1755 CATHETER, INTRASPINAL: HCPCS

## 2024-03-26 PROCEDURE — 77002 NEEDLE LOCALIZATION BY XRAY: CPT

## 2024-03-26 PROCEDURE — 25010000002 BUPIVACAINE 0.5 % SOLUTION: Performed by: ANESTHESIOLOGY

## 2024-03-26 PROCEDURE — 25010000002 DEXAMETHASONE SODIUM PHOSPHATE 10 MG/ML SOLUTION: Performed by: ANESTHESIOLOGY

## 2024-03-26 RX ORDER — BUPIVACAINE HYDROCHLORIDE 5 MG/ML
10 INJECTION, SOLUTION PERINEURAL ONCE
Status: COMPLETED | OUTPATIENT
Start: 2024-03-26 | End: 2024-03-26

## 2024-03-26 RX ORDER — LIDOCAINE HYDROCHLORIDE 10 MG/ML
5 INJECTION, SOLUTION EPIDURAL; INFILTRATION; INTRACAUDAL; PERINEURAL ONCE
Status: COMPLETED | OUTPATIENT
Start: 2024-03-26 | End: 2024-03-26

## 2024-03-26 RX ORDER — DEXAMETHASONE SODIUM PHOSPHATE 10 MG/ML
10 INJECTION, SOLUTION INTRAMUSCULAR; INTRAVENOUS ONCE
Status: COMPLETED | OUTPATIENT
Start: 2024-03-26 | End: 2024-03-26

## 2024-03-26 RX ADMIN — LIDOCAINE HYDROCHLORIDE 5 ML: 10 INJECTION, SOLUTION EPIDURAL; INFILTRATION; INTRACAUDAL; PERINEURAL at 11:52

## 2024-03-26 RX ADMIN — BUPIVACAINE HYDROCHLORIDE 10 ML: 5 INJECTION, SOLUTION PERINEURAL at 11:52

## 2024-03-26 RX ADMIN — DEXAMETHASONE SODIUM PHOSPHATE 10 MG: 10 INJECTION, SOLUTION INTRAMUSCULAR; INTRAVENOUS at 11:52

## 2024-03-26 NOTE — H&P
Ashlie Levi is a 78 y.o.  female who returns today for a scheduled for bilateral piriformis injections.  The previous clinic note has been reviewed.  There has been   no change in the location or quality of the patient's pain.      Planned Procedure: Bilateral piriformis injection    Vitals:    03/26/24 1143   BP: 129/74   Pulse: 62   Resp: 16   Temp: 98.8 °F (37.1 °C)   SpO2: 96%       The risks and benefits of the procedure have been discussed with the patient who has elected to proceed.  There are no contraindications to the procedure at this time.  Appropriate consent forms have been signed.  All questions regarding the planned procedure have been addressed.  Please see the separate documentation for details of the interventional procedure.

## 2024-03-26 NOTE — PROCEDURES
Procedures    BILATERAL Piriformis Injection  [facility]      PREOPERATIVE DIAGNOSIS:   Piriformis Syndrome, Myofasciitis    POSTOPERATIVE DIAGNOSIS:  Piriformis Syndrome, Myofasciitis     PROCEDURE:  Piriformis Muscle Injection, with fluoroscopic guidance [facility] [id number]    PRE-PROCEDURE DISCUSSION WITH PATIENT:    Risks and complications were discussed with the patient prior to starting the procedure and informed consent was obtained.  We discussed various topics including but not limited to bleeding, infection, injury, postprocedural site soreness, painful flareup, worsening of clinical picture, paralysis, coma, and death.     SURGEON:  Hui Sauer MD    REASON FOR PROCEDURE:    Tenderness to palpation over the piriformis muscle with radiating pain into the posterior lower extremity    SEDATION:  No sedation was used for this procedure  ANESTHETIC AGENT:  0.25% Bupivacaine  STEROID AGENT:  10mg Dexamethasone    DESCRIPTON OF PROCEDURE:  After obtaining informed consent, IV access was not obtained in the preoperative area.  The patient was transported to the operative suite and placed in the prone position with a pillow under the pelvic area. EKG, blood pressure, and pulse oximeter were monitored. The lumbosacral area was prepped with Chloraprep and draped in a sterile fashion.     Attention was turned to the right piriformis muscle.  The femoral head on the right side was identified in an AP fluoroscopic image.  The skin and subcutaneous tissue superior to that target was anesthetized with 1% lidocaine. A 22-gauge spinal needle was then advanced percutaneously through the anesthetized skin tract under fluoroscopic guidance into the belly of the piriformis muscle.  After negative aspiration for blood a volume of 2mL of air was injected, producing a pneumogram of the piriformis muscle.  Subsequently, a volume of 5mL consisting of 5mg Dexamethasone with 0.25% Bupivacaine was injected  without resistance.    The same procedure was then performed on the contralateral side in the exact same fashion.      Vital signs remained stable.  The onset of analgesia was noted.      ESTIMATED BLOOD LOSS:  minimal  SPECIMENS:  None  COMPLICATIONS:  No complications were noted.    TOLERANCE & DISCHARGE CONDITION:    The patient tolerated the procedure well.  The patient was transported to the recovery area without difficulties.  The patient was discharged to home under the care of family in stable and satisfactory condition.    PLAN OF CARE:  The patient was given our standard instruction sheet and will resume all medications as per the medication reconciliation sheet.  The patient will Return to clinic 3-4 wks.  The patient is instructed to keep an account of pain relief after the procedure.

## 2024-03-26 NOTE — NURSING NOTE
Patient discharged in stable condition. Instructions reviewed, verbalized understanding. Pts , [place] is her  home today. VSS. Ambulatory with steady gait at discharge.

## 2024-03-27 RX ORDER — CARVEDILOL 6.25 MG/1
TABLET ORAL
Qty: 180 TABLET | Refills: 0 | Status: SHIPPED | OUTPATIENT
Start: 2024-03-27

## 2024-03-27 RX ORDER — ISOSORBIDE MONONITRATE 20 MG/1
TABLET ORAL
Qty: 180 TABLET | Refills: 0 | Status: SHIPPED | OUTPATIENT
Start: 2024-03-27

## 2024-03-27 RX ORDER — IRBESARTAN 300 MG/1
TABLET ORAL
Qty: 90 TABLET | Refills: 0 | Status: SHIPPED | OUTPATIENT
Start: 2024-03-27

## 2024-03-28 ENCOUNTER — TELEPHONE (OUTPATIENT)
Dept: PAIN MEDICINE | Facility: HOSPITAL | Age: 79
End: 2024-03-28

## 2024-03-28 NOTE — TELEPHONE ENCOUNTER
Post up pain injection follow up:  Patient stated no pain with activity or rest stated received good care and is very happy.

## 2024-04-16 ENCOUNTER — PREP FOR SURGERY (OUTPATIENT)
Dept: SURGERY | Facility: SURGERY CENTER | Age: 79
End: 2024-04-16
Payer: MEDICARE

## 2024-04-16 ENCOUNTER — OFFICE VISIT (OUTPATIENT)
Age: 79
End: 2024-04-16
Payer: MEDICARE

## 2024-04-16 VITALS
WEIGHT: 157.6 LBS | TEMPERATURE: 97.8 F | HEART RATE: 58 BPM | HEIGHT: 60 IN | DIASTOLIC BLOOD PRESSURE: 58 MMHG | BODY MASS INDEX: 30.94 KG/M2 | SYSTOLIC BLOOD PRESSURE: 120 MMHG | OXYGEN SATURATION: 94 %

## 2024-04-16 DIAGNOSIS — M48.062 LUMBAR STENOSIS WITH NEUROGENIC CLAUDICATION: ICD-10-CM

## 2024-04-16 DIAGNOSIS — M54.16 LUMBAR RADICULOPATHY: Primary | ICD-10-CM

## 2024-04-16 DIAGNOSIS — M51.36 DDD (DEGENERATIVE DISC DISEASE), LUMBAR: ICD-10-CM

## 2024-04-16 DIAGNOSIS — M47.816 FACET ARTHRITIS OF LUMBAR REGION: ICD-10-CM

## 2024-04-16 PROCEDURE — 3074F SYST BP LT 130 MM HG: CPT

## 2024-04-16 PROCEDURE — 1160F RVW MEDS BY RX/DR IN RCRD: CPT

## 2024-04-16 PROCEDURE — 99214 OFFICE O/P EST MOD 30 MIN: CPT

## 2024-04-16 PROCEDURE — 1125F AMNT PAIN NOTED PAIN PRSNT: CPT

## 2024-04-16 PROCEDURE — 1159F MED LIST DOCD IN RCRD: CPT

## 2024-04-16 PROCEDURE — 3078F DIAST BP <80 MM HG: CPT

## 2024-04-16 RX ORDER — DIAZEPAM 5 MG/1
10 TABLET ORAL ONCE
OUTPATIENT
Start: 2024-04-16

## 2024-04-16 NOTE — PATIENT INSTRUCTIONS
---  Indications for epidural injection:  Plan is to proceed with epidural at the appropriate level.  If the patient receives significant pain reduction and improvement in function and the plan will be to repeat the epidural when the pain worsens.  If a second epidural provides at least 6 weeks of sustained improvement that includes both pain reduction and improvement in function then an epidural injection could be repeated once again at the same level.  This is a mutual decision between the clinician and the patient that includes discussions including risks and benefits in detail as well as alternative therapies.  Patient's questions were answered to their satisfaction and to their understanding.  ---   Discussed with the patient that sedation is optional for this procedure.  The sedation offered is called conscious sedation which is different from general anesthesia that is utilized in surgical procedures. The dosing of the sedation is determined by the physician and they will be monitored throughout the procedure. With conscious sedation it is possible to remember parts or all of the procedure, this is normal. They will need to have a  with them as driving is prohibited following conscious sedation.      NPO instructions for conscious sedation:  --- Do not eat 6 hours prior to the procedure.   --- Do not drink any dairy or citrus 4 hours prior to the procedure.   --- Do not drink anything, including clear liquids, 2 hours prior to procedure.      If the NPO instructions are not followed then the procedure may be performed without sedation or the procedure will need to be rescheduled.

## 2024-04-16 NOTE — PROGRESS NOTES
CHIEF COMPLAINT  Back pain    Subjective   Ashlie Levi is a 78 y.o. female  who presents to the office for follow-up of procedure.  She completed Bilateral Pirifomis injections on 3/26/24 performed by Dr. Sauer for management of buttock pain/piriformis syndrome. Patient reports 100% relief from the procedure x 2 days. Pain has returned to baseline.     Today pain is 3/10VAS in severity (severity in pain varies based on activity level). Pain is located in low back and radiates into bilateral groins and down right lateral/anterior thigh/leg stopping mid-calf. She contributes 75% of pain to her back and 25% to right lower extremity. Describes this pain as a nearly continuous ache. Pain is worsened by lying flat, prolonged sitting or standing, and walking long distances. Pain improved with rest/reposition and mediations which includes OTC Tylenol PRN and CBD spray/ointment. She has completed PT with some improvement.      She was seen by ALIA Boogie with our clinic on 11/30/23 with similar complaints of pain. Bilateral piriformis injections were schedule with secondary plan being a LESI/TFLESI. Patient reports that she reviewed this procedure following this office visit and canceled due to being scared.      Right hip replacement with Dr. Wallace on 8/23/23. She had a right GTB injection on 3/1/24 with Dr. Wallace.      Past therapies:  Physical Therapy: Yes   Chiropractor: No  Massage Therapy: No  TENS: No  Neck or back surgery: No  Past pain management: Yes - saw ALIA with our clinic in 2023    Back Pain  This is a chronic problem. The current episode started more than 1 year ago. The problem occurs constantly. The problem is unchanged. The pain is present in the gluteal and lumbar spine. The quality of the pain is described as aching. The pain radiates to the right thigh (right lateral/anterior thigh/leg stopping mid calf). The pain is at a severity of 3/10. The symptoms are aggravated by standing,  "sitting, position and lying down (walking long distances,). Associated symptoms include weakness. Pertinent negatives include no abdominal pain, chest pain, dysuria, fever, headaches or numbness. Treatments tried: PT, Tylenol, piriformis injections (no relie) The treatment provided mild relief.     PEG Assessment   What number best describes your pain on average in the past week?7  What number best describes how, during the past week, pain has interfered with your enjoyment of life?0  What number best describes how, during the past week, pain has interfered with your general activity?  0    Review of Pertinent Medical Data ---      The following portions of the patient's history were reviewed and updated as appropriate: allergies, current medications, past family history, past medical history, past social history, past surgical history, and problem list.    Review of Systems   Constitutional:  Positive for fatigue. Negative for activity change, chills and fever.   HENT:  Negative for congestion.    Eyes:  Negative for visual disturbance.   Respiratory:  Negative for chest tightness and shortness of breath.    Cardiovascular:  Negative for chest pain.   Gastrointestinal:  Positive for diarrhea. Negative for abdominal pain and constipation.   Genitourinary:  Negative for difficulty urinating, dyspareunia and dysuria.   Musculoskeletal:  Positive for back pain.   Neurological:  Positive for weakness and light-headedness. Negative for dizziness, numbness and headaches.   Psychiatric/Behavioral:  Positive for agitation and sleep disturbance. The patient is nervous/anxious.      I have reviewed and confirmed the accuracy of the ROS as documented by the MA/LPN/RN ALIA Comer     Vitals:    04/16/24 1238   BP: 120/58   Pulse: 58   Temp: 97.8 °F (36.6 °C)   SpO2: 94%   Weight: 71.5 kg (157 lb 9.6 oz)   Height: 152.4 cm (60\")   PainSc:   3   PainLoc: Back     Objective   Physical Exam  Constitutional:       " Appearance: Normal appearance.   HENT:      Head: Normocephalic.   Cardiovascular:      Rate and Rhythm: Normal rate and regular rhythm.   Pulmonary:      Effort: Pulmonary effort is normal.      Breath sounds: Normal breath sounds.   Musculoskeletal:         General: Normal range of motion.      Cervical back: Normal range of motion.      Lumbar back: Tenderness present. Decreased range of motion. Positive right straight leg raise test. Negative left straight leg raise test.   Skin:     General: Skin is warm and dry.      Capillary Refill: Capillary refill takes less than 2 seconds.   Neurological:      General: No focal deficit present.      Mental Status: She is alert and oriented to person, place, and time.   Psychiatric:         Mood and Affect: Mood normal.         Behavior: Behavior normal.         Thought Content: Thought content normal.         Cognition and Memory: Cognition normal.       Assessment & Plan   Diagnoses and all orders for this visit:    1. Lumbar radiculopathy (Primary)    2. Lumbar stenosis with neurogenic claudication    3. DDD (degenerative disc disease), lumbar    4. Facet arthritis of lumbar region      Ashlie Levi reports a pain score of 3.  Given her pain assessment as noted, treatment options were discussed and the following options were decided upon as a follow-up plan to address the patient's pain: continuation of current treatment plan for pain and steroid injections.    --- L4-L5 LESI vs L3-L4 LESI - please evaluate under fluro   ---  Indications for epidural injection:  Plan is to proceed with epidural at the appropriate level.  If the patient receives significant pain reduction and improvement in function and the plan will be to repeat the epidural when the pain worsens.  If a second epidural provides at least 6 weeks of sustained improvement that includes both pain reduction and improvement in function then an epidural injection could be repeated once again at the same level.   This is a mutual decision between the clinician and the patient that includes discussions including risks and benefits in detail as well as alternative therapies.  Patient's questions were answered to their satisfaction and to their understanding.  ---   Discussed with the patient that sedation is optional for this procedure.  The sedation offered is called conscious sedation which is different from general anesthesia that is utilized in surgical procedures. The dosing of the sedation is determined by the physician and they will be monitored throughout the procedure. With conscious sedation it is possible to remember parts or all of the procedure, this is normal. They will need to have a  with them as driving is prohibited following conscious sedation.      NPO instructions for conscious sedation:  --- Do not eat 6 hours prior to the procedure.   --- Do not drink any dairy or citrus 4 hours prior to the procedure.   --- Do not drink anything, including clear liquids, 2 hours prior to procedure.      If the NPO instructions are not followed then the procedure may be performed without sedation or the procedure will need to be rescheduled.    --- Follow-up for procedure      FABIAN REPORT  As the clinician, I personally reviewed the FABIAN from 4/16/24 while the patient was in the office today.    Dictated utilizing Dragon dictation.

## 2024-04-25 RX ORDER — DEXAMETHASONE SODIUM PHOSPHATE 10 MG/ML
10 INJECTION, SOLUTION INTRAMUSCULAR; INTRAVENOUS ONCE
OUTPATIENT
Start: 2024-04-25 | End: 2024-04-25

## 2024-04-25 RX ORDER — LIDOCAINE HYDROCHLORIDE 10 MG/ML
5 INJECTION, SOLUTION EPIDURAL; INFILTRATION; INTRACAUDAL; PERINEURAL ONCE
OUTPATIENT
Start: 2024-04-25 | End: 2024-04-25

## 2024-04-26 NOTE — DISCHARGE INSTRUCTIONS
[facility] - Post-procedure Instructions              --  While there are no absolute restrictions, it is recommended that you do not perform strenuous activity today. In the morning, you may resume your level of activity as before your block.    --  If you have a band-aid at your injection site, please remove it later today. Observe the area for any redness, swelling, pus-like drainage, or a temperature over 101°. If any of these symptoms occur, please call your doctor at [phone]. If after office hours, leave a message and the on-call provider will return your call.    --  Ice may be applied to your injection site. It is recommended you avoid direct heat (heating pad; hot tub) for 1-2 days.    --  Call [facility] at [phone] if you experience persistent headache, persistent bleeding from the injection site, or severe pain not relieved by heat or oral medication.    --  Do not make important decisions today.    --  Due to the effects of the block and/or the Sedation, DO NOT drive or operate hazardous machinery for 12 hours.  Local anesthetics may cause numbness after procedure and precautions must be taken with regards to operating equipment as well as with walking, even if ambulating with assistance of another person or with an assistive device.    --  Do not drink alcohol for 12 hours.    -- You may return to work tomorrow, or as directed by your referring doctor.    --  Occasionally you may notice a slight increase in your pain after the procedure. This should start to improve within the next 24-48 hours. Radiofrequency ablation procedure pain may last 3-4 weeks.    --  It may take as long as 3-4 days before you notice a gradual improvement in your pain and/or other symptoms.    -- You may continue to take your prescribed pain medication as needed.    --  Some normal possible side effects of steroid use could include fluid retention, increased blood sugar, dull headache, increased  sweating, increased appetite, mood swings and flushing.    --  Diabetics are recommended to watch their blood glucose level closely for 24-48 hours after the injection.    --  Must stay in PACU for 20 min upon arrival and prove no leg weakness before being discharged.    --  IN THE EVENT OF A LIFE THREATENING EMERGENCY, (CHEST PAIN, BREATHING DIFFICULTIES, PARALYSIS…) YOU SHOULD GO TO YOUR NEAREST EMERGENCY ROOM.    --  You should be contacted by our office within 2-3 days to schedule follow up or next appointment date (if not already scheduled).  If not contacted within 7 days, please call the office at [phone].

## 2024-04-30 ENCOUNTER — HOSPITAL ENCOUNTER (OUTPATIENT)
Dept: PAIN MEDICINE | Facility: HOSPITAL | Age: 79
Discharge: HOME | End: 2024-04-30
Payer: MEDICARE

## 2024-04-30 ENCOUNTER — HOSPITAL ENCOUNTER (OUTPATIENT)
Dept: GENERAL RADIOLOGY | Facility: HOSPITAL | Age: 79
Discharge: HOME | End: 2024-04-30
Payer: MEDICARE

## 2024-04-30 VITALS
RESPIRATION RATE: 17 BRPM | TEMPERATURE: 97.5 F | BODY MASS INDEX: 30.82 KG/M2 | OXYGEN SATURATION: 95 % | HEART RATE: 54 BPM | WEIGHT: 157 LBS | HEIGHT: 60 IN | DIASTOLIC BLOOD PRESSURE: 79 MMHG | SYSTOLIC BLOOD PRESSURE: 147 MMHG

## 2024-04-30 DIAGNOSIS — R52 PAIN: ICD-10-CM

## 2024-04-30 DIAGNOSIS — M51.36 DDD (DEGENERATIVE DISC DISEASE), LUMBAR: ICD-10-CM

## 2024-04-30 DIAGNOSIS — M54.16 LUMBAR RADICULOPATHY: ICD-10-CM

## 2024-04-30 DIAGNOSIS — M48.062 LUMBAR STENOSIS WITH NEUROGENIC CLAUDICATION: ICD-10-CM

## 2024-04-30 PROCEDURE — 25010000002 DEXAMETHASONE SODIUM PHOSPHATE 10 MG/ML SOLUTION: Performed by: ANESTHESIOLOGY

## 2024-04-30 PROCEDURE — C1755 CATHETER, INTRASPINAL: HCPCS

## 2024-04-30 PROCEDURE — A9270 NON-COVERED ITEM OR SERVICE: HCPCS | Performed by: ANESTHESIOLOGY

## 2024-04-30 PROCEDURE — 25010000002 BUPIVACAINE 0.5 % SOLUTION: Performed by: ANESTHESIOLOGY

## 2024-04-30 PROCEDURE — 63710000001 DIAZEPAM 5 MG TABLET: Performed by: ANESTHESIOLOGY

## 2024-04-30 PROCEDURE — 77002 NEEDLE LOCALIZATION BY XRAY: CPT

## 2024-04-30 PROCEDURE — 62323 NJX INTERLAMINAR LMBR/SAC: CPT | Performed by: ANESTHESIOLOGY

## 2024-04-30 PROCEDURE — 25510000001 IOPAMIDOL 61 % SOLUTION: Performed by: ANESTHESIOLOGY

## 2024-04-30 RX ORDER — DEXAMETHASONE SODIUM PHOSPHATE 10 MG/ML
10 INJECTION INTRAMUSCULAR; INTRAVENOUS ONCE
Qty: 1 ML | Refills: 0 | Status: COMPLETED | OUTPATIENT
Start: 2024-04-30 | End: 2024-04-30

## 2024-04-30 RX ORDER — DIAZEPAM 5 MG/1
10 TABLET ORAL ONCE
Status: COMPLETED | OUTPATIENT
Start: 2024-04-30 | End: 2024-04-30

## 2024-04-30 RX ORDER — BUPIVACAINE HYDROCHLORIDE 5 MG/ML
0.5 INJECTION, SOLUTION PERINEURAL ONCE
Status: COMPLETED | OUTPATIENT
Start: 2024-04-30 | End: 2024-04-30

## 2024-04-30 RX ORDER — LIDOCAINE HYDROCHLORIDE 10 MG/ML
5 INJECTION, SOLUTION EPIDURAL; INFILTRATION; INTRACAUDAL; PERINEURAL ONCE
Status: COMPLETED | OUTPATIENT
Start: 2024-04-30 | End: 2024-04-30

## 2024-04-30 RX ORDER — SODIUM CHLORIDE 9 MG/ML
3 INJECTION, SOLUTION INTRAMUSCULAR; INTRAVENOUS; SUBCUTANEOUS EVERY 12 HOURS SCHEDULED
Status: DISCONTINUED | OUTPATIENT
Start: 2024-04-30 | End: 2024-05-01 | Stop reason: HOSPADM

## 2024-04-30 RX ADMIN — IOPAMIDOL 3 ML: 612 INJECTION, SOLUTION INTRAVENOUS at 08:21

## 2024-04-30 RX ADMIN — LIDOCAINE HYDROCHLORIDE 5 ML: 10 INJECTION, SOLUTION EPIDURAL; INFILTRATION; INTRACAUDAL; PERINEURAL at 08:20

## 2024-04-30 RX ADMIN — SODIUM CHLORIDE 4 ML: 9 INJECTION, SOLUTION INTRAMUSCULAR; INTRAVENOUS; SUBCUTANEOUS at 08:21

## 2024-04-30 RX ADMIN — BUPIVACAINE HYDROCHLORIDE 0.5 ML: 5 INJECTION, SOLUTION PERINEURAL at 08:22

## 2024-04-30 RX ADMIN — DIAZEPAM 10 MG: 5 TABLET ORAL at 07:12

## 2024-04-30 RX ADMIN — DEXAMETHASONE SODIUM PHOSPHATE 10 MG: 10 INJECTION INTRAMUSCULAR; INTRAVENOUS at 08:27

## 2024-04-30 NOTE — H&P
Ashlie Levi is a 78 y.o.  female who returns today for a scheduled procedure.  The previous clinic note has been reviewed.  There has been no change in the location or quality of the patient's pain.  The pain is currently rated as 2/10 in severity.  The patient is alert at the time of exam and is emotionally appropriate without significant debilities that would disqualify from the procedure.     Planned Procedure: L4-5 Epidural steroid injection     Vitals:    04/30/24 0717   BP: 151/80   Pulse: 59   Resp: 16   Temp: 97.7 °F (36.5 °C)   SpO2: 96%       The risks and benefits of the procedure have been discussed with the patient who has elected to proceed.  There are no contraindications to the procedure at this time.  Appropriate consent forms have been signed.  All questions regarding the planned procedure have been addressed.  Please see the separate documentation for details of the interventional procedure.

## 2024-04-30 NOTE — PROCEDURES
Procedures    L4/L5 Interlaminar Lumbar Epidural Steroid Injection   [facility]    PREOPERATIVE DIAGNOSIS:   Lumbar Spinal Stenosis WITH Neurogenic Claudication and Lumbar Radiculopathy  POSTOPERATIVE DIAGNOSIS:  Same as preop diagnosis    PROCEDURE:   Lumbar Epidural Steroid Injection, Therapeutic Interlaminar Injection, with epidurogram, at  L4/L5 level    PRE-PROCEDURE DISCUSSION WITH PATIENT:    Risks and complications were discussed with the patient prior to starting the procedure and informed consent was obtained.  We discussed various topics including but not limited to bleeding, infection, injury, paralysis, nerve injury, dural puncture, coma, death, worsening of clinical picture, lack of pain relief, and postprocedural soreness.    SURGEON:  Hui Sauer MD    REASON FOR PROCEDURE:    Diagnostic injection at this level is needed    SEDATION:  Anxiolysis was used for this procedure which included PO Valium 10mg  ANESTHETIC:  Marcaine 0.25%  STEROID:   10mg dexamethasone    DESCRIPTON OF PROCEDURE:    After obtaining informed consent, I.V. was not started in the preop area.   The patient was taken to the operating room and placed in the prone position.  Heart rate, blood pressure, and pulse oximeter were monitored throughout, and sedation was provided as needed by the RN under my guidance. All pressure points were well padded.  The lumbar spine area was prepped with Chloraprep and draped in a sterile fashion.      AP fluoroscopic image was used to visualize the L4/L5 interspace.  The skin and subcutaneous tissue over the area was anesthetized with 1% Lidocaine.  An 18-Gauge Tuohy needle was then advanced through the anesthetized skin tract under fluoroscopic guidance in a coaxial view using a loss of resistance technique.  Lateral fluoroscopy was used to verify appropriate needle depth.  Once the needle tip was felt to be in the posterior epidural space, aspiration was noted to be negative for  blood or CSF.  A volume of 1mL of Isovue was then injected under live fluoroscopy in an AP view which produced good epidural spread with no evidence of loculation, vascular run-off or intrathecal spread.  Subsequently, a total volume of 5mL consisting of 10mg of dexamethasone, 0.5mL of 0.25% bupivcacaine and normal saline was injected without resistance.  The needle was removed intact.     ESTIMATED BLOOD LOSS:  <5 mL  SPECIMENS:  None    COMPLICATIONS:     No complications were noted., There was no indication of vascular uptake on live injection of contrast dye., and There was no indication of intrathecal uptake on live injection of contrast dye.    TOLERANCE & DISCHARGE CONDITION:    The patient tolerated the procedure well.  The patient was transported to the recovery area without difficulties.  The patient was discharged to home under the care of family in stable and satisfactory condition.    PLAN OF CARE:  The patient was given our standard instruction sheet.  The patient will Return to clinic 4-6 wks  The patient will resume all medications as per the medication reconciliation sheet.

## 2024-05-02 ENCOUNTER — TELEPHONE (OUTPATIENT)
Dept: PAIN MEDICINE | Facility: HOSPITAL | Age: 79
End: 2024-05-02

## 2024-05-02 NOTE — TELEPHONE ENCOUNTER
Post procedure lumbar epidural injection follow up: Spoke to patients  who stated she is doing very well with little pain.

## 2024-05-06 RX ORDER — ISOSORBIDE MONONITRATE 20 MG/1
TABLET ORAL
Qty: 180 TABLET | Refills: 0 | Status: SHIPPED | OUTPATIENT
Start: 2024-05-06

## 2024-05-24 RX ORDER — ISOSORBIDE MONONITRATE 30 MG/1
30 TABLET, EXTENDED RELEASE ORAL DAILY
Qty: 90 TABLET | Refills: 3 | Status: SHIPPED | OUTPATIENT
Start: 2024-05-24

## 2024-05-28 ENCOUNTER — OFFICE VISIT (OUTPATIENT)
Age: 79
End: 2024-05-28
Payer: MEDICARE

## 2024-05-28 ENCOUNTER — PREP FOR SURGERY (OUTPATIENT)
Dept: SURGERY | Facility: SURGERY CENTER | Age: 79
End: 2024-05-28
Payer: MEDICARE

## 2024-05-28 VITALS
SYSTOLIC BLOOD PRESSURE: 138 MMHG | BODY MASS INDEX: 31.06 KG/M2 | DIASTOLIC BLOOD PRESSURE: 64 MMHG | OXYGEN SATURATION: 98 % | HEIGHT: 60 IN | TEMPERATURE: 97.3 F | WEIGHT: 158.2 LBS | HEART RATE: 62 BPM

## 2024-05-28 DIAGNOSIS — M51.36 DDD (DEGENERATIVE DISC DISEASE), LUMBAR: ICD-10-CM

## 2024-05-28 DIAGNOSIS — G57.03 PIRIFORMIS SYNDROME OF BOTH SIDES: ICD-10-CM

## 2024-05-28 DIAGNOSIS — M54.16 LUMBAR RADICULOPATHY: Primary | ICD-10-CM

## 2024-05-28 DIAGNOSIS — M51.36 DDD (DEGENERATIVE DISC DISEASE), LUMBAR: Primary | ICD-10-CM

## 2024-05-28 DIAGNOSIS — M47.816 FACET ARTHRITIS OF LUMBAR REGION: ICD-10-CM

## 2024-05-28 DIAGNOSIS — M48.062 LUMBAR STENOSIS WITH NEUROGENIC CLAUDICATION: ICD-10-CM

## 2024-05-28 PROCEDURE — 3078F DIAST BP <80 MM HG: CPT

## 2024-05-28 PROCEDURE — 1159F MED LIST DOCD IN RCRD: CPT

## 2024-05-28 PROCEDURE — 3075F SYST BP GE 130 - 139MM HG: CPT

## 2024-05-28 PROCEDURE — 1160F RVW MEDS BY RX/DR IN RCRD: CPT

## 2024-05-28 PROCEDURE — 1125F AMNT PAIN NOTED PAIN PRSNT: CPT

## 2024-05-28 PROCEDURE — 99214 OFFICE O/P EST MOD 30 MIN: CPT

## 2024-05-28 NOTE — PROGRESS NOTES
CHIEF COMPLAINT  Back pain    Subjective   Ashlie Levi is a 78 y.o. female  who presents to the office for follow-up of procedure.  She completed a L4/L5 LESI on 4/30/24 performed by Dr. Sauer for management of low back and leg pain. Patient reports 100% relief from the procedure x 1.5 weeks.    Today pain is 4/10VAS in severity (severity in pain varies based on activity level). Pain is located in low back and radiates into bilateral hips.  Describes this pain as a nearly continuous ache. Pain is worsened by lying flat, prolonged sitting or standing, and walking long distances. Pain improved with rest/reposition and mediations which includes OTC Tylenol PRN and CBD spray/ointment. She has completed PT with some improvement. She continues with HEP as tolerated. She is working on losing weight and has made dietary changes.     Right hip replacement with Dr. Wlalace on 8/23/23. She had a right GTB injection on 3/1/24 with Dr. Wallace.     Procedures:  4/30/24 - L4/L5 LESI - 100% relief x 1.5 weeks  3/26/24 - Bilateral piriformis injections - 100% relief x 2days    Past therapies:  Physical Therapy: Yes   Chiropractor: No  Massage Therapy: No  TENS: No  Neck or back surgery: No  Past pain management: Yes - saw APRN with our clinic in 2023    Back Pain  This is a chronic problem. The current episode started more than 1 year ago. The problem occurs constantly. The problem is unchanged. The pain is present in the gluteal and lumbar spine. The quality of the pain is described as aching. Radiates to: radiates into biteral hips. The pain is at a severity of 4/10. The symptoms are aggravated by standing, sitting, position and lying down (walking long distances,). Associated symptoms include abdominal pain and weakness. Pertinent negatives include no chest pain, dysuria, fever, headaches or numbness. Treatments tried: PT, Tylenol, piriformis injections (no relie) The treatment provided mild relief.      PEG Assessment   What  "number best describes your pain on average in the past week?7  What number best describes how, during the past week, pain has interfered with your enjoyment of life?8  What number best describes how, during the past week, pain has interfered with your general activity?  8    Review of Pertinent Medical Data ---      The following portions of the patient's history were reviewed and updated as appropriate: allergies, current medications, past family history, past medical history, past social history, past surgical history, and problem list.    Review of Systems   Constitutional:  Positive for fatigue. Negative for activity change (increased), chills and fever.   HENT:  Negative for congestion.    Eyes:  Negative for visual disturbance.   Respiratory:  Negative for chest tightness and shortness of breath.    Cardiovascular:  Negative for chest pain.   Gastrointestinal:  Positive for abdominal pain. Negative for constipation and diarrhea.   Genitourinary:  Negative for difficulty urinating, dyspareunia and dysuria.   Musculoskeletal:  Positive for back pain.   Neurological:  Positive for weakness and light-headedness. Negative for dizziness, numbness and headaches.   Psychiatric/Behavioral:  Positive for agitation. Negative for sleep disturbance. The patient is nervous/anxious.        I have reviewed and confirmed the accuracy of the ROS as documented by the MA/LPN/RN ALIA Comer     Vitals:    05/28/24 1138   BP: 138/64   Pulse: 62   Temp: 97.3 °F (36.3 °C)   SpO2: 98%   Weight: 71.8 kg (158 lb 3.2 oz)   Height: 152.4 cm (60\")   PainSc:   4   PainLoc: Back     Objective   Physical Exam  Constitutional:       Appearance: Normal appearance.   HENT:      Head: Normocephalic.   Cardiovascular:      Rate and Rhythm: Normal rate and regular rhythm.   Pulmonary:      Effort: Pulmonary effort is normal.      Breath sounds: Normal breath sounds.   Musculoskeletal:         General: Normal range of motion.      " "Cervical back: Normal range of motion.      Lumbar back: Tenderness and bony tenderness present. Decreased range of motion. Negative right straight leg raise test.      Comments: + lumbar facet loading/tenderness   Skin:     General: Skin is warm and dry.      Capillary Refill: Capillary refill takes less than 2 seconds.   Neurological:      General: No focal deficit present.      Mental Status: She is alert and oriented to person, place, and time.   Psychiatric:         Mood and Affect: Mood normal.         Behavior: Behavior normal.         Thought Content: Thought content normal.         Cognition and Memory: Cognition normal.       Assessment & Plan   Diagnoses and all orders for this visit:    1. Lumbar radiculopathy (Primary)    2. Lumbar stenosis with neurogenic claudication    3. DDD (degenerative disc disease), lumbar    4. Facet arthritis of lumbar region    5. Piriformis syndrome of both sides      Ashlie Levi reports a pain score of 4.  Given her pain assessment as noted, treatment options were discussed and the following options were decided upon as a follow-up plan to address the patient's pain: continuation of current treatment plan for pain.    --- Bilateral L3-L5 MBB x 2 with goal of RFA  -------  Education about Medial Branch Blockade and RF Therapy:    This medial branch blockade (MBB) suggested is intended for diagnostic purposes, with the intent of offering the patient Radiofrequency thermal rhizotomy (RF) if the MBB is diagnostically effective.  The diagnostic blockade is necessary to determine the likelihood that RF therapy could be efficacious in providing long term relief to the patient.    Medial branches are sensory nerve branches that connect to a facet joint and transmit sensations & pain signals from that joint.  Facet is a term for the type of joints found in the spine.  Medial branches are the nerves that go to a facet, and therefore are also sometimes called \"facet joint nerves\" " (FJNs).      In a medial branch blockade procedure, xray fluoroscopy is used to verify the locations of the outside of the joint lines which are being targeted.  Under xray guidance, needles are placed to these areas.  Contrast dye is injected to confirm proper placement, with dye flowing over the joint area, and to ensure that the dye does not flow into unintended areas such as a vein.  When this is confirmed, local anesthetic is injected to block the medial branch at that joint level.      If MBBs are diagnostically successful in blocking pain, then the patient is most likely a great candidate for Radiofrequency of those facet joint nerves.  In the RF procedure, needles are placed to the joint lines in the same fashion, and after testing, the needle tips are heated to thermally treat the nerves, blocking the nerves by in essence damaging the nerves with the heat treatment(non-pulsed).       Medically, a successful RF procedure should provide a patient with 50% pain relief or more for at least 6 months.  Clinical experience suggests that successful patients receive relief more in the range of 12 months on average.  We also discussed that a fortunate minority of patients receive therapeutic success from the MBB, and may not require RF ablation.  If a patient receives more than 8 weeks of relief from MBB, then occasional repeat MBB for therapeutic purposes is a very reasonable alternative therapy.  This course of therapy is consistent with our LCDs according to our CMS  in the area, and therefore other insurance providers should follow accordingly.  We will monitor our patients to screen for these therapeutic responders and will offer RF therapy only when necessary.      We discussed that MBB & RF are not without risks.  Guidelines regarding anticoagulant use & neuraxial procedures will be respected.  Patients that are ill or otherwise may be at risk for sepsis will not have their spines accessed by  neuraxial injections of any type.  This patient will not be offered these therapies if there is an increased risk.   We discussed that there is a risk of postprocedural pain and also a risk of worsening of clinical picture with these procedures as with any neuraxial procedure.    -------  --- Follow-up for procedure    Diagnostic Facet Joint Procedure:   MILAD   The first diagnostic facet joint procedure is considered medically reasonable and necessary for the diagnosis and treatment of chronic pain for this patient due to the patient meeting all of the following criteria:    1. Moderate to severe chronic neck or low back pain, predominantly axial, that causes functional deficit measured on pain or disability scale.  2. Pain present for minimum of 3 months with documented failure to respond to noninvasive conservative management (as tolerated)  3. Absence of untreated radiculopathy or neurogenic claudication (except for radiculopathy caused by facet joint synovial cyst)  4. There is no non-facet pathology per clinical assessment or radiology studies that could explain the source of the patient’s pain, including but not limited to fracture, tumor, infection, or significant deformity.       FABIAN REPORT  As the clinician, I personally reviewed the FABIAN from 5/28/24 while the patient was in the office today.    Dictated utilizing Dragon dictation.

## 2024-06-06 RX ORDER — LIDOCAINE HYDROCHLORIDE 10 MG/ML
5 INJECTION, SOLUTION EPIDURAL; INFILTRATION; INTRACAUDAL; PERINEURAL ONCE
Status: CANCELLED | OUTPATIENT
Start: 2024-06-06 | End: 2024-06-06

## 2024-06-06 RX ORDER — BUPIVACAINE HYDROCHLORIDE 2.5 MG/ML
10 INJECTION, SOLUTION INFILTRATION; PERINEURAL ONCE
Status: CANCELLED | OUTPATIENT
Start: 2024-06-06 | End: 2024-06-06

## 2024-06-07 NOTE — DISCHARGE INSTRUCTIONS
[facility] - Post-procedure Instructions              --  While there are no absolute restrictions, it is recommended that you do not perform strenuous activity today. In the morning, you may resume your level of activity as before your block.    --  If you have a band-aid at your injection site, please remove it later today. Observe the area for any redness, swelling, pus-like drainage, or a temperature over 101°. If any of these symptoms occur, please call your doctor at [phone]. If after office hours, leave a message and the on-call provider will return your call.    --  Ice may be applied to your injection site. It is recommended you avoid direct heat (heating pad; hot tub) for 1-2 days.    --  Call [facility] at [phone] if you experience persistent headache, persistent bleeding from the injection site, or severe pain not relieved by heat or oral medication.    --  Do not make important decisions today.    --  Due to the effects of the block and/or the Sedation, DO NOT drive or operate hazardous machinery for 12 hours.  Local anesthetics may cause numbness after procedure and precautions must be taken with regards to operating equipment as well as with walking, even if ambulating with assistance of another person or with an assistive device.    --  Do not drink alcohol for 12 hours.    -- You may return to work tomorrow, or as directed by your referring doctor.    --  Occasionally you may notice a slight increase in your pain after the procedure. This should start to improve within the next 24-48 hours. Radiofrequency ablation procedure pain may last 3-4 weeks.    --  It may take as long as 3-4 days before you notice a gradual improvement in your pain and/or other symptoms.    -- You may continue to take your prescribed pain medication as needed.    --  Some normal possible side effects of steroid use could include fluid retention, increased blood sugar, dull headache, increased  sweating, increased appetite, mood swings and flushing.    --  Diabetics are recommended to watch their blood glucose level closely for 24-48 hours after the injection.    --  Must stay in PACU for 20 min upon arrival and prove no leg weakness before being discharged.    --  IN THE EVENT OF A LIFE THREATENING EMERGENCY, (CHEST PAIN, BREATHING DIFFICULTIES, PARALYSIS…) YOU SHOULD GO TO YOUR NEAREST EMERGENCY ROOM.    --  You should be contacted by our office within 2-3 days to schedule follow up or next appointment date (if not already scheduled).  If not contacted within 7 days, please call the office at [phone].     If you have even 1 hour of relief, these injections are considered successful.

## 2024-06-11 ENCOUNTER — HOSPITAL ENCOUNTER (OUTPATIENT)
Dept: GENERAL RADIOLOGY | Facility: HOSPITAL | Age: 79
Discharge: HOME | End: 2024-06-11
Payer: MEDICARE

## 2024-06-11 ENCOUNTER — HOSPITAL ENCOUNTER (OUTPATIENT)
Dept: PAIN MEDICINE | Facility: HOSPITAL | Age: 79
Discharge: HOME | End: 2024-06-11
Payer: MEDICARE

## 2024-06-11 VITALS
HEIGHT: 60 IN | WEIGHT: 158 LBS | DIASTOLIC BLOOD PRESSURE: 71 MMHG | BODY MASS INDEX: 31.02 KG/M2 | TEMPERATURE: 97.9 F | SYSTOLIC BLOOD PRESSURE: 123 MMHG | RESPIRATION RATE: 16 BRPM | HEART RATE: 56 BPM | OXYGEN SATURATION: 96 %

## 2024-06-11 VITALS
HEART RATE: 53 BPM | OXYGEN SATURATION: 95 % | SYSTOLIC BLOOD PRESSURE: 120 MMHG | RESPIRATION RATE: 16 BRPM | DIASTOLIC BLOOD PRESSURE: 61 MMHG

## 2024-06-11 DIAGNOSIS — Z41.9 SURGERY, ELECTIVE: ICD-10-CM

## 2024-06-11 PROBLEM — M47.816 LUMBAR FACET JOINT SYNDROME: Status: ACTIVE | Noted: 2024-06-11

## 2024-06-11 PROCEDURE — 25010000002 BUPIVACAINE (PF) 0.25 % SOLUTION: Performed by: ANESTHESIOLOGY

## 2024-06-11 PROCEDURE — C1755 CATHETER, INTRASPINAL: HCPCS

## 2024-06-11 PROCEDURE — 77002 NEEDLE LOCALIZATION BY XRAY: CPT

## 2024-06-11 PROCEDURE — 64493 INJ PARAVERT F JNT L/S 1 LEV: CPT | Performed by: ANESTHESIOLOGY

## 2024-06-11 PROCEDURE — 64494 INJ PARAVERT F JNT L/S 2 LEV: CPT | Performed by: ANESTHESIOLOGY

## 2024-06-11 RX ORDER — BUPIVACAINE HYDROCHLORIDE 2.5 MG/ML
10 INJECTION, SOLUTION EPIDURAL; INFILTRATION; INTRACAUDAL ONCE
Status: DISCONTINUED | OUTPATIENT
Start: 2024-06-11 | End: 2024-06-11

## 2024-06-11 RX ORDER — LIDOCAINE HYDROCHLORIDE 10 MG/ML
5 INJECTION, SOLUTION EPIDURAL; INFILTRATION; INTRACAUDAL; PERINEURAL ONCE
Status: COMPLETED | OUTPATIENT
Start: 2024-06-11 | End: 2024-06-11

## 2024-06-11 RX ORDER — LIDOCAINE HYDROCHLORIDE 10 MG/ML
5 INJECTION, SOLUTION EPIDURAL; INFILTRATION; INTRACAUDAL; PERINEURAL ONCE
Status: DISCONTINUED | OUTPATIENT
Start: 2024-06-11 | End: 2024-06-11

## 2024-06-11 RX ORDER — BUPIVACAINE HYDROCHLORIDE 2.5 MG/ML
10 INJECTION, SOLUTION EPIDURAL; INFILTRATION; INTRACAUDAL ONCE
Status: COMPLETED | OUTPATIENT
Start: 2024-06-11 | End: 2024-06-11

## 2024-06-11 RX ADMIN — BUPIVACAINE HYDROCHLORIDE 10 ML: 2.5 INJECTION, SOLUTION EPIDURAL; INFILTRATION; INTRACAUDAL; PERINEURAL at 14:08

## 2024-06-11 RX ADMIN — LIDOCAINE HYDROCHLORIDE 5 ML: 10 INJECTION, SOLUTION EPIDURAL; INFILTRATION; INTRACAUDAL; PERINEURAL at 14:08

## 2024-06-11 NOTE — PROCEDURES
Procedures    Lumbar Medial Branch Blockade at  Bilateral L4-S1  [facility]      PREOPERATIVE DIAGNOSIS:  Lumbar spondylosis without myelopathy    POSTOPERATIVE DIAGNOSIS:  Lumbar spondylosis without myelopathy    PROCEDURE:   Diagnostic Lumbar Medial Branch Nerve Blockades, with fluoroscopy:  at the L4, L5 transverse processes and the sacral alar groove)   49355-12 -- Lumbar Facet blocks, 1st Level  06566-86 -- Lumbar Facet blocks, 2nd  Level     PRE-PROCEDURE DISCUSSION WITH PATIENT:    Risks and complications were discussed with the patient prior to starting the procedure and informed consent was obtained.      SURGEON:  Hui Sauer MD    REASON FOR PROCEDURE:    The patient complains of pain that seems to have a significant axial component, Tenderness of the affected facet joints on exam under fluoroscopy, and Painful area identified on exam under fluoroscopy    SEDATION:  No sedation was used for this procedure  ANESTHETIC:  0.25% bupivacaine  STEROID:  None  TOTAL VOLUME OF SOLUTION:  6ml    DESCRIPTON OF PROCEDURE:  After obtaining informed consent, IV access was not obtained in the preoperative area.   The patient was taken to the operating room.  The patient was placed in the prone position with a pillow under the abdomen. All pressure points were well padded.  Heart rate, blood pressure, and pulse oximeter were monitored.  The patient was monitored and sedated by the RN under my direction. The lumbosacral area was prepped with Chloraprep and draped in a sterile fashion.     AP fluoroscopic image was used to visualize the junction of the right S1 superior articular process with the sacral ala.  The skin and subcutaneous tissue over the area was anesthetized with 1% lidocaine.  A 22-gauge spinal needle was then advanced percutaneously through the anesthetized skin tract under fluoroscopic guidance in a coaxial view to contact periosteum.  After negative aspiration, a volume of 1 mL of the  local anesthetic was injected without resistance.  The image was then optimized to maximize visualization of the junctions of the L4, L5 superior articular processes with the transverse processes.  The skin and subcutaneous tissue over the areas was anesthetized with 1% lidocaine.  A 22-gauge spinal needle was then advanced percutaneously through the anesthetized skin tracts under fluoroscopic guidance in a coaxial view to contact periosteum at the target sites.  After negative aspiration, a volume of 1 mL of the local anesthetic  was injected without resistance at each of the target sites.      The same procedure was then performed on the contralateral side in the exact same fashion.        Onset of analgesia was noted.  Vital signs remained stable throughout.      ESTIMATED BLOOD LOSS:  <5 mL  SPECIMENS:  none    COMPLICATIONS:   No complications were noted.    TOLERANCE & DISCHARGE CONDITION:    The patient tolerated the procedure well.  The patient was transported to the recovery area without difficulties.  The patient was discharged to home under the care of family in stable and satisfactory condition.    Pre-procedure pain score: 5/10 at rest, 10/10 with activity  Post-procedure pain score: 0/10    PLAN OF CARE:  The patient was given our standard instruction sheet.  We discussed that Lumbar Medial Branch Blockade is a diagnostic procedure in consideration for radiofrequency ablation if two diagnostic procedures prove to be positive for significant benefit.  An alternative plan could be therapeutic lumbar branch blockades.  The patient is asked to keep an account of pain relief after the procedure today.  The patient will  Return to clinic 1-2 wks.  The patient will resume all medications as per the medication reconciliation sheet.

## 2024-06-11 NOTE — H&P
Ashlie Levi is a 78 y.o.  female who returns today for a scheduled procedure.  The previous clinic note has been reviewed.  There has been no change in the location or quality of the patient's pain.  The pain is currently rated as 5/10 in severity.  The patient is alert at the time of exam and is emotionally appropriate without significant debilities that would disqualify from the procedure.     Planned Procedure: Bilateral Lumbar Medial branch blocks     Vitals:    06/11/24 1402   BP: 144/72   Pulse: 57   Resp: 16   SpO2: 96%       The risks and benefits of the procedure have been discussed with the patient who has elected to proceed.  There are no contraindications to the procedure at this time.  Appropriate consent forms have been signed.  All questions regarding the planned procedure have been addressed.  Please see the separate documentation for details of the interventional procedure.

## 2024-06-17 RX ORDER — AMLODIPINE BESYLATE 5 MG/1
TABLET ORAL
Qty: 90 TABLET | Refills: 0 | Status: SHIPPED | OUTPATIENT
Start: 2024-06-17

## 2024-06-18 ENCOUNTER — OFFICE VISIT (OUTPATIENT)
Age: 79
End: 2024-06-18
Payer: MEDICARE

## 2024-06-18 VITALS
DIASTOLIC BLOOD PRESSURE: 60 MMHG | OXYGEN SATURATION: 93 % | HEIGHT: 60 IN | BODY MASS INDEX: 30.74 KG/M2 | TEMPERATURE: 97.8 F | WEIGHT: 156.6 LBS | SYSTOLIC BLOOD PRESSURE: 126 MMHG | HEART RATE: 68 BPM

## 2024-06-18 DIAGNOSIS — M48.062 LUMBAR STENOSIS WITH NEUROGENIC CLAUDICATION: ICD-10-CM

## 2024-06-18 DIAGNOSIS — M47.816 FACET ARTHRITIS OF LUMBAR REGION: ICD-10-CM

## 2024-06-18 DIAGNOSIS — G57.03 PIRIFORMIS SYNDROME OF BOTH SIDES: ICD-10-CM

## 2024-06-18 DIAGNOSIS — M51.36 DDD (DEGENERATIVE DISC DISEASE), LUMBAR: ICD-10-CM

## 2024-06-18 DIAGNOSIS — M54.16 LUMBAR RADICULOPATHY: Primary | ICD-10-CM

## 2024-06-18 RX ORDER — GABAPENTIN 300 MG/1
CAPSULE ORAL
Qty: 90 CAPSULE | Refills: 0 | Status: SHIPPED | OUTPATIENT
Start: 2024-06-18

## 2024-06-18 NOTE — PROGRESS NOTES
CHIEF COMPLAINT  Back pain    Subjective   Ashlie Levi is a 78 y.o. female  who presents to the office for follow-up of procedure.  She completed a Bilateral L4-S1 MBB on 6/11/24 performed by Dr. Sauer for management of low back pain. Patient reports 100% relief from the procedure while at the [facility] but pain returned while walking to her car. She is unsure if procedure was effective or not.     Today pain is 6/10VAS in severity (severity in pain varies based on activity level). Pain is located in low back and radiates into bilateral hips.  She reports anterior thigh pain bilaterally that worsens at night. Describes this pain as a nearly continuous aching and burning. Pain is worsened by lying flat, prolonged sitting or standing, and walking long distances. Pain improved with rest/reposition and mediations which includes OTC Tylenol PRN and CBD spray/ointment. She has completed PT with some improvement. She continues with HEP as tolerated. She is working on losing weight and has made dietary changes.     Right hip replacement with Dr. Wallace on 8/23/23. She had a right GTB injection on 3/1/24 with Dr. Wallace. She is scheduled to follow up with Dr. Wallace.      Procedures:  6/11/24 - Bilateral L4-S1 MBB - 100% relief for 30 minutes  4/30/24 - L4/L5 LESI - 100% relief x 1.5 weeks  3/26/24 - Bilateral piriformis injections - 100% relief x 2days     Past therapies:  Physical Therapy: Yes   Chiropractor: No  Massage Therapy: No  TENS: No  Neck or back surgery: No  Past pain management: Yes - saw APRN with our clinic in 2023    Back Pain  This is a chronic problem. The current episode started more than 1 year ago. The problem occurs constantly. The problem is unchanged. The pain is present in the gluteal and lumbar spine. The quality of the pain is described as aching. Radiates to: radiates into biteral hips and anterior thighs bilaterally. The pain is at a severity of 6/10. The symptoms are aggravated by  standing, sitting, position and lying down (walking long distances,). Associated symptoms include weakness. Pertinent negatives include no abdominal pain, chest pain, dysuria, fever, headaches or numbness. Treatments tried: PT, Tylenol, piriformis injections (no relief), short term relief with LESI or LMBB. The treatment provided mild relief.      PEG Assessment   What number best describes your pain on average in the past week?7  What number best describes how, during the past week, pain has interfered with your enjoyment of life?6  What number best describes how, during the past week, pain has interfered with your general activity?  7    Review of Pertinent Medical Data ---  LUMBAR SPINE MRI WITHOUT         HISTORY:  Low back pain for 5 years radiating down both legs to the knees. No  known trauma.     TECHNIQUE:  MRI of the lumbar spine performed without contrast using routine  technique. There is a abdomen pelvis CT for comparison from 8/30/2021.     FINDINGS:  There is essentially normal sagittal alignment. There is mild anterior  compression fracture at L1 with disruption of the anterior superior  endplate. There is a small horizontally oriented cleft subjacent to the  T12-L1 endplate and mild marrow edema. This compression fracture is new  since 2021 and possibly subacute given appearance and signal  characteristics. The intervertebral discs are desiccated in general.  Otherwise there is mild marrow endplate degenerative change and multiple  level Schmorl's node formation. The conus medullaris terminates at L1-2  and is normal.     At L1-2, there is mild to moderate facet degenerative change and  ligamentum flavum thickening. There is a concentric disc bulge with a  superimposed broad posterior protrusion/extrusion that extends into the  foramina. There is no at least moderate canal stenosis and impingement  on left greater than right lateral recess. There is mild bilateral  foraminal narrowing.     At L2-3,  there is mild to moderate facet degenerative change and  ligamentum flavum thickening bilaterally. There is a concentric disc  bulge with superimposed broad posterior protrusion that extends into the  foramina. There is mild to moderate central canal stenosis and mild left  and mild to moderate right foraminal narrowing.     At L3-4, there is severe right and moderate left-sided facet arthritis  with ligamentum flavum thickening. There is a broad-based posterior disc  extrusion that extends cephalad from the disc but remains contiguous  with it. It measures about 8 mm AP dimension. It contributes to moderate  to severe canal stenosis. There is impingement on the bilateral lateral  recesses left greater than right and there is mild to moderate right and  severe left foraminal impingement. More focal disc extrusion is extends  into the left foramen.     At L4-5, there is moderate facet arthritis bilaterally with mild to  moderate ligamentum flavum thickening. There is a concentric disc bulge  with a superimposed broad posterior protrusion/extrusion that extends  into foramina. Mild effacement of the thecal sac. Mild mass effect on  the bilateral lateral recesses. Moderate to severe left and mild right  foraminal narrowing.     At L5-S1, there is a broad posterior disc protrusion that extends into  the right foramen. There is fairly severe right foraminal impingement.  There is some effacement of the thecal sac by epidural lipomatosis.  There is moderate to severe right and mild left-sided facet degenerative  change.    IMPRESSION:  1. There is a mild anterior compression fracture centered at the  anterior superior endplate of L1 that appears to be subacute. It is  definitely new since August 2021 and it is not currently healed. There  is mild associated marrow edema. Please correlate for clinical evidence  of osteoporosis.  2. There is multiple level degenerative change with multilevel canal and  foraminal  "impingement. This includes moderate to severe canal stenosis  at L3-4. See level-by-level description of findings and correlate with  any radicular symptoms.     This report was finalized on 11/24/2023 6:01 PM by Dr. Qing Gleason MD.     The following portions of the patient's history were reviewed and updated as appropriate: allergies, current medications, past family history, past medical history, past social history, past surgical history, and problem list.    Review of Systems   Constitutional:  Positive for fatigue. Negative for activity change (increased), chills and fever.   HENT:  Negative for congestion.    Eyes:  Negative for visual disturbance.   Respiratory:  Negative for chest tightness and shortness of breath.    Cardiovascular:  Negative for chest pain.   Gastrointestinal:  Positive for constipation. Negative for abdominal pain and diarrhea.   Genitourinary:  Positive for frequency. Negative for difficulty urinating, dyspareunia and dysuria.   Musculoskeletal:  Positive for back pain.   Neurological:  Positive for weakness and light-headedness. Negative for dizziness, numbness and headaches.   Psychiatric/Behavioral:  Positive for agitation and sleep disturbance. The patient is not nervous/anxious.      I have reviewed and confirmed the accuracy of the ROS as documented by the MA/LPN/RN ALIA Comer     Vitals:    06/18/24 1243   BP: 126/60   Pulse: 68   Temp: 97.8 °F (36.6 °C)   SpO2: 93%   Weight: 71 kg (156 lb 9.6 oz)   Height: 152.4 cm (60\")   PainSc:   6   PainLoc: Back     Objective   Physical Exam  Constitutional:       Appearance: Normal appearance.   HENT:      Head: Normocephalic.   Cardiovascular:      Rate and Rhythm: Normal rate and regular rhythm.   Pulmonary:      Effort: Pulmonary effort is normal.      Breath sounds: Normal breath sounds.   Musculoskeletal:         General: Normal range of motion.      Cervical back: Normal range of motion.      Lumbar back: Tenderness " and bony tenderness present. Decreased range of motion. Negative right straight leg raise test.      Comments: + lumbar facet loading/tenderness   Skin:     General: Skin is warm and dry.      Capillary Refill: Capillary refill takes less than 2 seconds.   Neurological:      General: No focal deficit present.      Mental Status: She is alert and oriented to person, place, and time.   Psychiatric:         Mood and Affect: Mood normal.         Behavior: Behavior normal.         Thought Content: Thought content normal.         Cognition and Memory: Cognition normal.       Assessment & Plan   Diagnoses and all orders for this visit:    1. Lumbar radiculopathy (Primary)  -     gabapentin (NEURONTIN) 300 MG capsule; Take 1 capsule by mouth at night for 7 days. If tolerating well, may increase to 1 capsule twice a day with goal of taking medication three times per day.  Dispense: 90 capsule; Refill: 0  -     Ambulatory Referral to Neurosurgery    2. DDD (degenerative disc disease), lumbar  -     Ambulatory Referral to Neurosurgery    3. Lumbar stenosis with neurogenic claudication  -     gabapentin (NEURONTIN) 300 MG capsule; Take 1 capsule by mouth at night for 7 days. If tolerating well, may increase to 1 capsule twice a day with goal of taking medication three times per day.  Dispense: 90 capsule; Refill: 0  -     Ambulatory Referral to Neurosurgery    4. Facet arthritis of lumbar region    5. Piriformis syndrome of both sides      Ashlie M Levi reports a pain score of 6.  Given her pain assessment as noted, treatment options were discussed and the following options were decided upon as a follow-up plan to address the patient's pain: referral to specialist for assistance in pain treatment guidance.    --- Trial Gabapentin 300mg. The patient will be started on a trial of gabapentin. she will be started on gabapentin 300 mg once nightly for one week. Will then increase to twice daily for one week. Patient will then  increase to three times daily as tolerated. Reviewed potential side effects, including somnolence and dizziness.   --- Referral to Neurosurgery due to worsening low back, severe stenosis noted on lumbar MRI, and leg pain  --- Follow-up 1 month to assess medication effectiveness      FABIAN REPORT  As part of the patient's treatment plan, I am prescribing controlled substances. The patient has been made aware of appropriate use of such medications, including potential risk of somnolence, limited ability to drive and/or work safely, and the potential for dependence or overdose. It has also been made clear that these medications are for use by this patient only, without concomitant use of alcohol or other substances unless prescribed.     Patient has completed prescribing agreement detailing terms of continued prescribing of controlled substances, including monitoring FABIAN reports, urine drug screening, and pill counts if necessary. The patient is aware that inappropriate use will results in cessation of prescribing such medications.    As the clinician, I personally reviewed the FABIAN from 6/18/24 while the patient was in the office today.    History and physical exam exhibit continued safe and appropriate use of controlled substances.    Dictated utilizing Dragon dictation.

## 2024-06-24 RX ORDER — IRBESARTAN 300 MG/1
TABLET ORAL
Qty: 90 TABLET | Refills: 0 | Status: SHIPPED | OUTPATIENT
Start: 2024-06-24

## 2024-06-24 RX ORDER — CARVEDILOL 6.25 MG/1
TABLET ORAL
Qty: 180 TABLET | Refills: 0 | Status: SHIPPED | OUTPATIENT
Start: 2024-06-24

## 2024-06-25 ENCOUNTER — HOSPITAL ENCOUNTER (OUTPATIENT)
Dept: PAIN MEDICINE | Facility: HOSPITAL | Age: 79
Discharge: HOME | End: 2024-06-25
Payer: MEDICARE

## 2024-06-25 DIAGNOSIS — M51.36 DDD (DEGENERATIVE DISC DISEASE), LUMBAR: ICD-10-CM

## 2024-06-25 DIAGNOSIS — M47.816 FACET ARTHRITIS OF LUMBAR REGION: ICD-10-CM

## 2024-07-18 ENCOUNTER — OFFICE VISIT (OUTPATIENT)
Dept: CARDIOLOGY | Facility: CLINIC | Age: 79
End: 2024-07-18
Payer: MEDICARE

## 2024-07-18 VITALS
OXYGEN SATURATION: 95 % | DIASTOLIC BLOOD PRESSURE: 60 MMHG | WEIGHT: 154.7 LBS | BODY MASS INDEX: 30.37 KG/M2 | HEIGHT: 60 IN | SYSTOLIC BLOOD PRESSURE: 100 MMHG | HEART RATE: 55 BPM | RESPIRATION RATE: 18 BRPM

## 2024-07-18 DIAGNOSIS — E78.2 MIXED HYPERLIPIDEMIA: Chronic | ICD-10-CM

## 2024-07-18 DIAGNOSIS — I10 PRIMARY HYPERTENSION: Chronic | ICD-10-CM

## 2024-07-18 DIAGNOSIS — I25.118 CORONARY ARTERY DISEASE OF NATIVE ARTERY OF NATIVE HEART WITH STABLE ANGINA PECTORIS: Primary | Chronic | ICD-10-CM

## 2024-07-18 PROCEDURE — 1160F RVW MEDS BY RX/DR IN RCRD: CPT | Performed by: INTERNAL MEDICINE

## 2024-07-18 PROCEDURE — 93000 ELECTROCARDIOGRAM COMPLETE: CPT | Performed by: INTERNAL MEDICINE

## 2024-07-18 PROCEDURE — 99214 OFFICE O/P EST MOD 30 MIN: CPT | Performed by: INTERNAL MEDICINE

## 2024-07-18 PROCEDURE — 3078F DIAST BP <80 MM HG: CPT | Performed by: INTERNAL MEDICINE

## 2024-07-18 PROCEDURE — 1159F MED LIST DOCD IN RCRD: CPT | Performed by: INTERNAL MEDICINE

## 2024-07-18 PROCEDURE — 3074F SYST BP LT 130 MM HG: CPT | Performed by: INTERNAL MEDICINE

## 2024-07-18 RX ORDER — CITALOPRAM 40 MG/1
1 TABLET ORAL DAILY
COMMUNITY
Start: 2024-06-05

## 2024-07-18 RX ORDER — ISOSORBIDE MONONITRATE 30 MG/1
30 TABLET, EXTENDED RELEASE ORAL DAILY
COMMUNITY

## 2024-07-18 RX ORDER — NITROGLYCERIN 0.4 MG/1
0.4 TABLET SUBLINGUAL
Qty: 25 TABLET | Refills: 1 | Status: SHIPPED | OUTPATIENT
Start: 2024-07-18

## 2024-07-18 NOTE — PROGRESS NOTES
Subjective:     Encounter Date: 8/15/2019      Patient ID: Ashlie Levi is a 78 y.o. female.    Chief Complaint: CAD    Dear Dr. Silva,    I had the pleasure seeing this patient in the office today.  She comes in for 1 year follow-up. She has a history of CAD and had a stent placed in her LAD in 2006.  This was performed by Dr. Caldwell at [facility].  She had a stress test performed  5/2019 that demonstrated normal left ventricular and no ischemia.    Having back issues. No cardiac complaints. She denies any chest pain, pressure, tightness, squeezing, or heartburn.  She has not experienced any feeling of palpitations, tachycardia or heart racing and no presyncope or syncope.  There has not been any problems with dizziness or lightheadedness.  There has not been any orthopnea or PND, and no problems with lower extremity edema.  She denies any shortness of breath at rest or with activity and has not had any wheezing.  She has continued to perform daily activities of living without any specific problem or change in the level of activity.    The following portions of the patient's history were reviewed and updated as appropriate: allergies, current medications, past family history, past medical history, past social history, past surgical history and problem list.    Past Medical History:   Diagnosis Date    Abnormal electrocardiogram     2006 heart attack    Arthritis     Chest discomfort     Colon polyp     Coronary artery disease     Disease of thyroid gland     Diverticulitis     Elevated cholesterol     GERD (gastroesophageal reflux disease)     Hyperlipidemia     Hypertension        Past Surgical History:   Procedure Laterality Date    APPENDECTOMY      CARDIAC SURGERY      3 stents    CHOLECYSTECTOMY      COLONOSCOPY N/A 07/13/2016    Procedure: COLONOSCOPY;  Surgeon: Giovani Avelar MD;  Location: [facility];  Service:     COLONOSCOPY N/A 06/13/2019    Procedure: Colonoscopy with possible  biopsy or polypectomy;  Surgeon: Alfreda Soler DO;  Location:  [facility];  Service: Gastroenterology    COLONOSCOPY N/A 05/18/2021    Procedure: Colonoscopy with polypectomy, biopsy;  Surgeon: Alfreda Soler DO;  Location:  [facility];  Service: Gastroenterology;  Laterality: N/A;  diverticulosis  cecal biopsy  right colon polyp  left colon polyps x2    CORONARY STENT PLACEMENT      ENDOSCOPY N/A 11/21/2017    Procedure: ESOPHAGOGASTRODUODENOSCOPY with CARIN test ;  Surgeon: Alfreda Soler DO;  Location:  [facility];  Service:     ENDOSCOPY N/A 06/13/2019    Procedure: Esophagogastroduodenoscopy with possible biopsy, polypectomy, dilation;  Surgeon: Alfreda Soler DO;  Location:  [facility];  Service: Gastroenterology    ENDOSCOPY N/A 05/18/2021    Procedure: Esophagogastroduodenoscopy with biopsies;  Surgeon: Alfreda Soler DO;  Location:  [facility];  Service: Gastroenterology;  Laterality: N/A;  CARIN test  gastritis  duodenitis    EPIDURAL BLOCK  04/30/2024    EYE SURGERY      FOOT GANGLION EXCISION Left     HEMORROIDECTOMY      HERNIA REPAIR      HYSTERECTOMY      GREG with OC age 30 for DUB    INJECTION OF MEDICATION Bilateral 03/26/2024    piriformis    MEDIAL BRANCH BLOCK Bilateral     lumbar    MEDIAL BRANCH BLOCK Bilateral 06/11/2024    L3-L5    OOPHORECTOMY      dx lsc , interval USO    TOTAL HIP ARTHROPLASTY Right 08/23/2023    Procedure: TOTAL HIP ARTHROPLASTY ANTERIOR WITH HANA TABLE;  Surgeon: Aidan Wallace MD;  Location:  [facility];  Service: Orthopedics;  Laterality: Right;             ECG 12 Lead    Date/Time: 7/18/2024 1:16 PM  Performed by: Al Granado III, MD    Authorized by: Al Granado III, MD  Comparison: compared with previous ECG   Similar to previous ECG  Rhythm: sinus rhythm  Rate: normal  Conduction: conduction normal  ST Segments: ST segments normal  T Waves: T waves normal  QRS axis: normal  Other: no other findings    Clinical impression: normal  "ECG             Objective:     Vitals:    07/18/24 1241   BP: 100/60   BP Location: Left arm   Patient Position: Sitting   Cuff Size: Adult   Pulse: 55   Resp: 18   SpO2: 95%   Weight: 70.2 kg (154 lb 11.2 oz)   Height: 152.4 cm (60\")           General Appearance:    Alert, cooperative, in no acute distress   Head:    Normocephalic, without obvious abnormality, atraumatic   Eyes:            Lids and lashes normal, conjunctivae and sclerae normal, no   icterus, no pallor, corneas clear, PERRLA   Ears:    Ears appear intact with no abnormalities noted   Throat:   No oral lesions, no thrush, oral mucosa moist   Neck:   No adenopathy, supple, trachea midline, no thyromegaly, no   carotid bruit, no JVD   Back:     No kyphosis present, no scoliosis present, no skin lesions, erythema or scars, no tenderness to percussion or palpation, range of motion normal   Lungs:     Clear to auscultation,respirations regular, even and unlabored    Heart:    Regular rhythm and normal rate, normal S1 and S2, no murmur, no gallop, no rub, no click   Chest Wall:    No abnormalities observed   Abdomen:     Normal bowel sounds, no masses, no organomegaly, soft        non-tender, non-distended, no guarding, no rebound  tenderness   Extremities:   Moves all extremities well, no edema, no cyanosis, no redness   Pulses:   Pulses palpable and equal bilaterally. Normal radial, carotid, femoral, dorsalis pedis and posterior tibial pulses bilaterally. Normal abdominal aorta   Skin:  Psychiatric:   No bleeding, bruising or rash    Alert and oriented x 3, normal mood and affect       Lab Review:                Lab Results   Component Value Date    GLUCOSE 85 11/24/2023    BUN 14 11/24/2023    CREATININE 0.88 11/24/2023    EGFRIFNONA 57 (L) 11/21/2021    BCR 15.9 11/24/2023    K 4.1 11/24/2023    CO2 29.0 11/24/2023    CALCIUM 9.4 11/24/2023    ALBUMIN 4.4 11/24/2023    AST 24 11/24/2023    ALT 23 11/24/2023               Assessment:          Diagnosis " Plan   1. Coronary artery disease of native artery of native heart with stable angina pectoris  nitroglycerin (NITROSTAT) 0.4 MG SL tablet    ECG 12 Lead      2. Mixed hyperlipidemia  nitroglycerin (NITROSTAT) 0.4 MG SL tablet    ECG 12 Lead      3. Primary hypertension  nitroglycerin (NITROSTAT) 0.4 MG SL tablet    ECG 12 Lead               Plan:       1. Coronary Artery Disease  Assessment   The patient has no angina    Plan   Lifestyle modifications discussed include adhering to a heart healthy diet, avoidance of tobacco products, maintenance of a healthy weight, medication compliance, regular exercise and regular monitoring of cholesterol and blood pressure   Stable cardiac exam, normal EKG today.  No symptoms of concern.    Subjective - Objective   There has been a previous stent procedure using HERBERTH    Current antiplatelet therapy includes aspirin 81 mg    2.  Hyperlipidemia-continue current diet and therapy, AST, ALT reviewed  3.  Hypertensive heart disease-blood pressure well controlled on amlodipine carvedilol irbesartan, BUN and creatinine are reviewed. BP is low normal- will have her keep a BP log    Thank you very much for allowing us to participate in the care of this pleasant patient.  Please don't hesitate to call if I can be of assistance in any way.      Current Outpatient Medications:     acetaminophen (TYLENOL) 500 MG tablet, Take 1 tablet by mouth Every 6 (Six) Hours As Needed for Mild Pain., Disp: , Rfl:     amLODIPine (NORVASC) 5 MG tablet, Take 1 tablet by mouth once daily, Disp: 90 tablet, Rfl: 0    aspirin 81 MG EC tablet, Take 1 tablet by mouth 2 (Two) Times a Day. (Patient taking differently: Take 1 tablet by mouth Daily.), Disp: 60 tablet, Rfl: 0    atorvastatin (LIPITOR) 20 MG tablet, Take 1 tablet by mouth Every Night., Disp: , Rfl:     carvedilol (COREG) 6.25 MG tablet, Take 1 tablet by mouth twice daily, Disp: 180 tablet, Rfl: 0    citalopram (CeleXA) 40 MG tablet, Take 1 tablet by  mouth Daily., Disp: , Rfl:     irbesartan (AVAPRO) 300 MG tablet, TAKE 1 TABLET BY MOUTH ONCE DAILY AT NIGHT, Disp: 90 tablet, Rfl: 0    isosorbide mononitrate (IMDUR) 30 MG 24 hr tablet, Take 1 tablet by mouth Daily., Disp: , Rfl:     levothyroxine (SYNTHROID, LEVOTHROID) 112 MCG tablet, Take 1 tablet by mouth Daily., Disp: , Rfl:     meloxicam (MOBIC) 7.5 MG tablet, Take 1 tablet by mouth Daily. (Patient taking differently: Take 2 tablets by mouth Daily.), Disp: 90 tablet, Rfl: 3    nitroglycerin (NITROSTAT) 0.4 MG SL tablet, Place 1 tablet under the tongue Every 5 (Five) Minutes As Needed for Chest Pain. Take no more than 3 doses in 15 minutes and call EMS., Disp: 25 tablet, Rfl: 1    omeprazole (priLOSEC) 40 MG capsule, Take 1 capsule by mouth once daily, Disp: 90 capsule, Rfl: 4         EMR Dragon/Transcription disclaimer:    Much of this encounter note is an electronic transcription/translation of spoken language to printed text. The electronic translation of spoken language may permit erroneous, or at times, nonsensical words or phrases to be inadvertently transcribed; Although I have reviewed the note for such errors, some may still exist.

## 2024-08-05 NOTE — PROGRESS NOTES
"Subjective   Patient ID: Ashlie Levi is a 78 y.o. female is being seen for consultation today at the request of ALIA Comer for DDD Lumbar. Patient had a MRI L-spine on 11/24/2023    Treatment: JALEN    Today patient states that she has low back pain that radiates to  R leg, along with intermittent N/T     History of Present Illness    This patient has been having fairly severe pain in her back with radiation to her right hip and then into her groin on both sides.  It really does not go down her leg.  She has had this problem for years.  She has been treated with multiple injections as well as physical therapy but nothing is really helped.  Her last set of injections did not help at all.    The following portions of the patient's history were reviewed and updated as appropriate: allergies, current medications, past family history, past medical history, past social history, past surgical history, and problem list.    Review of Systems   Constitutional:  Negative for chills and fever.   HENT:  Negative for congestion.    Musculoskeletal:  Positive for back pain and myalgias.   Neurological:  Positive for weakness and numbness.       I reviewed the review of systems listed by the patient and discussed by my MA    Objective     Vitals:    08/06/24 1452   Weight: 70.2 kg (154 lb 11.2 oz)   Height: 152.4 cm (60\")     Body mass index is 30.21 kg/m².    Tobacco Use: Medium Risk (8/6/2024)    Patient History     Smoking Tobacco Use: Former     Smokeless Tobacco Use: Never     Passive Exposure: Never          Physical Exam  Constitutional:       Appearance: She is well-developed.   HENT:      Head: Normocephalic and atraumatic.   Eyes:      Extraocular Movements: EOM normal.      Conjunctiva/sclera: Conjunctivae normal.      Pupils: Pupils are equal, round, and reactive to light.   Neck:      Vascular: No carotid bruit.   Neurological:      Mental Status: She is oriented to person, place, and time.      " Coordination: Finger-Nose-Finger Test and Heel to Shin Test normal.      Gait: Gait is intact.      Deep Tendon Reflexes:      Reflex Scores:       Tricep reflexes are 2+ on the right side and 2+ on the left side.       Bicep reflexes are 2+ on the right side and 2+ on the left side.       Brachioradialis reflexes are 2+ on the right side and 2+ on the left side.       Patellar reflexes are 2+ on the right side and 2+ on the left side.       Achilles reflexes are 2+ on the right side and 2+ on the left side.  Psychiatric:         Speech: Speech normal.       Neurologic Exam     Mental Status   Oriented to person, place, and time.   Registration of memory: Good recent and remote memory.   Attention: normal. Concentration: normal.   Speech: speech is normal   Level of consciousness: alert  Knowledge: consistent with education.     Cranial Nerves     CN II   Visual fields full to confrontation.   Visual acuity: normal    CN III, IV, VI   Pupils are equal, round, and reactive to light.  Extraocular motions are normal.     CN V   Facial sensation intact.   Right corneal reflex: normal  Left corneal reflex: normal    CN VII   Facial expression full, symmetric.   Right facial weakness: none  Left facial weakness: none    CN VIII   Hearing: intact    CN IX, X   Palate: symmetric    CN XI   Right sternocleidomastoid strength: normal  Left sternocleidomastoid strength: normal    CN XII   Tongue: not atrophic  Tongue deviation: none    Motor Exam   Muscle bulk: normal  Right arm tone: normal  Left arm tone: normal  Right leg tone: normal  Left leg tone: normal    Strength   Strength 5/5 except as noted.     Sensory Exam   Light touch normal.     Gait, Coordination, and Reflexes     Gait  Gait: normal    Coordination   Finger to nose coordination: normal  Heel to shin coordination: normal    Reflexes   Right brachioradialis: 2+  Left brachioradialis: 2+  Right biceps: 2+  Left biceps: 2+  Right triceps: 2+  Left triceps:  2+  Right patellar: 2+  Left patellar: 2+  Right achilles: 2+  Left achilles: 2+  Right : 2+  Left : 2+          Assessment & Plan   Independent Review of Radiographic Studies:      I personally reviewed the images from the following studies.    Reviewed an MRI of the lumbar November of last year.  This shows some spondylolisthesis at L3-4 with some disc bulging.  This is on the sagittal images.  On the axial images L5-S1 is fairly open.  L4-5 is really fairly open as well.  L3-4 does show some left-sided narrowing into the neuroforamen.  L2-3 is fairly open as is L1 to.  There is a mild compression fracture of L1 that is new since August 2021.  It has subacute components.    Medical Decision Making:      I told the patient and her  I see little option at this point but to proceed with a lumbar myelogram.  I told the patient what a myelogram involves.  I explained that there is a 50% chance of developing a bad headache and nausea as a result of the test.  I explained that there is also a very small chance of infection, seizures, and bleeding.  I explained how we would treat a post myelogram headache including bedrest, caffeinated fluids, steroids, and blood patch.  The patient does ask to proceed.    Diagnoses and all orders for this visit:    1. Lumbar stenosis with neurogenic claudication (Primary)  -     Obtain Informed Consent; Standing  -     IR Myelogram Lumbar Spine; Future  -     CT Lumbar Spine With Intrathecal Contrast; Future  -     XR Spine Lumbar Complete With Flex & Ext; Future  -     No Lab Testing Needed; Standing  -     dexAMETHasone (DECADRON) 4 MG tablet; Take 2 tablets by mouth Take As Directed. Take both tablets by mouth 2 hours before myelogram  Dispense: 2 tablet; Refill: 0      Return for After radiology test.

## 2024-08-06 ENCOUNTER — OFFICE VISIT (OUTPATIENT)
Dept: NEUROSURGERY | Facility: CLINIC | Age: 79
End: 2024-08-06
Payer: MEDICARE

## 2024-08-06 VITALS — BODY MASS INDEX: 30.37 KG/M2 | WEIGHT: 154.7 LBS | HEIGHT: 60 IN

## 2024-08-06 DIAGNOSIS — M48.062 LUMBAR STENOSIS WITH NEUROGENIC CLAUDICATION: Primary | ICD-10-CM

## 2024-08-06 PROCEDURE — 1159F MED LIST DOCD IN RCRD: CPT | Performed by: NEUROLOGICAL SURGERY

## 2024-08-06 PROCEDURE — 1160F RVW MEDS BY RX/DR IN RCRD: CPT | Performed by: NEUROLOGICAL SURGERY

## 2024-08-06 PROCEDURE — 99204 OFFICE O/P NEW MOD 45 MIN: CPT | Performed by: NEUROLOGICAL SURGERY

## 2024-08-06 RX ORDER — DEXAMETHASONE 4 MG/1
8 TABLET ORAL TAKE AS DIRECTED
Qty: 2 TABLET | Refills: 0 | Status: SHIPPED | OUTPATIENT
Start: 2024-08-06

## 2024-08-09 ENCOUNTER — PATIENT ROUNDING (BHMG ONLY) (OUTPATIENT)
Dept: NEUROSURGERY | Facility: CLINIC | Age: 79
End: 2024-08-09
Payer: MEDICARE

## 2024-08-20 ENCOUNTER — OFFICE VISIT (OUTPATIENT)
Dept: ORTHOPEDIC SURGERY | Facility: CLINIC | Age: 79
End: 2024-08-20
Payer: MEDICARE

## 2024-08-20 VITALS — WEIGHT: 154 LBS | BODY MASS INDEX: 30.23 KG/M2 | HEIGHT: 60 IN

## 2024-08-20 DIAGNOSIS — Z96.641 STATUS POST TOTAL HIP REPLACEMENT, RIGHT: Primary | ICD-10-CM

## 2024-08-20 DIAGNOSIS — M25.552 LEFT HIP PAIN: ICD-10-CM

## 2024-08-20 PROCEDURE — 73502 X-RAY EXAM HIP UNI 2-3 VIEWS: CPT | Performed by: INTERNAL MEDICINE

## 2024-08-20 PROCEDURE — 99213 OFFICE O/P EST LOW 20 MIN: CPT | Performed by: INTERNAL MEDICINE

## 2024-08-20 NOTE — PROGRESS NOTES
Subjective:     Patient ID: Ashlie Levi is a 78 y.o. female.    Chief Complaint:    History of Present Illness  Ashlie Levi returns to clinic today for evaluation of right hip status post total hip arthroplasty 1 year ago on 2023.  The patient states that she still having anterior groin pain on her right and her left side.  She recently saw neurosurgery and a CT myelogram was ordered which has not been performed yet.  Patient states that she cannot lift her leg up all the way on the right side but 1 demonstrating what she can and cannot do it is equal to the contralateral side.  She denies any fever chills or drainage from her surgical incision.     Social History     Occupational History    Not on file   Tobacco Use    Smoking status: Former     Current packs/day: 0.00     Average packs/day: 1 pack/day for 50.0 years (50.0 ttl pk-yrs)     Types: Cigarettes     Start date: 10/13/1956     Quit date: 10/13/2006     Years since quittin.8     Passive exposure: Never    Smokeless tobacco: Never    Tobacco comments:     quit 2006   Vaping Use    Vaping status: Never Used   Substance and Sexual Activity    Alcohol use: Yes     Comment: occasional    Drug use: No    Sexual activity: Not Currently     Partners: Male     Birth control/protection: Surgical, Post-menopausal     Comment: GREG with OC      Past Medical History:   Diagnosis Date    Abnormal electrocardiogram     2006 heart attack    Arthritis     Chest discomfort     Colon polyp     Coronary artery disease     Disease of thyroid gland     Diverticulitis     Elevated cholesterol     GERD (gastroesophageal reflux disease)     Hyperlipidemia     Hypertension      Past Surgical History:   Procedure Laterality Date    APPENDECTOMY      CARDIAC SURGERY      3 stents    CHOLECYSTECTOMY      COLONOSCOPY N/A 2016    Procedure: COLONOSCOPY;  Surgeon: Giovani Avelar MD;  Location: [facility];  Service:     COLONOSCOPY N/A 2019    Procedure:  Colonoscopy with possible biopsy or polypectomy;  Surgeon: Alfreda Soler DO;  Location:  [facility];  Service: Gastroenterology    COLONOSCOPY N/A 05/18/2021    Procedure: Colonoscopy with polypectomy, biopsy;  Surgeon: Alfreda Soler DO;  Location:  [facility];  Service: Gastroenterology;  Laterality: N/A;  diverticulosis  cecal biopsy  right colon polyp  left colon polyps x2    CORONARY STENT PLACEMENT      ENDOSCOPY N/A 11/21/2017    Procedure: ESOPHAGOGASTRODUODENOSCOPY with CARIN test ;  Surgeon: Alfreda Soler DO;  Location:  [facility];  Service:     ENDOSCOPY N/A 06/13/2019    Procedure: Esophagogastroduodenoscopy with possible biopsy, polypectomy, dilation;  Surgeon: Alfreda Soler DO;  Location:  [facility];  Service: Gastroenterology    ENDOSCOPY N/A 05/18/2021    Procedure: Esophagogastroduodenoscopy with biopsies;  Surgeon: Alfreda Soler DO;  Location:  [facility];  Service: Gastroenterology;  Laterality: N/A;  CARIN test  gastritis  duodenitis    EPIDURAL BLOCK  04/30/2024    EYE SURGERY      FOOT GANGLION EXCISION Left     HEMORROIDECTOMY      HERNIA REPAIR      HYSTERECTOMY      GREG with OC age 30 for DUB    INJECTION OF MEDICATION Bilateral 03/26/2024    piriformis    MEDIAL BRANCH BLOCK Bilateral     lumbar    MEDIAL BRANCH BLOCK Bilateral 06/11/2024    L3-L5    OOPHORECTOMY      dx lsc , interval USO    TOTAL HIP ARTHROPLASTY Right 08/23/2023    Procedure: TOTAL HIP ARTHROPLASTY ANTERIOR WITH HANA TABLE;  Surgeon: Aidan Wallace MD;  Location:  [facility];  Service: Orthopedics;  Laterality: Right;       Family History   Problem Relation Age of Onset    Hypertension Mother     Stroke Mother     Diabetes Father     Hypertension Father     Heart disease Father     Stroke Father     Heart disease Brother     Colon cancer Brother         dx > 50    Breast cancer Neg Hx     Ovarian cancer Neg Hx     Deep vein thrombosis Neg Hx     Pulmonary embolism Neg Hx               "    Objective:  Vitals:    08/20/24 1309   Weight: 69.9 kg (154 lb)   Height: 152.4 cm (60\")         08/20/24  1309   Weight: 69.9 kg (154 lb)     Body mass index is 30.08 kg/m².        Right Hip Exam     Tenderness   The patient is experiencing tenderness in the anterior.    Range of Motion   Flexion:  normal   External rotation:  normal   Internal rotation:  normal     Muscle Strength   Flexion: 4/5     Tests   LAKSHMI: negative  Fadir:  Negative FADIR test    Other   Erythema: absent  Scars: absent  Sensation: normal  Pulse: present      Left Hip Exam     Tenderness   The patient is experiencing tenderness in the anterior.    Range of Motion   Flexion:  normal   External rotation:  normal   Internal rotation: normal     Muscle Strength   Flexion: 4/5     Tests   LAKSHMI: negative  Fadir:  Negative FADIR test    Other   Erythema: absent  Scars: absent  Sensation: normal  Pulse: present             Imaging: 3 views of the right hip and pelvis was ordered and reviewed by myself in the office today  Indication: left hip replacement  Findings: X-rays demonstrate a right total hip arthroplasty with implants in expected position. Leg lengths and offset appear clinically equal based off radiographic markers.  No signs of fracture, dislocation, subluxation, subsidence, or migration.  Comparative studies: last visit radiographs        Assessment:        1. Status post total hip replacement, right    2. Left hip pain           Plan:        Discussed treatment options at length with patient at today's visit.  I discussed with the patient that I do not have a good explanation for what is causing her bilateral groin pain.  Radiographically her hip looks excellent.  I discussed with her that I think the best course of action will be to order an MRI of her pelvis to evaluate the chondral surface of her contralateral hip and to evaluate for any other causes of hip pain/groin pain.  Would like to see the patient back following the MRI " to go over the results and next steps.  Follow up: After MRI without x-rays      Ashlie Levi was in agreement with plan and had all questions answered.     Medications:  No orders of the defined types were placed in this encounter.      Followup:  No follow-ups on file.    Diagnoses and all orders for this visit:    1. Status post total hip replacement, right (Primary)  -     XR Hip With or Without Pelvis 2 - 3 View Right  -     MRI Pelvis Without Contrast; Future    2. Left hip pain  -     MRI Pelvis Without Contrast; Future          Dictated utilizing Dragon dictation

## 2024-08-28 ENCOUNTER — TELEPHONE (OUTPATIENT)
Dept: NEUROSURGERY | Facility: CLINIC | Age: 79
End: 2024-08-28
Payer: MEDICARE

## 2024-08-28 NOTE — TELEPHONE ENCOUNTER
Spoke with  and explained to him that Dr Thompson does the myelograms here in at the [facility], not [facility], and that he does the myelograms on Tuesdays and Thursday morning in the radiology department and their arrival time would be at 6:00 am.  stated that he is NOT an early morning person and will not allow her to drive here at that time in the morning. I told the  that I will speak to Dr Thompson and see if he will be okay if they had the myelogram done at [facility], and I will call them after speaking with Dr Thompson.  and patient stated they understood.

## 2024-08-28 NOTE — TELEPHONE ENCOUNTER
Patients  called scheduling to set up myelogram.     Scheduling transferred patient to office.     I explained to patients  that Dr. Thompson performs them himself in [place]. Please call patient.

## 2024-08-29 ENCOUNTER — TELEPHONE (OUTPATIENT)
Dept: NEUROSURGERY | Facility: CLINIC | Age: 79
End: 2024-08-29
Payer: MEDICARE

## 2024-08-29 NOTE — TELEPHONE ENCOUNTER
S/W PATIENT'S  AND EXPLAINED PER DR. HURT TO REFER TO DR. MERCED CHAN W/UOFL HE EXPRESSED UNDERSTANDING

## 2024-08-29 NOTE — TELEPHONE ENCOUNTER
I talked to this patient and her  this morning.  They had requested to do the myelogram at [facility].  I explained that I do the test myself and it is very frequent that when the radiologist does it I do not get adequate information.  Therefore I am not willing to have them do the myelogram at [facility] under my orders.  We will give them the name of a spine surgeon in [place] and see if they want to see him instead.

## 2024-08-30 ENCOUNTER — HOSPITAL ENCOUNTER (OUTPATIENT)
Dept: MRI IMAGING | Facility: HOSPITAL | Age: 79
Discharge: HOME | End: 2024-08-30
Payer: MEDICARE

## 2024-08-30 DIAGNOSIS — M25.552 LEFT HIP PAIN: ICD-10-CM

## 2024-08-30 DIAGNOSIS — Z96.641 STATUS POST TOTAL HIP REPLACEMENT, RIGHT: ICD-10-CM

## 2024-08-30 PROCEDURE — 72195 MRI PELVIS W/O DYE: CPT

## 2024-09-10 RX ORDER — AMLODIPINE BESYLATE 5 MG/1
TABLET ORAL
Qty: 90 TABLET | Refills: 0 | Status: SHIPPED | OUTPATIENT
Start: 2024-09-10

## 2024-09-17 ENCOUNTER — TELEPHONE (OUTPATIENT)
Dept: ORTHOPEDIC SURGERY | Facility: CLINIC | Age: 79
End: 2024-09-17

## 2024-09-17 RX ORDER — IRBESARTAN 300 MG/1
TABLET ORAL
Qty: 90 TABLET | Refills: 0 | Status: SHIPPED | OUTPATIENT
Start: 2024-09-17

## 2024-09-17 RX ORDER — CARVEDILOL 6.25 MG/1
TABLET ORAL
Qty: 180 TABLET | Refills: 0 | Status: SHIPPED | OUTPATIENT
Start: 2024-09-17

## 2024-09-17 NOTE — TELEPHONE ENCOUNTER
Caller: Ashlie Levi    Relationship to patient: Self    Best call back number: [phone] (home)     Chief complaint: **MRI FOLLOW UP/ RIGHT HIP, MRI results in imaging     Type of visit: MRI FOLLOW UP    Requested date: ASAP    If rescheduling, when is the original appointment: 9/17/24     Additional notes: MS LEVI IS NOT FEELING WELL TODAY AND CANNOT MAKE HER APPT. THE NEXT AVAILABLE APPT IS 10/29/24 SHE WOULD LIKE A SOONER APPT THAN THAT OR JUST A CALL WITH THE MRI RESULTS

## 2024-09-23 ENCOUNTER — TELEPHONE (OUTPATIENT)
Dept: SURGERY | Facility: CLINIC | Age: 79
End: 2024-09-23
Payer: MEDICARE

## 2024-09-27 ENCOUNTER — OFFICE VISIT (OUTPATIENT)
Dept: ORTHOPEDIC SURGERY | Facility: CLINIC | Age: 79
End: 2024-09-27
Payer: MEDICARE

## 2024-09-27 VITALS — WEIGHT: 156 LBS | BODY MASS INDEX: 30.63 KG/M2 | HEIGHT: 60 IN

## 2024-09-27 DIAGNOSIS — M25.552 LEFT HIP PAIN: ICD-10-CM

## 2024-09-27 DIAGNOSIS — Z96.641 STATUS POST TOTAL HIP REPLACEMENT, RIGHT: Primary | ICD-10-CM

## 2024-09-30 ENCOUNTER — PREP FOR SURGERY (OUTPATIENT)
Dept: OTHER | Facility: HOSPITAL | Age: 79
End: 2024-09-30
Payer: MEDICARE

## 2024-09-30 DIAGNOSIS — Z86.0100 HISTORY OF COLON POLYPS: Primary | ICD-10-CM

## 2024-10-03 RX ORDER — OMEPRAZOLE 40 MG/1
40 CAPSULE, DELAYED RELEASE ORAL DAILY
Qty: 90 CAPSULE | Refills: 4 | Status: SHIPPED | OUTPATIENT
Start: 2024-10-03

## 2024-10-07 ENCOUNTER — ANESTHESIA EVENT (OUTPATIENT)
Dept: PERIOP | Facility: HOSPITAL | Age: 79
End: 2024-10-07
Payer: MEDICARE

## 2024-10-08 ENCOUNTER — ANESTHESIA (OUTPATIENT)
Dept: PERIOP | Facility: HOSPITAL | Age: 79
End: 2024-10-08
Payer: MEDICARE

## 2024-10-08 ENCOUNTER — HOSPITAL ENCOUNTER (OUTPATIENT)
Facility: HOSPITAL | Age: 79
Setting detail: HOSPITAL OUTPATIENT SURGERY
Discharge: HOME | End: 2024-10-08
Attending: SURGERY | Admitting: SURGERY
Payer: MEDICARE

## 2024-10-08 VITALS
SYSTOLIC BLOOD PRESSURE: 113 MMHG | RESPIRATION RATE: 18 BRPM | TEMPERATURE: 97.8 F | DIASTOLIC BLOOD PRESSURE: 68 MMHG | OXYGEN SATURATION: 95 % | WEIGHT: 152.4 LBS | HEART RATE: 50 BPM | BODY MASS INDEX: 29.76 KG/M2

## 2024-10-08 DIAGNOSIS — Z86.0100 HISTORY OF COLON POLYPS: ICD-10-CM

## 2024-10-08 PROCEDURE — 45380 COLONOSCOPY AND BIOPSY: CPT | Performed by: SURGERY

## 2024-10-08 PROCEDURE — 25010000002 LIDOCAINE 2% SOLUTION: Performed by: NURSE ANESTHETIST, CERTIFIED REGISTERED

## 2024-10-08 PROCEDURE — 25010000002 PROPOFOL 200 MG/20ML EMULSION: Performed by: NURSE ANESTHETIST, CERTIFIED REGISTERED

## 2024-10-08 PROCEDURE — 88305 TISSUE EXAM BY PATHOLOGIST: CPT | Performed by: SURGERY

## 2024-10-08 PROCEDURE — 25810000003 LACTATED RINGERS PER 1000 ML: Performed by: ANESTHESIOLOGY

## 2024-10-08 RX ORDER — SODIUM CHLORIDE, SODIUM LACTATE, POTASSIUM CHLORIDE, CALCIUM CHLORIDE 600; 310; 30; 20 MG/100ML; MG/100ML; MG/100ML; MG/100ML
100 INJECTION, SOLUTION INTRAVENOUS ONCE
Status: DISCONTINUED | OUTPATIENT
Start: 2024-10-08 | End: 2024-10-08 | Stop reason: HOSPADM

## 2024-10-08 RX ORDER — LIDOCAINE HYDROCHLORIDE 20 MG/ML
INJECTION, SOLUTION INFILTRATION; PERINEURAL AS NEEDED
Status: DISCONTINUED | OUTPATIENT
Start: 2024-10-08 | End: 2024-10-08 | Stop reason: SURG

## 2024-10-08 RX ORDER — ONDANSETRON 2 MG/ML
4 INJECTION INTRAMUSCULAR; INTRAVENOUS ONCE AS NEEDED
Status: DISCONTINUED | OUTPATIENT
Start: 2024-10-08 | End: 2024-10-08 | Stop reason: HOSPADM

## 2024-10-08 RX ORDER — SODIUM CHLORIDE, SODIUM LACTATE, POTASSIUM CHLORIDE, CALCIUM CHLORIDE 600; 310; 30; 20 MG/100ML; MG/100ML; MG/100ML; MG/100ML
9 INJECTION, SOLUTION INTRAVENOUS CONTINUOUS
Status: DISCONTINUED | OUTPATIENT
Start: 2024-10-08 | End: 2024-10-08 | Stop reason: HOSPADM

## 2024-10-08 RX ORDER — PROPOFOL 10 MG/ML
INJECTION, EMULSION INTRAVENOUS AS NEEDED
Status: DISCONTINUED | OUTPATIENT
Start: 2024-10-08 | End: 2024-10-08 | Stop reason: SURG

## 2024-10-08 RX ORDER — LIDOCAINE HYDROCHLORIDE 10 MG/ML
0.5 INJECTION, SOLUTION INFILTRATION; PERINEURAL ONCE AS NEEDED
Status: DISCONTINUED | OUTPATIENT
Start: 2024-10-08 | End: 2024-10-08 | Stop reason: HOSPADM

## 2024-10-08 RX ADMIN — LIDOCAINE HYDROCHLORIDE 100 MG: 20 INJECTION, SOLUTION INFILTRATION; PERINEURAL at 09:16

## 2024-10-08 RX ADMIN — PROPOFOL 100 MG: 10 INJECTION, EMULSION INTRAVENOUS at 09:30

## 2024-10-08 RX ADMIN — PROPOFOL 100 MG: 10 INJECTION, EMULSION INTRAVENOUS at 09:16

## 2024-10-08 RX ADMIN — PROPOFOL 100 MG: 10 INJECTION, EMULSION INTRAVENOUS at 09:58

## 2024-10-08 RX ADMIN — SODIUM CHLORIDE, POTASSIUM CHLORIDE, SODIUM LACTATE AND CALCIUM CHLORIDE: 600; 310; 30; 20 INJECTION, SOLUTION INTRAVENOUS at 09:13

## 2024-10-08 RX ADMIN — PROPOFOL 100 MG: 10 INJECTION, EMULSION INTRAVENOUS at 09:44

## 2024-10-08 NOTE — ANESTHESIA PREPROCEDURE EVALUATION
Anesthesia Evaluation     Patient summary reviewed and Nursing notes reviewed   NPO Solid Status: > 8 hours  NPO Liquid Status: > 8 hours           Airway   Mallampati: II  TM distance: >3 FB  Neck ROM: full  No difficulty expected  Dental    (+) implants    Comment: Bottom teeth are all caps and top teeth are all implants.     Pulmonary - normal exam   (+) a smoker (quit 2006) Former, cigarettes,  Cardiovascular   Exercise tolerance: good (4-7 METS)    ECG reviewed  Patient on routine beta blocker  Rhythm: regular  Rate: normal    (+) hypertension 2 medications or greater, past MI  >12 months, CAD, cardiac stents more than 12 months ago , hyperlipidemia  (-) angina    ROS comment: Saw cardiologist Dr. Granado in 07/2024. Notes:   She had a stress test performed  5/2019 that demonstrated normal left ventricular and no ischemia.    Neuro/Psych  (+) numbness (improving lumbar radiculopathy)  GI/Hepatic/Renal/Endo    (+) GERD well controlled, thyroid problem hypothyroidism    ROS Comment: Colon polyps      Musculoskeletal     Abdominal    Substance History      OB/GYN          Other   arthritis,                       Anesthesia Plan    ASA 3     MAC       Anesthetic plan, risks, benefits, and alternatives have been provided, discussed and informed consent has been obtained with: patient.  Pre-procedure education provided  Use of blood products discussed with patient  Consented to blood products.    Plan discussed with CRNA.        CODE STATUS:

## 2024-10-08 NOTE — H&P
General Surgery      Patient Care Team:  Kole Silva MD as PCP - General (Family Medicine)    CHIEF COMPLAINT: History of colon polyps    HISTORY OF PRESENT ILLNESS:    Ashlie is here for her 3-year repeat colonoscopy.  She has a history of adenomatous colon polyps.  She is doing well.      Past Medical History:   Diagnosis Date    Abnormal electrocardiogram     2006 heart attack    Arthritis     Chest discomfort     Colon polyp     Coronary artery disease     Disease of thyroid gland     Diverticulitis     Elevated cholesterol     GERD (gastroesophageal reflux disease)     Hyperlipidemia     Hypertension      Past Surgical History:   Procedure Laterality Date    APPENDECTOMY      CARDIAC SURGERY      3 stents    CHOLECYSTECTOMY      COLONOSCOPY N/A 07/13/2016    Procedure: COLONOSCOPY;  Surgeon: Giovani Avelar MD;  Location: [facility] OR;  Service:     COLONOSCOPY N/A 06/13/2019    Procedure: Colonoscopy with possible biopsy or polypectomy;  Surgeon: Alfreda Soler DO;  Location: [facility] OR;  Service: Gastroenterology    COLONOSCOPY N/A 05/18/2021    Procedure: Colonoscopy with polypectomy, biopsy;  Surgeon: Alfreda Soler DO;  Location: [facility] OR;  Service: Gastroenterology;  Laterality: N/A;  diverticulosis  cecal biopsy  right colon polyp  left colon polyps x2    CORONARY STENT PLACEMENT      ENDOSCOPY N/A 11/21/2017    Procedure: ESOPHAGOGASTRODUODENOSCOPY with CARIN test ;  Surgeon: Alfreda Soler DO;  Location: [facility] OR;  Service:     ENDOSCOPY N/A 06/13/2019    Procedure: Esophagogastroduodenoscopy with possible biopsy, polypectomy, dilation;  Surgeon: Alfreda Soler DO;  Location: [facility] OR;  Service: Gastroenterology    ENDOSCOPY N/A 05/18/2021    Procedure: Esophagogastroduodenoscopy with biopsies;  Surgeon: Alfreda Soler DO;  Location: [facility] OR;  Service: Gastroenterology;  Laterality: N/A;  CARIN test  gastritis  duodenitis    EPIDURAL BLOCK  04/30/2024    EYE  SURGERY      FOOT GANGLION EXCISION Left     HEMORROIDECTOMY      HERNIA REPAIR      HYSTERECTOMY      GREG with OC age 30 for DUB    INJECTION OF MEDICATION Bilateral 2024    piriformis    MEDIAL BRANCH BLOCK Bilateral     lumbar    MEDIAL BRANCH BLOCK Bilateral 2024    L3-L5    OOPHORECTOMY      dx lsc , interval USO    TOTAL HIP ARTHROPLASTY Right 2023    Procedure: TOTAL HIP ARTHROPLASTY ANTERIOR WITH HANA TABLE;  Surgeon: Aidan Wallace MD;  Location: [facility];  Service: Orthopedics;  Laterality: Right;     Family History   Problem Relation Age of Onset    Hypertension Mother     Stroke Mother     Diabetes Father     Hypertension Father     Heart disease Father     Stroke Father     Heart disease Brother     Colon cancer Brother         dx > 50    Breast cancer Neg Hx     Ovarian cancer Neg Hx     Deep vein thrombosis Neg Hx     Pulmonary embolism Neg Hx      Social History     Tobacco Use    Smoking status: Former     Current packs/day: 0.00     Average packs/day: 1 pack/day for 50.0 years (50.0 ttl pk-yrs)     Types: Cigarettes     Start date: 10/13/1956     Quit date: 10/13/2006     Years since quittin.0     Passive exposure: Never    Smokeless tobacco: Never    Tobacco comments:     quit    Vaping Use    Vaping status: Never Used   Substance Use Topics    Alcohol use: Yes     Comment: occasional    Drug use: No     Medications Prior to Admission   Medication Sig Dispense Refill Last Dose    acetaminophen (TYLENOL) 500 MG tablet Take 1 tablet by mouth Every 6 (Six) Hours As Needed for Mild Pain.       amLODIPine (NORVASC) 5 MG tablet Take 1 tablet by mouth once daily 90 tablet 0     aspirin 81 MG EC tablet Take 1 tablet by mouth 2 (Two) Times a Day. (Patient taking differently: Take 1 tablet by mouth Daily.) 60 tablet 0     atorvastatin (LIPITOR) 20 MG tablet Take 1 tablet by mouth Every Night.       carvedilol (COREG) 6.25 MG tablet Take 1 tablet by mouth twice daily 180  tablet 0     citalopram (CeleXA) 40 MG tablet Take 1 tablet by mouth Daily.       irbesartan (AVAPRO) 300 MG tablet TAKE 1 TABLET BY MOUTH ONCE DAILY AT NIGHT 90 tablet 0     isosorbide mononitrate (IMDUR) 30 MG 24 hr tablet Take 1 tablet by mouth Daily.       levothyroxine (SYNTHROID, LEVOTHROID) 112 MCG tablet Take 1 tablet by mouth Daily.       meloxicam (MOBIC) 7.5 MG tablet Take 1 tablet by mouth Daily. (Patient taking differently: Take 2 tablets by mouth Daily.) 90 tablet 3     nitroglycerin (NITROSTAT) 0.4 MG SL tablet Place 1 tablet under the tongue Every 5 (Five) Minutes As Needed for Chest Pain. Take no more than 3 doses in 15 minutes and call EMS. 25 tablet 1     omeprazole (priLOSEC) 40 MG capsule Take 1 capsule by mouth once daily 90 capsule 4      Allergies:  Penicillins    REVIEW OF SYSTEMS:  Please see the above history of present illness for pertinent positives and negatives.  The remainder of the patient's systems have been reviewed and are negative.     Vital Signs            Physical Exam:  Physical Exam   Constitutional: Patient appears well-developed and well-nourished and in no acute distress   HEENT:   Head: Normocephalic and atraumatic.   Musculoskeletal: Normal posture.  Extremities: No edema.   Neurological: Patient is alert and oriented.  Psychological:   Mood and behavior appropriate.  Skin: Skin is warm and dry.    Debilities/Disabilities Identified: None    Emotional Behavior: Appropriate           Results Review:    I reviewed the patient's new clinical results.  Lab Results (most recent)       None            Imaging Results (Most Recent)       None              ECG/EMG Results (most recent)       None              Assessment & Plan     History of colon polyps  I discussed with Helder the benefits risks of a colonoscopy.  All of her questions were answered and she is willing to proceed.      Alfreda Soler DO  10/08/24  08:48 EDT

## 2024-10-08 NOTE — ANESTHESIA POSTPROCEDURE EVALUATION
Patient: Ashlie Levi    Procedure Summary       Date: 10/08/24 Room / Location: [facility] ENDOSCOPY 1 /  [facility] OR    Anesthesia Start: 0913 Anesthesia Stop: 1012    Procedure: COLONOSCOPY WITH POLYPECTOMY Diagnosis:       History of colon polyps      (History of colon polyps [Z86.0100])    Surgeons: Alfreda Soler DO Provider: Ian Cueto CRNA    Anesthesia Type: MAC ASA Status: 3            Anesthesia Type: MAC    Vitals  Vitals Value Taken Time   /68 10/08/24 1058   Temp     Pulse 51 10/08/24 1058   Resp 18 10/08/24 1050   SpO2 95 % 10/08/24 1058   Vitals shown include unfiled device data.        Post Anesthesia Care and Evaluation    Patient location during evaluation: PHASE II  Patient participation: complete - patient participated  Level of consciousness: awake and alert  Pain management: adequate    Airway patency: patent  Anesthetic complications: No anesthetic complications  PONV Status: none  Cardiovascular status: acceptable  Respiratory status: acceptable  Hydration status: acceptable

## 2024-10-08 NOTE — OP NOTE
Colonoscopy Procedure Note      Pre-operative Diagnosis: History of colon polyps    Post-operative Diagnosis: Diverticulosis, sigmoid colon polyp, right colon polyp    Procedure: Colonoscopy with polypectomy using cold forceps    Surgeon: Karlene    Anesthetic: MAC    Estimated Blood Loss: Minimal    Complications: None    Indications: History of colon polyps    Findings/Treatments:   Diverticulosis-increase fiber with Metamucil and water intake  Right colon polyp-removed cold forceps  Sigmoid colon polyp-removed cold forceps       Scope Withdrawal Time: 6 minutes      Recommendations: Await pathology      Procedure Details     After discussing the benefits and risks of the procedure with the patient, not limited to but including:  Bleeding, infection, perforation, aspiration; informed consent was signed.  The patient was taken into the endoscopy room at [facility] and placed in the left lateral decubitus position.  MAC anesthesia was given with appropriate cardiopulmonary monitoring.  A rectal exam was performed.  Sphincter tone was normal.  The colonoscope was then inserted and carefully advanced to the cecum while visualizing the mucosa.  The cecum was identified by the ileocecal valve and the orifice of the appendix.  Ashlie had a very redundant colon.  Once in the cecum the scope was withdrawn to the right colon where a polyp was removed with cold forceps.  The scope was then slowly withdrawn while carefully evaluating the mucosa and deflating air.  She had moderate diverticulosis of the sigmoid colon as well as a polyp that was removed using cold forceps.  The rectum was normal.  The scope was removed and Ashlie was taken to the recovery area in stable postoperative condition having tolerated her procedure well.    Alfreda Soler DO

## 2024-10-09 LAB
CYTO UR: NORMAL
LAB AP CASE REPORT: NORMAL
LAB AP DIAGNOSIS COMMENT: NORMAL
PATH REPORT.FINAL DX SPEC: NORMAL
PATH REPORT.GROSS SPEC: NORMAL

## 2024-10-21 ENCOUNTER — OFFICE VISIT (OUTPATIENT)
Dept: SURGERY | Facility: CLINIC | Age: 79
End: 2024-10-21
Payer: MEDICARE

## 2024-10-21 VITALS
HEART RATE: 72 BPM | SYSTOLIC BLOOD PRESSURE: 116 MMHG | RESPIRATION RATE: 20 BRPM | WEIGHT: 155 LBS | DIASTOLIC BLOOD PRESSURE: 74 MMHG | BODY MASS INDEX: 30.43 KG/M2 | HEIGHT: 60 IN

## 2024-10-21 DIAGNOSIS — K92.1 BLACK STOOL: ICD-10-CM

## 2024-10-21 DIAGNOSIS — R10.30 LOWER ABDOMINAL PAIN: ICD-10-CM

## 2024-10-21 DIAGNOSIS — R10.13 EPIGASTRIC PAIN: Primary | ICD-10-CM

## 2024-10-21 PROCEDURE — 1159F MED LIST DOCD IN RCRD: CPT | Performed by: SURGERY

## 2024-10-21 PROCEDURE — 3074F SYST BP LT 130 MM HG: CPT | Performed by: SURGERY

## 2024-10-21 PROCEDURE — 3078F DIAST BP <80 MM HG: CPT | Performed by: SURGERY

## 2024-10-21 PROCEDURE — 99214 OFFICE O/P EST MOD 30 MIN: CPT | Performed by: SURGERY

## 2024-10-21 PROCEDURE — 1160F RVW MEDS BY RX/DR IN RCRD: CPT | Performed by: SURGERY

## 2024-10-21 NOTE — LETTER
October 21, 2024     Kole Silva MD  [address] [place] [address]    Patient: Ashlie Levi   YOB: 1945   Date of Visit: 10/21/2024     Dear Kole Silva MD:       Thank you for referring Ashlie Levi to me for evaluation. Below are the relevant portions of my assessment and plan of care.    If you have questions, please do not hesitate to call me. I look forward to following Ashlie along with you.         Sincerely,        Alfreda Soler DO        CC: No Recipients    Alfreda Soler DO  10/21/24 1246  Sign when Signing Visit  Ashlie Levi 79 y.o. female presents as a self ref with c/o abd pain, fatigue, and black stools.  Pt states for many years she notes episodes of multiple BM in a day and feels bad.    Chief Complaint   Patient presents with   • Abdominal Pain             HPI   Helder is well-known to service.  I recently did a colonoscopy on her and found diverticulosis, sigmoid colon polyp, and right colon polyp.  She presents today with complaints of abdominal pain and passing black stool.  She says her pain is lower and hurts when she has to move her bowels but on physical exam she does have a lot of epigastric pain as well.  She has been having a lot of issues with constipation as well.  She has no other complaints.      Review of Systems   All other systems reviewed and are negative.            Current Outpatient Medications:   •  acetaminophen (TYLENOL) 500 MG tablet, Take 1 tablet by mouth Every 6 (Six) Hours As Needed for Mild Pain., Disp: , Rfl:   •  amLODIPine (NORVASC) 5 MG tablet, Take 1 tablet by mouth once daily, Disp: 90 tablet, Rfl: 0  •  aspirin 81 MG EC tablet, Take 1 tablet by mouth 2 (Two) Times a Day. (Patient taking differently: Take 1 tablet by mouth Daily.), Disp: 60 tablet, Rfl: 0  •  atorvastatin (LIPITOR) 20 MG tablet, Take 1 tablet by mouth Every Night., Disp: , Rfl:   •  carvedilol (COREG) 6.25 MG tablet, Take 1 tablet by mouth twice  daily, Disp: 180 tablet, Rfl: 0  •  citalopram (CeleXA) 40 MG tablet, Take 1 tablet by mouth Daily., Disp: , Rfl:   •  irbesartan (AVAPRO) 300 MG tablet, TAKE 1 TABLET BY MOUTH ONCE DAILY AT NIGHT, Disp: 90 tablet, Rfl: 0  •  isosorbide mononitrate (IMDUR) 30 MG 24 hr tablet, Take 1 tablet by mouth Daily., Disp: , Rfl:   •  levothyroxine (SYNTHROID, LEVOTHROID) 112 MCG tablet, Take 1 tablet by mouth Daily., Disp: , Rfl:   •  meloxicam (MOBIC) 7.5 MG tablet, Take 1 tablet by mouth Daily. (Patient taking differently: Take 2 tablets by mouth Daily.), Disp: 90 tablet, Rfl: 3  •  nitroglycerin (NITROSTAT) 0.4 MG SL tablet, Place 1 tablet under the tongue Every 5 (Five) Minutes As Needed for Chest Pain. Take no more than 3 doses in 15 minutes and call EMS., Disp: 25 tablet, Rfl: 1  •  omeprazole (priLOSEC) 40 MG capsule, Take 1 capsule by mouth once daily, Disp: 90 capsule, Rfl: 4        No Known Allergies        Past Medical History:   Diagnosis Date   • Abnormal electrocardiogram     2006 heart attack   • Arthritis    • Chest discomfort    • Colon polyp    • Coronary artery disease    • Disease of thyroid gland    • Diverticulitis    • Elevated cholesterol    • GERD (gastroesophageal reflux disease)    • Heart attack    • Hyperlipidemia    • Hypertension            Past Surgical History:   Procedure Laterality Date   • APPENDECTOMY     • CARDIAC SURGERY      3 stents   • CHOLECYSTECTOMY     • COLONOSCOPY N/A 07/13/2016    Procedure: COLONOSCOPY;  Surgeon: Giovani Avelar MD;  Location: [facility] OR;  Service:    • COLONOSCOPY N/A 06/13/2019    Procedure: Colonoscopy with possible biopsy or polypectomy;  Surgeon: Alfreda Soler DO;  Location: [facility] OR;  Service: Gastroenterology   • COLONOSCOPY N/A 05/18/2021    Procedure: Colonoscopy with polypectomy, biopsy;  Surgeon: Alfreda Soler DO;  Location: [facility] OR;  Service: Gastroenterology;  Laterality: N/A;  diverticulosis  cecal biopsy  right colon  polyp  left colon polyps x2   • COLONOSCOPY W/ POLYPECTOMY N/A 10/8/2024    Procedure: COLONOSCOPY WITH POLYPECTOMY;  Surgeon: Alfreda Soler DO;  Location:  [facility] OR;  Service: Gastroenterology;  Laterality: N/A;  diverticulosis, sigmoid colon polyp, right colon polyp   • CORONARY STENT PLACEMENT     • ENDOSCOPY N/A 2017    Procedure: ESOPHAGOGASTRODUODENOSCOPY with CARIN test ;  Surgeon: Alfreda Soler DO;  Location:  [facility] OR;  Service:    • ENDOSCOPY N/A 2019    Procedure: Esophagogastroduodenoscopy with possible biopsy, polypectomy, dilation;  Surgeon: Alfreda Soler DO;  Location:  [facility] OR;  Service: Gastroenterology   • ENDOSCOPY N/A 2021    Procedure: Esophagogastroduodenoscopy with biopsies;  Surgeon: Alfreda Soler DO;  Location: [facility] OR;  Service: Gastroenterology;  Laterality: N/A;  CARIN test  gastritis  duodenitis   • EPIDURAL BLOCK  2024   • EYE SURGERY     • FOOT GANGLION EXCISION Left    • HEMORROIDECTOMY     • HERNIA REPAIR     • HYSTERECTOMY      GREG with OC age 30 for DUB   • INJECTION OF MEDICATION Bilateral 2024    piriformis   • MEDIAL BRANCH BLOCK Bilateral     lumbar   • MEDIAL BRANCH BLOCK Bilateral 2024    L3-L5   • OOPHORECTOMY      dx lsc , interval USO   • TOTAL HIP ARTHROPLASTY Right 2023    Procedure: TOTAL HIP ARTHROPLASTY ANTERIOR WITH HANA TABLE;  Surgeon: Aidan Wallace MD;  Location: [facility] OR;  Service: Orthopedics;  Laterality: Right;           Social History     Tobacco Use   • Smoking status: Former     Current packs/day: 0.00     Average packs/day: 1 pack/day for 50.0 years (50.0 ttl pk-yrs)     Types: Cigarettes     Start date: 10/13/1956     Quit date: 10/13/2006     Years since quittin.0     Passive exposure: Never   • Smokeless tobacco: Never   • Tobacco comments:     quit    Vaping Use   • Vaping status: Never Used   Substance Use Topics   • Alcohol use: Yes     Comment: occasional   • Drug  "use: No           Immunization History   Administered Date(s) Administered   • COVID-19 (MODERNA) 1st,2nd,3rd Dose Monovalent 01/14/2021, 02/11/2021, 11/02/2021   • COVID-19 (MODERNA) BIVALENT 12+YRS 11/18/2022   • Fluzone High-Dose 65+YRS 10/12/2018, 10/10/2019   • Fluzone High-Dose 65+yrs 10/24/2020, 10/05/2022   • Pneumococcal Conjugate 13-Valent (PCV13) 10/24/2020   • Pneumococcal Polysaccharide (PPSV23) 10/12/2018   • Shingrix 04/25/2023   • Td (TDVAX) 09/19/2002   • Tdap 10/05/2022   • Zostavax 07/01/2013           Physical Exam  Vitals and nursing note reviewed.   Constitutional:       Appearance: Normal appearance.   HENT:      Head: Normocephalic and atraumatic.   Cardiovascular:      Rate and Rhythm: Normal rate and regular rhythm.   Pulmonary:      Effort: Pulmonary effort is normal.      Breath sounds: Normal breath sounds.   Abdominal:      General: Bowel sounds are normal.      Palpations: Abdomen is soft.      Comments: Epigastric tenderness to palpation   Musculoskeletal:         General: No swelling or tenderness.   Skin:     General: Skin is warm and dry.   Neurological:      General: No focal deficit present.      Mental Status: She is alert and oriented to person, place, and time.   Psychiatric:         Mood and Affect: Mood normal.         Behavior: Behavior normal.         Debilities/Disabilities Identified: None    Emotional Behavior: Appropriate      /74   Pulse 72   Resp 20   Ht 152.4 cm (60\")   Wt 70.3 kg (155 lb)   LMP  (LMP Unknown)   BMI 30.27 kg/m²         Diagnoses and all orders for this visit:    1. Epigastric pain (Primary)    2. Black stool    3. Lower abdominal pain    I will get Ashlie scheduled for an EGD and we will obtain a CT scan of the abdomen pelvis.  We discussed the benefits and risks and all of her questions were answered and she is willing to proceed.    Thank you for allowing me to participate in the care of this interesting patient.         "

## 2024-10-21 NOTE — PROGRESS NOTES
Ashlie Levi 79 y.o. female presents as a self ref with c/o abd pain, fatigue, and black stools.  Pt states for many years she notes episodes of multiple BM in a day and feels bad.    Chief Complaint   Patient presents with    Abdominal Pain             HPI   Helder is well-known to service.  I recently did a colonoscopy on her and found diverticulosis, sigmoid colon polyp, and right colon polyp.  She presents today with complaints of abdominal pain and passing black stool.  She says her pain is lower and hurts when she has to move her bowels but on physical exam she does have a lot of epigastric pain as well.  She has been having a lot of issues with constipation as well.  She has no other complaints.      Review of Systems   All other systems reviewed and are negative.            Current Outpatient Medications:     acetaminophen (TYLENOL) 500 MG tablet, Take 1 tablet by mouth Every 6 (Six) Hours As Needed for Mild Pain., Disp: , Rfl:     amLODIPine (NORVASC) 5 MG tablet, Take 1 tablet by mouth once daily, Disp: 90 tablet, Rfl: 0    aspirin 81 MG EC tablet, Take 1 tablet by mouth 2 (Two) Times a Day. (Patient taking differently: Take 1 tablet by mouth Daily.), Disp: 60 tablet, Rfl: 0    atorvastatin (LIPITOR) 20 MG tablet, Take 1 tablet by mouth Every Night., Disp: , Rfl:     carvedilol (COREG) 6.25 MG tablet, Take 1 tablet by mouth twice daily, Disp: 180 tablet, Rfl: 0    citalopram (CeleXA) 40 MG tablet, Take 1 tablet by mouth Daily., Disp: , Rfl:     irbesartan (AVAPRO) 300 MG tablet, TAKE 1 TABLET BY MOUTH ONCE DAILY AT NIGHT, Disp: 90 tablet, Rfl: 0    isosorbide mononitrate (IMDUR) 30 MG 24 hr tablet, Take 1 tablet by mouth Daily., Disp: , Rfl:     levothyroxine (SYNTHROID, LEVOTHROID) 112 MCG tablet, Take 1 tablet by mouth Daily., Disp: , Rfl:     meloxicam (MOBIC) 7.5 MG tablet, Take 1 tablet by mouth Daily. (Patient taking differently: Take 2 tablets by mouth Daily.), Disp: 90 tablet, Rfl: 3    nitroglycerin  (NITROSTAT) 0.4 MG SL tablet, Place 1 tablet under the tongue Every 5 (Five) Minutes As Needed for Chest Pain. Take no more than 3 doses in 15 minutes and call EMS., Disp: 25 tablet, Rfl: 1    omeprazole (priLOSEC) 40 MG capsule, Take 1 capsule by mouth once daily, Disp: 90 capsule, Rfl: 4        No Known Allergies        Past Medical History:   Diagnosis Date    Abnormal electrocardiogram     2006 heart attack    Arthritis     Chest discomfort     Colon polyp     Coronary artery disease     Disease of thyroid gland     Diverticulitis     Elevated cholesterol     GERD (gastroesophageal reflux disease)     Heart attack     Hyperlipidemia     Hypertension            Past Surgical History:   Procedure Laterality Date    APPENDECTOMY      CARDIAC SURGERY      3 stents    CHOLECYSTECTOMY      COLONOSCOPY N/A 07/13/2016    Procedure: COLONOSCOPY;  Surgeon: Giovani Avelar MD;  Location: [facility] OR;  Service:     COLONOSCOPY N/A 06/13/2019    Procedure: Colonoscopy with possible biopsy or polypectomy;  Surgeon: Alfreda Soler DO;  Location: [facility] OR;  Service: Gastroenterology    COLONOSCOPY N/A 05/18/2021    Procedure: Colonoscopy with polypectomy, biopsy;  Surgeon: Alfreda Soler DO;  Location: [facility] OR;  Service: Gastroenterology;  Laterality: N/A;  diverticulosis  cecal biopsy  right colon polyp  left colon polyps x2    COLONOSCOPY W/ POLYPECTOMY N/A 10/8/2024    Procedure: COLONOSCOPY WITH POLYPECTOMY;  Surgeon: Alfreda Soler DO;  Location: [facility] OR;  Service: Gastroenterology;  Laterality: N/A;  diverticulosis, sigmoid colon polyp, right colon polyp    CORONARY STENT PLACEMENT      ENDOSCOPY N/A 11/21/2017    Procedure: ESOPHAGOGASTRODUODENOSCOPY with CARIN test ;  Surgeon: Alfreda Soler DO;  Location: [facility] OR;  Service:     ENDOSCOPY N/A 06/13/2019    Procedure: Esophagogastroduodenoscopy with possible biopsy, polypectomy, dilation;  Surgeon: Alfreda Soler DO;  Location:   [facility] OR;  Service: Gastroenterology    ENDOSCOPY N/A 2021    Procedure: Esophagogastroduodenoscopy with biopsies;  Surgeon: Alfreda Soler DO;  Location: [facility] OR;  Service: Gastroenterology;  Laterality: N/A;  CARIN test  gastritis  duodenitis    EPIDURAL BLOCK  2024    EYE SURGERY      FOOT GANGLION EXCISION Left     HEMORROIDECTOMY      HERNIA REPAIR      HYSTERECTOMY      GREG with OC age 30 for DUB    INJECTION OF MEDICATION Bilateral 2024    piriformis    MEDIAL BRANCH BLOCK Bilateral     lumbar    MEDIAL BRANCH BLOCK Bilateral 2024    L3-L5    OOPHORECTOMY      dx lsc , interval USO    TOTAL HIP ARTHROPLASTY Right 2023    Procedure: TOTAL HIP ARTHROPLASTY ANTERIOR WITH HANA TABLE;  Surgeon: Aidan Wallace MD;  Location: [facility] OR;  Service: Orthopedics;  Laterality: Right;           Social History     Tobacco Use    Smoking status: Former     Current packs/day: 0.00     Average packs/day: 1 pack/day for 50.0 years (50.0 ttl pk-yrs)     Types: Cigarettes     Start date: 10/13/1956     Quit date: 10/13/2006     Years since quittin.0     Passive exposure: Never    Smokeless tobacco: Never    Tobacco comments:     quit 2006   Vaping Use    Vaping status: Never Used   Substance Use Topics    Alcohol use: Yes     Comment: occasional    Drug use: No           Immunization History   Administered Date(s) Administered    COVID-19 (MODERNA) 1st,2nd,3rd Dose Monovalent 2021, 2021, 2021    COVID-19 (MODERNA) BIVALENT 12+YRS 2022    Fluzone High-Dose 65+YRS 10/12/2018, 10/10/2019    Fluzone High-Dose 65+yrs 10/24/2020, 10/05/2022    Pneumococcal Conjugate 13-Valent (PCV13) 10/24/2020    Pneumococcal Polysaccharide (PPSV23) 10/12/2018    Shingrix 2023    Td (TDVAX) 2002    Tdap 10/05/2022    Zostavax 2013           Physical Exam  Vitals and nursing note reviewed.   Constitutional:       Appearance: Normal appearance.   HENT:      Head:  "Normocephalic and atraumatic.   Cardiovascular:      Rate and Rhythm: Normal rate and regular rhythm.   Pulmonary:      Effort: Pulmonary effort is normal.      Breath sounds: Normal breath sounds.   Abdominal:      General: Bowel sounds are normal.      Palpations: Abdomen is soft.      Comments: Epigastric tenderness to palpation   Musculoskeletal:         General: No swelling or tenderness.   Skin:     General: Skin is warm and dry.   Neurological:      General: No focal deficit present.      Mental Status: She is alert and oriented to person, place, and time.   Psychiatric:         Mood and Affect: Mood normal.         Behavior: Behavior normal.         Debilities/Disabilities Identified: None    Emotional Behavior: Appropriate      /74   Pulse 72   Resp 20   Ht 152.4 cm (60\")   Wt 70.3 kg (155 lb)   LMP  (LMP Unknown)   BMI 30.27 kg/m²         Diagnoses and all orders for this visit:    1. Epigastric pain (Primary)    2. Black stool    3. Lower abdominal pain    I will get Ashlie scheduled for an EGD and we will obtain a CT scan of the abdomen pelvis.  We discussed the benefits and risks and all of her questions were answered and she is willing to proceed.    Thank you for allowing me to participate in the care of this interesting patient.         "

## 2024-10-22 DIAGNOSIS — K92.1 BLACK STOOL: ICD-10-CM

## 2024-10-22 DIAGNOSIS — R10.30 LOWER ABDOMINAL PAIN: ICD-10-CM

## 2024-10-22 DIAGNOSIS — R10.13 EPIGASTRIC PAIN: Primary | ICD-10-CM

## 2024-10-25 ENCOUNTER — PATIENT ROUNDING (BHMG ONLY) (OUTPATIENT)
Dept: SURGERY | Facility: CLINIC | Age: 79
End: 2024-10-25
Payer: MEDICARE

## 2024-10-25 NOTE — PROGRESS NOTES
October 25, 2024    Hello, may I speak with Ashlie Levi?    My name is José Antonio      I am  with [facility]  [address] [place] [id number] [phone].    Before we get started may I verify your date of birth? 1945    I am calling to officially welcome you to our practice and ask about your recent visit. Is this a good time to talk? yes    Tell me about your visit with us. What things went well?  everything went well.        We're always looking for ways to make our patients' experiences even better. Do you have recommendations on ways we may improve?  no    Overall were you satisfied with your first visit to our practice? yes       I appreciate you taking the time to speak with me today. Is there anything else I can do for you? no      Thank you, and have a great day.

## 2024-10-31 ENCOUNTER — TELEPHONE (OUTPATIENT)
Dept: SURGERY | Facility: CLINIC | Age: 79
End: 2024-10-31
Payer: MEDICARE

## 2024-10-31 NOTE — TELEPHONE ENCOUNTER
Micki called and reports pt fell and fx scapula.  Req resched proc 4 weeks out.  EGD resched 12/03/24 @ 0900.  CT resched 12/04/24 @ 0900.     SG SCHED notified.

## 2024-11-04 ENCOUNTER — TELEPHONE (OUTPATIENT)
Dept: ORTHOPEDIC SURGERY | Facility: CLINIC | Age: 79
End: 2024-11-04

## 2024-11-04 ENCOUNTER — OFFICE VISIT (OUTPATIENT)
Dept: ORTHOPEDIC SURGERY | Facility: CLINIC | Age: 79
End: 2024-11-04
Payer: MEDICARE

## 2024-11-04 VITALS
HEART RATE: 64 BPM | WEIGHT: 155 LBS | BODY MASS INDEX: 30.43 KG/M2 | DIASTOLIC BLOOD PRESSURE: 63 MMHG | HEIGHT: 60 IN | SYSTOLIC BLOOD PRESSURE: 108 MMHG

## 2024-11-04 DIAGNOSIS — S42.291A OTHER CLOSED DISPLACED FRACTURE OF PROXIMAL END OF RIGHT HUMERUS, INITIAL ENCOUNTER: ICD-10-CM

## 2024-11-04 DIAGNOSIS — M25.531 RIGHT WRIST PAIN: ICD-10-CM

## 2024-11-04 DIAGNOSIS — M89.8X2 PAIN OF RIGHT HUMERUS: Primary | ICD-10-CM

## 2024-11-04 RX ORDER — HYDROCODONE BITARTRATE AND ACETAMINOPHEN 7.5; 325 MG/1; MG/1
1 TABLET ORAL EVERY 6 HOURS PRN
Qty: 40 TABLET | Refills: 0 | Status: SHIPPED | OUTPATIENT
Start: 2024-11-04 | End: 2024-11-13 | Stop reason: HOSPADM

## 2024-11-04 RX ORDER — OXYCODONE AND ACETAMINOPHEN 5; 325 MG/1; MG/1
TABLET ORAL
COMMUNITY
Start: 2024-11-01 | Stop reason: SINTOL

## 2024-11-04 RX ORDER — LORAZEPAM 0.5 MG/1
0.5 TABLET ORAL EVERY 6 HOURS PRN
COMMUNITY
Start: 2024-11-01

## 2024-11-04 NOTE — TELEPHONE ENCOUNTER
"Caller: Torres Levi \"AL\"    Relationship to patient: Emergency Contact    Best call back number: [phone]*    Patient is needing: PT'S  IS CALLING BECAUSE THE PT WAS SEEN TODAY AND WAS SUPPOSED TO HAVE AN RX CALLED IN AND THE PHARMACY HAS NOT RECEIVED IT AS OF YET.. PLEASE ADVISE..      [facility] [address] [place] [address] - [phone] [facility] [phone]  [phone]   "

## 2024-11-04 NOTE — PROGRESS NOTES
Subjective:     Patient ID: Ashlie Levi is a 79 y.o. female.    Chief Complaint:  Right shoulder pain, new patient to examiner    History of Present Illness  History of Present Illness  Ashlie presents to clinic today for evaluation of her right shoulder pain started acutely last  when she was out of town visiting family-she tripped over a cat gate per her report and landed on her right arm with immediate onset of significant pain.  She presented to local emergency department for evaluation where x-rays and CT scan were taken indicating comminuted proximal humerus fracture.  She was placed in a sling and recommended for local orthopedic follow-up.  She denies any associated new numbness or tingling to her right lower extremity since time of the injury but does note moderate to severe pain localized over the anterior lateral aspect of her right arm with associated swelling as well as bruising extending down to the level of her elbow.  Rates pain as a 9-10 out of 10 describes a shooting and aching in nature minimal improvement with sling and rest as well as ice exacerbation with any types of motion or local pressure.  She has had minimal improvement with oral pain medication including hydrocodone.  She denies any fevers chills or sweats.  She denies any associated neck pain.  She has had some pain down in her wrist and states she did not have x-rays a bit after her fall.     Social History     Occupational History    Not on file   Tobacco Use    Smoking status: Former     Current packs/day: 0.00     Average packs/day: 1 pack/day for 50.0 years (50.0 ttl pk-yrs)     Types: Cigarettes     Start date: 10/13/1956     Quit date: 10/13/2006     Years since quittin.0     Passive exposure: Never    Smokeless tobacco: Never    Tobacco comments:     quit 2006   Vaping Use    Vaping status: Never Used   Substance and Sexual Activity    Alcohol use: Yes     Comment: occasional    Drug use: No    Sexual  activity: Not Currently     Partners: Male     Birth control/protection: Surgical, Post-menopausal     Comment: GREG with OC      Past Medical History:   Diagnosis Date    Abnormal electrocardiogram     2006 heart attack    Arthritis     Chest discomfort     Colon polyp     Coronary artery disease     Disease of thyroid gland     Diverticulitis     Elevated cholesterol     GERD (gastroesophageal reflux disease)     Heart attack     Hyperlipidemia     Hypertension      Past Surgical History:   Procedure Laterality Date    APPENDECTOMY      CARDIAC SURGERY      3 stents    CHOLECYSTECTOMY      COLONOSCOPY N/A 07/13/2016    Procedure: COLONOSCOPY;  Surgeon: Giovani Avelar MD;  Location: [facility] OR;  Service:     COLONOSCOPY N/A 06/13/2019    Procedure: Colonoscopy with possible biopsy or polypectomy;  Surgeon: Alfreda Soler DO;  Location: [facility] OR;  Service: Gastroenterology    COLONOSCOPY N/A 05/18/2021    Procedure: Colonoscopy with polypectomy, biopsy;  Surgeon: Alfreda Soler DO;  Location: [facility] OR;  Service: Gastroenterology;  Laterality: N/A;  diverticulosis  cecal biopsy  right colon polyp  left colon polyps x2    COLONOSCOPY W/ POLYPECTOMY N/A 10/8/2024    Procedure: COLONOSCOPY WITH POLYPECTOMY;  Surgeon: Alfreda Soler DO;  Location: [facility] OR;  Service: Gastroenterology;  Laterality: N/A;  diverticulosis, sigmoid colon polyp, right colon polyp    CORONARY STENT PLACEMENT      ENDOSCOPY N/A 11/21/2017    Procedure: ESOPHAGOGASTRODUODENOSCOPY with CARIN test ;  Surgeon: Alfreda Soler DO;  Location: [facility] OR;  Service:     ENDOSCOPY N/A 06/13/2019    Procedure: Esophagogastroduodenoscopy with possible biopsy, polypectomy, dilation;  Surgeon: Alfreda Soler DO;  Location: [facility] OR;  Service: Gastroenterology    ENDOSCOPY N/A 05/18/2021    Procedure: Esophagogastroduodenoscopy with biopsies;  Surgeon: Alfreda Soler DO;  Location: [facility] OR;  Service:  "Gastroenterology;  Laterality: N/A;  CARIN test  gastritis  duodenitis    EPIDURAL BLOCK  04/30/2024    EYE SURGERY      FOOT GANGLION EXCISION Left     HEMORROIDECTOMY      HERNIA REPAIR      HYSTERECTOMY      GREG with OC age 30 for DUB    INJECTION OF MEDICATION Bilateral 03/26/2024    piriformis    MEDIAL BRANCH BLOCK Bilateral     lumbar    MEDIAL BRANCH BLOCK Bilateral 06/11/2024    L3-L5    OOPHORECTOMY      dx lsc , interval USO    TOTAL HIP ARTHROPLASTY Right 08/23/2023    Procedure: TOTAL HIP ARTHROPLASTY ANTERIOR WITH HANA TABLE;  Surgeon: Aidan Wallace MD;  Location: [facility];  Service: Orthopedics;  Laterality: Right;       Family History   Problem Relation Age of Onset    Hypertension Mother     Stroke Mother     Diabetes Father     Hypertension Father     Heart disease Father     Stroke Father     Heart disease Brother     Colon cancer Brother         dx > 50    Breast cancer Neg Hx     Ovarian cancer Neg Hx     Deep vein thrombosis Neg Hx     Pulmonary embolism Neg Hx          Review of Systems        Objective:  Vitals:    11/04/24 1124   BP: 108/63   Pulse: 64   Weight: 70.3 kg (155 lb)   Height: 152.4 cm (60\")         11/04/24  1124   Weight: 70.3 kg (155 lb)     Body mass index is 30.27 kg/m².  Physical Exam    Vital signs reviewed.   General: No acute distress, alert and oriented  Eyes: conjunctiva clear; pupils equally round and reactive  ENT: external ears and nose atraumatic; oropharynx clear  CV: no peripheral edema  Resp: normal respiratory effort  Skin: no rashes or wounds; normal turgor  Psych: mood and affect appropriate; recent and remote memory intact          Physical Exam         Right shoulder-significant soft tissue swelling around the shoulder itself with ecchymosis extending down the anterior medial and lateral aspects of the arm down to the level of the elbow.  Patient refuses to range elbow secondary to referred pain to the shoulder with no focal tenderness about the medial " or lateral epicondyle.  Maximal tenderness is over the proximal humerus region.  No tenderness over clavicle or acromion.  She does have mild tenderness over dorsal wrist she is able to actively flex and extend wrist to 30 degrees extraction as well as radial and ulnar deviate to 10 degrees in each direction.  Patient is able to flex and extend fingers of the right hand including the thumb with 4 out of 5 strength.  Brisk cap refill all digits, 1+ radial pulse.  Positive sensation light touch proximal lateral arm as well as all distributions right hand symmetric to the left.    Imaging:  Right Shoulder X-Ray  Indication: Pain  AP, scapular Y, and axillary lateral views    Findings:  Comminuted proximal humerus fracture noted involving the surgical neck and extending up into the humeral head region with slight varus alignment and shortening appreciated.    Review of outside CT scan report, no images available indicates acute proximal humerus comminuted fracture involving the surgical neck and humeral head      Right Wrist X-Ray  Indication: Pain  AP, Lateral, and Oblique views    Findings:  No fracture  No bony lesion  Normal soft tissues  Normal joint spaces    No prior studies were available for comparison.    Assessment:        1. Pain of right humerus    2. Other closed displaced fracture of proximal end of right humerus, initial encounter    3. Right wrist pain           Plan:      CT shoulder right without IV contrast    IMPRESSION:  Acute comminuted fracture of the proximal humerus.    Assessment & Plan  I discussed treatment options at length with Ashlie in regards to her right shoulder-at this point in time she has a significantly comminuted proximal humerus fracture with angulation as well as involvement into the humeral head.  At this point in time with her fracture pattern she has a very low chance of successful healing with closed treatment as well as high likelihood of posttraumatic arthritis given  involvement of the humeral head at the fracture site.  We discussed options including but not limited to observation, closed treatment, and reverse shoulder arthroplasty. Given significant comminution and pain levels as well as poor prognosis in regards to closed treatment, patient does wish to proceed with right reverse shoulder arthroplasty at this time.    Patient wishes to proceed with reverse shoulder arthroplasty for treatment of proximal humerus fracture at this point in time.  I discussed with with the patient the specific indication of a reverse shoulder arthroplasty particularly for shoulder pain as well as for pseudoparalysis, and reviewed anatomy of the shoulder as well as a model of the implant.  I reviewed details of the procedure as well as risks, benefits, and alternatives with the risks including but not limited to neurovascular damage, bleeding, infection, periprosthetic fracture, chronic pain, instability, loosening of implant, failure of implant, loss of motion, weakness, stiffness, DVT, pulmonary embolus, death, stroke, complex regional pain syndrome, myocardial infarction, need for additional procedures.  Patient understood all these had all questions answered today.  We will have patient medically optimized by primary care physician and plan on proceeding with surgery at next available date.  Patient understood all this had all questions answered.  No guarantees were given in regards to results of the surgery.      Will plan for direct admission given poor pain control at this point in time.    Patient denies history of DVT or pulmonary embolus, denies history of diabetes.  She did have a prior cardiac stent most recently in 2006.    Ashlie Levi was in agreement with plan and had all questions answered.     Orders:  Orders Placed This Encounter   Procedures    XR Humerus Right    XR Wrist 3+ View Right       Medications:  New Medications Ordered This Visit   Medications     HYDROcodone-acetaminophen (Norco) 7.5-325 MG per tablet     Sig: Take 1 tablet by mouth Every 6 (Six) Hours As Needed for Moderate Pain.     Dispense:  40 tablet     Refill:  0       Followup:  No follow-ups on file.    Diagnoses and all orders for this visit:    1. Pain of right humerus (Primary)  -     XR Humerus Right    2. Other closed displaced fracture of proximal end of right humerus, initial encounter  -     HYDROcodone-acetaminophen (Norco) 7.5-325 MG per tablet; Take 1 tablet by mouth Every 6 (Six) Hours As Needed for Moderate Pain.  Dispense: 40 tablet; Refill: 0    3. Right wrist pain  -     XR Wrist 3+ View Right                  Dictated utilizing Dragon dictation     Patient or patient representative verbalized consent for the use of Ambient Listening during the visit with  Farhat Ang MD for chart documentation. 11/4/2024  11:43 EST

## 2024-11-06 ENCOUNTER — TELEPHONE (OUTPATIENT)
Dept: ORTHOPEDIC SURGERY | Facility: CLINIC | Age: 79
End: 2024-11-06
Payer: MEDICARE

## 2024-11-06 NOTE — TELEPHONE ENCOUNTER
PER DR YEAGER, ADVISED PATIENTS  THAT SHE WILL BE ADMITTED TO THE HOSPITAL FOR SURGERY ON 11/12. NEEDS TO ARRIVE AT 7AM ON 11/12. HE WILL PUT IN ORDERS THE DAY BEFORE.

## 2024-11-12 ENCOUNTER — APPOINTMENT (OUTPATIENT)
Dept: ULTRASOUND IMAGING | Facility: HOSPITAL | Age: 79
End: 2024-11-12
Payer: MEDICARE

## 2024-11-12 ENCOUNTER — APPOINTMENT (OUTPATIENT)
Dept: GENERAL RADIOLOGY | Facility: HOSPITAL | Age: 79
End: 2024-11-12
Payer: MEDICARE

## 2024-11-12 ENCOUNTER — PREP FOR SURGERY (OUTPATIENT)
Dept: OTHER | Facility: HOSPITAL | Age: 79
End: 2024-11-12
Payer: MEDICARE

## 2024-11-12 ENCOUNTER — ANESTHESIA (OUTPATIENT)
Dept: PERIOP | Facility: HOSPITAL | Age: 79
End: 2024-11-12
Payer: MEDICARE

## 2024-11-12 ENCOUNTER — APPOINTMENT (OUTPATIENT)
Dept: CT IMAGING | Facility: HOSPITAL | Age: 79
End: 2024-11-12
Payer: MEDICARE

## 2024-11-12 ENCOUNTER — ANESTHESIA EVENT (OUTPATIENT)
Dept: PERIOP | Facility: HOSPITAL | Age: 79
End: 2024-11-12
Payer: MEDICARE

## 2024-11-12 ENCOUNTER — HOSPITAL ENCOUNTER (OUTPATIENT)
Facility: HOSPITAL | Age: 79
Discharge: HOME | End: 2024-11-13
Attending: ORTHOPAEDIC SURGERY | Admitting: ORTHOPAEDIC SURGERY
Payer: MEDICARE

## 2024-11-12 DIAGNOSIS — S42.291A OTHER CLOSED DISPLACED FRACTURE OF PROXIMAL END OF RIGHT HUMERUS, INITIAL ENCOUNTER: Primary | ICD-10-CM

## 2024-11-12 DIAGNOSIS — S42.291A OTHER CLOSED DISPLACED FRACTURE OF PROXIMAL END OF RIGHT HUMERUS, INITIAL ENCOUNTER: ICD-10-CM

## 2024-11-12 DIAGNOSIS — Z96.611 STATUS POST REVERSE TOTAL SHOULDER REPLACEMENT, RIGHT: Primary | ICD-10-CM

## 2024-11-12 PROBLEM — S42.201A CLOSED FRACTURE OF RIGHT PROXIMAL HUMERUS: Status: ACTIVE | Noted: 2024-11-12

## 2024-11-12 LAB
ABO GROUP BLD: NORMAL
ANION GAP SERPL CALCULATED.3IONS-SCNC: 11.1 MMOL/L (ref 5–15)
APTT PPP: 29.9 SECONDS (ref 24.3–38.1)
BASOPHILS # BLD AUTO: 0.09 10*3/MM3 (ref 0–0.2)
BASOPHILS NFR BLD AUTO: 0.8 % (ref 0–1.5)
BLD GP AB SCN SERPL QL: NEGATIVE
BUN SERPL-MCNC: 11 MG/DL (ref 8–23)
BUN/CREAT SERPL: 16.4 (ref 7–25)
CALCIUM SPEC-SCNC: 9.8 MG/DL (ref 8.6–10.5)
CHLORIDE SERPL-SCNC: 100 MMOL/L (ref 98–107)
CO2 SERPL-SCNC: 25.9 MMOL/L (ref 22–29)
CREAT SERPL-MCNC: 0.67 MG/DL (ref 0.57–1)
DEPRECATED RDW RBC AUTO: 45.1 FL (ref 37–54)
EGFRCR SERPLBLD CKD-EPI 2021: 87.9 ML/MIN/1.73
EOSINOPHIL # BLD AUTO: 0.52 10*3/MM3 (ref 0–0.4)
EOSINOPHIL NFR BLD AUTO: 4.8 % (ref 0.3–6.2)
ERYTHROCYTE [DISTWIDTH] IN BLOOD BY AUTOMATED COUNT: 13.5 % (ref 12.3–15.4)
GLUCOSE SERPL-MCNC: 126 MG/DL (ref 65–99)
HCT VFR BLD AUTO: 35.4 % (ref 34–46.6)
HGB BLD-MCNC: 11.5 G/DL (ref 12–15.9)
IMM GRANULOCYTES # BLD AUTO: 0.06 10*3/MM3 (ref 0–0.05)
IMM GRANULOCYTES NFR BLD AUTO: 0.6 % (ref 0–0.5)
INR PPP: 1.01 (ref 0.9–1.1)
LYMPHOCYTES # BLD AUTO: 2.63 10*3/MM3 (ref 0.7–3.1)
LYMPHOCYTES NFR BLD AUTO: 24.5 % (ref 19.6–45.3)
MCH RBC QN AUTO: 29.8 PG (ref 26.6–33)
MCHC RBC AUTO-ENTMCNC: 32.5 G/DL (ref 31.5–35.7)
MCV RBC AUTO: 91.7 FL (ref 79–97)
MONOCYTES # BLD AUTO: 0.66 10*3/MM3 (ref 0.1–0.9)
MONOCYTES NFR BLD AUTO: 6.1 % (ref 5–12)
NEUTROPHILS NFR BLD AUTO: 6.79 10*3/MM3 (ref 1.7–7)
NEUTROPHILS NFR BLD AUTO: 63.2 % (ref 42.7–76)
NRBC BLD AUTO-RTO: 0 /100 WBC (ref 0–0.2)
PLATELET # BLD AUTO: 344 10*3/MM3 (ref 140–450)
PMV BLD AUTO: 9.5 FL (ref 6–12)
POTASSIUM SERPL-SCNC: 3.8 MMOL/L (ref 3.5–5.2)
PROTHROMBIN TIME: 13.7 SECONDS (ref 12.1–15)
QT INTERVAL: 465 MS
QTC INTERVAL: 477 MS
RBC # BLD AUTO: 3.86 10*6/MM3 (ref 3.77–5.28)
RH BLD: POSITIVE
SODIUM SERPL-SCNC: 137 MMOL/L (ref 136–145)
T&S EXPIRATION DATE: NORMAL
WBC NRBC COR # BLD AUTO: 10.75 10*3/MM3 (ref 3.4–10.8)

## 2024-11-12 PROCEDURE — 63710000001 ATORVASTATIN 20 MG TABLET: Performed by: ORTHOPAEDIC SURGERY

## 2024-11-12 PROCEDURE — G0378 HOSPITAL OBSERVATION PER HR: HCPCS

## 2024-11-12 PROCEDURE — 25010000002 VANCOMYCIN 1 G RECONSTITUTED SOLUTION: Performed by: ORTHOPAEDIC SURGERY

## 2024-11-12 PROCEDURE — 25810000003 LACTATED RINGERS PER 1000 ML

## 2024-11-12 PROCEDURE — 23472 RECONSTRUCT SHOULDER JOINT: CPT | Performed by: ORTHOPAEDIC SURGERY

## 2024-11-12 PROCEDURE — 99213 OFFICE O/P EST LOW 20 MIN: CPT | Performed by: HOSPITALIST

## 2024-11-12 PROCEDURE — 25810000003 SODIUM CHLORIDE 0.9 % SOLUTION 250 ML FLEX CONT: Performed by: NURSE ANESTHETIST, CERTIFIED REGISTERED

## 2024-11-12 PROCEDURE — 25010000002 ESMOLOL 100 MG/10ML SOLUTION

## 2024-11-12 PROCEDURE — 25010000002 MIDAZOLAM PER 1MG

## 2024-11-12 PROCEDURE — 25010000002 SUCCINYLCHOLINE PER 20 MG

## 2024-11-12 PROCEDURE — 93005 ELECTROCARDIOGRAM TRACING: CPT | Performed by: HOSPITALIST

## 2024-11-12 PROCEDURE — 86900 BLOOD TYPING SEROLOGIC ABO: CPT | Performed by: ORTHOPAEDIC SURGERY

## 2024-11-12 PROCEDURE — 25010000002 DEXAMETHASONE PER 1 MG

## 2024-11-12 PROCEDURE — 94761 N-INVAS EAR/PLS OXIMETRY MLT: CPT

## 2024-11-12 PROCEDURE — 73200 CT UPPER EXTREMITY W/O DYE: CPT

## 2024-11-12 PROCEDURE — 25010000002 MORPHINE PER 10 MG: Performed by: ORTHOPAEDIC SURGERY

## 2024-11-12 PROCEDURE — C1776 JOINT DEVICE (IMPLANTABLE): HCPCS | Performed by: ORTHOPAEDIC SURGERY

## 2024-11-12 PROCEDURE — S0260 H&P FOR SURGERY: HCPCS | Performed by: ORTHOPAEDIC SURGERY

## 2024-11-12 PROCEDURE — 25010000002 BUPIVACAINE (PF) 0.5 % SOLUTION

## 2024-11-12 PROCEDURE — A9270 NON-COVERED ITEM OR SERVICE: HCPCS | Performed by: ORTHOPAEDIC SURGERY

## 2024-11-12 PROCEDURE — 25810000003 LACTATED RINGERS PER 1000 ML: Performed by: ORTHOPAEDIC SURGERY

## 2024-11-12 PROCEDURE — C1713 ANCHOR/SCREW BN/BN,TIS/BN: HCPCS | Performed by: ORTHOPAEDIC SURGERY

## 2024-11-12 PROCEDURE — 25010000002 FENTANYL CITRATE (PF) 50 MCG/ML SOLUTION

## 2024-11-12 PROCEDURE — 93010 ELECTROCARDIOGRAM REPORT: CPT | Performed by: INTERNAL MEDICINE

## 2024-11-12 PROCEDURE — 63710000001 PREGABALIN 75 MG CAPSULE: Performed by: ORTHOPAEDIC SURGERY

## 2024-11-12 PROCEDURE — 25810000003 SODIUM CHLORIDE 0.9 % SOLUTION 250 ML FLEX CONT: Performed by: ORTHOPAEDIC SURGERY

## 2024-11-12 PROCEDURE — 86901 BLOOD TYPING SEROLOGIC RH(D): CPT | Performed by: ORTHOPAEDIC SURGERY

## 2024-11-12 PROCEDURE — 63710000001 ACETAMINOPHEN EXTRA STRENGTH 500 MG TABLET: Performed by: ORTHOPAEDIC SURGERY

## 2024-11-12 PROCEDURE — 25010000002 PROPOFOL 200 MG/20ML EMULSION

## 2024-11-12 PROCEDURE — 85730 THROMBOPLASTIN TIME PARTIAL: CPT | Performed by: ORTHOPAEDIC SURGERY

## 2024-11-12 PROCEDURE — 86850 RBC ANTIBODY SCREEN: CPT | Performed by: ORTHOPAEDIC SURGERY

## 2024-11-12 PROCEDURE — 73020 X-RAY EXAM OF SHOULDER: CPT

## 2024-11-12 PROCEDURE — 25010000002 VANCOMYCIN 1 G RECONSTITUTED SOLUTION 1 EACH VIAL: Performed by: ORTHOPAEDIC SURGERY

## 2024-11-12 PROCEDURE — 25010000002 LIDOCAINE 2% SOLUTION

## 2024-11-12 PROCEDURE — 96374 THER/PROPH/DIAG INJ IV PUSH: CPT

## 2024-11-12 PROCEDURE — 25010000002 MIDAZOLAM PER 1MG: Performed by: ORTHOPAEDIC SURGERY

## 2024-11-12 PROCEDURE — 25010000002 ONDANSETRON PER 1 MG

## 2024-11-12 PROCEDURE — 85025 COMPLETE CBC W/AUTO DIFF WBC: CPT | Performed by: ORTHOPAEDIC SURGERY

## 2024-11-12 PROCEDURE — 80048 BASIC METABOLIC PNL TOTAL CA: CPT | Performed by: ORTHOPAEDIC SURGERY

## 2024-11-12 PROCEDURE — 25010000002 PHENYLEPHRINE 10 MG/ML SOLUTION 5 ML VIAL: Performed by: NURSE ANESTHETIST, CERTIFIED REGISTERED

## 2024-11-12 PROCEDURE — 96376 TX/PRO/DX INJ SAME DRUG ADON: CPT

## 2024-11-12 PROCEDURE — 25010000002 SUGAMMADEX 200 MG/2ML SOLUTION: Performed by: NURSE ANESTHETIST, CERTIFIED REGISTERED

## 2024-11-12 PROCEDURE — 63710000001 CARVEDILOL 3.125 MG TABLET: Performed by: ORTHOPAEDIC SURGERY

## 2024-11-12 PROCEDURE — 25010000002 PHENYLEPHRINE 10 MG/ML SOLUTION

## 2024-11-12 PROCEDURE — 85610 PROTHROMBIN TIME: CPT | Performed by: ORTHOPAEDIC SURGERY

## 2024-11-12 DEVICE — IMPLANTABLE DEVICE
Type: IMPLANTABLE DEVICE | Site: SHOULDER | Status: FUNCTIONAL
Brand: COMPREHENSIVE® REVERSE SHOULDER

## 2024-11-12 DEVICE — STEM HUM/SHLDR TRABECULARMETAL REV 12X130MM: Type: IMPLANTABLE DEVICE | Site: SHOULDER | Status: FUNCTIONAL

## 2024-11-12 DEVICE — IMPLANTABLE DEVICE
Type: IMPLANTABLE DEVICE | Site: SHOULDER | Status: FUNCTIONAL
Brand: COMPREHENSIVE REVERSE SHOULDER

## 2024-11-12 DEVICE — PALACOS® MV+G IS INDICATED FOR USE AS BONE CEMENT IN ARTHROPLASTY PROCEDURES OF THE HIP, KNEE AND OTHER JOINTS TO FIX PLASTIC AND METAL PROSTHETIC PARTS TO LIVING BONE WHEN RECONSTRUCTION IS NECESSARY. THE CEMENT IS INDICATED FOR USE IN THE SECOND STAGE OF A TWO STAGE REVISION FOR TOTAL JOINT ARTHROPLASTY AFTER THE INITIAL INFECTION HAS BEEN CLEARED.PALACOS® MV+G CONTAINS THE X-RAY CONTRAST MEDIUM ZIRCONIUM DIOXIDE. TO IMPROVE VISIBILITY IN THE SURGICAL FIELD PALACOS® MV+G HAS BEEN COLOURED WITH CHLOROPHYLL (E141).THE BONE CEMENT IS PREPARED DIRECTLY BEFORE USE BY MIXING A POLYMER POWDER COMPONENT WITH A LIQUID MONOMER COMPONENT. A DUCTILE DOUGH FORMS WHICH CURES WITHIN A FEW MINUTES.
Type: IMPLANTABLE DEVICE | Site: SHOULDER | Status: FUNCTIONAL
Brand: PALACOS®

## 2024-11-12 DEVICE — IMPLANTABLE DEVICE: Type: IMPLANTABLE DEVICE | Site: SHOULDER | Status: FUNCTIONAL

## 2024-11-12 DEVICE — LINER HUM/SHLDR TRABECULARMETAL REV POLY 60DEG 36MM PLS3: Type: IMPLANTABLE DEVICE | Site: SHOULDER | Status: FUNCTIONAL

## 2024-11-12 DEVICE — TOTL SHLDER REV: Type: IMPLANTABLE DEVICE | Site: SHOULDER | Status: FUNCTIONAL

## 2024-11-12 DEVICE — KNOTLESS TISSUE CONTROL DEVICE, UNDYED UNIDIRECTIONAL (ANTIBACTERIAL) SYNTHETIC ABSORBABLE DEVICE
Type: IMPLANTABLE DEVICE | Site: SHOULDER | Status: FUNCTIONAL
Brand: STRATAFIX

## 2024-11-12 DEVICE — FW,BPB #2 SUTR,BLU W/NDL
Type: IMPLANTABLE DEVICE | Site: SHOULDER | Status: FUNCTIONAL
Brand: ARTHREX®

## 2024-11-12 RX ORDER — HYDROCODONE BITARTRATE AND ACETAMINOPHEN 10; 325 MG/1; MG/1
1 TABLET ORAL EVERY 4 HOURS PRN
Status: DISCONTINUED | OUTPATIENT
Start: 2024-11-12 | End: 2024-11-13 | Stop reason: HOSPADM

## 2024-11-12 RX ORDER — ONDANSETRON 2 MG/ML
4 INJECTION INTRAMUSCULAR; INTRAVENOUS EVERY 6 HOURS PRN
Status: CANCELLED | OUTPATIENT
Start: 2024-11-12

## 2024-11-12 RX ORDER — PHENYLEPHRINE HYDROCHLORIDE 10 MG/ML
INJECTION INTRAVENOUS AS NEEDED
Status: DISCONTINUED | OUTPATIENT
Start: 2024-11-12 | End: 2024-11-12 | Stop reason: SURG

## 2024-11-12 RX ORDER — LEVOTHYROXINE SODIUM 112 UG/1
112 TABLET ORAL DAILY
Status: DISCONTINUED | OUTPATIENT
Start: 2024-11-13 | End: 2024-11-13 | Stop reason: HOSPADM

## 2024-11-12 RX ORDER — SODIUM CHLORIDE 0.9 % (FLUSH) 0.9 %
10 SYRINGE (ML) INJECTION AS NEEDED
Status: CANCELLED | OUTPATIENT
Start: 2024-11-12

## 2024-11-12 RX ORDER — DEXAMETHASONE SODIUM PHOSPHATE 10 MG/ML
8 INJECTION INTRAMUSCULAR; INTRAVENOUS ONCE AS NEEDED
Status: COMPLETED | OUTPATIENT
Start: 2024-11-12 | End: 2024-11-12

## 2024-11-12 RX ORDER — AMLODIPINE BESYLATE 5 MG/1
5 TABLET ORAL DAILY
Status: DISCONTINUED | OUTPATIENT
Start: 2024-11-13 | End: 2024-11-13 | Stop reason: HOSPADM

## 2024-11-12 RX ORDER — MORPHINE SULFATE 1 MG/ML
1 INJECTION, SOLUTION EPIDURAL; INTRATHECAL; INTRAVENOUS EVERY 4 HOURS PRN
Status: CANCELLED | OUTPATIENT
Start: 2024-11-12 | End: 2024-11-17

## 2024-11-12 RX ORDER — PROPOFOL 10 MG/ML
INJECTION, EMULSION INTRAVENOUS AS NEEDED
Status: DISCONTINUED | OUTPATIENT
Start: 2024-11-12 | End: 2024-11-12 | Stop reason: SURG

## 2024-11-12 RX ORDER — NALOXONE HCL 0.4 MG/ML
0.4 VIAL (ML) INJECTION
Status: DISCONTINUED | OUTPATIENT
Start: 2024-11-12 | End: 2024-11-13 | Stop reason: HOSPADM

## 2024-11-12 RX ORDER — SODIUM CHLORIDE 0.9 % (FLUSH) 0.9 %
10 SYRINGE (ML) INJECTION AS NEEDED
Status: DISCONTINUED | OUTPATIENT
Start: 2024-11-12 | End: 2024-11-13 | Stop reason: HOSPADM

## 2024-11-12 RX ORDER — CARVEDILOL 3.12 MG/1
6.25 TABLET ORAL 2 TIMES DAILY
Status: DISCONTINUED | OUTPATIENT
Start: 2024-11-12 | End: 2024-11-13 | Stop reason: HOSPADM

## 2024-11-12 RX ORDER — ONDANSETRON 2 MG/ML
4 INJECTION INTRAMUSCULAR; INTRAVENOUS EVERY 6 HOURS PRN
Status: DISCONTINUED | OUTPATIENT
Start: 2024-11-12 | End: 2024-11-13 | Stop reason: HOSPADM

## 2024-11-12 RX ORDER — SODIUM CHLORIDE, SODIUM LACTATE, POTASSIUM CHLORIDE, CALCIUM CHLORIDE 600; 310; 30; 20 MG/100ML; MG/100ML; MG/100ML; MG/100ML
30 INJECTION, SOLUTION INTRAVENOUS CONTINUOUS
Status: ACTIVE | OUTPATIENT
Start: 2024-11-12 | End: 2024-11-12

## 2024-11-12 RX ORDER — NITROGLYCERIN 0.4 MG/1
0.4 TABLET SUBLINGUAL
Status: DISCONTINUED | OUTPATIENT
Start: 2024-11-12 | End: 2024-11-13 | Stop reason: HOSPADM

## 2024-11-12 RX ORDER — ISOSORBIDE MONONITRATE 30 MG/1
30 TABLET, EXTENDED RELEASE ORAL DAILY
Status: DISCONTINUED | OUTPATIENT
Start: 2024-11-13 | End: 2024-11-13 | Stop reason: HOSPADM

## 2024-11-12 RX ORDER — FAMOTIDINE 40 MG/1
40 TABLET, FILM COATED ORAL DAILY
Status: CANCELLED | OUTPATIENT
Start: 2024-11-12

## 2024-11-12 RX ORDER — SUCCINYLCHOLINE CHLORIDE 20 MG/ML
INJECTION INTRAMUSCULAR; INTRAVENOUS AS NEEDED
Status: DISCONTINUED | OUTPATIENT
Start: 2024-11-12 | End: 2024-11-12 | Stop reason: SURG

## 2024-11-12 RX ORDER — ESMOLOL HYDROCHLORIDE 10 MG/ML
INJECTION INTRAVENOUS AS NEEDED
Status: DISCONTINUED | OUTPATIENT
Start: 2024-11-12 | End: 2024-11-12 | Stop reason: SURG

## 2024-11-12 RX ORDER — MAGNESIUM HYDROXIDE 1200 MG/15ML
LIQUID ORAL AS NEEDED
Status: DISCONTINUED | OUTPATIENT
Start: 2024-11-12 | End: 2024-11-12 | Stop reason: HOSPADM

## 2024-11-12 RX ORDER — DEXMEDETOMIDINE HYDROCHLORIDE 100 UG/ML
INJECTION, SOLUTION INTRAVENOUS
Status: COMPLETED | OUTPATIENT
Start: 2024-11-12 | End: 2024-11-12

## 2024-11-12 RX ORDER — SODIUM CHLORIDE 0.9 % (FLUSH) 0.9 %
10 SYRINGE (ML) INJECTION EVERY 12 HOURS SCHEDULED
Status: CANCELLED | OUTPATIENT
Start: 2024-11-12

## 2024-11-12 RX ORDER — FAMOTIDINE 20 MG/1
40 TABLET, FILM COATED ORAL DAILY
Status: DISCONTINUED | OUTPATIENT
Start: 2024-11-12 | End: 2024-11-13 | Stop reason: HOSPADM

## 2024-11-12 RX ORDER — PANTOPRAZOLE SODIUM 40 MG/1
40 TABLET, DELAYED RELEASE ORAL
Status: DISCONTINUED | OUTPATIENT
Start: 2024-11-13 | End: 2024-11-13 | Stop reason: HOSPADM

## 2024-11-12 RX ORDER — ONDANSETRON 4 MG/1
4 TABLET, ORALLY DISINTEGRATING ORAL EVERY 6 HOURS PRN
Status: DISCONTINUED | OUTPATIENT
Start: 2024-11-12 | End: 2024-11-12 | Stop reason: SDUPTHER

## 2024-11-12 RX ORDER — ONDANSETRON 2 MG/ML
4 INJECTION INTRAMUSCULAR; INTRAVENOUS EVERY 6 HOURS PRN
Status: DISCONTINUED | OUTPATIENT
Start: 2024-11-12 | End: 2024-11-12 | Stop reason: SDUPTHER

## 2024-11-12 RX ORDER — ATORVASTATIN CALCIUM 20 MG/1
20 TABLET, FILM COATED ORAL NIGHTLY
Status: DISCONTINUED | OUTPATIENT
Start: 2024-11-12 | End: 2024-11-13 | Stop reason: HOSPADM

## 2024-11-12 RX ORDER — SODIUM CHLORIDE 0.9 % (FLUSH) 0.9 %
10 SYRINGE (ML) INJECTION EVERY 12 HOURS SCHEDULED
Status: DISCONTINUED | OUTPATIENT
Start: 2024-11-12 | End: 2024-11-13 | Stop reason: HOSPADM

## 2024-11-12 RX ORDER — ROCURONIUM BROMIDE 10 MG/ML
INJECTION, SOLUTION INTRAVENOUS AS NEEDED
Status: DISCONTINUED | OUTPATIENT
Start: 2024-11-12 | End: 2024-11-12 | Stop reason: SURG

## 2024-11-12 RX ORDER — MIDAZOLAM HYDROCHLORIDE 2 MG/2ML
0.5 INJECTION, SOLUTION INTRAMUSCULAR; INTRAVENOUS ONCE AS NEEDED
Status: DISCONTINUED | OUTPATIENT
Start: 2024-11-12 | End: 2024-11-13 | Stop reason: HOSPADM

## 2024-11-12 RX ORDER — FENTANYL CITRATE 50 UG/ML
INJECTION, SOLUTION INTRAMUSCULAR; INTRAVENOUS AS NEEDED
Status: DISCONTINUED | OUTPATIENT
Start: 2024-11-12 | End: 2024-11-12 | Stop reason: SURG

## 2024-11-12 RX ORDER — SODIUM CHLORIDE 9 MG/ML
40 INJECTION, SOLUTION INTRAVENOUS AS NEEDED
Status: DISCONTINUED | OUTPATIENT
Start: 2024-11-12 | End: 2024-11-13 | Stop reason: HOSPADM

## 2024-11-12 RX ORDER — FENTANYL CITRATE 50 UG/ML
50 INJECTION, SOLUTION INTRAMUSCULAR; INTRAVENOUS
Status: DISCONTINUED | OUTPATIENT
Start: 2024-11-12 | End: 2024-11-12 | Stop reason: HOSPADM

## 2024-11-12 RX ORDER — VANCOMYCIN HYDROCHLORIDE 1 G/20ML
INJECTION, POWDER, LYOPHILIZED, FOR SOLUTION INTRAVENOUS AS NEEDED
Status: DISCONTINUED | OUTPATIENT
Start: 2024-11-12 | End: 2024-11-12 | Stop reason: HOSPADM

## 2024-11-12 RX ORDER — KETAMINE HYDROCHLORIDE 10 MG/ML
INJECTION, SOLUTION INTRAMUSCULAR; INTRAVENOUS AS NEEDED
Status: DISCONTINUED | OUTPATIENT
Start: 2024-11-12 | End: 2024-11-12 | Stop reason: SURG

## 2024-11-12 RX ORDER — ASPIRIN 81 MG/1
81 TABLET ORAL DAILY
Status: DISCONTINUED | OUTPATIENT
Start: 2024-11-13 | End: 2024-11-13 | Stop reason: HOSPADM

## 2024-11-12 RX ORDER — TRANEXAMIC ACID 10 MG/ML
1000 INJECTION, SOLUTION INTRAVENOUS ONCE
Status: COMPLETED | OUTPATIENT
Start: 2024-11-12 | End: 2024-11-12

## 2024-11-12 RX ORDER — HYDROCODONE BITARTRATE AND ACETAMINOPHEN 5; 325 MG/1; MG/1
1 TABLET ORAL ONCE AS NEEDED
Status: DISCONTINUED | OUTPATIENT
Start: 2024-11-12 | End: 2024-11-12 | Stop reason: HOSPADM

## 2024-11-12 RX ORDER — ONDANSETRON 2 MG/ML
4 INJECTION INTRAMUSCULAR; INTRAVENOUS ONCE AS NEEDED
Status: COMPLETED | OUTPATIENT
Start: 2024-11-12 | End: 2024-11-12

## 2024-11-12 RX ORDER — SODIUM CHLORIDE, SODIUM LACTATE, POTASSIUM CHLORIDE, CALCIUM CHLORIDE 600; 310; 30; 20 MG/100ML; MG/100ML; MG/100ML; MG/100ML
100 INJECTION, SOLUTION INTRAVENOUS ONCE
Status: DISCONTINUED | OUTPATIENT
Start: 2024-11-12 | End: 2024-11-12 | Stop reason: HOSPADM

## 2024-11-12 RX ORDER — NALOXONE HCL 0.4 MG/ML
0.4 VIAL (ML) INJECTION
Status: CANCELLED | OUTPATIENT
Start: 2024-11-12

## 2024-11-12 RX ORDER — MORPHINE SULFATE 2 MG/ML
1 INJECTION, SOLUTION INTRAMUSCULAR; INTRAVENOUS EVERY 4 HOURS PRN
Status: DISCONTINUED | OUTPATIENT
Start: 2024-11-12 | End: 2024-11-13 | Stop reason: HOSPADM

## 2024-11-12 RX ORDER — ONDANSETRON 2 MG/ML
4 INJECTION INTRAMUSCULAR; INTRAVENOUS ONCE AS NEEDED
Status: DISCONTINUED | OUTPATIENT
Start: 2024-11-12 | End: 2024-11-12 | Stop reason: HOSPADM

## 2024-11-12 RX ORDER — ONDANSETRON 2 MG/ML
4 INJECTION INTRAMUSCULAR; INTRAVENOUS EVERY 6 HOURS PRN
Status: DISCONTINUED | OUTPATIENT
Start: 2024-11-12 | End: 2024-11-12

## 2024-11-12 RX ORDER — MELOXICAM 7.5 MG/1
15 TABLET ORAL DAILY
Status: DISCONTINUED | OUTPATIENT
Start: 2024-11-13 | End: 2024-11-13 | Stop reason: HOSPADM

## 2024-11-12 RX ORDER — LIDOCAINE HYDROCHLORIDE 10 MG/ML
0.5 INJECTION, SOLUTION INFILTRATION; PERINEURAL ONCE AS NEEDED
Status: DISCONTINUED | OUTPATIENT
Start: 2024-11-12 | End: 2024-11-12 | Stop reason: HOSPADM

## 2024-11-12 RX ORDER — PREGABALIN 75 MG/1
150 CAPSULE ORAL ONCE
Status: COMPLETED | OUTPATIENT
Start: 2024-11-12 | End: 2024-11-12

## 2024-11-12 RX ORDER — SODIUM CHLORIDE 9 MG/ML
40 INJECTION, SOLUTION INTRAVENOUS AS NEEDED
Status: CANCELLED | OUTPATIENT
Start: 2024-11-12

## 2024-11-12 RX ORDER — ACETAMINOPHEN 500 MG
1000 TABLET ORAL ONCE
Status: COMPLETED | OUTPATIENT
Start: 2024-11-12 | End: 2024-11-12

## 2024-11-12 RX ORDER — EPHEDRINE SULFATE 50 MG/ML
INJECTION INTRAVENOUS AS NEEDED
Status: DISCONTINUED | OUTPATIENT
Start: 2024-11-12 | End: 2024-11-12 | Stop reason: SURG

## 2024-11-12 RX ORDER — FAMOTIDINE 10 MG/ML
20 INJECTION, SOLUTION INTRAVENOUS
Status: COMPLETED | OUTPATIENT
Start: 2024-11-12 | End: 2024-11-12

## 2024-11-12 RX ORDER — VIT C/B6/B5/MAGNESIUM/HERB 173 50-5-6-5MG
500 CAPSULE ORAL DAILY
COMMUNITY

## 2024-11-12 RX ORDER — ONDANSETRON 4 MG/1
4 TABLET, ORALLY DISINTEGRATING ORAL EVERY 6 HOURS PRN
Status: DISCONTINUED | OUTPATIENT
Start: 2024-11-12 | End: 2024-11-12

## 2024-11-12 RX ORDER — HYDROCODONE BITARTRATE AND ACETAMINOPHEN 7.5; 325 MG/1; MG/1
2 TABLET ORAL EVERY 4 HOURS PRN
Status: DISCONTINUED | OUTPATIENT
Start: 2024-11-12 | End: 2024-11-13 | Stop reason: HOSPADM

## 2024-11-12 RX ORDER — ONDANSETRON 4 MG/1
4 TABLET, ORALLY DISINTEGRATING ORAL EVERY 6 HOURS PRN
Status: CANCELLED | OUTPATIENT
Start: 2024-11-12

## 2024-11-12 RX ORDER — CITALOPRAM HYDROBROMIDE 20 MG/1
40 TABLET ORAL DAILY
Status: DISCONTINUED | OUTPATIENT
Start: 2024-11-13 | End: 2024-11-13 | Stop reason: HOSPADM

## 2024-11-12 RX ORDER — BUPIVACAINE HYDROCHLORIDE 5 MG/ML
INJECTION, SOLUTION EPIDURAL; INTRACAUDAL
Status: COMPLETED | OUTPATIENT
Start: 2024-11-12 | End: 2024-11-12

## 2024-11-12 RX ORDER — LIDOCAINE HYDROCHLORIDE 20 MG/ML
INJECTION, SOLUTION INFILTRATION; PERINEURAL AS NEEDED
Status: DISCONTINUED | OUTPATIENT
Start: 2024-11-12 | End: 2024-11-12 | Stop reason: SURG

## 2024-11-12 RX ORDER — ONDANSETRON 4 MG/1
4 TABLET, ORALLY DISINTEGRATING ORAL EVERY 6 HOURS PRN
Status: DISCONTINUED | OUTPATIENT
Start: 2024-11-12 | End: 2024-11-13 | Stop reason: HOSPADM

## 2024-11-12 RX ADMIN — Medication 10 ML: at 21:51

## 2024-11-12 RX ADMIN — TRANEXAMIC ACID 1000 MG: 10 INJECTION, SOLUTION INTRAVENOUS at 18:33

## 2024-11-12 RX ADMIN — MORPHINE SULFATE 1 MG: 2 INJECTION, SOLUTION INTRAMUSCULAR; INTRAVENOUS at 13:41

## 2024-11-12 RX ADMIN — BUPIVACAINE HYDROCHLORIDE 12 ML: 5 INJECTION, SOLUTION EPIDURAL; INTRACAUDAL; PERINEURAL at 15:44

## 2024-11-12 RX ADMIN — FAMOTIDINE 20 MG: 10 INJECTION, SOLUTION INTRAVENOUS at 15:15

## 2024-11-12 RX ADMIN — ROCURONIUM 30 MG: 50 INJECTION, SOLUTION INTRAVENOUS at 16:18

## 2024-11-12 RX ADMIN — FENTANYL CITRATE 50 MCG: 50 INJECTION, SOLUTION INTRAMUSCULAR; INTRAVENOUS at 16:33

## 2024-11-12 RX ADMIN — TRANEXAMIC ACID 1000 MG: 10 INJECTION, SOLUTION INTRAVENOUS at 16:14

## 2024-11-12 RX ADMIN — EPHEDRINE SULFATE 5 MG: 50 INJECTION INTRAVENOUS at 14:37

## 2024-11-12 RX ADMIN — KETAMINE HYDROCHLORIDE 30 MG: 10 INJECTION INTRAMUSCULAR; INTRAVENOUS at 16:01

## 2024-11-12 RX ADMIN — PHENYLEPHRINE HYDROCHLORIDE 100 MCG: 10 INJECTION INTRAVENOUS at 16:23

## 2024-11-12 RX ADMIN — EPHEDRINE SULFATE 10 MG: 50 INJECTION INTRAVENOUS at 14:34

## 2024-11-12 RX ADMIN — ATORVASTATIN CALCIUM 20 MG: 20 TABLET, FILM COATED ORAL at 21:50

## 2024-11-12 RX ADMIN — ESMOLOL HYDROCHLORIDE 20 MG: 10 INJECTION, SOLUTION INTRAVENOUS at 16:01

## 2024-11-12 RX ADMIN — PHENYLEPHRINE HYDROCHLORIDE 100 MCG: 10 INJECTION INTRAVENOUS at 16:25

## 2024-11-12 RX ADMIN — Medication 10 ML: at 09:01

## 2024-11-12 RX ADMIN — CARVEDILOL 6.25 MG: 3.12 TABLET, FILM COATED ORAL at 21:50

## 2024-11-12 RX ADMIN — PREGABALIN 150 MG: 75 CAPSULE ORAL at 14:59

## 2024-11-12 RX ADMIN — ONDANSETRON 4 MG: 2 INJECTION INTRAMUSCULAR; INTRAVENOUS at 15:14

## 2024-11-12 RX ADMIN — MIDAZOLAM HYDROCHLORIDE 0.5 MG: 1 INJECTION, SOLUTION INTRAMUSCULAR; INTRAVENOUS at 15:29

## 2024-11-12 RX ADMIN — ROCURONIUM 10 MG: 50 INJECTION, SOLUTION INTRAVENOUS at 17:00

## 2024-11-12 RX ADMIN — DEXMEDETOMIDINE 20 MCG: 100 INJECTION, SOLUTION, CONCENTRATE INTRAVENOUS at 15:44

## 2024-11-12 RX ADMIN — SODIUM CHLORIDE, POTASSIUM CHLORIDE, SODIUM LACTATE AND CALCIUM CHLORIDE: 600; 310; 30; 20 INJECTION, SOLUTION INTRAVENOUS at 15:51

## 2024-11-12 RX ADMIN — EPHEDRINE SULFATE 5 MG: 50 INJECTION INTRAVENOUS at 16:30

## 2024-11-12 RX ADMIN — SODIUM CHLORIDE 1000 MG: 900 INJECTION, SOLUTION INTRAVENOUS at 21:57

## 2024-11-12 RX ADMIN — ROCURONIUM 10 MG: 50 INJECTION, SOLUTION INTRAVENOUS at 17:59

## 2024-11-12 RX ADMIN — LIDOCAINE HYDROCHLORIDE 60 MG: 20 INJECTION, SOLUTION INFILTRATION; PERINEURAL at 16:01

## 2024-11-12 RX ADMIN — ACETAMINOPHEN 1000 MG: 500 TABLET ORAL at 14:59

## 2024-11-12 RX ADMIN — SODIUM CHLORIDE 1000 MG: 900 INJECTION, SOLUTION INTRAVENOUS at 15:01

## 2024-11-12 RX ADMIN — FENTANYL CITRATE 25 MCG: 50 INJECTION, SOLUTION INTRAMUSCULAR; INTRAVENOUS at 18:45

## 2024-11-12 RX ADMIN — MORPHINE SULFATE 1 MG: 2 INJECTION, SOLUTION INTRAMUSCULAR; INTRAVENOUS at 09:00

## 2024-11-12 RX ADMIN — EPHEDRINE SULFATE 10 MG: 50 INJECTION INTRAVENOUS at 16:25

## 2024-11-12 RX ADMIN — SUGAMMADEX 137 MG: 100 INJECTION, SOLUTION INTRAVENOUS at 18:02

## 2024-11-12 RX ADMIN — DEXAMETHASONE SODIUM PHOSPHATE 8 MG: 10 INJECTION INTRAMUSCULAR; INTRAVENOUS at 15:15

## 2024-11-12 RX ADMIN — ROCURONIUM 10 MG: 50 INJECTION, SOLUTION INTRAVENOUS at 17:34

## 2024-11-12 RX ADMIN — SUCCINYLCHOLINE CHLORIDE 110 MG: 20 INJECTION, SOLUTION INTRAMUSCULAR; INTRAVENOUS at 16:01

## 2024-11-12 RX ADMIN — EPHEDRINE SULFATE 10 MG: 50 INJECTION INTRAVENOUS at 14:35

## 2024-11-12 RX ADMIN — PROPOFOL 110 MG: 10 INJECTION, EMULSION INTRAVENOUS at 16:01

## 2024-11-12 RX ADMIN — FENTANYL CITRATE 25 MCG: 50 INJECTION, SOLUTION INTRAMUSCULAR; INTRAVENOUS at 18:06

## 2024-11-12 RX ADMIN — PHENYLEPHRINE HYDROCHLORIDE 20 MCG/MIN: 10 INJECTION INTRAVENOUS at 17:24

## 2024-11-12 NOTE — H&P
Orthopedic H&P    Patient ID: Ashlie Levi is a 79 y.o. female.     Chief Complaint:  Right shoulder pain, proximal humerus fracture comminuted with uncontrolled pain    History of Present Illness  History of Present Illness  Ashlie presents for direct admission given uncontrolled pain secondary to comminuted right proximal humerus fracture.  She noted her right shoulder pain started acutely October 29 when she was out of town visiting family-she tripped over a cat gate per her report and landed on her right arm with immediate onset of significant pain.  She presented to local emergency department for evaluation where x-rays and CT scan were taken indicating comminuted proximal humerus fracture.  She was placed in a sling and recommended for local orthopedic follow-up.  She denies any associated new numbness or tingling to her right upper extremity since time of the injury but does note moderate to severe pain localized over the anterior lateral aspect of her right arm with associated swelling as well as bruising extending down to the level of her elbow.  Rates pain as a 9-10 out of 10 describes a shooting and aching in nature minimal improvement with sling and rest as well as ice exacerbation with any types of motion or local pressure.  She has had minimal improvement with oral pain medication including hydrocodone.  She denies any fevers chills or sweats.  She denies any associated neck pain.  She has had some pain down in her wrist as well as some difficulties with movement of wrist and fingers on the right hand.  She states that this is virtually unchanged since her evaluation in office where x-rays were taken indicating no evidence of fracture.    No current facility-administered medications on file prior to encounter.     Current Outpatient Medications on File Prior to Encounter   Medication Sig Dispense Refill    acetaminophen (TYLENOL) 500 MG tablet Take 1 tablet by mouth Every 6 (Six) Hours As Needed for Mild  Pain.      amLODIPine (NORVASC) 5 MG tablet Take 1 tablet by mouth once daily 90 tablet 0    aspirin 81 MG EC tablet Take 1 tablet by mouth 2 (Two) Times a Day. (Patient taking differently: Take 1 tablet by mouth Daily.) 60 tablet 0    atorvastatin (LIPITOR) 20 MG tablet Take 1 tablet by mouth Every Night.      carvedilol (COREG) 6.25 MG tablet Take 1 tablet by mouth twice daily 180 tablet 0    citalopram (CeleXA) 40 MG tablet Take 1 tablet by mouth Daily.      irbesartan (AVAPRO) 300 MG tablet TAKE 1 TABLET BY MOUTH ONCE DAILY AT NIGHT 90 tablet 0    isosorbide mononitrate (IMDUR) 30 MG 24 hr tablet Take 1 tablet by mouth Daily.      levothyroxine (SYNTHROID, LEVOTHROID) 112 MCG tablet Take 1 tablet by mouth Daily.      meloxicam (MOBIC) 7.5 MG tablet Take 1 tablet by mouth Daily. (Patient taking differently: Take 2 tablets by mouth Daily.) 90 tablet 3    omeprazole (priLOSEC) 40 MG capsule Take 1 capsule by mouth once daily 90 capsule 4    HYDROcodone-acetaminophen (Norco) 7.5-325 MG per tablet Take 1 tablet by mouth Every 6 (Six) Hours As Needed for Moderate Pain. 40 tablet 0    LORazepam (ATIVAN) 0.5 MG tablet TAKE 1 TABLET BY MOUTH THREE TIMES DAILY FOR FEAR OF FLYING      nitroglycerin (NITROSTAT) 0.4 MG SL tablet Place 1 tablet under the tongue Every 5 (Five) Minutes As Needed for Chest Pain. Take no more than 3 doses in 15 minutes and call EMS. 25 tablet 1    Turmeric (QC Tumeric Complex) 500 MG capsule Take 1 capsule by mouth Daily.      [DISCONTINUED] oxyCODONE-acetaminophen (PERCOCET) 5-325 MG per tablet TAKE 1 TABLET BY MOUTH EVERY 6 HOURS AS NEEDED FOR 5 DAYS FOR SHOULDER PAIN       Allergies   Allergen Reactions    Oxycodone-Acetaminophen Hallucinations           Social History            Occupational History    Not on file   Tobacco Use    Smoking status: Former       Current packs/day: 0.00       Average packs/day: 1 pack/day for 50.0 years (50.0 ttl pk-yrs)       Types: Cigarettes       Start date:  10/13/1956       Quit date: 10/13/2006       Years since quittin.0       Passive exposure: Never    Smokeless tobacco: Never    Tobacco comments:       quit 2006   Vaping Use    Vaping status: Never Used   Substance and Sexual Activity    Alcohol use: Yes       Comment: occasional    Drug use: No    Sexual activity: Not Currently       Partners: Male       Birth control/protection: Surgical, Post-menopausal       Comment: GREG with OC      Medical History        Past Medical History:   Diagnosis Date    Abnormal electrocardiogram        heart attack    Arthritis      Chest discomfort      Colon polyp      Coronary artery disease      Disease of thyroid gland      Diverticulitis      Elevated cholesterol      GERD (gastroesophageal reflux disease)      Heart attack      Hyperlipidemia      Hypertension           Surgical History         Past Surgical History:   Procedure Laterality Date    APPENDECTOMY        CARDIAC SURGERY         3 stents    CHOLECYSTECTOMY        COLONOSCOPY N/A 2016     Procedure: COLONOSCOPY;  Surgeon: Giovani Avelar MD;  Location: [facility] OR;  Service:     COLONOSCOPY N/A 2019     Procedure: Colonoscopy with possible biopsy or polypectomy;  Surgeon: Alfreda Soler DO;  Location: [facility] OR;  Service: Gastroenterology    COLONOSCOPY N/A 2021     Procedure: Colonoscopy with polypectomy, biopsy;  Surgeon: Alfreda Soler DO;  Location: [facility] OR;  Service: Gastroenterology;  Laterality: N/A;  diverticulosis  cecal biopsy  right colon polyp  left colon polyps x2    COLONOSCOPY W/ POLYPECTOMY N/A 10/8/2024     Procedure: COLONOSCOPY WITH POLYPECTOMY;  Surgeon: Alfreda Soler DO;  Location: [facility] OR;  Service: Gastroenterology;  Laterality: N/A;  diverticulosis, sigmoid colon polyp, right colon polyp    CORONARY STENT PLACEMENT        ENDOSCOPY N/A 2017     Procedure: ESOPHAGOGASTRODUODENOSCOPY with CARIN test ;  Surgeon: Alfreda Soler  "DO;  Location:  [facility] OR;  Service:     ENDOSCOPY N/A 06/13/2019     Procedure: Esophagogastroduodenoscopy with possible biopsy, polypectomy, dilation;  Surgeon: Alfreda Soler DO;  Location: [facility] OR;  Service: Gastroenterology    ENDOSCOPY N/A 05/18/2021     Procedure: Esophagogastroduodenoscopy with biopsies;  Surgeon: Alfreda Soler DO;  Location: [facility] OR;  Service: Gastroenterology;  Laterality: N/A;  CARIN test  gastritis  duodenitis    EPIDURAL BLOCK   04/30/2024    EYE SURGERY        FOOT GANGLION EXCISION Left      HEMORROIDECTOMY        HERNIA REPAIR        HYSTERECTOMY         GREG with OC age 30 for DUB    INJECTION OF MEDICATION Bilateral 03/26/2024     piriformis    MEDIAL BRANCH BLOCK Bilateral       lumbar    MEDIAL BRANCH BLOCK Bilateral 06/11/2024     L3-L5    OOPHORECTOMY         dx lsc , interval USO    TOTAL HIP ARTHROPLASTY Right 08/23/2023     Procedure: TOTAL HIP ARTHROPLASTY ANTERIOR WITH HANA TABLE;  Surgeon: Aidan Wallace MD;  Location: [facility] OR;  Service: Orthopedics;  Laterality: Right;                  Family History   Problem Relation Age of Onset    Hypertension Mother      Stroke Mother      Diabetes Father      Hypertension Father      Heart disease Father      Stroke Father      Heart disease Brother      Colon cancer Brother           dx > 50    Breast cancer Neg Hx      Ovarian cancer Neg Hx      Deep vein thrombosis Neg Hx      Pulmonary embolism Neg Hx              Review of Systems           Objective:  Vitals       Vitals:     11/04/24 1124   BP: 108/63   Pulse: 64   Weight: 70.3 kg (155 lb)   Height: 152.4 cm (60\")         Vitals             11/04/24  1124   Weight: 70.3 kg (155 lb)         Body mass index is 30.27 kg/m².  Physical Exam     Vital signs reviewed.   General: No acute distress, alert and oriented  Eyes: conjunctiva clear; pupils equally round and reactive  ENT: external ears and nose atraumatic; oropharynx clear  CV: no peripheral edema  Resp: " normal respiratory effort  Skin: no rashes or wounds; normal turgor  Psych: mood and affect appropriate; recent and remote memory intact  Debilities: None        Objective  Physical Exam           Right shoulder-significant soft tissue swelling around the shoulder itself with ecchymosis extending down the anterior medial and lateral aspects of the arm down to the level of the elbow.  Patient refuses to range elbow secondary to referred pain to the shoulder with no focal tenderness about the medial or lateral epicondyle.  Maximal tenderness is over the proximal humerus region.  No tenderness over clavicle or acromion.  She does have mild tenderness over dorsal wrist she is able to actively flex and extend wrist to 30 degrees extraction as well as radial and ulnar deviate to 10 degrees in each direction.  Patient is able to flex and extend fingers of the right hand including the thumb with 4 out of 5 strength.  Brisk cap refill all digits, 1+ radial pulse.  Positive sensation light touch proximal lateral arm as well as all distributions right hand symmetric to the left.     Imaging:  Right Shoulder X-Ray  Indication: Pain  AP, scapular Y, and axillary lateral views     Findings:  Comminuted proximal humerus fracture noted involving the surgical neck and extending up into the humeral head region with slight varus alignment and shortening appreciated.     Review of outside CT scan report, no images available indicates acute proximal humerus comminuted fracture involving the surgical neck and humeral head        Right Wrist X-Ray  Indication: Pain  AP, Lateral, and Oblique views     Findings:  No fracture  No bony lesion  Normal soft tissues  Normal joint spaces     No prior studies were available for comparison.     Assessment:        Assessment  1. Pain of right humerus    2. Other closed displaced fracture of proximal end of right humerus, initial encounter    3. Right wrist pain             Plan:        Plan  CT  shoulder right without IV contrast    IMPRESSION:  Acute comminuted fracture of the proximal humerus.     Assessment & Plan  I discussed treatment options at length with Ashlie in regards to her right shoulder-at this point in time she has a significantly comminuted proximal humerus fracture with angulation as well as involvement into the humeral head.  At this point in time with her fracture pattern she has a very low chance of successful healing with closed treatment as well as high likelihood of posttraumatic arthritis given involvement of the humeral head at the fracture site.  We discussed options including but not limited to observation, closed treatment, and reverse shoulder arthroplasty. Given significant comminution and pain levels as well as poor prognosis in regards to closed treatment, patient does wish to proceed with right reverse shoulder arthroplasty at this time.     Patient wishes to proceed with reverse shoulder arthroplasty for treatment of proximal humerus fracture at this point in time.  I discussed with with the patient the specific indication of a reverse shoulder arthroplasty particularly for shoulder pain as well as for pseudoparalysis, and reviewed anatomy of the shoulder as well as a model of the implant.  I reviewed details of the procedure as well as risks, benefits, and alternatives with the risks including but not limited to neurovascular damage, bleeding, infection, periprosthetic fracture, chronic pain, instability, loosening of implant, failure of implant, loss of motion, weakness, stiffness, DVT, pulmonary embolus, death, stroke, complex regional pain syndrome, myocardial infarction, need for additional procedures.  Patient understood all these had all questions answered today.  We will have patient medically optimized by hospitalist.  Patient understood all this had all questions answered.  No guarantees were given in regards to results of the surgery.      Will plan for direct  admission given poor pain control at this point in time.     Patient denies history of DVT or pulmonary embolus, denies history of diabetes.  She did have a prior cardiac stent most recently in 2006.     Ashlie Levi was in agreement with plan and had all questions answered.

## 2024-11-12 NOTE — OP NOTE
Date of Surgery: 11/12/2024    PREOPERATIVE DIAGNOSES: Right shoulder proximal humerus fracture    POSTOPERATIVE DIAGNOSES:   Right shoulder proximal humerus fracture  2.   Right shoulder biceps tendinopathy    PROCEDURE PERFORMED:   Right reverse total shoulder arthroplasty for treatment of proximal humerus fracture  2.   Right shoulder open biceps tenodesis    SURGEON: Farhat Ang MD     ASSISTANT:   Assistant: Lakisha Thorne CSA was responsible for performing the following activities: Retraction, Irrigation, Closing, and Placing Dressing and their skilled assistance was necessary for the success of this case.    ANESTHESIA:   general with block    ESTIMATED BLOOD LOSS:  <500ml     DRAIN: None.     COMPLICATION: None.     SPECIMEN: None.     FLUID: per anesthesia    URINE OUTPUT: Not recorded.     IMPLANTS:   Glenoid: Biomet medium augment baseplate with central screw 30mm in length, peripheral locking screw x4, 36 mm glenosphere +1.5 inferior offset    Humerus: Lesia trabecular metal stem 12 mm, +3 mm polyethylene liner, antibiotic cement    FINDINGS: Examination under anesthesia: Passive forward flexion to 100; passive external rotation to 15'; no open wounds or lacerations over the operative shoulder; 1+ radial pulse right wrist.     INDICATIONS: The patient is a pleasant 79 y.o. female who had significantly comminuted proximal humerus fracture with displacement and fracture extending into the humeral head. Given the patient's persistence of pain and comminution of fracture, wished to proceed with the above-mentioned procedure.     INFORMED CONSENT: Patient was explained details of the procedure, as well as risks, benefits, and alternatives as documented on the history and physical, and had all questions answered prior to signing \the operative consent form. No guarantees were given in regards to results of the surgery.     DESCRIPTION OF PROCEDURE: The patient was seen, evaluated, and cleared for  surgery by Anesthesia. Was met in the preoperative holding area. Operative site was marked, consent was reviewed, history and physical was updated, and preoperative labs were reviewed. Regional block was then placed per Anesthesia and the patient was taken to the operating room and placed in a supine position on the beach chair table. After successful intubation per Anesthesia, the patient was elevated into a seated position. Head was secured with a facemask. Neck was noted to be in normal anatomic alignment. The patient was secured to the table with a waist strap and all bony prominences were well-padded. Examination under anesthesia was carried out at this point in time. The right arm was then sterilely prepped and draped in a standard fashion.     A formal timeout was completed, including confirmation of history and physical, operative consent, surgical site, patient identification number, and preoperative antibiotic administration. The procedure was then begun with a 10 cm longitudinal incision over the deltopectoral interval heading towards the coracoid proximally with incision through the skin only with a #15 blade followed by subcutaneous dissection with Metzenbaum scissors. Deltopectoral interval was identified at this point in time. Interval was opened, the cephalic vein was protected, and a retractor was placed with adhesions being bluntly dissected in the subacromial and subdeltoid spaces. Brown retractor was placed at this point in time. The three sisters vessels were ligated along the medial neck and biceps tendon was identified at this point in time.     Biceps was tenodesed in an open fashion to the pectoralis major tendon and the proximal portion of the biceps was resected at this point in time.     The bicipital groove was opened, identifying the fracture site and significant comminution at this point time.  Tag sutures were placed into the subscapularis, supraspinatus, and infraspinatus to gain  control of the proximal segment at this point time.  The fracture segments were excised leaving a small wafer of bone attached to the rotator cuff tendons.  The humeral head segment was identified at this point time and removed from the glenohumeral joint. The proximal humerus was externally rotated and residual attachment of any inferior capsule to the proximal humerus was released at this point time around to the midaxillary line, achieving full exposure of the proximal humerus.     Attention was then turned to preparation of the proximal humerus with the opening reamer, progressing in stepwise fashion to a size 12 mm reamer.  Upper border of the pectoralis major tendon was tagged with a suture and marking placed on the suture at 5 cm proximal to guide the depth of the remaining.  Scratch fit was obtained with a 12 mm reamer at the appropriate depth.    Attention was then turned to glenoid exposure with posterior and anterior glenoid retractors being placed at this point in time. The biceps tendon stump was then identified at this point in time and used to ayla the 12-o'clock location, followed by marking of the 3, 6, and 9-o'clock locations on the glenoid face. Labrum was excised 360' from the margins of the glenoid at this time. The centralizing guide was then placed followed by the central wire with the one-step cannulated drill and reamer being passed over the wire at this time with 10' of cephalad tilt placed on the wire initially.  Once 50% ream was obtained of the inferior glenoid, medium  was used and noted to be appropriate depth at this time.  Offset reaming guide was then drilled and placed followed by use of bowtie reamer.  Medium trial was in place and no to be well-seated at this time.  Thorough irrigation of the glenoid was completed at this point time. The Glenosphere baseplate was then opened on the back table, placed on the impactor, and impacted into position onto the glenoid at this time  with good fit noted.  Central screw was measured once the guidewire was removed, and central screw length of 30 mm placed at this point time with good bite noted.  Drill guide was then placed and used to drill all 4 peripheral screw holes at this time, followed by measurement with depth gauge and placement of screws and locking fashion. Size 36 Glenosphere standard offset was then opened on the back table, placed onto the baseplate, and impacted into position with a secure fit of the Faria taper noted with no evidence of disengagement on pulling with a right angle from all sides.     Attention was then returned to the proximal humerus where the trial stem was placed at appropriate height as measured with a tag suture from the pectoralis major.  Trial reduction was carried out at this point time and noted to be appropriate with +3 mm polyethylene liner trial.  The canal was thoroughly irrigated at this point in time and antibiotic cement opened on the back table and prepared in standard fashion. The final 12 mm humeral stem implant was opened on the back table.  Once cement was sufficiently cured, it was injected into the humeral canal after placement of cement restrictor distally, using third-generation cement technique with appropriate pressurization.  Humeral stem was then impacted into position in the humeral canal with appropriate version as noted with the version bars and to the determined depth and height from trialing and the tag suture from the pectoralis major.  Extraneous cement was removed at this point time. Trial liners were placed at this time, the shoulder was reduced, and noted to be stable throughout range of motion, with appropriate tension on the strap muscles. Full motion was achieved and dislocation had to be performed with 1 fingertip snugly fit in the joint space. The shoulder was then dislocated once again and trial liner was removed.     Final polyethylene liner implant was opened on the  back table. Surrounding tissue was thoroughly irrigated. Then 30 mL of Exparel injection was injected in the periosteal layers on both the glenoid and humeral sides and the implant liner was impacted into position at this point and the shoulder was reduced and noted to be stable throughout range of motion once again. The portion of the subscapularis, supraspinatus, and  infraspinatus, each with a small wafer of bone, were then tied to the proximal humeral stem with a multiple suture configuration at this time. joint was then thoroughly irrigated once again with normal saline, followed by a Betadine wash, followed by normal saline once again.  1 g of vancomycin powder was placed in the deep tissue at this time.    Attention was then turned to closure of the wound with 0 Vicryl used for closure of the deltopectoral layer, 2-0 vicryl and 3-0 Stratafix for subcutaneous closure, followed by Prineo mesh and glue for skin closure. The wound was dressed with Telfa, Tegaderm, ABD pad, and Medipore tape. The patient was placed in a sling with an abduction pillow to the operative side.     At the end of the procedure, all lap, needle, and sponge counts were correct x2. The patient had brisk capillary refill to all digits of the operative extremity. Compartments were soft and easily compressible at the end of the procedure.     DISPOSITION: The patient was extubated per Anesthesia and taken to the recovery room in stable condition. Will be admitted to the Orthopedic service for pain control and kept in a sling for 6 weeks with range of motion of the elbow, wrist, hand initiated within the 1st week.  No pendulum exercises until 4 weeks. Results of the procedure were discussed immediately postoperatively with the patient's family, and they had all questions answered at that time.     Farhat Ang M.D.*

## 2024-11-12 NOTE — CONSULTS
"[facility] HOSPITALIST     Kole Silva MD    Reason for Consultation: Preoperative evaluation    HISTORY OF PRESENT ILLNESS:    Ms. Levi is a pleasant 79-year-old female who fell approximately 3 weeks ago and felt her right wrist \"break\".  She did not lose consciousness.  She immediately told her family that came to her aid that she had broken her wrist.  She does not exert herself at home.  She reports her helps her with many tasks.  She denies resting chest pain and dyspnea.      Past Medical History:   Diagnosis Date    Abnormal electrocardiogram     2006 heart attack    Arthritis     Chest discomfort     Colon polyp     Coronary artery disease     Disease of thyroid gland     Diverticulitis     Elevated cholesterol     GERD (gastroesophageal reflux disease)     Heart attack     Hyperlipidemia     Hypertension      Past Surgical History:   Procedure Laterality Date    APPENDECTOMY      CARDIAC SURGERY      3 stents    CHOLECYSTECTOMY      COLONOSCOPY N/A 07/13/2016    Procedure: COLONOSCOPY;  Surgeon: Giovani Avelar MD;  Location: [facility] OR;  Service:     COLONOSCOPY N/A 06/13/2019    Procedure: Colonoscopy with possible biopsy or polypectomy;  Surgeon: Alfreda Soler DO;  Location: [facility] OR;  Service: Gastroenterology    COLONOSCOPY N/A 05/18/2021    Procedure: Colonoscopy with polypectomy, biopsy;  Surgeon: Alfreda Soler DO;  Location: [facility] OR;  Service: Gastroenterology;  Laterality: N/A;  diverticulosis  cecal biopsy  right colon polyp  left colon polyps x2    COLONOSCOPY W/ POLYPECTOMY N/A 10/8/2024    Procedure: COLONOSCOPY WITH POLYPECTOMY;  Surgeon: Alfreda Soler DO;  Location: [facility] OR;  Service: Gastroenterology;  Laterality: N/A;  diverticulosis, sigmoid colon polyp, right colon polyp    CORONARY STENT PLACEMENT      ENDOSCOPY N/A 11/21/2017    Procedure: ESOPHAGOGASTRODUODENOSCOPY with CARIN test ;  Surgeon: Alfreda Soler DO;  Location: " [facility] OR;  Service:     ENDOSCOPY N/A 2019    Procedure: Esophagogastroduodenoscopy with possible biopsy, polypectomy, dilation;  Surgeon: Alfreda Soler DO;  Location:  [facility] OR;  Service: Gastroenterology    ENDOSCOPY N/A 2021    Procedure: Esophagogastroduodenoscopy with biopsies;  Surgeon: Alfreda Soler DO;  Location:  [facility] OR;  Service: Gastroenterology;  Laterality: N/A;  CARIN test  gastritis  duodenitis    EPIDURAL BLOCK  2024    EYE SURGERY      FOOT GANGLION EXCISION Left     HEMORROIDECTOMY      HERNIA REPAIR      HYSTERECTOMY      GREG with OC age 30 for DUB    INJECTION OF MEDICATION Bilateral 2024    piriformis    MEDIAL BRANCH BLOCK Bilateral     lumbar    MEDIAL BRANCH BLOCK Bilateral 2024    L3-L5    OOPHORECTOMY      dx lsc , interval USO    TOTAL HIP ARTHROPLASTY Right 2023    Procedure: TOTAL HIP ARTHROPLASTY ANTERIOR WITH HANA TABLE;  Surgeon: Aidan Wallace MD;  Location:  [facility] OR;  Service: Orthopedics;  Laterality: Right;     Family History   Problem Relation Age of Onset    Hypertension Mother     Stroke Mother     Diabetes Father     Hypertension Father     Heart disease Father     Stroke Father     Heart disease Brother     Colon cancer Brother         dx > 50    Breast cancer Neg Hx     Ovarian cancer Neg Hx     Deep vein thrombosis Neg Hx     Pulmonary embolism Neg Hx      Social History     Tobacco Use    Smoking status: Former     Current packs/day: 0.00     Average packs/day: 1 pack/day for 50.0 years (50.0 ttl pk-yrs)     Types: Cigarettes     Start date: 10/13/1956     Quit date: 10/13/2006     Years since quittin.0     Passive exposure: Never    Smokeless tobacco: Never    Tobacco comments:     quit    Vaping Use    Vaping status: Never Used   Substance Use Topics    Alcohol use: Yes     Comment: occasional    Drug use: No     Medications Prior to Admission   Medication Sig Dispense Refill Last Dose/Taking     acetaminophen (TYLENOL) 500 MG tablet Take 1 tablet by mouth Every 6 (Six) Hours As Needed for Mild Pain.   11/11/2024    amLODIPine (NORVASC) 5 MG tablet Take 1 tablet by mouth once daily 90 tablet 0 11/12/2024 at  6:00 AM    aspirin 81 MG EC tablet Take 1 tablet by mouth 2 (Two) Times a Day. (Patient taking differently: Take 1 tablet by mouth Daily.) 60 tablet 0 Past Week    atorvastatin (LIPITOR) 20 MG tablet Take 1 tablet by mouth Every Night.   11/11/2024    carvedilol (COREG) 6.25 MG tablet Take 1 tablet by mouth twice daily 180 tablet 0 11/12/2024 Morning    citalopram (CeleXA) 40 MG tablet Take 1 tablet by mouth Daily.   11/11/2024    irbesartan (AVAPRO) 300 MG tablet TAKE 1 TABLET BY MOUTH ONCE DAILY AT NIGHT 90 tablet 0 11/11/2024 Morning    isosorbide mononitrate (IMDUR) 30 MG 24 hr tablet Take 1 tablet by mouth Daily.   11/12/2024 Morning    levothyroxine (SYNTHROID, LEVOTHROID) 112 MCG tablet Take 1 tablet by mouth Daily.   11/12/2024 Morning    meloxicam (MOBIC) 7.5 MG tablet Take 1 tablet by mouth Daily. (Patient taking differently: Take 2 tablets by mouth Daily.) 90 tablet 3 Past Week    omeprazole (priLOSEC) 40 MG capsule Take 1 capsule by mouth once daily 90 capsule 4 Patient Taking Differently    HYDROcodone-acetaminophen (Norco) 7.5-325 MG per tablet Take 1 tablet by mouth Every 6 (Six) Hours As Needed for Moderate Pain. 40 tablet 0 11/10/2024 Evening    LORazepam (ATIVAN) 0.5 MG tablet TAKE 1 TABLET BY MOUTH THREE TIMES DAILY FOR FEAR OF FLYING   Unknown    nitroglycerin (NITROSTAT) 0.4 MG SL tablet Place 1 tablet under the tongue Every 5 (Five) Minutes As Needed for Chest Pain. Take no more than 3 doses in 15 minutes and call EMS. 25 tablet 1     Turmeric (QC Tumeric Complex) 500 MG capsule Take 1 capsule by mouth Daily.        Allergies:  Oxycodone-acetaminophen    REVIEW OF SYSTEMS:  Please see the above history of present illness for pertinent positives and negatives.  The remainder of the  "patient's systems have been reviewed and are negative.    Vital Signs  Temp:  [98.3 °F (36.8 °C)] 98.3 °F (36.8 °C)  Heart Rate:  [65] 65  Resp:  [16-20] 16  BP: (141-164)/(65-72) 141/65  Oxygen Therapy  SpO2: 96 %  Pulse Oximetry Type: Continuous  Device (Oxygen Therapy): room air}  Body mass index is 29.41 kg/m².  Flowsheet Rows      Flowsheet Row First Filed Value   Admission Height 152.4 cm (60\") Documented at 11/12/2024 0739   Admission Weight 68.3 kg (150 lb 9.6 oz) Documented at 11/12/2024 0739               Physical Exam:  Physical Exam   Constitutional: Patient appears well developed and well nourished and in no acute distress.  Appears younger than stated age.   HEENT:   Head: Normocephalic and atraumatic.   Eyes:  Pupils are equal, round, and EOM are intact. Sclerae are anicteric and noninjected.  Cardiovascular: Regular rate, regular rhythm, S1 normal and S2 normal.  Exam reveals no gallop and no friction rub.  No murmur heard.  Pulmonary/Chest: Lungs are clear to auscultation bilaterally. No respiratory distress. No wheezes. No rhonchi. No rales.   Abdominal: Soft. Bowel sounds are normal. There is no tenderness.   Musculoskeletal: Normal muscle tone  Extremities: No edema.   Neurological: Patient is alert and oriented to person, place, and time. Cranial nerves II-XII are grossly intact with no focal deficits.       Results Review:    I reviewed the patient's new clinical results.  Lab Results (most recent)       Procedure Component Value Units Date/Time    Basic Metabolic Panel [[id number]]  (Abnormal) Collected: 11/12/24 0844    Specimen: Blood Updated: 11/12/24 0917     Glucose 126 mg/dL      BUN 11 mg/dL      Creatinine 0.67 mg/dL      Sodium 137 mmol/L      Potassium 3.8 mmol/L      Chloride 100 mmol/L      CO2 25.9 mmol/L      Calcium 9.8 mg/dL      BUN/Creatinine Ratio 16.4     Anion Gap 11.1 mmol/L      eGFR 87.9 mL/min/1.73     Narrative:      GFR Normal >60  Chronic Kidney Disease <60  Kidney " Failure <15    The GFR formula is only valid for adults with stable renal function between ages 18 and 70.    Protime-INR [[id number]]  (Normal) Collected: 11/12/24 0844    Specimen: Blood Updated: 11/12/24 0912     Protime 13.7 Seconds      INR 1.01    Narrative:      Therapeutic Ranges for INR: 2.0-3.0 (PT 20-30)                              2.5-3.5 (PT 25-34)    aPTT [[id number]]  (Normal) Collected: 11/12/24 0844    Specimen: Blood Updated: 11/12/24 0912     PTT 29.9 seconds     Narrative:      PTT = The equivalent PTT values for the therapeutic range of heparin levels at 0.1 to 0.7 U/ml are 53 to 110 seconds.      CBC & Differential [[id number]]  (Abnormal) Collected: 11/12/24 0844    Specimen: Blood Updated: 11/12/24 0851    Narrative:      The following orders were created for panel order CBC & Differential.  Procedure                               Abnormality         Status                     ---------                               -----------         ------                     CBC Auto Differential[[id number]]        Abnormal            Final result                 Please view results for these tests on the individual orders.    CBC Auto Differential [[id number]]  (Abnormal) Collected: 11/12/24 0844    Specimen: Blood Updated: 11/12/24 0851     WBC 10.75 10*3/mm3      RBC 3.86 10*6/mm3      Hemoglobin 11.5 g/dL      Hematocrit 35.4 %      MCV 91.7 fL      MCH 29.8 pg      MCHC 32.5 g/dL      RDW 13.5 %      RDW-SD 45.1 fl      MPV 9.5 fL      Platelets 344 10*3/mm3      Neutrophil % 63.2 %      Lymphocyte % 24.5 %      Monocyte % 6.1 %      Eosinophil % 4.8 %      Basophil % 0.8 %      Immature Grans % 0.6 %      Neutrophils, Absolute 6.79 10*3/mm3      Lymphocytes, Absolute 2.63 10*3/mm3      Monocytes, Absolute 0.66 10*3/mm3      Eosinophils, Absolute 0.52 10*3/mm3      Basophils, Absolute 0.09 10*3/mm3      Immature Grans, Absolute 0.06 10*3/mm3      nRBC 0.0 /100 WBC             Imaging Results (Most  Recent)       Procedure Component Value Units Date/Time    CT Upper Extremity Right Without Contrast [[id number]] Collected: 11/12/24 0939     Updated: 11/12/24 0950    Narrative:      CT UPPER EXTREMITY RIGHT WO CONTRAST    Date of Exam: 11/12/2024 9:15 AM EST    Indication: Evaluate proximal humerus fracture.    Comparison: None available.    Technique: Axial CT images were obtained of the right upper extremity without contrast administration.  Sagittal and coronal reconstructions were performed.  Automated exposure control and iterative reconstruction methods were used.      Findings:  There is a displaced comminuted fracture involving the proximal metadiaphysis and proximal head of the right humerus. The fracture extends through the proximal metadiaphysis, surgical neck, anatomic neck, and tuberosities of the proximal humerus. There   is displacement of fracture fragments. There is also impaction of the dominant distal diaphyseal fracture fragment into the more proximal fracture fragments. Partially avulsed greater and lesser tuberosity fracture fragments are noted.    The glenohumeral joint is grossly intact without dislocation. There is partial posterior subluxation of the humeral head with respect to the glenoid. A lesser tuberosity fracture fragment is displaced towards the superior anterior margin of the joint.   There is an associated large joint effusion. There is also hemorrhage within the adjacent soft tissues and within the joint space.    The acromioclavicular joint is intact. Mild age-related changes are noted. No displaced fracture is seen involving the scapula, acromion, or distal clavicle.    There may be associated injury of the infraspinatus component of the cuff. The subscapularis is not well visualized. There appears to be increased subacromial/subdeltoid bursal fluid. There is moderate atrophy throughout the rotator cuff musculature.    A tiny nodule is seen in the lateral aspect of the right  upper lobe suggesting a granuloma measuring 3 mm. No infiltrates or pleural effusions are seen.      Impression:      Impression:  1.Comminuted fracture involving the proximal metadiaphysis and proximal head of the right humerus. Multiple fracture fragments are observed. The fracture involves the greater and lesser tuberosities. There is displacement of fracture fragments. There is   also slight impaction of the dominant diaphyseal fracture fragment into the proximal fracture fragments.  2.There is slight posterior subluxation of the humeral head with aspect of the glenoid. No gross dislocation is seen. A large joint effusion is noted. There is evidence for associated hemorrhage within the joint space as well as within the adjacent soft   tissues.  3.There may be injury of the infraspinatus component of the cuff. The subscapularis component is not well seen. There appears to be slightly increased overlying subacromial/subdeltoid bursal fluid. There is moderate atrophy throughout the rotator cuff   musculature.        Electronically Signed: Neal Mejia MD    11/12/2024 9:47 AM EST    Workstation ID: [id number]          reviewed    ECG/EMG Results (most recent)       Procedure Component Value Units Date/Time    ECG 12 Lead Pre-Op / Pre-Procedure [[id number]] Collected: 11/12/24 1047     Updated: 11/12/24 1333     QT Interval 465 ms      QTC Interval 477 ms     Narrative:      HEART RATE=63  bpm  RR Dchyhehz=006  ms  CT Lnlzbmry=681  ms  P Horizontal Axis=27  deg  P Front Axis=8  deg  QRSD Interval=78  ms  QT Bvwhxamk=219  ms  VDgT=979  ms  QRS Axis=16  deg  T Wave Axis=65  deg  - ABNORMAL ECG -  Sinus rhythm  Prolonged CT interval  No change from prior tracing  Electronically Signed By: Cassandra Layne ([facility]) 2024-11-12 13:33:31  Date and Time of Study:2024-11-12 10:47:07          reviewed    Impressions/Recommendations:    Preoperative Evaluation: I've reviewed Dr. Granado' note from 7/18/24 and EKG from this morning w/o  dynamic change.  She is Juárez Class II.  No further testing required.    I discussed the patient's findings and my recommendations with patient.     Dieter Bradford MD  11/12/24  14:12 EST

## 2024-11-12 NOTE — ANESTHESIA PROCEDURE NOTES
Peripheral Block    Pre-sedation assessment completed: 11/12/2024 3:28 PM    Patient reassessed immediately prior to procedure    Patient location during procedure: pre-op  Start time: 11/12/2024 3:38 PM  Stop time: 11/12/2024 3:44 PM  Reason for block: at surgeon's request and post-op pain management  Preanesthetic Checklist  Completed: patient identified, IV checked, site marked, risks and benefits discussed, surgical consent, monitors and equipment checked, pre-op evaluation and timeout performed  Prep:  Pt Position: supine  Sterile barriers:cap, gloves, mask and washed/disinfected hands  Prep: ChloraPrep  Patient monitoring: blood pressure monitoring, continuous pulse oximetry and EKG  Procedure    Sedation: yes  Performed under: local infiltration  Guidance:ultrasound guided    ULTRASOUND INTERPRETATION.  Using ultrasound guidance a 21 G gauge needle was placed in close proximity to the nerve, at which point, under ultrasound guidance anesthetic was injected in the area of the nerve and spread of the anesthesia was seen on ultrasound in close proximity thereto.  There were no abnormalities seen on ultrasound; a digital image was taken; and the patient tolerated the procedure with no complications. Images:still images obtained, printed/placed on chart    Laterality:right  Block Type:interscalene  Injection Technique:single-shot  Needle Type:echogenic  Needle Gauge:21 G  Resistance on Injection: none    Medications Used: dexmedetomidine HCl (PRECEDEX) injection - Perineural   20 mcg - 11/12/2024 3:44:00 PM  bupivacaine PF (MARCAINE) injection 0.5% - Perineural   12 mL - 11/12/2024 3:44:00 PM      Medications  Comment:Epi 50 mcg added to block mix    Post Assessment  Injection Assessment: negative aspiration for heme, no paresthesia on injection and incremental injection  Patient Tolerance:comfortable throughout block  Complications:no  Performed by: Ray Rees CRNA

## 2024-11-12 NOTE — ANESTHESIA PREPROCEDURE EVALUATION
Anesthesia Evaluation     Patient summary reviewed and Nursing notes reviewed   no history of anesthetic complications:   NPO Solid Status: > 8 hours  NPO Liquid Status: > 8 hours           Airway   Mallampati: II  TM distance: >3 FB  Neck ROM: full  Possible difficult intubation  Dental    (+) implants, lower dentures and upper dentures    Pulmonary - normal exam    breath sounds clear to auscultation  (+) a smoker (quit 2006) Former, cigarettes,  Cardiovascular - normal exam    ECG reviewed  Patient on routine beta blocker and Beta blocker given within 24 hours of surgery  Rhythm: regular  Rate: normal    (+) hypertension well controlled 2 medications or greater, past MI (2006)  >12 months, CAD, cardiac stents Drug eluting stent more than 12 months ago , orthopnea, hyperlipidemia  (-) angina, DVT    ROS comment: HEART RATE=63  bpm  RR Gvmxgrrj=887  ms  RI Bbhithtz=281  ms  P Horizontal Axis=27  deg  P Front Axis=8  deg  QRSD Interval=78  ms  QT Kwebzala=155  ms  ANtT=145  ms  QRS Axis=16  deg  T Wave Axis=65  deg  - ABNORMAL ECG -  Sinus rhythm  Prolonged RI interval  No change from prior tracing  Electronically Signed By: Cassandra Layne ([facility]) 2024-11-12 13:33:31  Date and Time of Study:2024-11-12 10:47:07    ·   Calculated EF = 70%).  · Myocardial perfusion imaging indicates a normal myocardial perfusion study with no evidence of ischemia.  · Impressions are consistent with a low risk study.      Neuro/Psych  GI/Hepatic/Renal/Endo    (+) GERD well controlled, thyroid problem hypothyroidism    Musculoskeletal     (+) back pain, chronic pain, neck pain  Abdominal  - normal exam    Abdomen: soft.   Substance History   (+) alcohol use (wine occassionally, <1 week)     OB/GYN          Other   arthritis,                   Anesthesia Plan    ASA 3     general with block     intravenous induction     Anesthetic plan, risks, benefits, and alternatives have been provided, discussed and informed consent has been obtained  with: patient and spouse/significant other.    Use of blood products discussed with patient and spouse/significant other  Consented to blood products.    Plan discussed with CRNA.    CODE STATUS:

## 2024-11-12 NOTE — PLAN OF CARE
Goal Outcome Evaluation:  Plan of Care Reviewed With: patient        Progress: no change  Outcome Evaluation: Admit to MS. NPO for surgery. To OR.

## 2024-11-12 NOTE — ANESTHESIA PROCEDURE NOTES
Airway  Urgency: elective    Date/Time: 11/12/2024 4:06 PM  Airway not difficult    General Information and Staff    Patient location during procedure: OR  CRNA/CAA: Ray Rees CRNA  SRNA: Jorje Juan SRNA  Indications and Patient Condition  Indications for airway management: airway protection    Preoxygenated: yes  Mask difficulty assessment: 0 - not attempted    Final Airway Details  Final airway type: endotracheal airway      Successful airway: ETT  Cuffed: yes   Successful intubation technique: direct laryngoscopy  Endotracheal tube insertion site: oral  Blade: Conway  Blade size: 2  ETT size (mm): 7.0  Cormack-Lehane Classification: grade IIa - partial view of glottis  Placement verified by: chest auscultation and capnometry   Cuff volume (mL): 10  Measured from: lips  ETT/EBT  to lips (cm): 21  Number of attempts at approach: 1  Assessment: lips, teeth, and gum same as pre-op and atraumatic intubation

## 2024-11-13 VITALS
RESPIRATION RATE: 16 BRPM | OXYGEN SATURATION: 96 % | BODY MASS INDEX: 29.57 KG/M2 | TEMPERATURE: 97.5 F | WEIGHT: 150.6 LBS | DIASTOLIC BLOOD PRESSURE: 69 MMHG | HEART RATE: 76 BPM | SYSTOLIC BLOOD PRESSURE: 130 MMHG | HEIGHT: 60 IN

## 2024-11-13 LAB
ANION GAP SERPL CALCULATED.3IONS-SCNC: 14 MMOL/L (ref 5–15)
BUN SERPL-MCNC: 15 MG/DL (ref 8–23)
BUN/CREAT SERPL: 19 (ref 7–25)
CALCIUM SPEC-SCNC: 9.1 MG/DL (ref 8.6–10.5)
CHLORIDE SERPL-SCNC: 101 MMOL/L (ref 98–107)
CO2 SERPL-SCNC: 19 MMOL/L (ref 22–29)
CREAT SERPL-MCNC: 0.79 MG/DL (ref 0.57–1)
DEPRECATED RDW RBC AUTO: 46.5 FL (ref 37–54)
EGFRCR SERPLBLD CKD-EPI 2021: 75.3 ML/MIN/1.73
ERYTHROCYTE [DISTWIDTH] IN BLOOD BY AUTOMATED COUNT: 13.5 % (ref 12.3–15.4)
GLUCOSE SERPL-MCNC: 151 MG/DL (ref 65–99)
HCT VFR BLD AUTO: 35.5 % (ref 34–46.6)
HGB BLD-MCNC: 11.3 G/DL (ref 12–15.9)
MCH RBC QN AUTO: 30 PG (ref 26.6–33)
MCHC RBC AUTO-ENTMCNC: 31.8 G/DL (ref 31.5–35.7)
MCV RBC AUTO: 94.2 FL (ref 79–97)
PLATELET # BLD AUTO: 294 10*3/MM3 (ref 140–450)
PMV BLD AUTO: 9.9 FL (ref 6–12)
POTASSIUM SERPL-SCNC: 5 MMOL/L (ref 3.5–5.2)
RBC # BLD AUTO: 3.77 10*6/MM3 (ref 3.77–5.28)
SODIUM SERPL-SCNC: 134 MMOL/L (ref 136–145)
WBC NRBC COR # BLD AUTO: 14.17 10*3/MM3 (ref 3.4–10.8)

## 2024-11-13 PROCEDURE — 63710000001 FAMOTIDINE 20 MG TABLET: Performed by: ORTHOPAEDIC SURGERY

## 2024-11-13 PROCEDURE — 99213 OFFICE O/P EST LOW 20 MIN: CPT | Performed by: FAMILY MEDICINE

## 2024-11-13 PROCEDURE — A9270 NON-COVERED ITEM OR SERVICE: HCPCS | Performed by: ORTHOPAEDIC SURGERY

## 2024-11-13 PROCEDURE — 63710000001 CITALOPRAM 20 MG TABLET: Performed by: ORTHOPAEDIC SURGERY

## 2024-11-13 PROCEDURE — 63710000001 LEVOTHYROXINE 112 MCG TABLET: Performed by: ORTHOPAEDIC SURGERY

## 2024-11-13 PROCEDURE — 63710000001 PANTOPRAZOLE 40 MG TABLET DELAYED-RELEASE: Performed by: ORTHOPAEDIC SURGERY

## 2024-11-13 PROCEDURE — 63710000001 MELOXICAM 7.5 MG TABLET: Performed by: ORTHOPAEDIC SURGERY

## 2024-11-13 PROCEDURE — 97161 PT EVAL LOW COMPLEX 20 MIN: CPT

## 2024-11-13 PROCEDURE — 63710000001 ISOSORBIDE MONONITRATE 30 MG TABLET SUSTAINED-RELEASE 24 HOUR: Performed by: ORTHOPAEDIC SURGERY

## 2024-11-13 PROCEDURE — G0378 HOSPITAL OBSERVATION PER HR: HCPCS

## 2024-11-13 PROCEDURE — 63710000001 ASPIRIN 81 MG TABLET DELAYED-RELEASE: Performed by: ORTHOPAEDIC SURGERY

## 2024-11-13 PROCEDURE — 94640 AIRWAY INHALATION TREATMENT: CPT

## 2024-11-13 PROCEDURE — 63710000001 AMLODIPINE 5 MG TABLET: Performed by: ORTHOPAEDIC SURGERY

## 2024-11-13 PROCEDURE — 63710000001 HYDROCODONE-ACETAMINOPHEN 10-325 MG TABLET: Performed by: ORTHOPAEDIC SURGERY

## 2024-11-13 PROCEDURE — 63710000001 CARVEDILOL 3.125 MG TABLET: Performed by: ORTHOPAEDIC SURGERY

## 2024-11-13 PROCEDURE — 85027 COMPLETE CBC AUTOMATED: CPT | Performed by: ORTHOPAEDIC SURGERY

## 2024-11-13 PROCEDURE — 97165 OT EVAL LOW COMPLEX 30 MIN: CPT

## 2024-11-13 PROCEDURE — 80048 BASIC METABOLIC PNL TOTAL CA: CPT | Performed by: ORTHOPAEDIC SURGERY

## 2024-11-13 RX ORDER — ALBUTEROL SULFATE 0.83 MG/ML
2.5 SOLUTION RESPIRATORY (INHALATION) ONCE
Status: COMPLETED | OUTPATIENT
Start: 2024-11-13 | End: 2024-11-13

## 2024-11-13 RX ORDER — DOCUSATE SODIUM 100 MG/1
100 CAPSULE, LIQUID FILLED ORAL 2 TIMES DAILY PRN
Qty: 60 CAPSULE | Refills: 0 | Status: SHIPPED | OUTPATIENT
Start: 2024-11-13 | End: 2024-11-18 | Stop reason: HOSPADM

## 2024-11-13 RX ORDER — MELOXICAM 15 MG/1
15 TABLET ORAL DAILY
Qty: 30 TABLET | Refills: 0 | Status: SHIPPED | OUTPATIENT
Start: 2024-11-14

## 2024-11-13 RX ORDER — ASPIRIN 81 MG/1
81 TABLET ORAL DAILY
Qty: 21 TABLET | Refills: 0 | Status: SHIPPED | OUTPATIENT
Start: 2024-11-14

## 2024-11-13 RX ORDER — HYDROCODONE BITARTRATE AND ACETAMINOPHEN 7.5; 325 MG/1; MG/1
1 TABLET ORAL EVERY 6 HOURS PRN
Qty: 50 TABLET | Refills: 0 | Status: SHIPPED | OUTPATIENT
Start: 2024-11-13

## 2024-11-13 RX ORDER — ONDANSETRON 4 MG/1
4 TABLET, ORALLY DISINTEGRATING ORAL EVERY 8 HOURS PRN
Qty: 20 TABLET | Refills: 0 | Status: SHIPPED | OUTPATIENT
Start: 2024-11-13

## 2024-11-13 RX ADMIN — ASPIRIN 81 MG: 81 TABLET, COATED ORAL at 09:38

## 2024-11-13 RX ADMIN — CITALOPRAM HYDROBROMIDE 40 MG: 20 TABLET ORAL at 09:39

## 2024-11-13 RX ADMIN — CARVEDILOL 6.25 MG: 3.12 TABLET, FILM COATED ORAL at 09:38

## 2024-11-13 RX ADMIN — ISOSORBIDE MONONITRATE 30 MG: 30 TABLET, EXTENDED RELEASE ORAL at 09:38

## 2024-11-13 RX ADMIN — PANTOPRAZOLE SODIUM 40 MG: 40 TABLET, DELAYED RELEASE ORAL at 05:40

## 2024-11-13 RX ADMIN — ALBUTEROL SULFATE 2.5 MG: 2.5 SOLUTION RESPIRATORY (INHALATION) at 13:27

## 2024-11-13 RX ADMIN — LEVOTHYROXINE SODIUM 112 MCG: 0.11 TABLET ORAL at 09:39

## 2024-11-13 RX ADMIN — MELOXICAM 15 MG: 7.5 TABLET ORAL at 09:39

## 2024-11-13 RX ADMIN — AMLODIPINE BESYLATE 5 MG: 5 TABLET ORAL at 09:40

## 2024-11-13 RX ADMIN — HYDROCODONE BITARTRATE AND ACETAMINOPHEN 1 TABLET: 10; 325 TABLET ORAL at 13:20

## 2024-11-13 RX ADMIN — FAMOTIDINE 40 MG: 20 TABLET ORAL at 09:39

## 2024-11-13 RX ADMIN — Medication 10 ML: at 09:39

## 2024-11-13 NOTE — CASE MANAGEMENT/SOCIAL WORK
Case Management Discharge Note      Final Note: dc home         Selected Continued Care - Discharged on 11/13/2024 Admission date: 11/12/2024 - Discharge disposition: Home or Self Care      Destination    No services have been selected for the patient.                Durable Medical Equipment    No services have been selected for the patient.                Dialysis/Infusion    No services have been selected for the patient.                Home Medical Care    No services have been selected for the patient.                Therapy    No services have been selected for the patient.                Community Resources    No services have been selected for the patient.                Community & DME    No services have been selected for the patient.                         Final Discharge Disposition Code: 01 - home or self-care

## 2024-11-13 NOTE — ANESTHESIA POSTPROCEDURE EVALUATION
Patient: Ashlie Levi    Procedure Summary       Date: 11/12/24 Room / Location:  [facility] OR 2 /  [facility] OR    Anesthesia Start: 1555 Anesthesia Stop:     Procedure: Reverse total shoulder arthroplasty for treatment of proximal humerus fracture and all associated procedures (Right: Shoulder) Diagnosis:       Other closed displaced fracture of proximal end of right humerus, initial encounter      (Other closed displaced fracture of proximal end of right humerus, initial encounter [S42.291A])    Surgeons: Farhat Ang MD Provider: Nara Garcia CRNA    Anesthesia Type: general with block ASA Status: 3            Anesthesia Type: general with block    Vitals  Vitals Value Taken Time   /79 11/12/24 1955   Temp 97.7 °F (36.5 °C) 11/12/24 1915   Pulse 97 11/12/24 2000   Resp 15 11/12/24 1955   SpO2 92 % 11/12/24 2000   Vitals shown include unfiled device data.        Post Anesthesia Care and Evaluation    Patient location during evaluation: PACU  Patient participation: complete - patient participated  Level of consciousness: awake and alert  Pain score: 0  Pain management: adequate    Airway patency: patent  Anesthetic complications: No anesthetic complications  PONV Status: none  Cardiovascular status: acceptable  Respiratory status: acceptable  Hydration status: acceptable

## 2024-11-13 NOTE — PLAN OF CARE
Goal Outcome Evaluation:  Plan of Care Reviewed With: patient           Outcome Evaluation: OT evaluation completed. Patient perfirmed sit to stand and functional mobility with SBA/CGA without a device. Patient was educated on compensatory strategies for ADLs and on HEP for elbow, wrist and hand ROM. Patient also educated on brace don/doffing. Patient reports no concerns regarding return home with spouse. No further skilled OT needs at this time.

## 2024-11-13 NOTE — PROGRESS NOTES
Patient: Ashlie BARROW Levi    @A@    Anesthesia Type: general with block  Patient location: [facility] Surgical Floor  Last vitals  BP      Temp      Pulse     Resp      SpO2        Post vital signs: stable  Level of consciousness: awake, alert, and oriented    Post-anesthesia pain: adequate analgesia  Airway patency: patent  Respiratory: unassisted  Cardiovascular: stable and blood pressure at baseline  Hydration: euvolemic    Difficult Airway: no  Anesthetic complications: no

## 2024-11-13 NOTE — THERAPY DISCHARGE NOTE
Patient Name: Ashlie Levi  : 1945    MRN: [id number]                              Today's Date: 2024       Admit Date: 2024    Visit Dx:     ICD-10-CM ICD-9-CM   1. Other closed displaced fracture of proximal end of right humerus, initial encounter  S42.291A 812.09     Patient Active Problem List   Diagnosis    Coronary artery disease    Hyperlipidemia    Hypertension    GERD (gastroesophageal reflux disease)    Vulvar itching    Dyspepsia    Bloating    Nausea    Dry heaves    History of colon polyps    Incontinence of feces    OA (osteoarthritis) of hip    Osteoarthritis of right hip    Piriformis syndrome of both sides    Lumbar radiculopathy    Lumbar stenosis with neurogenic claudication    Lumbar facet joint syndrome    Epigastric pain    Black stool    Lower abdominal pain    Closed 3-part fracture of proximal humerus, right, initial encounter    Closed fracture of right proximal humerus     Past Medical History:   Diagnosis Date    Abnormal electrocardiogram      heart attack    Arthritis     Chest discomfort     Colon polyp     Coronary artery disease     Disease of thyroid gland     Diverticulitis     Elevated cholesterol     GERD (gastroesophageal reflux disease)     Heart attack         Hyperlipidemia     Hypertension      Past Surgical History:   Procedure Laterality Date    APPENDECTOMY      CARDIAC SURGERY      3 stents    CHOLECYSTECTOMY      COLONOSCOPY N/A 2016    Procedure: COLONOSCOPY;  Surgeon: Giovani Avelar MD;  Location: [facility] OR;  Service:     COLONOSCOPY N/A 2019    Procedure: Colonoscopy with possible biopsy or polypectomy;  Surgeon: Alfreda Soler DO;  Location: [facility] OR;  Service: Gastroenterology    COLONOSCOPY N/A 2021    Procedure: Colonoscopy with polypectomy, biopsy;  Surgeon: Alfreda Soler DO;  Location: [facility] OR;  Service: Gastroenterology;  Laterality: N/A;  diverticulosis  cecal biopsy  right colon  polyp  left colon polyps x2    COLONOSCOPY W/ POLYPECTOMY N/A 10/8/2024    Procedure: COLONOSCOPY WITH POLYPECTOMY;  Surgeon: Alfreda Soler DO;  Location:  [facility] OR;  Service: Gastroenterology;  Laterality: N/A;  diverticulosis, sigmoid colon polyp, right colon polyp    CORONARY STENT PLACEMENT      ENDOSCOPY N/A 11/21/2017    Procedure: ESOPHAGOGASTRODUODENOSCOPY with CARIN test ;  Surgeon: Alfreda Soler DO;  Location:  [facility] OR;  Service:     ENDOSCOPY N/A 06/13/2019    Procedure: Esophagogastroduodenoscopy with possible biopsy, polypectomy, dilation;  Surgeon: Alfreda Soler DO;  Location:  [facility] OR;  Service: Gastroenterology    ENDOSCOPY N/A 05/18/2021    Procedure: Esophagogastroduodenoscopy with biopsies;  Surgeon: Alfreda Soler DO;  Location:  [facility] OR;  Service: Gastroenterology;  Laterality: N/A;  CARIN test  gastritis  duodenitis    EPIDURAL BLOCK  04/30/2024    EYE SURGERY      FOOT GANGLION EXCISION Left     HEMORROIDECTOMY      HERNIA REPAIR      HYSTERECTOMY      GREG with OC age 30 for DUB    INJECTION OF MEDICATION Bilateral 03/26/2024    piriformis    MEDIAL BRANCH BLOCK Bilateral     lumbar    MEDIAL BRANCH BLOCK Bilateral 06/11/2024    L3-L5    OOPHORECTOMY      dx lsc , interval USO    TOTAL HIP ARTHROPLASTY Right 08/23/2023    Procedure: TOTAL HIP ARTHROPLASTY ANTERIOR WITH HANA TABLE;  Surgeon: Aidan Wallace MD;  Location: [facility] OR;  Service: Orthopedics;  Laterality: Right;      General Information       Row Name 11/13/24 0829          Physical Therapy Time and Intention    Document Type discharge evaluation/summary  -     Mode of Treatment physical therapy  -       Row Name 11/13/24 0829          General Information    Patient Profile Reviewed yes  pt with right humerus fracture, s/p repair NWB right UE  -JW     Prior Level of Function independent:;all household mobility  pt reports some difficulty with ADLs prior to surgery due to shoulder pain  -JW      Existing Precautions/Restrictions fall;non-weight bearing  NWB right UE  -JW     Barriers to Rehab none identified  -[facility] Name 11/13/24 0829          Living Environment    People in Home spouse  -[facility] Name 11/13/24 0829          Home Main Entrance    Number of Stairs, Main Entrance two  -[facility] Name 11/13/24 0829          Stairs Within Home, Primary    Stairs, Within Home, Primary single story house  -[facility] Name 11/13/24 0829          Cognition    Orientation Status (Cognition) oriented x 3  -[facility] Name 11/13/24 0829          Safety Issues/Impairments Affecting Functional Mobility    Safety Issues Affecting Function (Mobility) impulsivity  -     Comment, Safety Issues/Impairments (Mobility) cues to focus attention on task  -               User Key  (r) = Recorded By, (t) = Taken By, (c) = Cosigned By      Initials Name Provider Type     Irene Yost, PT Physical Therapist                   Mobility       [facility] Name 11/13/24 0829          Bed Mobility    Comment, (Bed Mobility) deferred up in recliner  -JW       Row Name 11/13/24 0829          Sit-Stand Transfer    Sit-Stand [place] (Transfers) supervision  -     Assistive Device (Sit-Stand Transfers) other (see comments)  no device used  -[facility] Name 11/13/24 0829          Gait/Stairs (Locomotion)    [place] Level (Gait) supervision;contact guard  -     Assistive Device (Gait) other (see comments)  no device used  -     Distance in Feet (Gait) 150  -     Deviations/Abnormal Patterns (Gait) nataly decreased  -     Comment, (Gait/Stairs) pt initially requires CGA, upon cues for focusing attention on task able to progress to SBA for mobility  -[facility] Name 11/13/24 0829          Mobility    Extremity Weight-bearing Status right upper extremity  -     Right Upper Extremity (Weight-bearing Status) non weight-bearing (NWB)  -               User Key  (r) = Recorded By, (t) = Taken By, (c) =  "Cosigned By      Initials Name Provider Type     Irene Yost, PT Physical Therapist                   Obj/Interventions       [facility] Name 11/13/24 0829          Range of Motion Comprehensive    Comment, General Range of Motion LE ROM functional within task  -[facility] Name 11/13/24 0829          Strength Comprehensive (MMT)    Comment, General Manual Muscle Testing (MMT) Assessment LE strength functional within task  -[facility] Name 11/13/24 0829          Motor Skills    Therapeutic Exercise --  discussed ROM of uninvolved joints  -[facility] Name 11/13/24 0829          Balance    Comment, Balance SBA for standing balance  -               User Key  (r) = Recorded By, (t) = Taken By, (c) = Cosigned By      Initials Name Provider Type     Irene Yost PT Physical Therapist                   Goals/Plan    No documentation.                  Clinical Impression       [facility] Name 11/13/24 0829          Pain    Pretreatment Pain Rating 0/10 - no pain  -     Posttreatment Pain Rating 0/10 - no pain  -[facility] Name 11/13/24 0829          Plan of Care Review    Plan of Care Reviewed With patient  -     Outcome Evaluation Physical therapy evaluation complete.  Patient performs sit to stand with SBA and gait without device x 150 feet, SBA/CGA.  Patient initially requires CGA however following cues for attention to task, balance improved to SBA.  Patient manages obstacles and direction changes without difficulty.  Patient expresses no concerns regarding return home at this time.  No further skilled PT needs in acute care setting.  -[facility] Name 11/13/24 0829          Therapy Assessment/Plan (PT)    Patient/Family Therapy Goals Statement (PT) \"go home\"  -     Criteria for Skilled Interventions Met (PT) no problems identified which require skilled intervention  -     Therapy Frequency (PT) evaluation only  -[facility] Name 11/13/24 0829          Positioning and Restraints    Pre-Treatment " Position sitting in chair/recliner  -     Post Treatment Position chair  -JW     In Chair reclined;call light within reach;encouraged to call for assist  -               User Key  (r) = Recorded By, (t) = Taken By, (c) = Cosigned By      Initials Name Provider Type    Irene Paz, AMBERLY Physical Therapist                   Outcome Measures       Row Name 11/13/24 0829          How much help from another person do you currently need...    Turning from your back to your side while in flat bed without using bedrails? 3  -JW     Moving from lying on back to sitting on the side of a flat bed without bedrails? 3  -JW     Moving to and from a bed to a chair (including a wheelchair)? 3  -JW     Standing up from a chair using your arms (e.g., wheelchair, bedside chair)? 3  -JW     Climbing 3-5 steps with a railing? 3  -JW     To walk in hospital room? 3  -JW     AM-PAC 6 Clicks Score (PT) 18  -JW     Highest Level of Mobility Goal 6 --> Walk 10 steps or more  -       Row Name 11/13/24 0829          Functional Assessment    Outcome Measure Options AM-PAC 6 Clicks Basic Mobility (PT)  -               User Key  (r) = Recorded By, (t) = Taken By, (c) = Cosigned By      Initials Name Provider Type    Irene Paz PT Physical Therapist                  Physical Therapy Education       Title: PT OT SLP Therapies (Resolved)       Topic: Physical Therapy (Resolved)       Point: Mobility training (Resolved)       Learning Progress Summary            Patient Acceptance, E,TB, VU by  at 11/13/2024 0857                      Point: Precautions (Resolved)       Learning Progress Summary            Patient Acceptance, E,TB, VU by  at 11/13/2024 0857                                      User Key       Initials Effective Dates Name Provider Type [facility] 06/16/21 -  Irene Yost, AMBERLY Physical Therapist PT                  PT Recommendation and Plan     Outcome Evaluation: Physical therapy evaluation complete.   Patient performs sit to stand with SBA and gait without device x 150 feet, SBA/CGA.  Patient initially requires CGA however following cues for attention to task, balance improved to SBA.  Patient manages obstacles and direction changes without difficulty.  Patient expresses no concerns regarding return home at this time.  No further skilled PT needs in acute care setting.     Time Calculation:   PT Evaluation Complexity  History, PT Evaluation Complexity: 1-2 personal factors and/or comorbidities  Examination of Body Systems (PT Eval Complexity): 1-2 elements  Clinical Presentation (PT Evaluation Complexity): stable  Clinical Decision Making (PT Evaluation Complexity): low complexity  Overall Complexity (PT Evaluation Complexity): low complexity     PT Charges       Row Name 11/13/24 0859             Time Calculation    Start Time 0829  -      Stop Time 0844  -      Time Calculation (min) 15 min  -      PT Received On 11/13/24  -                User Key  (r) = Recorded By, (t) = Taken By, (c) = Cosigned By      Initials Name Provider Type    Irene Paz, PT Physical Therapist                  Therapy Charges for Today       Code Description Service Date Service Provider Modifiers Qty    [id number]  PT EVAL LOW COMPLEXITY 1 11/13/2024 Irene Yost, PT GP 1            PT G-Codes  Outcome Measure Options: AM-PAC 6 Clicks Basic Mobility (PT)  AM-PAC 6 Clicks Score (PT): 18    PT Discharge Summary  Anticipated Discharge Disposition (PT): home with assist    Irene Yost, AMBERLY  11/13/2024

## 2024-11-13 NOTE — PLAN OF CARE
Goal Outcome Evaluation:  Plan of Care Reviewed With: patient      No c/o pain noted

## 2024-11-13 NOTE — PROGRESS NOTES
POD# 1 s/p right reverse total shoulder arthroplasty for treatment of proximal humerus fracture, open biceps tenodesis    Subjective:Ashlie Levi resting in chair this a.m., pain well-controlled.  She does report decrease sensation along the palmar aspect of the thumb compared to contralateral side but otherwise doing well.  Denies any issues with her incision overnight.  Has continued with sling denies any other concerns present.    Objective:  Vitals:    11/12/24 2034 11/12/24 2200 11/13/24 0042 11/13/24 0336   BP: 103/55  133/62 141/66   BP Location: Left arm  Left arm Left arm   Patient Position: Lying  Lying Lying   Pulse: 89  80 71   Resp: 16  16 16   Temp: 98.1 °F (36.7 °C)  97.1 °F (36.2 °C) 96.8 °F (36 °C)   TempSrc: Oral  Temporal Temporal   SpO2: 92% 95% 94% 94%   Weight:       Height:           Results from last 7 days   Lab Units 11/13/24  0554 11/12/24  0844   WBC 10*3/mm3 14.17* 10.75   HEMOGLOBIN g/dL 11.3* 11.5*   HEMATOCRIT % 35.5 35.4   PLATELETS 10*3/mm3 294 344       Results from last 7 days   Lab Units 11/13/24  0554 11/12/24  0844   SODIUM mmol/L 134* 137   POTASSIUM mmol/L 5.0 3.8   CHLORIDE mmol/L 101 100   CO2 mmol/L 19.0* 25.9   BUN mg/dL 15 11   CREATININE mg/dL 0.79 0.67   GLUCOSE mg/dL 151* 126*   CALCIUM mg/dL 9.1 9.8       Exam:    Right upper extremity examined  Flex/extend all digits right hand   strength 4 out of 5  Decreased sensation but can feel pressure to the palmar and dorsum of the thumb, all other sensation within normal limits  Incision clean dry and intact  Positive deltoid firing    Impression: s/p right reverse total shoulder arthroplasty    Plan:  1. PT/OT-sling x 6 weeks, gentle motion of the elbow, wrist, hand initiated within the first week. No pendulum exercises until 4 weeks   2. Pain control-Norco  3. DVT prophylaxis-aspirin once daily  4. Wound care-dressing remain intact until follow-up in office  5. Disposition-Home today, follow-up in office in 2 weeks;  nebulizer therapy recommended prior to discharge for wheezing then discharge home.

## 2024-11-13 NOTE — PROGRESS NOTES
"SERVICE: [facility] HOSPITALIST    CONSULTANTS:    CHIEF COMPLAINT:  right shoulder pain     SUBJECTIVE: Patient seen and examined at bedside. She says her shoulder pain is controlled, but block working; denies chest or abdominal pain, nausea, vomiting, or headache   OBJECTIVE:    Physical exam is largely unchanged from previous exam, except where documented below, examination is accurate as of 11/13/2024    Physical Exam:  General: Patient is awake and alert.sitting up in chair; NAD  HENT: Head is atraumatic, normocephalic.   Eyes: Vision is grossly intact. Pupils appear equal and round.   Cardiovascular: RRR, S1-S2   Respiratory: Lungs are clear to ausculation without wheezes, rhonchi or rales.   Abdominal/GI: Soft, nontender, bowel sounds present. No rebound or guarding present.   Extremities: No peripheral edema noted.   Musculoskeletal: right UE in sling/cooling wrap    Skin: Warm and dry.       /65 (BP Location: Left arm, Patient Position: Lying)   Pulse 75   Temp 97.2 °F (36.2 °C) (Oral)   Resp 16   Ht 152.4 cm (60\")   Wt 68.3 kg (150 lb 9.6 oz)   LMP  (LMP Unknown)   SpO2 95%   BMI 29.41 kg/m²     MEDS/LABS REVIEWED AND ORDERED    amLODIPine, 5 mg, Oral, Daily  aspirin, 81 mg, Oral, Daily  atorvastatin, 20 mg, Oral, Nightly  carvedilol, 6.25 mg, Oral, BID  citalopram, 40 mg, Oral, Daily  famotidine, 40 mg, Oral, Daily  isosorbide mononitrate, 30 mg, Oral, Daily  levothyroxine, 112 mcg, Oral, Daily  meloxicam, 15 mg, Oral, Daily  pantoprazole, 40 mg, Oral, Q AM  sodium chloride, 10 mL, Intravenous, Q12H          LAB/DIAGNOSTICS:    Lab Results (last 24 hours)       Procedure Component Value Units Date/Time    Basic Metabolic Panel [[id number]]  (Abnormal) Collected: 11/13/24 0554    Specimen: Blood Updated: 11/13/24 0635     Glucose 151 mg/dL      BUN 15 mg/dL      Creatinine 0.79 mg/dL      Sodium 134 mmol/L      Potassium 5.0 mmol/L      Comment: Specimen hemolyzed.  Result " may be falsely elevated.        Chloride 101 mmol/L      CO2 19.0 mmol/L      Calcium 9.1 mg/dL      BUN/Creatinine Ratio 19.0     Anion Gap 14.0 mmol/L      eGFR 75.3 mL/min/1.73     Narrative:      GFR Normal >60  Chronic Kidney Disease <60  Kidney Failure <15    The GFR formula is only valid for adults with stable renal function between ages 18 and 70.    CBC (No Diff) [[id number]]  (Abnormal) Collected: 11/13/24 0554    Specimen: Blood Updated: 11/13/24 0608     WBC 14.17 10*3/mm3      RBC 3.77 10*6/mm3      Hemoglobin 11.3 g/dL      Hematocrit 35.5 %      MCV 94.2 fL      MCH 30.0 pg      MCHC 31.8 g/dL      RDW 13.5 %      RDW-SD 46.5 fl      MPV 9.9 fL      Platelets 294 10*3/mm3     Basic Metabolic Panel [[id number]]  (Abnormal) Collected: 11/12/24 0844    Specimen: Blood Updated: 11/12/24 0917     Glucose 126 mg/dL      BUN 11 mg/dL      Creatinine 0.67 mg/dL      Sodium 137 mmol/L      Potassium 3.8 mmol/L      Chloride 100 mmol/L      CO2 25.9 mmol/L      Calcium 9.8 mg/dL      BUN/Creatinine Ratio 16.4     Anion Gap 11.1 mmol/L      eGFR 87.9 mL/min/1.73     Narrative:      GFR Normal >60  Chronic Kidney Disease <60  Kidney Failure <15    The GFR formula is only valid for adults with stable renal function between ages 18 and 70.    Protime-INR [[id number]]  (Normal) Collected: 11/12/24 0844    Specimen: Blood Updated: 11/12/24 0912     Protime 13.7 Seconds      INR 1.01    Narrative:      Therapeutic Ranges for INR: 2.0-3.0 (PT 20-30)                              2.5-3.5 (PT 25-34)    aPTT [[id number]]  (Normal) Collected: 11/12/24 0844    Specimen: Blood Updated: 11/12/24 0912     PTT 29.9 seconds     Narrative:      PTT = The equivalent PTT values for the therapeutic range of heparin levels at 0.1 to 0.7 U/ml are 53 to 110 seconds.      CBC & Differential [[id number]]  (Abnormal) Collected: 11/12/24 0844    Specimen: Blood Updated: 11/12/24 0851    Narrative:      The following orders were created for panel  order CBC & Differential.  Procedure                               Abnormality         Status                     ---------                               -----------         ------                     CBC Auto Differential[[id number]]        Abnormal            Final result                 Please view results for these tests on the individual orders.    CBC Auto Differential [[id number]]  (Abnormal) Collected: 11/12/24 0844    Specimen: Blood Updated: 11/12/24 0851     WBC 10.75 10*3/mm3      RBC 3.86 10*6/mm3      Hemoglobin 11.5 g/dL      Hematocrit 35.4 %      MCV 91.7 fL      MCH 29.8 pg      MCHC 32.5 g/dL      RDW 13.5 %      RDW-SD 45.1 fl      MPV 9.5 fL      Platelets 344 10*3/mm3      Neutrophil % 63.2 %      Lymphocyte % 24.5 %      Monocyte % 6.1 %      Eosinophil % 4.8 %      Basophil % 0.8 %      Immature Grans % 0.6 %      Neutrophils, Absolute 6.79 10*3/mm3      Lymphocytes, Absolute 2.63 10*3/mm3      Monocytes, Absolute 0.66 10*3/mm3      Eosinophils, Absolute 0.52 10*3/mm3      Basophils, Absolute 0.09 10*3/mm3      Immature Grans, Absolute 0.06 10*3/mm3      nRBC 0.0 /100 WBC           ECG 12 Lead Pre-Op / Pre-Procedure   Final Result   HEART RATE=63  bpm   RR Xgfgjcia=352  ms   AR Xypaswlc=810  ms   P Horizontal Axis=27  deg   P Front Axis=8  deg   QRSD Interval=78  ms   QT Cwdviexy=040  ms   ELqI=548  ms   QRS Axis=16  deg   T Wave Axis=65  deg   - ABNORMAL ECG -   Sinus rhythm   Prolonged AR interval   No change from prior tracing   Electronically Signed By: Cassandra Layne ([facility]) 2024-11-12 13:33:31   Date and Time of Study:2024-11-12 10:47:07          XR Shoulder 1 View Right    Result Date: 11/12/2024  Impression: Single frontal view showing expected postoperative changes from recent reverse total shoulder arthroplasty. Electronically Signed: Deepa Moore  11/12/2024 7:52 PM EST  Workstation ID: [id number]    CT Upper Extremity Right Without Contrast    Result Date: 11/12/2024  Impression:  "1.Comminuted fracture involving the proximal metadiaphysis and proximal head of the right humerus. Multiple fracture fragments are observed. The fracture involves the greater and lesser tuberosities. There is displacement of fracture fragments. There is also slight impaction of the dominant diaphyseal fracture fragment into the proximal fracture fragments. 2.There is slight posterior subluxation of the humeral head with aspect of the glenoid. No gross dislocation is seen. A large joint effusion is noted. There is evidence for associated hemorrhage within the joint space as well as within the adjacent soft tissues. 3.There may be injury of the infraspinatus component of the cuff. The subscapularis component is not well seen. There appears to be slightly increased overlying subacromial/subdeltoid bursal fluid. There is moderate atrophy throughout the rotator cuff musculature. Electronically Signed: Neal Mejia MD  11/12/2024 9:47 AM EST  Workstation ID: [id number]       ASSESSMENT/PLAN:    HTN  BP well  controlled  Continue norvasc and coreg  Monitor     CAD  Continue coreg and aspirin     Hyperlipidemia  Continue statin     Hypothyroidism  Continue synthroid     Closed fracture proximal right humerus  Continue pain control and routine post-op care/PT per primary team         Patient is doing well medically; ok to d/c once she meets PT goals; dispo per primary team     Delroy Rios DO  11/13/24  08:33 EST    At [facility], we believe that sharing information builds trust and better relationships. You are receiving this note because you recently visited [facility]. It is possible you will see health information before a provider has talked with you about it. This kind of information can be easy to misunderstand. To help you fully understand what it means for your health, we urge you to discuss this note with your provider.    \"Dictated utilizing Dragon dictation\"  "

## 2024-11-13 NOTE — THERAPY DISCHARGE NOTE
Acute Care - Occupational Therapy Discharge   Padmini Loza    Patient Name: Ashlie Levi  : 1945    MRN: [id number]                              Today's Date: 2024       Admit Date: 2024    Visit Dx:     ICD-10-CM ICD-9-CM   1. Other closed displaced fracture of proximal end of right humerus, initial encounter  S42.291A 812.09     Patient Active Problem List   Diagnosis    Coronary artery disease    Hyperlipidemia    Hypertension    GERD (gastroesophageal reflux disease)    Vulvar itching    Dyspepsia    Bloating    Nausea    Dry heaves    History of colon polyps    Incontinence of feces    OA (osteoarthritis) of hip    Osteoarthritis of right hip    Piriformis syndrome of both sides    Lumbar radiculopathy    Lumbar stenosis with neurogenic claudication    Lumbar facet joint syndrome    Epigastric pain    Black stool    Lower abdominal pain    Closed 3-part fracture of proximal humerus, right, initial encounter    Closed fracture of right proximal humerus     Past Medical History:   Diagnosis Date    Abnormal electrocardiogram      heart attack    Arthritis     Chest discomfort     Colon polyp     Coronary artery disease     Disease of thyroid gland     Diverticulitis     Elevated cholesterol     GERD (gastroesophageal reflux disease)     Heart attack         Hyperlipidemia     Hypertension      Past Surgical History:   Procedure Laterality Date    APPENDECTOMY      CARDIAC SURGERY      3 stents    CHOLECYSTECTOMY      COLONOSCOPY N/A 2016    Procedure: COLONOSCOPY;  Surgeon: Giovani Avelar MD;  Location: [facility] OR;  Service:     COLONOSCOPY N/A 2019    Procedure: Colonoscopy with possible biopsy or polypectomy;  Surgeon: Alfreda Soler DO;  Location: [facility] OR;  Service: Gastroenterology    COLONOSCOPY N/A 2021    Procedure: Colonoscopy with polypectomy, biopsy;  Surgeon: Alfreda Soler DO;  Location: [facility] OR;  Service: Gastroenterology;   Laterality: N/A;  diverticulosis  cecal biopsy  right colon polyp  left colon polyps x2    COLONOSCOPY W/ POLYPECTOMY N/A 10/8/2024    Procedure: COLONOSCOPY WITH POLYPECTOMY;  Surgeon: Alfreda Soler DO;  Location:  [facility] OR;  Service: Gastroenterology;  Laterality: N/A;  diverticulosis, sigmoid colon polyp, right colon polyp    CORONARY STENT PLACEMENT      ENDOSCOPY N/A 11/21/2017    Procedure: ESOPHAGOGASTRODUODENOSCOPY with CARIN test ;  Surgeon: Alfreda Soler DO;  Location:  [facility] OR;  Service:     ENDOSCOPY N/A 06/13/2019    Procedure: Esophagogastroduodenoscopy with possible biopsy, polypectomy, dilation;  Surgeon: Alfreda Soler DO;  Location:  [facility] OR;  Service: Gastroenterology    ENDOSCOPY N/A 05/18/2021    Procedure: Esophagogastroduodenoscopy with biopsies;  Surgeon: Alfreda Soler DO;  Location:  [facility] OR;  Service: Gastroenterology;  Laterality: N/A;  CARIN test  gastritis  duodenitis    EPIDURAL BLOCK  04/30/2024    EYE SURGERY      FOOT GANGLION EXCISION Left     HEMORROIDECTOMY      HERNIA REPAIR      HYSTERECTOMY      GREG with OC age 30 for DUB    INJECTION OF MEDICATION Bilateral 03/26/2024    piriformis    MEDIAL BRANCH BLOCK Bilateral     lumbar    MEDIAL BRANCH BLOCK Bilateral 06/11/2024    L3-L5    OOPHORECTOMY      dx lsc , interval USO    TOTAL HIP ARTHROPLASTY Right 08/23/2023    Procedure: TOTAL HIP ARTHROPLASTY ANTERIOR WITH HANA TABLE;  Surgeon: Aidan Wallace MD;  Location: [facility] OR;  Service: Orthopedics;  Laterality: Right;      General Information       Row Name 11/13/24 0876          OT Time and Intention    Subjective Information no complaints  -EN     Document Type discharge evaluation/summary  -EN     Mode of Treatment occupational therapy  -EN     Patient Effort good  -EN       Row Name 11/13/24 08          General Information    Patient Profile Reviewed other (see comments)  Patient suffered a right humerus fracture after a fall on 10/29/24.  Patient is now s/p R reverse total shoulder arthroplasty and right shoulder biceps tenodesis.  -EN     Prior Level of Function independent:;all household mobility;ADL's  -EN     Existing Precautions/Restrictions fall;non-weight bearing  -EN     Barriers to Rehab none identified  -EN       Row Name 11/13/24 0850          Living Environment    People in Home spouse  -EN       Row Name 11/13/24 0850          Home Main Entrance    Number of Stairs, Main Entrance three  -EN       Row Name 11/13/24 0850          Stairs Within Home, Primary    Stairs, Within Home, Primary single level home  -EN       Row Name 11/13/24 0850          Cognition    Orientation Status (Cognition) oriented x 3  -EN       Row Name 11/13/24 0850          Safety Issues/Impairments Affecting Functional Mobility    Safety Issues Affecting Function (Mobility) impulsivity  -EN     Comment, Safety Issues/Impairments (Mobility) cues to stay on task  -EN               User Key  (r) = Recorded By, (t) = Taken By, (c) = Cosigned By      Initials Name Provider Type    EN Cookie Jean OTR Occupational Therapist                   Mobility/ADL's       Row Name 11/13/24 0858          Bed Mobility    Comment, (Bed Mobility) deferred, up in chair  -EN       Row Name 11/13/24 0858          Transfers    Transfers sit-stand transfer  -EN       Row Name 11/13/24 0858          Sit-Stand Transfer    Sit-Stand [place] (Transfers) standby assist  -EN     Assistive Device (Sit-Stand Transfers) other (see comments)  no assistive device  -EN       Row Name 11/13/24 0858          Functional Mobility    Functional Mobility- Ind. Level standby assist;contact guard assist  -EN     Functional Mobility- Device other (see comments)  no assistive device  -EN     Functional Mobility-Distance (Feet) 150  -EN     Functional Mobility-Maintain WBing able to maintain weight bearing status  -EN       Row Name 11/13/24 0858          Activities of Daily Living    BADL  Assessment/Intervention upper body dressing  -EN       [facility] Name 11/13/24 0858          Mobility    Extremity Weight-bearing Status right upper extremity  -EN       [facility] Name 11/13/24 0858          Upper Body Dressing Assessment/Training    Comment, (Upper Body Dressing) Patient reports assist with ADls since fracture on 10/29. Patient educated on one handed dressing techniques and reports she will have assist from spouse.  -EN               User Key  (r) = Recorded By, (t) = Taken By, (c) = Cosigned By      Initials Name Provider Type    Cookie Thorne OTR Occupational Therapist                   Obj/Interventions       [facility] Name 11/13/24 0900          Sensory Assessment (Somatosensory)    Sensory Assessment (Somatosensory) sensation intact  -EN       Row Name 11/13/24 0900          Range of Motion Comprehensive    Comment, General Range of Motion L UE WFL, R shoulder deferred, right hand and wrist WFL  -EN       [facility] Name 11/13/24 0900          Strength Comprehensive (MMT)    Comment, General Manual Muscle Testing (MMT) Assessment L UE strength WFL  -EN       [facility] Name 11/13/24 0900          Motor Skills    Therapeutic Exercise --  educated on ROM of elbow, wrist and hand, educated on no shoulder ROM at this time  -EN       [facility] Name 11/13/24 0900          Balance    Comment, Balance SBA for standing balance  -EN               User Key  (r) = Recorded By, (t) = Taken By, (c) = Cosigned By      Initials Name Provider Type    Cookie Thorne OTR Occupational Therapist                   Goals/Plan    No documentation.                  Clinical Impression       [facility] Name 11/13/24 0901          Pain Assessment    Pretreatment Pain Rating 0/10 - no pain  -EN     Posttreatment Pain Rating 0/10 - no pain  -EN     Pain Management Interventions cold applied  -EN       [facility] Name 11/13/24 0901          Plan of Care Review    Plan of Care Reviewed With patient  -EN     Outcome Evaluation OT evaluation completed.  Patient perfirmed sit to stand and functional mobility with SBA/CGA without a device. Patient was educated on compensatory strategies for ADLs and on HEP for elbow, wrist and hand ROM. Patient also educated on brace don/doffing. Patient reports no concerns regarding return home with spouse. No further skilled OT needs at this time.  -EN               User Key  (r) = Recorded By, (t) = Taken By, (c) = Cosigned By      Initials Name Provider Type    Cookie Thorne OTR Occupational Therapist                   Outcome Measures       Row Name 11/13/24 0905          How much help from another is currently needed...    Putting on and taking off regular lower body clothing? 3  -EN     Bathing (including washing, rinsing, and drying) 3  -EN     Toileting (which includes using toilet bed pan or urinal) 3  -EN     Putting on and taking off regular upper body clothing 3  -EN     Taking care of personal grooming (such as brushing teeth) 4  -EN     Eating meals 4  -EN     AM-PAC 6 Clicks Score (OT) 20  -EN       Row Name 11/13/24 0829          How much help from another person do you currently need...    Turning from your back to your side while in flat bed without using bedrails? 3  -JW     Moving from lying on back to sitting on the side of a flat bed without bedrails? 3  -JW     Moving to and from a bed to a chair (including a wheelchair)? 3  -JW     Standing up from a chair using your arms (e.g., wheelchair, bedside chair)? 3  -JW     Climbing 3-5 steps with a railing? 3  -JW     To walk in hospital room? 3  -JW     AM-PAC 6 Clicks Score (PT) 18  -JW     Highest Level of Mobility Goal 6 --> Walk 10 steps or more  -       Row Name 11/13/24 0905 11/13/24 0829       Functional Assessment    Outcome Measure Options AM-PAC 6 Clicks Daily Activity (OT)  -EN AM-PAC 6 Clicks Basic Mobility (PT)  -              User Key  (r) = Recorded By, (t) = Taken By, (c) = Cosigned By      Initials Name Provider Type    VIRGIL Jean  SY De La [place]ega Occupational Therapist    Irene Paz, PT Physical Therapist                  Occupational Therapy Education       Title: PT OT SLP Therapies (Resolved)       Topic: Occupational Therapy (Resolved)       Point: ADL training (Resolved)       Description:   Instruct learner(s) on proper safety adaptation and remediation techniques during self care or transfers.   Instruct in proper use of assistive devices.                  Learning Progress Summary            Patient Acceptance, E, VU by EN at 11/13/2024 0905    Comment: No shoulder ROM, may perform supported elbow ROM, wrist ROM and hand ROM. Educated on don/doffing brace, and compensatory strategies for improved ADL independence.                      Point: Home exercise program (Resolved)       Description:   Instruct learner(s) on appropriate technique for monitoring, assisting and/or progressing therapeutic exercises/activities.                  Learning Progress Summary            Patient Acceptance, E, VU by EN at 11/13/2024 0905    Comment: No shoulder ROM, may perform supported elbow ROM, wrist ROM and hand ROM. Educated on don/doffing brace, and compensatory strategies for improved ADL independence.                      Point: Precautions (Resolved)       Description:   Instruct learner(s) on prescribed precautions during self-care and functional transfers.                  Learning Progress Summary            Patient Acceptance, E, VU by EN at 11/13/2024 0905    Comment: No shoulder ROM, may perform supported elbow ROM, wrist ROM and hand ROM. Educated on don/doffing brace, and compensatory strategies for improved ADL independence.                                      User Key       Initials Effective Dates Name Provider Type Discipline    EN 06/16/21 -  Cookie Jean OTR Occupational Therapist OT                  OT Recommendation and Plan     Plan of Care Review  Plan of Care Reviewed With: patient  Outcome Evaluation: OT  evaluation completed. Patient perfirmed sit to stand and functional mobility with SBA/CGA without a device. Patient was educated on compensatory strategies for ADLs and on HEP for elbow, wrist and hand ROM. Patient also educated on brace don/doffing. Patient reports no concerns regarding return home with spouse. No further skilled OT needs at this time.       Time Calculation:   Evaluation Complexity (OT)  Review Occupational Profile/Medical/Therapy History Complexity: brief/low complexity  Assessment, Occupational Performance/Identification of Deficit Complexity: 1-3 performance deficits  Clinical Decision Making Complexity (OT): problem focused assessment/low complexity  Overall Complexity of Evaluation (OT): low complexity     Time Calculation- OT       Row Name 11/13/24 0907             Time Calculation- OT    OT Start Time 0829  -EN      OT Stop Time 0844  -EN      OT Time Calculation (min) 15 min  -EN                User Key  (r) = Recorded By, (t) = Taken By, (c) = Cosigned By      Initials Name Provider Type    EN Cookie Jean OTELANA Occupational Therapist                  Therapy Charges for Today       Code Description Service Date Service Provider Modifiers Qty    [id number]  OT EVAL LOW COMPLEXITY 1 11/13/2024 Cookie Jean OTR GO 1                    SY Ornelas  11/13/2024

## 2024-11-13 NOTE — PLAN OF CARE
Goal Outcome Evaluation:  Plan of Care Reviewed With: patient           Outcome Evaluation: Physical therapy evaluation complete.  Patient performs sit to stand with SBA and gait without device x 150 feet, SBA/CGA.  Patient initially requires CGA however following cues for attention to task, balance improved to SBA.  Patient manages obstacles and direction changes without difficulty.  Patient expresses no concerns regarding return home at this time. Reviewed restrictions per ortho MD. No further skilled PT needs in acute care setting.    Anticipated Discharge Disposition (PT): home with assist

## 2024-11-13 NOTE — DISCHARGE INSTRUCTIONS
Total Shoulder Joint Replacement Discharge Instructions:    I. ACTIVITIES:  1. Exercises:  Complete exercise program as taught by the hospital physical therapist 2 times per day  Wear sling when in bed and when out of bed (except when doing exercises)  Exercise program will be advanced by the physical therapist  During the day be up ambulating every 2 hours (while awake) for short distances  Complete the ankle pump exercises at least 10 times per hour (while awake)  Elevate operative arm when in bed and for at least 30 minutes during the day.   Use cold packs 20-30 minutes approximately 5 times per day. This should be done before and after completing your exercises and at any time you are experiencing pain/ stiffness in your operative extremity.    2. Activities of Daily Living:  No tub baths, hot tubs, or swimming pools for 4 weeks  May shower and let water run over the adhesive dressing on post-operative day #7 if no drainage. If dressing does come off, cover incision with waterproof band-aids while showering until first post-op visit in the office.     II. RESTRICTIONS  No pushing, pulling, lifting, or weight bearing on operative extremity  Avoid pushing yourself up out of a chair with the operative extremity  Continue to follow shoulder precautions as instructed by hospital physical therapist  Your surgeon will discuss with you when you will be able to drive again.  First week stay inside on even terrain. May go up and down stairs one stair at a time utilizing the hand rail  After one week, you may venture outside.    III. PRECAUTIONS:  Everyone that comes near you should wash their hands  No elective dental, genital-urinary, or colon procedures or surgical procedures for 12 weeks after surgery unless absolutely necessary.   If dental work or surgical procedure is deemed absolutely necessary, you will need to contact your surgeon as you will need to take antibiotics 1 hour prior to any dental work (including  teeth cleanings).  Please discuss with your surgeon prophylactic antibiotics as the length of time this intervention will be necessary for you varies with each patient’s health history and situation.  Avoid sick people. If you must be around someone who is ill, they should wear a mask.  Avoid visits to the Emergency Room or Urgent Care unless you are having a life threatening event.     IV. INCISION CARE:  Keep clear adhesive dressing and gauze in place until follow up visit. If this dressing does come off, then cover with dry gauze and paper tape daily.Wash your hands prior to dressing changes  No creams or ointments to the incisionMay remove dressing once the incision is free of drainage  Do not touch or pick at the incision  Check dressing every day and notify surgeon immediately if any of the following signs or symptoms are noted:  Increase in redness  Increase in swelling around the incision and of the entire extremity  Increase in pain  Drainage oozing from the incision  Pulling apart of the edges of the incision  Increase in overall body temperature (greater than 100.5 degrees)  Any visible sutures or staples will be removed at your 2 week follow up as long as the incision is healing appropriately.     V. MEDICATIONS:     1. Stool Softeners: You will be at greater risk of constipation after surgery due to being less mobile and the pain medications.   Take stool softeners as instructed by your surgeon while on pain medications. Over the counter Colace 100 mg 1-2 capsules twice daily.   If stools become too loose or too frequent, please decreases the dosage or stop the stool softener.  If constipation occurs despite use of stool softeners, you are to continue the stool softeners and add a laxative (Milk of Magnesia 1 ounce daily as needed)  Drink plenty of fluids, and eat fruits and vegetables during your recovery time    2. Pain Medications utilized after surgery are narcotics and the law requires that the  following information be given to all patients that are prescribed narcotics:  CLASSIFICATION: Pain medications are called Opioids and are narcotics  LEGALITIES: It is illegal to share narcotics with others and to drive within 24 hours of taking narcotics  POTENTIAL SIDE EFFECTS: Potential side effects of opioids include: nausea, vomiting, itching, dizziness, drowsiness, dry mouth, constipation, and difficulty urinating.  POTENTIAL ADVERSE EFFECTS:   Opioid tolerance can develop with use of pain medications and this simply means that it requires more and more of the medication to control pain; however, this is seen more in patients that use opioids for longer periods of time.  Opioid dependence can develop with use of Opioids and this simply means that to stop the medication can cause withdrawal symptoms; however, this is seen with patients that use Opioids for longer periods of time.  Opioid addiction can develop with use of Opioids and the incidence of this is very unlikely in patients who take the medications as ordered and stop the medications as instructed.  Opioid overdose can be dangerous, but is unlikely when the medication is taken as ordered and stopped when ordered. It is important not to mix opioids with alcohol or with and type of sedative such as Benadryl as this can lead to over sedation and respiratory difficulty.  DOSAGE:   Pain medications will need to be taken consistently for the first week to decrease pain and promote adequate pain relief and participation in physical therapy.  After the initial surgical pain begins to resolve, you may begin to decrease the pain medication. By the end of 6 weeks, you should be off of pain medications.  Refills will not be given by the office during evening hours, on weekends, or after 12 weeks post-op.  To seek refills on pain medications during the initial 6 week post-operative period, you must call the office 48 hours in advance to request the refill. The  office will then notify you when to  the prescription. DO NOT wait until you are out of the medication to request a refill.    VI. FOLLOW-UP VISITS:  You will need to follow up in the office with ALIA Baeza in 2 weeks. Please call [phone] to schedule this appointment.  You will need to follow up with your primary care physician within 4 weeks.  If you have any concerns or suspected complications prior to your follow up visit, please call your surgeons office. Do not wait until your appointment time if you suspect complications. These will need to be addressed in the office promptly.

## 2024-11-14 ENCOUNTER — READMISSION MANAGEMENT (OUTPATIENT)
Dept: CALL CENTER | Facility: HOSPITAL | Age: 79
End: 2024-11-14
Payer: MEDICARE

## 2024-11-14 NOTE — DISCHARGE SUMMARY
Orthopedic Discharge Summary      Patient: Ashlie Levi      YOB: 1945    Medical Record Number: [id number]    Attending Physician: No att. providers found  Consulting Physician(s):   Date of Admission: 11/12/2024  7:06 AM  Date of Discharge: 11/13/2024        Closed 3-part fracture of proximal humerus, right, initial encounter    Closed fracture of right proximal humerus     Status Post: Right reverse total shoulder arthroplasty for treatment of proximal humerus fracture, open biceps tenodesis      Allergies   Allergen Reactions    Oxycodone-Acetaminophen Hallucinations       Current Medications:     Discharge Medications        New Medications        Instructions Start Date   docusate sodium 100 MG capsule  Commonly known as: Colace   100 mg, Oral, 2 Times Daily PRN      ondansetron ODT 4 MG disintegrating tablet  Commonly known as: ZOFRAN-ODT   4 mg, Translingual, Every 8 Hours PRN             Changes to Medications        Instructions Start Date   meloxicam 15 MG tablet  Commonly known as: MOBIC  What changed:   medication strength  how much to take   15 mg, Oral, Daily             Continue These Medications        Instructions Start Date   amLODIPine 5 MG tablet  Commonly known as: NORVASC   Take 1 tablet by mouth once daily      aspirin 81 MG EC tablet   81 mg, Oral, Daily      atorvastatin 20 MG tablet  Commonly known as: LIPITOR   20 mg, Nightly      carvedilol 6.25 MG tablet  Commonly known as: COREG   Take 1 tablet by mouth twice daily      citalopram 40 MG tablet  Commonly known as: CeleXA   1 tablet, Daily      HYDROcodone-acetaminophen 7.5-325 MG per tablet  Commonly known as: NORCO   1 tablet, Oral, Every 6 Hours PRN      irbesartan 300 MG tablet  Commonly known as: AVAPRO   TAKE 1 TABLET BY MOUTH ONCE DAILY AT NIGHT      isosorbide mononitrate 30 MG 24 hr tablet  Commonly known as: IMDUR   30 mg, Daily      levothyroxine 112 MCG tablet  Commonly known as: SYNTHROID, LEVOTHROID   1  tablet, Daily      LORazepam 0.5 MG tablet  Commonly known as: ATIVAN   TAKE 1 TABLET BY MOUTH THREE TIMES DAILY FOR FEAR OF FLYING      nitroglycerin 0.4 MG SL tablet  Commonly known as: NITROSTAT   0.4 mg, Sublingual, Every 5 Minutes PRN, Take no more than 3 doses in 15 minutes and call EMS.      omeprazole 40 MG capsule  Commonly known as: priLOSEC   40 mg, Oral, Daily      QC Tumeric Complex 500 MG capsule  Generic drug: Turmeric   500 mg, Oral, Daily             Stop These Medications      acetaminophen 500 MG tablet  Commonly known as: TYLENOL                  Past Medical History:   Diagnosis Date    Abnormal electrocardiogram     2006 heart attack    Arthritis     Chest discomfort     Colon polyp     Coronary artery disease     Disease of thyroid gland     Diverticulitis     Elevated cholesterol     GERD (gastroesophageal reflux disease)     Heart attack     2006    Hyperlipidemia     Hypertension      Past Surgical History:   Procedure Laterality Date    APPENDECTOMY      CARDIAC SURGERY      3 stents    CHOLECYSTECTOMY      COLONOSCOPY N/A 07/13/2016    Procedure: COLONOSCOPY;  Surgeon: Giovani Avelar MD;  Location: [facility] OR;  Service:     COLONOSCOPY N/A 06/13/2019    Procedure: Colonoscopy with possible biopsy or polypectomy;  Surgeon: Alfreda Soler DO;  Location: [facility] OR;  Service: Gastroenterology    COLONOSCOPY N/A 05/18/2021    Procedure: Colonoscopy with polypectomy, biopsy;  Surgeon: Alfreda Soler DO;  Location: [facility] OR;  Service: Gastroenterology;  Laterality: N/A;  diverticulosis  cecal biopsy  right colon polyp  left colon polyps x2    COLONOSCOPY W/ POLYPECTOMY N/A 10/8/2024    Procedure: COLONOSCOPY WITH POLYPECTOMY;  Surgeon: Alfreda Soler DO;  Location: [facility] OR;  Service: Gastroenterology;  Laterality: N/A;  diverticulosis, sigmoid colon polyp, right colon polyp    CORONARY STENT PLACEMENT      ENDOSCOPY N/A 11/21/2017    Procedure:  ESOPHAGOGASTRODUODENOSCOPY with CARIN test ;  Surgeon: Alfreda Soler DO;  Location:  [facility] OR;  Service:     ENDOSCOPY N/A 2019    Procedure: Esophagogastroduodenoscopy with possible biopsy, polypectomy, dilation;  Surgeon: Alfreda Soler DO;  Location:  [facility] OR;  Service: Gastroenterology    ENDOSCOPY N/A 2021    Procedure: Esophagogastroduodenoscopy with biopsies;  Surgeon: Alfreda Soler DO;  Location:  [facility] OR;  Service: Gastroenterology;  Laterality: N/A;  CARIN test  gastritis  duodenitis    EPIDURAL BLOCK  2024    EYE SURGERY      FOOT GANGLION EXCISION Left     HEMORROIDECTOMY      HERNIA REPAIR      HYSTERECTOMY      GREG with OC age 30 for DUB    INJECTION OF MEDICATION Bilateral 2024    piriformis    MEDIAL BRANCH BLOCK Bilateral     lumbar    MEDIAL BRANCH BLOCK Bilateral 2024    L3-L5    OOPHORECTOMY      dx lsc , interval USO    TOTAL HIP ARTHROPLASTY Right 2023    Procedure: TOTAL HIP ARTHROPLASTY ANTERIOR WITH HANA TABLE;  Surgeon: Aidan Wallace MD;  Location: [facility] OR;  Service: Orthopedics;  Laterality: Right;     Social History     Occupational History    Not on file   Tobacco Use    Smoking status: Former     Current packs/day: 0.00     Average packs/day: 1 pack/day for 50.0 years (50.0 ttl pk-yrs)     Types: Cigarettes     Start date: 10/13/1956     Quit date: 10/13/2006     Years since quittin.1     Passive exposure: Never    Smokeless tobacco: Never    Tobacco comments:     quit 2006   Vaping Use    Vaping status: Never Used   Substance and Sexual Activity    Alcohol use: Yes     Comment: occasional    Drug use: No    Sexual activity: Not Currently     Partners: Male     Birth control/protection: Surgical, Post-menopausal     Comment: GREG with OC      Social History     Social History Narrative    Not on file     Family History   Problem Relation Age of Onset    Hypertension Mother     Stroke Mother     Diabetes Father      Hypertension Father     Heart disease Father     Stroke Father     Heart disease Brother     Colon cancer Brother         dx > 50    Breast cancer Neg Hx     Ovarian cancer Neg Hx     Deep vein thrombosis Neg Hx     Pulmonary embolism Neg Hx          Physical Exam: 79 y.o. female  General Appearance:    Alert, cooperative, in no acute distress                      Vitals:    11/13/24 0336 11/13/24 0750 11/13/24 1145 11/13/24 1327   BP: 141/66 138/65 130/69    BP Location: Left arm Left arm Left arm    Patient Position: Lying Lying Sitting    Pulse: 71 75 70 76   Resp: 16 16 16 16   Temp: 96.8 °F (36 °C) 97.2 °F (36.2 °C) 97.5 °F (36.4 °C)    TempSrc: Temporal Oral Oral    SpO2: 94% 95% 95% 96%   Weight:       Height:            Head:    Normocephalic, without obvious abnormality, atraumatic   Eyes:            Lids and lashes normal, conjunctivae and sclerae normal, no   icterus, no pallor, corneas clear, PERRLA   Ears:    Ears appear intact with no abnormalities noted   Throat:   No oral lesions, no thrush, oral mucosa moist   Neck:   No adenopathy, supple, trachea midline, no thyromegaly, no    carotid bruit, no JVD   Back:     No kyphosis present, no scoliosis present, no skin lesions,       erythema or scars, no tenderness to percussion or                   palpation,   range of motion normal   Lungs:     Clear to auscultation,respirations regular, even and                   unlabored    Heart:    Regular rhythm and normal rate, normal S1 and S2, no            murmur, no gallop, no rub, no click   Chest Wall:    No abnormalities observed   Abdomen:     Normal bowel sounds, no masses, no organomegaly, soft        non-tender, non-distended, no guarding, no rebound                 tenderness   Rectal:     Deferred   Extremities:   Incision intact without signs or symptoms of infection.               Neurovascular status remains intact to operative extremity.      Moves all extremities well, no edema, no cyanosis, no               redness   Pulses:   Pulses palpable and equal bilaterally   Skin:   No bleeding, bruising or rash   Lymph nodes:   No palpable adenopathy   Neurologic:   Cranial nerves 2 - 12 grossly intact, sensation intact, DTR        present and equal bilaterally           Hospital Course:  79 y.o. female admitted to [facility] to services of Farhat Ang MD with Closed 3-part fracture of proximal humerus, right, initial encounter [S42.291A] on 11/12/2024 and underwent No admission procedures for hospital encounter.  Per Farhat Ang MD. Antibiotic and VTE prophylaxis were per [facility]IP protocols. Post-operatively the patient transferred to the post-operative floor where the patient underwent mobilization therapy that included active as well as passive ROM exercises. Opioids were titrated to achieve appropriate pain management to allow for participation in mobilization exercises. Vital signs are now stable. The incision is intact without signs or symptoms of infection. Operative extremity neurovascular status remains intact.   Appropriate education re: incision care, activity levels, medications, and follow up visits was completed and all questions were answered. The patient is now deemed stable for discharge to Home.      DIAGNOSTIC TESTS:     Admission on 11/12/2024, Discharged on 11/13/2024   Component Date Value Ref Range Status    Glucose 11/12/2024 126 (H)  65 - 99 mg/dL Final    BUN 11/12/2024 11  8 - 23 mg/dL Final    Creatinine 11/12/2024 0.67  0.57 - 1.00 mg/dL Final    Sodium 11/12/2024 137  136 - 145 mmol/L Final    Potassium 11/12/2024 3.8  3.5 - 5.2 mmol/L Final    Chloride 11/12/2024 100  98 - 107 mmol/L Final    CO2 11/12/2024 25.9  22.0 - 29.0 mmol/L Final    Calcium 11/12/2024 9.8  8.6 - 10.5 mg/dL Final    BUN/Creatinine Ratio 11/12/2024 16.4  7.0 - 25.0 Final    Anion Gap 11/12/2024 11.1  5.0 - 15.0 mmol/L Final    eGFR 11/12/2024 87.9  >60.0 mL/min/1.73 Final    Protime 11/12/2024  13.7  12.1 - 15.0 Seconds Final    INR 11/12/2024 1.01  0.90 - 1.10 Final    PTT 11/12/2024 29.9  24.3 - 38.1 seconds Final    ABO Type 11/12/2024 O   Final    RH type 11/12/2024 Positive   Final    Antibody Screen 11/12/2024 Negative   Final    T&S Expiration Date 11/12/2024 11/15/2024 11:59:59 PM   Final    WBC 11/12/2024 10.75  3.40 - 10.80 10*3/mm3 Final    RBC 11/12/2024 3.86  3.77 - 5.28 10*6/mm3 Final    Hemoglobin 11/12/2024 11.5 (L)  12.0 - 15.9 g/dL Final    Hematocrit 11/12/2024 35.4  34.0 - 46.6 % Final    MCV 11/12/2024 91.7  79.0 - 97.0 fL Final    MCH 11/12/2024 29.8  26.6 - 33.0 pg Final    MCHC 11/12/2024 32.5  31.5 - 35.7 g/dL Final    RDW 11/12/2024 13.5  12.3 - 15.4 % Final    RDW-SD 11/12/2024 45.1  37.0 - 54.0 fl Final    MPV 11/12/2024 9.5  6.0 - 12.0 fL Final    Platelets 11/12/2024 344  140 - 450 10*3/mm3 Final    Neutrophil % 11/12/2024 63.2  42.7 - 76.0 % Final    Lymphocyte % 11/12/2024 24.5  19.6 - 45.3 % Final    Monocyte % 11/12/2024 6.1  5.0 - 12.0 % Final    Eosinophil % 11/12/2024 4.8  0.3 - 6.2 % Final    Basophil % 11/12/2024 0.8  0.0 - 1.5 % Final    Immature Grans % 11/12/2024 0.6 (H)  0.0 - 0.5 % Final    Neutrophils, Absolute 11/12/2024 6.79  1.70 - 7.00 10*3/mm3 Final    Lymphocytes, Absolute 11/12/2024 2.63  0.70 - 3.10 10*3/mm3 Final    Monocytes, Absolute 11/12/2024 0.66  0.10 - 0.90 10*3/mm3 Final    Eosinophils, Absolute 11/12/2024 0.52 (H)  0.00 - 0.40 10*3/mm3 Final    Basophils, Absolute 11/12/2024 0.09  0.00 - 0.20 10*3/mm3 Final    Immature Grans, Absolute 11/12/2024 0.06 (H)  0.00 - 0.05 10*3/mm3 Final    nRBC 11/12/2024 0.0  0.0 - 0.2 /100 WBC Final    QT Interval 11/12/2024 465  ms Final    QTC Interval 11/12/2024 477  ms Final    Glucose 11/13/2024 151 (H)  65 - 99 mg/dL Final    BUN 11/13/2024 15  8 - 23 mg/dL Final    Creatinine 11/13/2024 0.79  0.57 - 1.00 mg/dL Final    Sodium 11/13/2024 134 (L)  136 - 145 mmol/L Final    Potassium 11/13/2024 5.0  3.5 - 5.2  mmol/L Final    Specimen hemolyzed.  Result may be falsely elevated.    Chloride 11/13/2024 101  98 - 107 mmol/L Final    CO2 11/13/2024 19.0 (L)  22.0 - 29.0 mmol/L Final    Calcium 11/13/2024 9.1  8.6 - 10.5 mg/dL Final    BUN/Creatinine Ratio 11/13/2024 19.0  7.0 - 25.0 Final    Anion Gap 11/13/2024 14.0  5.0 - 15.0 mmol/L Final    eGFR 11/13/2024 75.3  >60.0 mL/min/1.73 Final    WBC 11/13/2024 14.17 (H)  3.40 - 10.80 10*3/mm3 Final    RBC 11/13/2024 3.77  3.77 - 5.28 10*6/mm3 Final    Hemoglobin 11/13/2024 11.3 (L)  12.0 - 15.9 g/dL Final    Hematocrit 11/13/2024 35.5  34.0 - 46.6 % Final    MCV 11/13/2024 94.2  79.0 - 97.0 fL Final    MCH 11/13/2024 30.0  26.6 - 33.0 pg Final    MCHC 11/13/2024 31.8  31.5 - 35.7 g/dL Final    RDW 11/13/2024 13.5  12.3 - 15.4 % Final    RDW-SD 11/13/2024 46.5  37.0 - 54.0 fl Final    MPV 11/13/2024 9.9  6.0 - 12.0 fL Final    Platelets 11/13/2024 294  140 - 450 10*3/mm3 Final       No results found.    Discharge and Follow up Instructions:   Sling x 6 weeks; initiate finger, wrist, passive elbow motion now  Follow-up in Ortho office in 2 weeks no x-ray imaging needed  Aspirin DVT prophylaxis    Date: 11/14/2024    Josefina Wray, APRN

## 2024-11-14 NOTE — OUTREACH NOTE
Prep Survey      Flowsheet Row Responses   [facility] facility patient discharged from? [place]   Is LACE score < 7 ? Yes   Eligibility Readm Mgmt   Discharge diagnosis Closed 3-part fracture of proximal humerus, right, initial encounter   Does the patient have one of the following disease processes/diagnoses(primary or secondary)? Other   Does the patient have Home health ordered? No   Is there a DME ordered? No   Medication alerts for this patient see avs   Prep survey completed? Yes            Daphnie ENG - Registered Nurse

## 2024-11-16 ENCOUNTER — HOSPITAL ENCOUNTER (OUTPATIENT)
Facility: HOSPITAL | Age: 79
Setting detail: OBSERVATION
Discharge: HOME | End: 2024-11-18
Attending: STUDENT IN AN ORGANIZED HEALTH CARE EDUCATION/TRAINING PROGRAM | Admitting: INTERNAL MEDICINE
Payer: MEDICARE

## 2024-11-16 ENCOUNTER — READMISSION MANAGEMENT (OUTPATIENT)
Dept: CALL CENTER | Facility: HOSPITAL | Age: 79
End: 2024-11-16
Payer: MEDICARE

## 2024-11-16 ENCOUNTER — APPOINTMENT (OUTPATIENT)
Dept: CT IMAGING | Facility: HOSPITAL | Age: 79
End: 2024-11-16
Payer: MEDICARE

## 2024-11-16 DIAGNOSIS — K59.00 CONSTIPATION, UNSPECIFIED CONSTIPATION TYPE: Primary | ICD-10-CM

## 2024-11-16 DIAGNOSIS — R10.9 ABDOMINAL PAIN, UNSPECIFIED ABDOMINAL LOCATION: ICD-10-CM

## 2024-11-16 PROBLEM — K56.2 CECAL VOLVULUS: Status: ACTIVE | Noted: 2024-11-16

## 2024-11-16 LAB
ALBUMIN SERPL-MCNC: 3.3 G/DL (ref 3.5–5.2)
ALBUMIN/GLOB SERPL: 1 G/DL
ALP SERPL-CCNC: 94 U/L (ref 39–117)
ALT SERPL W P-5'-P-CCNC: 22 U/L (ref 1–33)
ANION GAP SERPL CALCULATED.3IONS-SCNC: 12.9 MMOL/L (ref 5–15)
AST SERPL-CCNC: 39 U/L (ref 1–32)
BASOPHILS # BLD AUTO: 0.06 10*3/MM3 (ref 0–0.2)
BASOPHILS NFR BLD AUTO: 0.5 % (ref 0–1.5)
BILIRUB SERPL-MCNC: 0.6 MG/DL (ref 0–1.2)
BILIRUB UR QL STRIP: NEGATIVE
BUN SERPL-MCNC: 11 MG/DL (ref 8–23)
BUN/CREAT SERPL: 19 (ref 7–25)
CALCIUM SPEC-SCNC: 9.2 MG/DL (ref 8.6–10.5)
CHLORIDE SERPL-SCNC: 101 MMOL/L (ref 98–107)
CLARITY UR: CLEAR
CO2 SERPL-SCNC: 20.1 MMOL/L (ref 22–29)
COLOR UR: YELLOW
CREAT SERPL-MCNC: 0.58 MG/DL (ref 0.57–1)
DEPRECATED RDW RBC AUTO: 45.5 FL (ref 37–54)
EGFRCR SERPLBLD CKD-EPI 2021: 91 ML/MIN/1.73
EOSINOPHIL # BLD AUTO: 1.25 10*3/MM3 (ref 0–0.4)
EOSINOPHIL NFR BLD AUTO: 10.1 % (ref 0.3–6.2)
ERYTHROCYTE [DISTWIDTH] IN BLOOD BY AUTOMATED COUNT: 13.7 % (ref 12.3–15.4)
GLOBULIN UR ELPH-MCNC: 3.2 GM/DL
GLUCOSE SERPL-MCNC: 109 MG/DL (ref 65–99)
GLUCOSE UR STRIP-MCNC: NEGATIVE MG/DL
HCT VFR BLD AUTO: 31.1 % (ref 34–46.6)
HGB BLD-MCNC: 10.1 G/DL (ref 12–15.9)
HGB UR QL STRIP.AUTO: NEGATIVE
IMM GRANULOCYTES # BLD AUTO: 0.06 10*3/MM3 (ref 0–0.05)
IMM GRANULOCYTES NFR BLD AUTO: 0.5 % (ref 0–0.5)
KETONES UR QL STRIP: NEGATIVE
LEUKOCYTE ESTERASE UR QL STRIP.AUTO: ABNORMAL
LIPASE SERPL-CCNC: 14 U/L (ref 13–60)
LYMPHOCYTES # BLD AUTO: 2.63 10*3/MM3 (ref 0.7–3.1)
LYMPHOCYTES NFR BLD AUTO: 21.2 % (ref 19.6–45.3)
MCH RBC QN AUTO: 29.7 PG (ref 26.6–33)
MCHC RBC AUTO-ENTMCNC: 32.5 G/DL (ref 31.5–35.7)
MCV RBC AUTO: 91.5 FL (ref 79–97)
MONOCYTES # BLD AUTO: 0.68 10*3/MM3 (ref 0.1–0.9)
MONOCYTES NFR BLD AUTO: 5.5 % (ref 5–12)
NEUTROPHILS NFR BLD AUTO: 62.2 % (ref 42.7–76)
NEUTROPHILS NFR BLD AUTO: 7.74 10*3/MM3 (ref 1.7–7)
NITRITE UR QL STRIP: NEGATIVE
NRBC BLD AUTO-RTO: 0 /100 WBC (ref 0–0.2)
PH UR STRIP.AUTO: 5.5 [PH] (ref 4.5–8)
PLAT MORPH BLD: NORMAL
PLATELET # BLD AUTO: 332 10*3/MM3 (ref 140–450)
PMV BLD AUTO: 10.2 FL (ref 6–12)
POTASSIUM SERPL-SCNC: 4.8 MMOL/L (ref 3.5–5.2)
PROT SERPL-MCNC: 6.5 G/DL (ref 6–8.5)
PROT UR QL STRIP: NEGATIVE
RBC # BLD AUTO: 3.4 10*6/MM3 (ref 3.77–5.28)
RBC MORPH BLD: NORMAL
SODIUM SERPL-SCNC: 134 MMOL/L (ref 136–145)
SP GR UR STRIP: 1.01 (ref 1–1.03)
UROBILINOGEN UR QL STRIP: ABNORMAL
WBC MORPH BLD: NORMAL
WBC NRBC COR # BLD AUTO: 12.42 10*3/MM3 (ref 3.4–10.8)

## 2024-11-16 PROCEDURE — G0378 HOSPITAL OBSERVATION PER HR: HCPCS

## 2024-11-16 PROCEDURE — 80053 COMPREHEN METABOLIC PANEL: CPT | Performed by: STUDENT IN AN ORGANIZED HEALTH CARE EDUCATION/TRAINING PROGRAM

## 2024-11-16 PROCEDURE — 25010000002 HEPARIN (PORCINE) PER 1000 UNITS: Performed by: HOSPITALIST

## 2024-11-16 PROCEDURE — 96372 THER/PROPH/DIAG INJ SC/IM: CPT

## 2024-11-16 PROCEDURE — 85007 BL SMEAR W/DIFF WBC COUNT: CPT | Performed by: STUDENT IN AN ORGANIZED HEALTH CARE EDUCATION/TRAINING PROGRAM

## 2024-11-16 PROCEDURE — 99222 1ST HOSP IP/OBS MODERATE 55: CPT | Performed by: HOSPITALIST

## 2024-11-16 PROCEDURE — 25810000003 LACTATED RINGERS PER 1000 ML: Performed by: HOSPITALIST

## 2024-11-16 PROCEDURE — 99285 EMERGENCY DEPT VISIT HI MDM: CPT | Performed by: STUDENT IN AN ORGANIZED HEALTH CARE EDUCATION/TRAINING PROGRAM

## 2024-11-16 PROCEDURE — 83690 ASSAY OF LIPASE: CPT | Performed by: STUDENT IN AN ORGANIZED HEALTH CARE EDUCATION/TRAINING PROGRAM

## 2024-11-16 PROCEDURE — 74176 CT ABD & PELVIS W/O CONTRAST: CPT

## 2024-11-16 PROCEDURE — 85025 COMPLETE CBC W/AUTO DIFF WBC: CPT | Performed by: STUDENT IN AN ORGANIZED HEALTH CARE EDUCATION/TRAINING PROGRAM

## 2024-11-16 PROCEDURE — 81003 URINALYSIS AUTO W/O SCOPE: CPT | Performed by: STUDENT IN AN ORGANIZED HEALTH CARE EDUCATION/TRAINING PROGRAM

## 2024-11-16 RX ORDER — ONDANSETRON 2 MG/ML
4 INJECTION INTRAMUSCULAR; INTRAVENOUS EVERY 6 HOURS PRN
Status: DISCONTINUED | OUTPATIENT
Start: 2024-11-16 | End: 2024-11-18 | Stop reason: HOSPADM

## 2024-11-16 RX ORDER — POLYETHYLENE GLYCOL 3350 17 G/17G
17 POWDER, FOR SOLUTION ORAL DAILY
Status: DISCONTINUED | OUTPATIENT
Start: 2024-11-17 | End: 2024-11-18 | Stop reason: HOSPADM

## 2024-11-16 RX ORDER — HYDROCODONE BITARTRATE AND ACETAMINOPHEN 7.5; 325 MG/1; MG/1
1 TABLET ORAL EVERY 6 HOURS PRN
Status: DISCONTINUED | OUTPATIENT
Start: 2024-11-16 | End: 2024-11-16

## 2024-11-16 RX ORDER — HYDROCODONE BITARTRATE AND ACETAMINOPHEN 7.5; 325 MG/1; MG/1
1 TABLET ORAL EVERY 6 HOURS PRN
Status: DISCONTINUED | OUTPATIENT
Start: 2024-11-16 | End: 2024-11-18 | Stop reason: HOSPADM

## 2024-11-16 RX ORDER — SODIUM CHLORIDE 0.9 % (FLUSH) 0.9 %
10 SYRINGE (ML) INJECTION EVERY 12 HOURS SCHEDULED
Status: DISCONTINUED | OUTPATIENT
Start: 2024-11-16 | End: 2024-11-18 | Stop reason: HOSPADM

## 2024-11-16 RX ORDER — CITALOPRAM HYDROBROMIDE 20 MG/1
20 TABLET ORAL DAILY
Status: DISCONTINUED | OUTPATIENT
Start: 2024-11-17 | End: 2024-11-18 | Stop reason: HOSPADM

## 2024-11-16 RX ORDER — SODIUM CHLORIDE 9 MG/ML
40 INJECTION, SOLUTION INTRAVENOUS AS NEEDED
Status: DISCONTINUED | OUTPATIENT
Start: 2024-11-16 | End: 2024-11-18 | Stop reason: HOSPADM

## 2024-11-16 RX ORDER — LOSARTAN POTASSIUM 50 MG/1
25 TABLET ORAL
Status: DISCONTINUED | OUTPATIENT
Start: 2024-11-17 | End: 2024-11-18 | Stop reason: HOSPADM

## 2024-11-16 RX ORDER — LACTULOSE 10 G/15ML
20 SOLUTION ORAL 2 TIMES DAILY
Status: DISCONTINUED | OUTPATIENT
Start: 2024-11-16 | End: 2024-11-18 | Stop reason: HOSPADM

## 2024-11-16 RX ORDER — SODIUM CHLORIDE 0.9 % (FLUSH) 0.9 %
10 SYRINGE (ML) INJECTION AS NEEDED
Status: DISCONTINUED | OUTPATIENT
Start: 2024-11-16 | End: 2024-11-18 | Stop reason: HOSPADM

## 2024-11-16 RX ORDER — AMOXICILLIN 250 MG
2 CAPSULE ORAL 2 TIMES DAILY PRN
Status: DISCONTINUED | OUTPATIENT
Start: 2024-11-16 | End: 2024-11-18 | Stop reason: HOSPADM

## 2024-11-16 RX ORDER — AMLODIPINE BESYLATE 5 MG/1
5 TABLET ORAL DAILY
Status: DISCONTINUED | OUTPATIENT
Start: 2024-11-17 | End: 2024-11-18 | Stop reason: HOSPADM

## 2024-11-16 RX ORDER — ASPIRIN 81 MG/1
81 TABLET ORAL DAILY
Status: DISCONTINUED | OUTPATIENT
Start: 2024-11-17 | End: 2024-11-18 | Stop reason: HOSPADM

## 2024-11-16 RX ORDER — ISOSORBIDE MONONITRATE 30 MG/1
30 TABLET, EXTENDED RELEASE ORAL DAILY
Status: DISCONTINUED | OUTPATIENT
Start: 2024-11-17 | End: 2024-11-18 | Stop reason: HOSPADM

## 2024-11-16 RX ORDER — LEVOTHYROXINE SODIUM 112 UG/1
112 TABLET ORAL DAILY
Status: DISCONTINUED | OUTPATIENT
Start: 2024-11-17 | End: 2024-11-18 | Stop reason: HOSPADM

## 2024-11-16 RX ORDER — ONDANSETRON 4 MG/1
4 TABLET, ORALLY DISINTEGRATING ORAL EVERY 6 HOURS PRN
Status: DISCONTINUED | OUTPATIENT
Start: 2024-11-16 | End: 2024-11-18 | Stop reason: HOSPADM

## 2024-11-16 RX ORDER — ACETAMINOPHEN 325 MG/1
650 TABLET ORAL EVERY 4 HOURS PRN
Status: DISCONTINUED | OUTPATIENT
Start: 2024-11-16 | End: 2024-11-18 | Stop reason: HOSPADM

## 2024-11-16 RX ORDER — SODIUM CHLORIDE, SODIUM LACTATE, POTASSIUM CHLORIDE, CALCIUM CHLORIDE 600; 310; 30; 20 MG/100ML; MG/100ML; MG/100ML; MG/100ML
75 INJECTION, SOLUTION INTRAVENOUS CONTINUOUS
Status: DISCONTINUED | OUTPATIENT
Start: 2024-11-16 | End: 2024-11-17

## 2024-11-16 RX ORDER — PANTOPRAZOLE SODIUM 40 MG/1
40 TABLET, DELAYED RELEASE ORAL
Status: DISCONTINUED | OUTPATIENT
Start: 2024-11-17 | End: 2024-11-18 | Stop reason: HOSPADM

## 2024-11-16 RX ORDER — CARVEDILOL 3.12 MG/1
6.25 TABLET ORAL 2 TIMES DAILY
Status: DISCONTINUED | OUTPATIENT
Start: 2024-11-16 | End: 2024-11-18 | Stop reason: HOSPADM

## 2024-11-16 RX ORDER — BISACODYL 10 MG
10 SUPPOSITORY, RECTAL RECTAL DAILY PRN
Status: DISCONTINUED | OUTPATIENT
Start: 2024-11-16 | End: 2024-11-18 | Stop reason: HOSPADM

## 2024-11-16 RX ORDER — POLYETHYLENE GLYCOL 3350 17 G/17G
17 POWDER, FOR SOLUTION ORAL DAILY PRN
Status: DISCONTINUED | OUTPATIENT
Start: 2024-11-16 | End: 2024-11-18 | Stop reason: HOSPADM

## 2024-11-16 RX ORDER — ACETAMINOPHEN 160 MG/5ML
650 SOLUTION ORAL EVERY 4 HOURS PRN
Status: DISCONTINUED | OUTPATIENT
Start: 2024-11-16 | End: 2024-11-18 | Stop reason: HOSPADM

## 2024-11-16 RX ORDER — BISACODYL 5 MG/1
5 TABLET, DELAYED RELEASE ORAL DAILY PRN
Status: DISCONTINUED | OUTPATIENT
Start: 2024-11-16 | End: 2024-11-18 | Stop reason: HOSPADM

## 2024-11-16 RX ORDER — HEPARIN SODIUM 5000 [USP'U]/ML
5000 INJECTION, SOLUTION INTRAVENOUS; SUBCUTANEOUS EVERY 12 HOURS SCHEDULED
Status: DISCONTINUED | OUTPATIENT
Start: 2024-11-16 | End: 2024-11-18 | Stop reason: HOSPADM

## 2024-11-16 RX ORDER — SODIUM CHLORIDE 9 MG/ML
100 INJECTION, SOLUTION INTRAVENOUS CONTINUOUS
Status: ACTIVE | OUTPATIENT
Start: 2024-11-16 | End: 2024-11-16

## 2024-11-16 RX ORDER — ACETAMINOPHEN 650 MG/1
650 SUPPOSITORY RECTAL EVERY 4 HOURS PRN
Status: DISCONTINUED | OUTPATIENT
Start: 2024-11-16 | End: 2024-11-18 | Stop reason: HOSPADM

## 2024-11-16 RX ADMIN — SODIUM CHLORIDE, POTASSIUM CHLORIDE, SODIUM LACTATE AND CALCIUM CHLORIDE 75 ML/HR: 600; 310; 30; 20 INJECTION, SOLUTION INTRAVENOUS at 22:00

## 2024-11-16 RX ADMIN — Medication 10 ML: at 22:00

## 2024-11-16 RX ADMIN — CARVEDILOL 6.25 MG: 3.12 TABLET, FILM COATED ORAL at 21:32

## 2024-11-16 RX ADMIN — HEPARIN SODIUM 5000 UNITS: 5000 INJECTION INTRAVENOUS; SUBCUTANEOUS at 21:33

## 2024-11-16 RX ADMIN — LACTULOSE 20 G: 20 SOLUTION ORAL at 21:33

## 2024-11-16 RX ADMIN — HYDROCODONE BITARTRATE AND ACETAMINOPHEN 1 TABLET: 7.5; 325 TABLET ORAL at 17:11

## 2024-11-16 NOTE — ED NOTES
"Nursing report ED to floor  Ashlie Levi  79 y.o.  female    HPI :   Chief Complaint   Patient presents with    Abdominal Pain    Constipation       Admitting doctor:   Juan David Dahl DO    Admitting diagnosis:   The primary encounter diagnosis was Constipation, unspecified constipation type. A diagnosis of Abdominal pain, unspecified abdominal location was also pertinent to this visit.    Code status:   Current Code Status       Date Active Code Status Order ID Comments User Context       Prior            Allergies:   Oxycodone-acetaminophen    Isolation:   No active isolations    Intake and Output  No intake or output data in the 24 hours ending 11/16/24 1813    Weight:       11/16/24  1324   Weight: 68 kg (150 lb)       Most recent vitals:   Vitals:    11/16/24 1324 11/16/24 1605   BP: 160/79 177/98   BP Location: Left arm    Patient Position: Lying    Pulse: 65 64   Resp: 18 18   Temp: 97.9 °F (36.6 °C)    SpO2: 95% 94%   Weight: 68 kg (150 lb)    Height: 152.4 cm (60\")        Active LDAs/IV Access:   Lines, Drains & Airways       Active LDAs       None                    Labs (abnormal labs have a star):   Labs Reviewed   COMPREHENSIVE METABOLIC PANEL - Abnormal; Notable for the following components:       Result Value    Glucose 109 (*)     Sodium 134 (*)     CO2 20.1 (*)     Albumin 3.3 (*)     AST (SGOT) 39 (*)     All other components within normal limits    Narrative:     GFR Normal >60  Chronic Kidney Disease <60  Kidney Failure <15    The GFR formula is only valid for adults with stable renal function between ages 18 and 70.   URINALYSIS W/ CULTURE IF INDICATED - Abnormal; Notable for the following components:    Leuk Esterase, UA Small (1+) (*)     All other components within normal limits    Narrative:     In absence of clinical symptoms, the presence of pyuria, bacteria, and/or nitrites on the urinalysis result does not correlate with infection.   CBC WITH AUTO DIFFERENTIAL - Abnormal; Notable " for the following components:    WBC 12.42 (*)     RBC 3.40 (*)     Hemoglobin 10.1 (*)     Hematocrit 31.1 (*)     Eosinophil % 10.1 (*)     Neutrophils, Absolute 7.74 (*)     Eosinophils, Absolute 1.25 (*)     Immature Grans, Absolute 0.06 (*)     All other components within normal limits   LIPASE - Normal   SCAN SLIDE - Normal   CBC AND DIFFERENTIAL    Narrative:     The following orders were created for panel order CBC & Differential.  Procedure                               Abnormality         Status                     ---------                               -----------         ------                     CBC Auto Differential[[id number]]        Abnormal            Final result               Scan Slide[[id number]]                   Normal              Final result                 Please view results for these tests on the individual orders.       Results       Procedure Component Value Ref Range Date/Time    Lactic Acid, Plasma [[id number]]     Order Status: Canceled Specimen: Blood     Comprehensive Metabolic Panel [[id number]]  (Abnormal) Collected: 11/16/24 1435    Order Status: Completed Specimen: Blood Updated: 11/16/24 1501     Glucose 109 65 - 99 mg/dL      BUN 11 8 - 23 mg/dL      Creatinine 0.58 0.57 - 1.00 mg/dL      Sodium 134 136 - 145 mmol/L      Potassium 4.8 3.5 - 5.2 mmol/L      Comment: Specimen hemolyzed.  Result may be falsely elevated.        Chloride 101 98 - 107 mmol/L      CO2 20.1 22.0 - 29.0 mmol/L      Calcium 9.2 8.6 - 10.5 mg/dL      Total Protein 6.5 6.0 - 8.5 g/dL      Albumin 3.3 3.5 - 5.2 g/dL      ALT (SGPT) 22 1 - 33 U/L      Comment: Specimen hemolyzed.  Result may  be falsely elevated.        AST (SGOT) 39 1 - 32 U/L      Comment: Specimen hemolyzed.  Result may be falsely elevated.        Alkaline Phosphatase 94 39 - 117 U/L      Total Bilirubin 0.6 0.0 - 1.2 mg/dL      Globulin 3.2 gm/dL      A/G Ratio 1.0 g/dL      BUN/Creatinine Ratio 19.0 7.0 - 25.0      Anion Gap 12.9 5.0 -  15.0 mmol/L      eGFR 91.0 >60.0 mL/min/1.73     Narrative:      GFR Normal >60  Chronic Kidney Disease <60  Kidney Failure <15    The GFR formula is only valid for adults with stable renal function between ages 18 and 70.    Lipase [[id number]]  (Normal) Collected: 11/16/24 1357    Order Status: Completed Specimen: Blood Updated: 11/16/24 1424     Lipase 14 13 - 60 U/L      Comment: Specimen hemolyzed.  Results may be falsely decreased.       Scan Slide [[id number]]  (Normal) Collected: 11/16/24 1357    Order Status: Completed Specimen: Blood Updated: 11/16/24 1422     RBC Morphology Normal Normal      WBC Morphology Normal Normal      Platelet Morphology Normal Normal     CBC Auto Differential [[id number]]  (Abnormal) Collected: 11/16/24 1357    Order Status: Completed Specimen: Blood Updated: 11/16/24 1413     WBC 12.42 3.40 - 10.80 10*3/mm3      RBC 3.40 3.77 - 5.28 10*6/mm3      Hemoglobin 10.1 12.0 - 15.9 g/dL      Hematocrit 31.1 34.0 - 46.6 %      MCV 91.5 79.0 - 97.0 fL      MCH 29.7 26.6 - 33.0 pg      MCHC 32.5 31.5 - 35.7 g/dL      RDW 13.7 12.3 - 15.4 %      RDW-SD 45.5 37.0 - 54.0 fl      MPV 10.2 6.0 - 12.0 fL      Platelets 332 140 - 450 10*3/mm3      Neutrophil % 62.2 42.7 - 76.0 %      Lymphocyte % 21.2 19.6 - 45.3 %      Monocyte % 5.5 5.0 - 12.0 %      Eosinophil % 10.1 0.3 - 6.2 %      Basophil % 0.5 0.0 - 1.5 %      Immature Grans % 0.5 0.0 - 0.5 %      Neutrophils, Absolute 7.74 1.70 - 7.00 10*3/mm3      Lymphocytes, Absolute 2.63 0.70 - 3.10 10*3/mm3      Monocytes, Absolute 0.68 0.10 - 0.90 10*3/mm3      Eosinophils, Absolute 1.25 0.00 - 0.40 10*3/mm3      Basophils, Absolute 0.06 0.00 - 0.20 10*3/mm3      Immature Grans, Absolute 0.06 0.00 - 0.05 10*3/mm3      nRBC 0.0 0.0 - 0.2 /100 WBC     Urinalysis With Culture If Indicated - Urine, Clean Catch [[id number]]  (Abnormal) Collected: 11/16/24 1357    Order Status: Completed Specimen: Urine, Clean Catch Updated: 11/16/24 1411     Color, UA  Yellow Yellow, Straw      Appearance, UA Clear Clear      pH, UA 5.5 4.5 - 8.0      Specific Gravity, UA 1.015 1.003 - 1.030      Glucose, UA Negative Negative      Ketones, UA Negative Negative      Bilirubin, UA Negative Negative      Blood, UA Negative Negative      Protein, UA Negative Negative      Leuk Esterase, UA Small (1+) Negative      Nitrite, UA Negative Negative      Urobilinogen, UA 0.2 E.U./dL 0.2 - 1.0 E.U./dL     Narrative:      In absence of clinical symptoms, the presence of pyuria, bacteria, and/or nitrites on the urinalysis result does not correlate with infection.    Urinalysis, Microscopic Only - Urine, Clean Catch [[id number]] Collected: 24 1357    Order Status: Canceled Specimen: Urine, Clean Catch Updated: 24 1410             EKG:   No orders to display       Meds given in ED:   Medications   HYDROcodone-acetaminophen (NORCO) 7.5-325 MG per tablet 1 tablet (1 tablet Oral Given 24 1711)       Imaging results:  CT Abdomen Pelvis Without Contrast    Result Date: 2024  Impression: 1.Calcification is noted in the midpole right kidney that may be vascular in nature or sequela of a nonobstructing calculus. 2.Colonic diverticulosis 3.Moderate stool burden in the more distal colon that could reflect some constipation. 4.Whirlpool sign within the mesentery in the right quadrant of questionable significance. 5.Atherosclerotic vascular calcification 6.Degenerative change lumbar spine Electronically Signed: Forest Garcia MD  2024 5:27 PM EST  Workstation ID: [id number]     Ambulatory status:   - ambulates with assist X 1     Social issues:   Social History     Socioeconomic History    Marital status:    Tobacco Use    Smoking status: Former     Current packs/day: 0.00     Average packs/day: 1 pack/day for 50.0 years (50.0 ttl pk-yrs)     Types: Cigarettes     Start date: 10/13/1956     Quit date: 10/13/2006     Years since quittin.1     Passive exposure: Never     Smokeless tobacco: Never    Tobacco comments:     quit 2006   Vaping Use    Vaping status: Never Used   Substance and Sexual Activity    Alcohol use: Yes     Comment: occasional    Drug use: No    Sexual activity: Not Currently     Partners: Male     Birth control/protection: Surgical, Post-menopausal     Comment: GREG with OC       NIH Stroke Scale:         Maritza WALDRON  11/16/24 18:13 EST    [FreeTextEntry2] : left knee pain the same

## 2024-11-16 NOTE — ED NOTES
Pt's IV infiltrated during CT scan. ER MD states the PO contrast scan patient will receive will be sufficient.

## 2024-11-16 NOTE — ED PROVIDER NOTES
Subjective   History of Present Illness  79 female presents to the emergency room chief complaint of 8 days of constipation, without obstipation, occurring in his accounts of taking hydrocodone for recent right sided shoulder surgery 3 wks ago. She endorses 9/10 intermittent abdominal pain.  Denies fever chills, blood from any orifice, nausea vomiting or abdominal pain.  ROS otherwise negative vitals are within normal limits on exam patient well-appearing clear to auscultation nontender abdomen, rectal exam does not show any fecal impaction, there were external nonthrombosed hemorrhoids present.  Review of Systems    Past Medical History:   Diagnosis Date    Abnormal electrocardiogram     2006 heart attack    Arthritis     Chest discomfort     Colon polyp     Coronary artery disease     Disease of thyroid gland     Diverticulitis     Elevated cholesterol     GERD (gastroesophageal reflux disease)     Heart attack     2006    Hyperlipidemia     Hypertension        Allergies   Allergen Reactions    Oxycodone-Acetaminophen Hallucinations       Past Surgical History:   Procedure Laterality Date    APPENDECTOMY      CARDIAC SURGERY      3 stents    CHOLECYSTECTOMY      COLONOSCOPY N/A 07/13/2016    Procedure: COLONOSCOPY;  Surgeon: Giovani Avelar MD;  Location: [facility] OR;  Service:     COLONOSCOPY N/A 06/13/2019    Procedure: Colonoscopy with possible biopsy or polypectomy;  Surgeon: Alfreda Soler DO;  Location: [facility] OR;  Service: Gastroenterology    COLONOSCOPY N/A 05/18/2021    Procedure: Colonoscopy with polypectomy, biopsy;  Surgeon: Alfreda Soler DO;  Location: [facility] OR;  Service: Gastroenterology;  Laterality: N/A;  diverticulosis  cecal biopsy  right colon polyp  left colon polyps x2    COLONOSCOPY W/ POLYPECTOMY N/A 10/8/2024    Procedure: COLONOSCOPY WITH POLYPECTOMY;  Surgeon: Alfreda Soler DO;  Location: [facility] OR;  Service: Gastroenterology;  Laterality: N/A;   diverticulosis, sigmoid colon polyp, right colon polyp    CORONARY STENT PLACEMENT      ENDOSCOPY N/A 11/21/2017    Procedure: ESOPHAGOGASTRODUODENOSCOPY with CARIN test ;  Surgeon: Alfreda Soler DO;  Location:  [facility] OR;  Service:     ENDOSCOPY N/A 06/13/2019    Procedure: Esophagogastroduodenoscopy with possible biopsy, polypectomy, dilation;  Surgeon: Alfreda Soler DO;  Location:  [facility] OR;  Service: Gastroenterology    ENDOSCOPY N/A 05/18/2021    Procedure: Esophagogastroduodenoscopy with biopsies;  Surgeon: Alfreda Soler DO;  Location:  [facility] OR;  Service: Gastroenterology;  Laterality: N/A;  CARIN test  gastritis  duodenitis    EPIDURAL BLOCK  04/30/2024    EYE SURGERY      FOOT GANGLION EXCISION Left     HEMORROIDECTOMY      HERNIA REPAIR      HYSTERECTOMY      GREG with OC age 30 for DUB    INJECTION OF MEDICATION Bilateral 03/26/2024    piriformis    MEDIAL BRANCH BLOCK Bilateral     lumbar    MEDIAL BRANCH BLOCK Bilateral 06/11/2024    L3-L5    OOPHORECTOMY      dx lsc , interval USO    TOTAL HIP ARTHROPLASTY Right 08/23/2023    Procedure: TOTAL HIP ARTHROPLASTY ANTERIOR WITH HANA TABLE;  Surgeon: Aidan Wallace MD;  Location:  [facility] OR;  Service: Orthopedics;  Laterality: Right;    TOTAL SHOULDER ARTHROPLASTY W/ DISTAL CLAVICLE EXCISION Right 11/12/2024    Procedure: Reverse total shoulder arthroplasty for treatment of proximal humerus fracture and all associated procedures;  Surgeon: Farhat Ang MD;  Location:  [facility] OR;  Service: Orthopedics;  Laterality: Right;       Family History   Problem Relation Age of Onset    Hypertension Mother     Stroke Mother     Diabetes Father     Hypertension Father     Heart disease Father     Stroke Father     Heart disease Brother     Colon cancer Brother         dx > 50    Breast cancer Neg Hx     Ovarian cancer Neg Hx     Deep vein thrombosis Neg Hx     Pulmonary embolism Neg Hx        Social History     Socioeconomic History    Marital  status:    Tobacco Use    Smoking status: Former     Current packs/day: 0.00     Average packs/day: 1 pack/day for 50.0 years (50.0 ttl pk-yrs)     Types: Cigarettes     Start date: 10/13/1956     Quit date: 10/13/2006     Years since quittin.1     Passive exposure: Never    Smokeless tobacco: Never    Tobacco comments:     quit 2006   Vaping Use    Vaping status: Never Used   Substance and Sexual Activity    Alcohol use: Yes     Comment: occasional    Drug use: No    Sexual activity: Not Currently     Partners: Male     Birth control/protection: Surgical, Post-menopausal     Comment: GREG with OC           Objective   Physical Exam    Procedures           ED Course                                                       Medical Decision Making    79 female presents with 8 days of constipation w/ 9/10 intermittent pain without obstipation with no impaction on rectal exam in the context of her recent surgery.    Concern for obstruction, versus functional constipation related to opioid medications, consideration to voluvulus.  Evaluate with CT abdomen pelvis with PO contrast and abdominal labs.    6 pm cbc shows WBC 12.42; downtrend from 13th.     CT shows whirlpool sign in RLQ; however PO contrast passed this point.    Possible intermittent cecal volvulus.     Consult ANOOP Langford, general surgeon, who recommended admission and serial abdominal exams.    Admit to Dr. Morrissey, obs floor, further such recs.      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       Medication List       No changes were made to your prescriptions during this visit.            Jemal Sherman MD  11/16/24 2208

## 2024-11-16 NOTE — ED NOTES
Patient's initial IV infiltrated during CT. During that time the patient was adamant that she would not be stuck again. Patient educated that now since she is being admitted she would more than likely need an IV. Patient once again adamant that she does not want an IV. Patient educated on importance of IV for admission. Patient verbalized understanding and still does not want an IV. Patient states if there comes a point tonight where she would need an IV for something than staff could attempt to stick her however at this time she does not want to be stuck of have another IV. ER MD aware and charge RN aware.

## 2024-11-17 LAB
ANION GAP SERPL CALCULATED.3IONS-SCNC: 10.9 MMOL/L (ref 5–15)
BASOPHILS # BLD AUTO: 0.07 10*3/MM3 (ref 0–0.2)
BASOPHILS NFR BLD AUTO: 0.7 % (ref 0–1.5)
BUN SERPL-MCNC: 8 MG/DL (ref 8–23)
BUN/CREAT SERPL: 13.3 (ref 7–25)
CALCIUM SPEC-SCNC: 9.4 MG/DL (ref 8.6–10.5)
CHLORIDE SERPL-SCNC: 102 MMOL/L (ref 98–107)
CO2 SERPL-SCNC: 26.1 MMOL/L (ref 22–29)
CREAT SERPL-MCNC: 0.6 MG/DL (ref 0.57–1)
DEPRECATED RDW RBC AUTO: 45.6 FL (ref 37–54)
EGFRCR SERPLBLD CKD-EPI 2021: 90.3 ML/MIN/1.73
EOSINOPHIL # BLD AUTO: 1.12 10*3/MM3 (ref 0–0.4)
EOSINOPHIL NFR BLD AUTO: 11.3 % (ref 0.3–6.2)
ERYTHROCYTE [DISTWIDTH] IN BLOOD BY AUTOMATED COUNT: 13.8 % (ref 12.3–15.4)
GLUCOSE SERPL-MCNC: 150 MG/DL (ref 65–99)
HCT VFR BLD AUTO: 31.5 % (ref 34–46.6)
HGB BLD-MCNC: 10.3 G/DL (ref 12–15.9)
IMM GRANULOCYTES # BLD AUTO: 0.06 10*3/MM3 (ref 0–0.05)
IMM GRANULOCYTES NFR BLD AUTO: 0.6 % (ref 0–0.5)
LYMPHOCYTES # BLD AUTO: 2.44 10*3/MM3 (ref 0.7–3.1)
LYMPHOCYTES NFR BLD AUTO: 24.7 % (ref 19.6–45.3)
MCH RBC QN AUTO: 29.9 PG (ref 26.6–33)
MCHC RBC AUTO-ENTMCNC: 32.7 G/DL (ref 31.5–35.7)
MCV RBC AUTO: 91.3 FL (ref 79–97)
MONOCYTES # BLD AUTO: 0.59 10*3/MM3 (ref 0.1–0.9)
MONOCYTES NFR BLD AUTO: 6 % (ref 5–12)
NEUTROPHILS NFR BLD AUTO: 5.61 10*3/MM3 (ref 1.7–7)
NEUTROPHILS NFR BLD AUTO: 56.7 % (ref 42.7–76)
NRBC BLD AUTO-RTO: 0 /100 WBC (ref 0–0.2)
PLATELET # BLD AUTO: 348 10*3/MM3 (ref 140–450)
PMV BLD AUTO: 10.1 FL (ref 6–12)
POTASSIUM SERPL-SCNC: 3.8 MMOL/L (ref 3.5–5.2)
RBC # BLD AUTO: 3.45 10*6/MM3 (ref 3.77–5.28)
SODIUM SERPL-SCNC: 139 MMOL/L (ref 136–145)
WBC NRBC COR # BLD AUTO: 9.89 10*3/MM3 (ref 3.4–10.8)

## 2024-11-17 PROCEDURE — 96372 THER/PROPH/DIAG INJ SC/IM: CPT

## 2024-11-17 PROCEDURE — 25010000002 HEPARIN (PORCINE) PER 1000 UNITS: Performed by: HOSPITALIST

## 2024-11-17 PROCEDURE — 97161 PT EVAL LOW COMPLEX 20 MIN: CPT

## 2024-11-17 PROCEDURE — 85025 COMPLETE CBC W/AUTO DIFF WBC: CPT | Performed by: HOSPITALIST

## 2024-11-17 PROCEDURE — 80048 BASIC METABOLIC PNL TOTAL CA: CPT | Performed by: HOSPITALIST

## 2024-11-17 PROCEDURE — G0378 HOSPITAL OBSERVATION PER HR: HCPCS

## 2024-11-17 PROCEDURE — 99232 SBSQ HOSP IP/OBS MODERATE 35: CPT | Performed by: INTERNAL MEDICINE

## 2024-11-17 PROCEDURE — 99214 OFFICE O/P EST MOD 30 MIN: CPT | Performed by: SURGERY

## 2024-11-17 RX ADMIN — ISOSORBIDE MONONITRATE 30 MG: 30 TABLET, EXTENDED RELEASE ORAL at 11:05

## 2024-11-17 RX ADMIN — ACETAMINOPHEN 650 MG: 325 TABLET ORAL at 14:23

## 2024-11-17 RX ADMIN — ASPIRIN 81 MG: 81 TABLET, COATED ORAL at 11:05

## 2024-11-17 RX ADMIN — HEPARIN SODIUM 5000 UNITS: 5000 INJECTION INTRAVENOUS; SUBCUTANEOUS at 11:04

## 2024-11-17 RX ADMIN — CARVEDILOL 6.25 MG: 3.12 TABLET, FILM COATED ORAL at 11:05

## 2024-11-17 RX ADMIN — ACETAMINOPHEN 650 MG: 325 TABLET ORAL at 23:24

## 2024-11-17 RX ADMIN — POLYETHYLENE GLYCOL 3350 17 G: 17 POWDER, FOR SOLUTION ORAL at 11:05

## 2024-11-17 RX ADMIN — AMLODIPINE BESYLATE 5 MG: 5 TABLET ORAL at 11:05

## 2024-11-17 RX ADMIN — HEPARIN SODIUM 5000 UNITS: 5000 INJECTION INTRAVENOUS; SUBCUTANEOUS at 20:07

## 2024-11-17 RX ADMIN — LEVOTHYROXINE SODIUM 112 MCG: 0.11 TABLET ORAL at 11:05

## 2024-11-17 RX ADMIN — CARVEDILOL 6.25 MG: 3.12 TABLET, FILM COATED ORAL at 20:07

## 2024-11-17 RX ADMIN — LACTULOSE 20 G: 20 SOLUTION ORAL at 20:07

## 2024-11-17 RX ADMIN — LOSARTAN POTASSIUM 25 MG: 50 TABLET, FILM COATED ORAL at 11:05

## 2024-11-17 NOTE — CONSULTS
"ASSESSMENT/PLAN:    79-year-old lady presents emergency room with constipation that is almost certainly related to narcotic use secondary to shoulder surgery 5 days ago.  I was consulted principally due to CT scan showing \"whirlpool sign\", interpreted by radiologist as being of questionable significance and with no evidence of small bowel or colonic obstruction.  Interestingly, I reviewed CT scan of her abdomen and pelvis from 2021 and the findings are almost identical.  Given the lack of any evidence of obstruction and completely benign exam as well as similar appearance on CT scan 3 years ago, this finding on current CT scan is of no clinical significance.  I have advanced her to a regular diet and stopped her IV fluids.  I will be available to see her again if needed.    CC:     Constipation    HPI:    79-year-old lady presented to the emergency room with severe constipation following shoulder surgery 5 days ago.  No complaints of abdominal pain.  Tolerating clear liquids this morning.  Had multiple bowel movements through the night.  Relevant review of systems negative other than presenting complaints.    ENDOSCOPY:   Colonoscopy 10/8/2024: Diverticulosis, right colon polyp, sigmoid colon polyp, body of report describes redundant colon.  Pathology: Sigmoid polyp-hyperplastic, right colon polyp-tubular adenoma  EGD 5/18/2021: Gross findings listed as duodenitis and gastritis.  Duodenal biopsies showed peptic duodenitis.  Gastric biopsies showed reactive gastropathy.    RADIOLOGY:   CT abdomen pelvis yesterday: Diverticulosis.  Moderate stool burden.  Whirlpool sign within the mesentery in the right quadrant of questionable significance.  I reviewed the images and there is no evidence of small bowel obstruction or colonic obstruction.  I concur that the so-called whirlpool sign is of questionable significance.    LABS:    CBC today: WBC 9.9, hemoglobin 10.3, platelets 348  BMP today: Glucose 150, otherwise " normal    SOCIAL HISTORY:   Denies tobacco use  Occasional alcohol use    FAMILY HISTORY:    Colorectal cancer: Brother    PREVIOUS SURGERY    Total shoulder arthroplasty 11/12/2024  Right total hip arthroplasty 8/23/2023  Appendectomy  Cholecystectomy  Coronary stents  Hysterectomy    PAST MEDICAL HISTORY:    History of adenomatous polyps  Coronary artery disease  Arthritis  Hypothyroidism  Gastroesophageal reflux disease  Hypertension  Hyperlipidemia    MEDICATIONS:   Amlodipine  Aspirin 81 mg  Lipitor  Coreg  Celexa  Hydrocodone  Avapro  Imdur  Synthroid  Ativan  Mobic  Nitrostat  Prilosec  Zofran    ALLERGIES:   Oxycodone-hallucinations    PHYSICAL EXAM:   Constitutional: No acute distress  Vital signs:   Weight: 151 pounds  Height: 60 inches  BMI: 29.6  Blood pressure 152/70  Heart rate 60  Respiratory rate 18  Temperature 97.8  Respiratory: Normal nonlabored inspiratory effort  Cardiovascular: Regular rate, no jugular venous distention  Gastrointestinal: Soft, nondistended and completely nontender    ANOOP COSBY M.D.

## 2024-11-17 NOTE — CASE MANAGEMENT/SOCIAL WORK
Continued Stay Note  YAMILE Adams     Patient Name: Ashlie Levi  MRN: [id number]  Today's Date: 11/17/2024    Admit Date: 11/16/2024    Plan: Home w/family   Discharge Plan       Row Name 11/17/24 1344       Plan    Plan Home w/family    Plan Comments Met with pt, introduced self, role and discussed dc plans, needs.  Pt confirmed her address and PCP. Reviewed Goldman with pt, signed and faxed to registration. Pt lives in a 1 story home with 2 steps to get into the front.  There is a rail she can hold on to. She lives with her spouse, son and daughter in law.  Dgt in law cooks and cleans. Spouse assists pt with personal care as needed.  Pt does not drive but her family take her where she needs to go.  She has a lift chair, cane, rollator, BSC, walker, shower chair, and BP cuff.  She has had HH before arranged by MD office.  She has not been to  rehab.  She uses [facility] pharmacy in [place] and has no issues with medication copays.  Pt denies any abuse, provlems with paying for food or utilities.  Her plan is to return home.  Family will transport.  Will follow. ThanksZofia                   Discharge Codes    No documentation.                       Zofia Gonsalez

## 2024-11-17 NOTE — PLAN OF CARE
Goal Outcome Evaluation:  Plan of Care Reviewed With: patient        Progress: improving  Outcome Evaluation: Admitted 11/16 for constipation. Has had multiple stools with balls of stool but mostly brown water. No c/o pain. VSS.

## 2024-11-17 NOTE — H&P
[facility]      Patient Name: Ashlie Levi  : 1945  MRN: [id number]  Primary Care Physician:  Kole Silva MD  Date of admission: 2024      Subjective      Chief Complaint: Constipation    History of Present Illness: Ashlie Levi is a 79 y.o. female who presented to [facility] on 2024 complaining of having no bowel movements almost for last 1 week, patient underwent CT abdomen revealing constipation.  Patient underwent recent right shoulder surgery, she was noted taking hydrocodone at home for the pain control.  Denies for any shortness of breath, denies for any nausea vomiting, she does feel uncomfortable in the bed.  She says she is passing gas.  Following this we were asked to admit the patient for further care.  General surgery consulted from ER.      Review of Systems   All other systems reviewed and are negative.       Personal History     Past Medical History:   Diagnosis Date    Abnormal electrocardiogram      heart attack    Arthritis     Chest discomfort     Colon polyp     Coronary artery disease     Disease of thyroid gland     Diverticulitis     Elevated cholesterol     GERD (gastroesophageal reflux disease)     Heart attack         Hyperlipidemia     Hypertension        Past Surgical History:   Procedure Laterality Date    APPENDECTOMY      CARDIAC SURGERY      3 stents    CHOLECYSTECTOMY      COLONOSCOPY N/A 2016    Procedure: COLONOSCOPY;  Surgeon: Giovani Avelar MD;  Location: [facility] OR;  Service:     COLONOSCOPY N/A 2019    Procedure: Colonoscopy with possible biopsy or polypectomy;  Surgeon: Alfreda Soler DO;  Location: [facility] OR;  Service: Gastroenterology    COLONOSCOPY N/A 2021    Procedure: Colonoscopy with polypectomy, biopsy;  Surgeon: Alfreda Soler DO;  Location: [facility] OR;  Service: Gastroenterology;  Laterality: N/A;  diverticulosis  cecal biopsy  right  colon polyp  left colon polyps x2    COLONOSCOPY W/ POLYPECTOMY N/A 10/8/2024    Procedure: COLONOSCOPY WITH POLYPECTOMY;  Surgeon: Alfreda Soler DO;  Location: [facility] OR;  Service: Gastroenterology;  Laterality: N/A;  diverticulosis, sigmoid colon polyp, right colon polyp    CORONARY STENT PLACEMENT      ENDOSCOPY N/A 11/21/2017    Procedure: ESOPHAGOGASTRODUODENOSCOPY with CARIN test ;  Surgeon: Alfreda Soler DO;  Location: [facility] OR;  Service:     ENDOSCOPY N/A 06/13/2019    Procedure: Esophagogastroduodenoscopy with possible biopsy, polypectomy, dilation;  Surgeon: Alfreda Soler DO;  Location: [facility] OR;  Service: Gastroenterology    ENDOSCOPY N/A 05/18/2021    Procedure: Esophagogastroduodenoscopy with biopsies;  Surgeon: Alfreda Soler DO;  Location: [facility] OR;  Service: Gastroenterology;  Laterality: N/A;  CARIN test  gastritis  duodenitis    EPIDURAL BLOCK  04/30/2024    EYE SURGERY      FOOT GANGLION EXCISION Left     HEMORROIDECTOMY      HERNIA REPAIR      HYSTERECTOMY      GREG with OC age 30 for DUB    INJECTION OF MEDICATION Bilateral 03/26/2024    piriformis    MEDIAL BRANCH BLOCK Bilateral     lumbar    MEDIAL BRANCH BLOCK Bilateral 06/11/2024    L3-L5    OOPHORECTOMY      dx lsc , interval USO    TOTAL HIP ARTHROPLASTY Right 08/23/2023    Procedure: TOTAL HIP ARTHROPLASTY ANTERIOR WITH HANA TABLE;  Surgeon: Aidan Wallace MD;  Location: [facility] OR;  Service: Orthopedics;  Laterality: Right;    TOTAL SHOULDER ARTHROPLASTY W/ DISTAL CLAVICLE EXCISION Right 11/12/2024    Procedure: Reverse total shoulder arthroplasty for treatment of proximal humerus fracture and all associated procedures;  Surgeon: Farhat Ang MD;  Location: [facility] OR;  Service: Orthopedics;  Laterality: Right;       Family History: family history includes Colon cancer in her brother; Diabetes in her father; Heart disease in her brother and father; Hypertension in her father and mother; Stroke in her  father and mother. Otherwise pertinent FHx was reviewed and not pertinent to current issue.    Social History:  reports that she quit smoking about 18 years ago. Her smoking use included cigarettes. She started smoking about 68 years ago. She has a 50 pack-year smoking history. She has never been exposed to tobacco smoke. She has never used smokeless tobacco. She reports current alcohol use. She reports that she does not use drugs.    Home Medications:  Prior to Admission Medications       Prescriptions Last Dose Informant Patient Reported? Taking?    amLODIPine (NORVASC) 5 MG tablet   No Yes    Take 1 tablet by mouth once daily    Patient taking differently:  Take 1 tablet by mouth Daily.    aspirin 81 MG EC tablet   No Yes    Take 1 tablet by mouth Daily. Indications: VTE Prophylaxis    Patient taking differently:  Take 1 tablet by mouth Every Night. Indications: VTE Prophylaxis    atorvastatin (LIPITOR) 20 MG tablet   Yes Yes    Take 1 tablet by mouth Every Night.    carvedilol (COREG) 6.25 MG tablet   No Yes    Take 1 tablet by mouth twice daily    Patient taking differently:  Take 1 tablet by mouth 2 (Two) Times a Day With Meals.    citalopram (CeleXA) 40 MG tablet   Yes Yes    Take 1 tablet by mouth Daily.    docusate sodium (Colace) 100 MG capsule   No Yes    Take 1 capsule by mouth 2 (Two) Times a Day As Needed for Constipation.    HYDROcodone-acetaminophen (NORCO) 7.5-325 MG per tablet   No Yes    Take 1 tablet by mouth Every 6 (Six) Hours As Needed for Moderate Pain.    irbesartan (AVAPRO) 300 MG tablet   No Yes    TAKE 1 TABLET BY MOUTH ONCE DAILY AT NIGHT    Patient taking differently:  Take 1 tablet by mouth Every Night.    isosorbide mononitrate (IMDUR) 30 MG 24 hr tablet   Yes Yes    Take 1 tablet by mouth Daily.    levothyroxine (SYNTHROID, LEVOTHROID) 112 MCG tablet   Yes Yes    Take 1 tablet by mouth Daily.    LORazepam (ATIVAN) 0.5 MG tablet   Yes Yes    Take 1 tablet by mouth Every 6 (Six) Hours  As Needed.    meloxicam (MOBIC) 15 MG tablet   No Yes    Take 1 tablet by mouth Daily.    nitroglycerin (NITROSTAT) 0.4 MG SL tablet   No Yes    Place 1 tablet under the tongue Every 5 (Five) Minutes As Needed for Chest Pain. Take no more than 3 doses in 15 minutes and call EMS.    omeprazole (priLOSEC) 40 MG capsule   No Yes    Take 1 capsule by mouth once daily    ondansetron ODT (ZOFRAN-ODT) 4 MG disintegrating tablet   No Yes    Place 1 tablet on the tongue Every 8 (Eight) Hours As Needed for Nausea or Vomiting.    Turmeric (QC Tumeric Complex) 500 MG capsule   Yes No    Take 1 capsule by mouth Daily.              Allergies:  Allergies   Allergen Reactions    Oxycodone-Acetaminophen Hallucinations       Objective      Vitals:   Temp:  [97.8 °F (36.6 °C)-97.9 °F (36.6 °C)] 97.8 °F (36.6 °C)  Heart Rate:  [61-66] 66  Resp:  [18-20] 20  BP: (155-188)/(73-99) 175/73    Physical Exam  Vitals and nursing note reviewed.   Constitutional:       General: She is not in acute distress.     Appearance: Normal appearance. She is well-developed. She is not ill-appearing, toxic-appearing or diaphoretic.   HENT:      Head: Normocephalic and atraumatic.      Right Ear: Ear canal and external ear normal.      Left Ear: Ear canal and external ear normal.      Nose: Nose normal. No congestion or rhinorrhea.      Mouth/Throat:      Mouth: Mucous membranes are moist.      Pharynx: No oropharyngeal exudate.   Eyes:      General: No scleral icterus.        Right eye: No discharge.         Left eye: No discharge.      Extraocular Movements: Extraocular movements intact.      Conjunctiva/sclera: Conjunctivae normal.      Pupils: Pupils are equal, round, and reactive to light.   Neck:      Thyroid: No thyromegaly.      Vascular: No carotid bruit or JVD.      Trachea: No tracheal deviation.   Cardiovascular:      Rate and Rhythm: Normal rate and regular rhythm.      Pulses: Normal pulses.      Heart sounds: Normal heart sounds. No murmur  heard.     No friction rub. No gallop.   Pulmonary:      Effort: Pulmonary effort is normal. No respiratory distress.      Breath sounds: Normal breath sounds. No stridor. No wheezing, rhonchi or rales.   Chest:      Chest wall: No tenderness.   Abdominal:      General: Bowel sounds are normal. There is no distension.      Palpations: Abdomen is soft. There is no mass.      Tenderness: There is no abdominal tenderness. There is no guarding or rebound.      Hernia: No hernia is present.   Musculoskeletal:         General: No swelling, tenderness, deformity or signs of injury. Normal range of motion.      Cervical back: Normal range of motion and neck supple. No rigidity. No muscular tenderness.      Right lower leg: No edema.      Left lower leg: No edema.   Lymphadenopathy:      Cervical: No cervical adenopathy.   Skin:     General: Skin is warm and dry.      Coloration: Skin is not jaundiced or pale.      Findings: No bruising, erythema or rash.   Neurological:      General: No focal deficit present.      Mental Status: She is alert and oriented to person, place, and time. Mental status is at baseline.      Cranial Nerves: No cranial nerve deficit.      Sensory: No sensory deficit.      Motor: No weakness or abnormal muscle tone.      Coordination: Coordination normal.   Psychiatric:         Mood and Affect: Mood normal.         Behavior: Behavior normal.         Thought Content: Thought content normal.         Judgment: Judgment normal.          Result Review    Result Review:  I have personally reviewed the results from the time of this admission to 11/16/2024 21:03 EST and agree with these findings:  [x]  Laboratory  [x]  Microbiology  [x]  Radiology  []  EKG/Telemetry   []  Cardiology/Vascular   []  Pathology  []  Old records  []  Other:  Most notable findings include:       Assessment & Plan        Active Hospital Problems:  Active Hospital Problems    Diagnosis     **Cecal volvulus      Plan:     Severe  constipation likely from narcotic use for pain control related to surgery, will put the patient on MiraLAX and lactulose, will monitor for bowel movement, will give gentle hydration meanwhile, general surgery consulted from ER.    Recent shoulder surgery, postop care, PT OT evaluate and treat    Hypertension, continue home regime, monitor vitals.    GERD, continue PPI    Dyslipidemia on a statin, monitor LFTs as needed    Hypothyroidism, continue Synthroid, TSH as needed      VTE Prophylaxis:  Mechanical VTE prophylaxis orders are signed & held.          CODE STATUS:       Admission Status:  I believe this patient meets observation status.    I discussed the patient's findings and my recommendations with patient.    This patient has been examined wearing appropriate Personal Protective Equipment and discussed with hospital infection control department. 11/16/24      Signature:

## 2024-11-17 NOTE — THERAPY DISCHARGE NOTE
Patient Name: Ashlie Levi  : 1945    MRN: [id number]                              Today's Date: 2024       Acute care Initial evaluation and discharge summary:     Admit Date: 2024    Visit Dx:     ICD-10-CM ICD-9-CM   1. Constipation, unspecified constipation type  K59.00 564.00   2. Abdominal pain, unspecified abdominal location  R10.9 789.00     Patient Active Problem List   Diagnosis    Coronary artery disease    Hyperlipidemia    Hypertension    GERD (gastroesophageal reflux disease)    Vulvar itching    Dyspepsia    Bloating    Nausea    Dry heaves    History of colon polyps    Incontinence of feces    OA (osteoarthritis) of hip    Osteoarthritis of right hip    Piriformis syndrome of both sides    Lumbar radiculopathy    Lumbar stenosis with neurogenic claudication    Lumbar facet joint syndrome    Epigastric pain    Black stool    Lower abdominal pain    Closed 3-part fracture of proximal humerus, right, initial encounter    Closed fracture of right proximal humerus    Cecal volvulus     Past Medical History:   Diagnosis Date    Abnormal electrocardiogram      heart attack    Arthritis     Chest discomfort     Colon polyp     Coronary artery disease     Disease of thyroid gland     Diverticulitis     Elevated cholesterol     GERD (gastroesophageal reflux disease)     Heart attack         Hyperlipidemia     Hypertension      Past Surgical History:   Procedure Laterality Date    APPENDECTOMY      CARDIAC SURGERY      3 stents    CHOLECYSTECTOMY      COLONOSCOPY N/A 2016    Procedure: COLONOSCOPY;  Surgeon: Giovani Avelar MD;  Location: [facility] OR;  Service:     COLONOSCOPY N/A 2019    Procedure: Colonoscopy with possible biopsy or polypectomy;  Surgeon: Alfreda Soler DO;  Location: [facility] OR;  Service: Gastroenterology    COLONOSCOPY N/A 2021    Procedure: Colonoscopy with polypectomy, biopsy;  Surgeon: Alfreda Soler DO;  Location: [facility]  OR;  Service: Gastroenterology;  Laterality: N/A;  diverticulosis  cecal biopsy  right colon polyp  left colon polyps x2    COLONOSCOPY W/ POLYPECTOMY N/A 10/8/2024    Procedure: COLONOSCOPY WITH POLYPECTOMY;  Surgeon: Alfreda Soler DO;  Location:  [facility] OR;  Service: Gastroenterology;  Laterality: N/A;  diverticulosis, sigmoid colon polyp, right colon polyp    CORONARY STENT PLACEMENT      ENDOSCOPY N/A 11/21/2017    Procedure: ESOPHAGOGASTRODUODENOSCOPY with CARIN test ;  Surgeon: Alfreda Soler DO;  Location:  [facility] OR;  Service:     ENDOSCOPY N/A 06/13/2019    Procedure: Esophagogastroduodenoscopy with possible biopsy, polypectomy, dilation;  Surgeon: Alfreda Soler DO;  Location:  [facility] OR;  Service: Gastroenterology    ENDOSCOPY N/A 05/18/2021    Procedure: Esophagogastroduodenoscopy with biopsies;  Surgeon: Alfreda Soler DO;  Location:  [facility] OR;  Service: Gastroenterology;  Laterality: N/A;  CARIN test  gastritis  duodenitis    EPIDURAL BLOCK  04/30/2024    EYE SURGERY      FOOT GANGLION EXCISION Left     HEMORROIDECTOMY      HERNIA REPAIR      HYSTERECTOMY      GREG with OC age 30 for DUB    INJECTION OF MEDICATION Bilateral 03/26/2024    piriformis    MEDIAL BRANCH BLOCK Bilateral     lumbar    MEDIAL BRANCH BLOCK Bilateral 06/11/2024    L3-L5    OOPHORECTOMY      dx lsc , interval USO    TOTAL HIP ARTHROPLASTY Right 08/23/2023    Procedure: TOTAL HIP ARTHROPLASTY ANTERIOR WITH HANA TABLE;  Surgeon: Aidan Wallace MD;  Location: [facility] OR;  Service: Orthopedics;  Laterality: Right;    TOTAL SHOULDER ARTHROPLASTY W/ DISTAL CLAVICLE EXCISION Right 11/12/2024    Procedure: Reverse total shoulder arthroplasty for treatment of proximal humerus fracture and all associated procedures;  Surgeon: Farhat Ang MD;  Location: [facility] OR;  Service: Orthopedics;  Laterality: Right;      General Information       Row Name 11/17/24 7157          Physical Therapy Time and Intention     Document Type discharge evaluation/summary  -LN     Mode of Treatment physical therapy  -LN       Row Name 11/17/24 0845          General Information    Patient Profile Reviewed yes  Pt admitted on 11/16/24 with 8 days of constipation/abdominal pain due to pain meds. Pt s/p R reverse TSA and open biceps tenodesis for treatment on proximal humerus fx. on 11/12/24. She was discharged home 11/13/24.  -LN     Prior Level of Function independent:;all household mobility;community mobility;gait;transfer;bed mobility  uses SBQC/SPC as needed at home  -LN     Existing Precautions/Restrictions brace on at all times  In sling R shoulder; reports left swath pillow at home  -LN     Barriers to Rehab none identified  -LN       Row Name 11/17/24 0845          Living Environment    People in Home spouse  -LN       Row Name 11/17/24 0845          Home Main Entrance    Number of Stairs, Main Entrance two  -LN       Row Name 11/17/24 0845          Stairs Within Home, Primary    Stairs, Within Home, Primary lives in single story home  -LN       Row Name 11/17/24 0845          Cognition    Orientation Status (Cognition) oriented x 4  -LN       Row Name 11/17/24 0845          Safety Issues/Impairments Affecting Functional Mobility    Safety Issues Affecting Function (Mobility) impulsivity  -LN               User Key  (r) = Recorded By, (t) = Taken By, (c) = Cosigned By      Initials Name Provider Type    LN Moraima Baer, PT Physical Therapist                   Mobility       Row Name 11/17/24 0853          Bed Mobility    Comment, (Bed Mobility) deferred up in chair  -LN       Row Name 11/17/24 0853          Sit-Stand Transfer    Sit-Stand Knott (Transfers) independent  -LN     Assistive Device (Sit-Stand Transfers) cane, straight  -LN       Row Name 11/17/24 0853          Gait/Stairs (Locomotion)    Knott Level (Gait) independent;supervision  -LN     Assistive Device (Gait) cane, straight  -LN     Distance in  Feet (Gait) 30  -LN     Comment, (Gait/Stairs) Pt ambulated 30 feet in room independently/supervision with SPC with good balance noted. She safely went around obstacles in room without any cueing or assist needed.  -LN       Row Name 11/17/24 0853          Mobility    Extremity Weight-bearing Status right upper extremity  -LN     Right Upper Extremity (Weight-bearing Status) non weight-bearing (NWB)  -LN               User Key  (r) = Recorded By, (t) = Taken By, (c) = Cosigned By      Initials Name Provider Type    Moraima Chan, PT Physical Therapist                   Obj/Interventions       Row Name 11/17/24 0856          Range of Motion Comprehensive    Comment, General Range of Motion R UE NT due to being in sling; hand and fingers WFL. B LE grossly WFL.  -LN       Row Name 11/17/24 0856          Strength Comprehensive (MMT)    Comment, General Manual Muscle Testing (MMT) Assessment B LE 4+/5 grossly  -LN       Row Name 11/17/24 0856          Balance    Comment, Balance Independent with static and dynamic standing balance with SPC.  -LN               User Key  (r) = Recorded By, (t) = Taken By, (c) = Cosigned By      Initials Name Provider Type    Moraima Chan, PT Physical Therapist                   Goals/Plan       Row Name 11/17/24 1003          Bed Mobility Goal 1 (PT)    Activity/Assistive Device (Bed Mobility Goal 1, PT) --  no goals set today due to eval only.  -LN               User Key  (r) = Recorded By, (t) = Taken By, (c) = Cosigned By      Initials Name Provider Type    Moraima Chan, PT Physical Therapist                   Clinical Impression       Row Name 11/17/24 0857          Pain    Pretreatment Pain Rating 4/10  -LN     Pain Location shoulder  -LN     Pain Side/Orientation right  -LN     Pain Management Interventions exercise or physical activity utilized;positioning techniques utilized  -LN     Response to Pain Interventions activity participation with  "tolerable pain  -LN     Pre/Posttreatment Pain Comment Pt reports \"my shoulder pain is not bad; I just know it is there, but I am not going to ask for any pain medicine.\"  -LN       Row Name 11/17/24 0857          Plan of Care Review    Plan of Care Reviewed With patient  -LN     Outcome Evaluation PT evaluation completed this am. Pt agreed to PT. Pt reclined in bedside chair. Pt independent sit to stand with SPC. Pt ambulated 30 feet in room with SPC independent/supervision with good balance noted and she avoided obstacles safely in room without any assist or cueing needed. Pt has a cane at home. Reviewed wrist and hand ROM and passive elbow ROM for R UE. Pt only had sling on R UE; she reported the swath pillow was at home; advised her to put pillow back on when she goes home per MD protocol. Pt using a regular pillow at this time for proper positioning while in chair.  Pt has no concerns at this time with her mobility and gait and no concerns with going home. No further skilled PT needs at this time. Plan is home with family upon discharge;recommend outpatient PT for R shoulder when MD orders.  -LN       Row Name 11/17/24 0857          Therapy Assessment/Plan (PT)    Criteria for Skilled Interventions Met (PT) no problems identified which require skilled intervention  -LN     Therapy Frequency (PT) evaluation only  -LN     Predicted Duration of Therapy Intervention (PT) Evaluation only.  -LN               User Key  (r) = Recorded By, (t) = Taken By, (c) = Cosigned By      Initials Name Provider Type    Moraima Chan, PT Physical Therapist                   Outcome Measures       Row Name 11/17/24 1003          How much help from another person do you currently need...    Turning from your back to your side while in flat bed without using bedrails? 3  -LN     Moving from lying on back to sitting on the side of a flat bed without bedrails? 3  -LN     Moving to and from a bed to a chair (including a " wheelchair)? 3  -LN     Standing up from a chair using your arms (e.g., wheelchair, bedside chair)? 4  -LN     Climbing 3-5 steps with a railing? 3  -LN     To walk in hospital room? 4  -LN     AM-PAC 6 Clicks Score (PT) 20  -LN     Highest Level of Mobility Goal 6 --> Walk 10 steps or more  -LN       Row Name 11/17/24 1003          Functional Assessment    Outcome Measure Options AM-PAC 6 Clicks Basic Mobility (PT)  -LN               User Key  (r) = Recorded By, (t) = Taken By, (c) = Cosigned By      Initials Name Provider Type    LN Moraima Baer, PT Physical Therapist                  Physical Therapy Education       Title: PT OT SLP Therapies (Resolved)       Topic: Physical Therapy (Resolved)       Point: Mobility training (Resolved)       Learning Progress Summary            Patient Acceptance, E,D, VU,DU by LN at 11/17/2024 1004    Comment: Education on functional mobility and gait with SPC. Reviewed active wrist and hand ROM & AA elbow ROM R UE. Pt also advised to put her swath pillow back on when she goes home; only has sling on. Using regular pillow for proper positioning in chair.                      Point: Home exercise program (Resolved)       Learning Progress Summary            Patient Acceptance, E,D, VU,DU by LN at 11/17/2024 1004    Comment: Education on functional mobility and gait with SPC. Reviewed active wrist and hand ROM & AA elbow ROM R UE. Pt also advised to put her swath pillow back on when she goes home; only has sling on. Using regular pillow for proper positioning in chair.                      Point: Body mechanics (Resolved)       Learning Progress Summary            Patient Acceptance, E,D, VU,DU by LN at 11/17/2024 1004    Comment: Education on functional mobility and gait with SPC. Reviewed active wrist and hand ROM & AA elbow ROM R UE. Pt also advised to put her swath pillow back on when she goes home; only has sling on. Using regular pillow for proper positioning in  chair.                      Point: Precautions (Resolved)       Learning Progress Summary            Patient Acceptance, E,D, VU,DU by LN at 11/17/2024 1004    Comment: Education on functional mobility and gait with SPC. Reviewed active wrist and hand ROM & AA elbow ROM R UE. Pt also advised to put her swath pillow back on when she goes home; only has sling on. Using regular pillow for proper positioning in chair.                                      User Key       Initials Effective Dates Name Provider Type Discipline     06/16/21 -  Moraima Baer, PT Physical Therapist PT                  PT Recommendation and Plan     Outcome Evaluation: PT evaluation completed this am. Pt agreed to PT. Pt reclined in bedside chair. Pt independent sit to stand with SPC. Pt ambulated 30 feet in room with SPC independent/supervision with good balance noted and she avoided obstacles safely in room without any assist or cueing needed. Pt has a cane at home. Reviewed wrist and hand ROM and passive elbow ROM for R UE. Pt only had sling on R UE; she reported the swath pillow was at home; advised her to put pillow back on when she goes home per MD protocol. Pt has no concerns at this time with her mobility and gait and no concerns with going home. No further skilled PT needs at this time. Plan is home with family upon discharge;recommend outpatient PT for R shoulder when MD orders.       Time Calculation:   PT Evaluation Complexity  History, PT Evaluation Complexity: 1-2 personal factors and/or comorbidities  Examination of Body Systems (PT Eval Complexity): 1-2 elements  Clinical Presentation (PT Evaluation Complexity): evolving  Clinical Decision Making (PT Evaluation Complexity): low complexity  Overall Complexity (PT Evaluation Complexity): low complexity     PT Charges       Row Name 11/17/24 1013             Time Calculation    Start Time 0824  -LN      Stop Time 0839  -LN      Time Calculation (min) 15 min  -LN                 User Key  (r) = Recorded By, (t) = Taken By, (c) = Cosigned By      Initials Name Provider Type    LN Moraima Baer, PT Physical Therapist                  Therapy Charges for Today       Code Description Service Date Service Provider Modifiers Qty    92326460182  PT EVAL LOW COMPLEXITY 1 11/17/2024 Moraima Baer, PT GP 1            PT G-Codes  Outcome Measure Options: AM-PAC 6 Clicks Basic Mobility (PT)  AM-PAC 6 Clicks Score (PT): 20    PT Discharge Summary  Anticipated Discharge Disposition (PT): home with assist (recommend Outpatient PT for R shoulder when MD okays.)  Reason for Discharge: Independent (No further skilled PT needs at this time.)  Outcomes Achieved: Other (No goals set due to pt has no further skilled PT needs at this time.)  Discharge Destination: Home with assist    Moraima Baer, PT  11/17/2024

## 2024-11-17 NOTE — PLAN OF CARE
Goal Outcome Evaluation:           Progress: improving  Outcome Evaluation: Pt ambulating to RR having multiple BM, Re-addressed Sling for L shoulder. Pt resting comfortably

## 2024-11-17 NOTE — PLAN OF CARE
Goal Outcome Evaluation:  Plan of Care Reviewed With: patient           Outcome Evaluation: PT evaluation completed this am. Pt agreed to PT. Pt reclined in bedside chair. Pt independent sit to stand with SPC. Pt ambulated 30 feet in room with SPC independent/supervision with good balance noted and she avoided obstacles safely in room without any assist or cueing needed. Pt has a cane at home. Reviewed wrist and hand ROM and passive/AA elbow ROM for R UE. Pt only had sling on R UE; she reported the swath pillow was at home; advised her to put pillow back on when she goes home per MD protocol. Using regular pillow for proper positioning in chair at this time. Pt has no concerns at this time with her mobility and gait and no concerns with going home. No further skilled PT needs at this time. Plan is home with family upon discharge;recommend outpatient PT for R shoulder when MD orders.    Anticipated Discharge Disposition (PT): home with assist (Recommend outpatient PT once Ortho MD orders.)

## 2024-11-17 NOTE — PROGRESS NOTES
"Patient Care Team:  Kole Silva MD as PCP - General (Family Medicine)    Date: 2024  Patient Name: Ashlie Levi  : 1945  MRN: [id number]  Date of admission: 2024  Room/Bed: [facility]4/      Subjective   Subjective         Chief Complaint: f/u constipation     Summary:Ashlie Levi is a 79 y.o. female who presented to [facility] on 2024 complaining of having no bowel movements almost for last 1 week, patient underwent CT abdomen revealing constipation.  Patient underwent recent right shoulder surgery, she was noted taking hydrocodone at home for the pain control.  Denies for any shortness of breath, denies for any nausea vomiting, she does feel uncomfortable in the bed.  She says she is passing gas.  Following this we were asked to admit the patient for further care.  General surgery consulted from ER.     Interval Followup: Patient reports she is having some liquid stool and was able to tolerate some liquids this am. She denies any nausea or vomiting this am.       Review of Systems   Gastrointestinal:  Positive for abdominal pain and constipation.       Objective   Objective       Vital Signs  Temp:  [97.5 °F (36.4 °C)-98.3 °F (36.8 °C)] 97.5 °F (36.4 °C)  Heart Rate:  [59-66] 59  Resp:  [18-20] 18  BP: (152-188)/(67-99) 173/75  Oxygen Therapy  SpO2: 95 %  Device (Oxygen Therapy): room air  Flowsheet Rows      Flowsheet Row First Filed Value   Admission Height 152.4 cm (60\") Documented at 2024 1324   Admission Weight 68 kg (150 lb) Documented at 2024 1324          Intake & Output (last 3 days)          0701  11/15 0700 11/15 0701   0700  0701   0700  0701   0700    P.O.    1040    Total Intake(mL/kg)    1040 (15.1)    Net    +1040            Urine Unmeasured Occurrence   4 x     Stool Unmeasured Occurrence   4 x 3 x          Lines, Drains & Airways       Active LDAs       Name Placement date Placement time Site Days    Peripheral IV " "11/16/24 2145 Left;Posterior Hand 11/16/24 2145  Hand  less than 1                    Physical Exam  Vitals reviewed.   Constitutional:       General: She is not in acute distress.     Appearance: She is normal weight.   HENT:      Head: Normocephalic and atraumatic.      Mouth/Throat:      Mouth: Mucous membranes are moist.   Eyes:      General: No scleral icterus.     Pupils: Pupils are equal, round, and reactive to light.   Cardiovascular:      Rate and Rhythm: Normal rate and regular rhythm.   Pulmonary:      Effort: Pulmonary effort is normal. No respiratory distress.   Abdominal:      Palpations: Abdomen is soft.      Tenderness: There is no guarding or rebound.   Musculoskeletal:      Comments: RUE in sling   Neurological:      Mental Status: She is alert.           Results Review:      Results from last 7 days   Lab Units 11/17/24  0742 11/16/24  1357 11/13/24  0554   WBC 10*3/mm3 9.89 12.42* 14.17*   HEMOGLOBIN g/dL 10.3* 10.1* 11.3*   HEMATOCRIT % 31.5* 31.1* 35.5   PLATELETS 10*3/mm3 348 332 294     Results from last 7 days   Lab Units 11/17/24  0742 11/16/24  1435 11/13/24  0554   SODIUM mmol/L 139 134* 134*   POTASSIUM mmol/L 3.8 4.8 5.0   CHLORIDE mmol/L 102 101 101   CO2 mmol/L 26.1 20.1* 19.0*   BUN mg/dL 8 11 15   CREATININE mg/dL 0.60 0.58 0.79   CALCIUM mg/dL 9.4 9.2 9.1   BILIRUBIN mg/dL  --  0.6  --    ALK PHOS U/L  --  94  --    ALT (SGPT) U/L  --  22  --    AST (SGOT) U/L  --  39*  --    GLUCOSE mg/dL 150* 109* 151*             No results found for: \"BLOODCX\"    No results found for: \"MRSACX\"    No results found for: \"STOOLCX\"    No results found for: \"URINECX\"    No results found for: \"WOUNDCX\"    Imaging Results (Last 24 Hours)       Procedure Component Value Units Date/Time    CT Abdomen Pelvis Without Contrast [[id number]] Collected: 11/16/24 1717     Updated: 11/16/24 1730    Narrative:      CT ABDOMEN PELVIS WO CONTRAST    Date of Exam: 11/16/2024 4:45 PM EST    Indication: constipation " x8d.    Comparison: CT abdomen pelvis August 30, 2021    Technique: Axial CT images were obtained of the abdomen and pelvis without the administration of contrast. Sagittal and coronal reconstructions were performed.  Automated exposure control and iterative reconstruction methods were used.      Findings:  There is no definite abnormality of the liver, spleen or pancreas. The adrenal glands are unchanged in appearance. There is calcification in the midpole of the right kidney that may be vascular in nature or reflect a tiny nonobstructing calculus.. No   definite obstructive uropathy is seen. The bladder does not appear unusual for the degree of distention. Streak artifact from right hip arthroplasty limits assessment of the pelvis.    There is colonic diverticulosis within the sigmoid portion of the colon without definitive change of acute diverticulitis. It is noted in the right quadrant of the abdomen best defined on the axial images from the image [id number] that there is a whirlpool   sign involving the mesentery without obvious bowel obstruction. The finding is of questionable significance. There is a moderate stool burden in the more distal colon.    Atherosclerotic vascular calcification is present. There is coronary artery calcification.    There are degenerative changes involving the lumbar spine.      Impression:      Impression:  1.Calcification is noted in the midpole right kidney that may be vascular in nature or sequela of a nonobstructing calculus.  2.Colonic diverticulosis  3.Moderate stool burden in the more distal colon that could reflect some constipation.  4.Whirlpool sign within the mesentery in the right quadrant of questionable significance.  5.Atherosclerotic vascular calcification  6.Degenerative change lumbar spine            Electronically Signed: Forest Garcia MD    11/16/2024 5:27 PM EST    Workstation ID: [id number]            I have personally reviewed the patient's new imaging and lab  results.          ECG/EMG Results (most recent)       None                    Medication Review:     Scheduled Meds:amLODIPine, 5 mg, Oral, Daily  aspirin, 81 mg, Oral, Daily  carvedilol, 6.25 mg, Oral, BID  citalopram, 20 mg, Oral, Daily  heparin (porcine), 5,000 Units, Subcutaneous, Q12H  isosorbide mononitrate, 30 mg, Oral, Daily  lactulose, 20 g, Oral, BID  levothyroxine, 112 mcg, Oral, Daily  losartan, 25 mg, Oral, Q24H  pantoprazole, 40 mg, Oral, Q AM  polyethylene glycol, 17 g, Oral, Daily  sodium chloride, 10 mL, Intravenous, Q12H  sodium chloride, 10 mL, Intravenous, Q12H      Continuous Infusions:   PRN Meds:.  acetaminophen **OR** acetaminophen **OR** acetaminophen    senna-docusate sodium **AND** polyethylene glycol **AND** bisacodyl **AND** bisacodyl    Calcium Replacement - Follow Nurse / BPA Driven Protocol    HYDROcodone-acetaminophen    Magnesium Low Dose Replacement - Follow Nurse / BPA Driven Protocol    ondansetron ODT **OR** ondansetron    Phosphorus Replacement - Follow Nurse / BPA Driven Protocol    Potassium Replacement - Follow Nurse / BPA Driven Protocol    sodium chloride    sodium chloride    sodium chloride    sodium chloride      I have reviewed the patient's current medication list    Assessment & Plan   Assessment / Plan       Active Hospital Problems    Diagnosis  POA    **Cecal volvulus [K56.2]  Yes       Severe constipation likely from narcotic use for pain control related to surgery  - continue miralax and lactulose bid   - diet as per surgery   - CT abd/pel reviewed      Recent shoulder surgery, postop care  - PT OT evaluate and treat     Hypertension  - continue home regime, monitor vitals.     GERD  - continue PPI     Dyslipidemia   - on a statin, monitor LFTs as needed     Hypothyroidism  - continue Synthroid, TSH as needed    DVT Prophylaxis  - heparin     Code Status  - Full Code    Plan for disposition: likely home in the next 24 hours as long as she continues to have bowel  movements and tolerate diet       Juan David Dahl,   11/17/24  14:56 EST          Note disclaimer: At [facility], we believe that sharing information builds trust and better relationships. You are receiving this note because you recently visited [facility]. It is possible you will see health information before a provider has talked with you about it. This kind of information can be easy to misunderstand. To help you fully understand what it means for your health, we urge you to discuss this note with your provider.

## 2024-11-18 VITALS
TEMPERATURE: 98.6 F | WEIGHT: 151.46 LBS | SYSTOLIC BLOOD PRESSURE: 113 MMHG | OXYGEN SATURATION: 93 % | BODY MASS INDEX: 29.74 KG/M2 | DIASTOLIC BLOOD PRESSURE: 67 MMHG | RESPIRATION RATE: 18 BRPM | HEIGHT: 60 IN | HEART RATE: 64 BPM

## 2024-11-18 PROBLEM — K56.2 CECAL VOLVULUS: Status: RESOLVED | Noted: 2024-11-16 | Resolved: 2024-11-18

## 2024-11-18 LAB
ANION GAP SERPL CALCULATED.3IONS-SCNC: 10.2 MMOL/L (ref 5–15)
BASOPHILS # BLD AUTO: 0.07 10*3/MM3 (ref 0–0.2)
BASOPHILS NFR BLD AUTO: 0.8 % (ref 0–1.5)
BUN SERPL-MCNC: 9 MG/DL (ref 8–23)
BUN/CREAT SERPL: 15.5 (ref 7–25)
CALCIUM SPEC-SCNC: 9.1 MG/DL (ref 8.6–10.5)
CHLORIDE SERPL-SCNC: 101 MMOL/L (ref 98–107)
CO2 SERPL-SCNC: 24.8 MMOL/L (ref 22–29)
CREAT SERPL-MCNC: 0.58 MG/DL (ref 0.57–1)
DEPRECATED RDW RBC AUTO: 45.5 FL (ref 37–54)
EGFRCR SERPLBLD CKD-EPI 2021: 91 ML/MIN/1.73
EOSINOPHIL # BLD AUTO: 1.04 10*3/MM3 (ref 0–0.4)
EOSINOPHIL NFR BLD AUTO: 11.5 % (ref 0.3–6.2)
ERYTHROCYTE [DISTWIDTH] IN BLOOD BY AUTOMATED COUNT: 13.7 % (ref 12.3–15.4)
GLUCOSE SERPL-MCNC: 155 MG/DL (ref 65–99)
HCT VFR BLD AUTO: 30 % (ref 34–46.6)
HGB BLD-MCNC: 9.8 G/DL (ref 12–15.9)
IMM GRANULOCYTES # BLD AUTO: 0.05 10*3/MM3 (ref 0–0.05)
IMM GRANULOCYTES NFR BLD AUTO: 0.6 % (ref 0–0.5)
LYMPHOCYTES # BLD AUTO: 2.68 10*3/MM3 (ref 0.7–3.1)
LYMPHOCYTES NFR BLD AUTO: 29.6 % (ref 19.6–45.3)
MCH RBC QN AUTO: 30 PG (ref 26.6–33)
MCHC RBC AUTO-ENTMCNC: 32.7 G/DL (ref 31.5–35.7)
MCV RBC AUTO: 91.7 FL (ref 79–97)
MONOCYTES # BLD AUTO: 0.64 10*3/MM3 (ref 0.1–0.9)
MONOCYTES NFR BLD AUTO: 7.1 % (ref 5–12)
NEUTROPHILS NFR BLD AUTO: 4.57 10*3/MM3 (ref 1.7–7)
NEUTROPHILS NFR BLD AUTO: 50.4 % (ref 42.7–76)
NRBC BLD AUTO-RTO: 0 /100 WBC (ref 0–0.2)
PLATELET # BLD AUTO: 325 10*3/MM3 (ref 140–450)
PMV BLD AUTO: 9.8 FL (ref 6–12)
POTASSIUM SERPL-SCNC: 3.6 MMOL/L (ref 3.5–5.2)
RBC # BLD AUTO: 3.27 10*6/MM3 (ref 3.77–5.28)
SODIUM SERPL-SCNC: 136 MMOL/L (ref 136–145)
WBC NRBC COR # BLD AUTO: 9.05 10*3/MM3 (ref 3.4–10.8)

## 2024-11-18 PROCEDURE — 80048 BASIC METABOLIC PNL TOTAL CA: CPT | Performed by: HOSPITALIST

## 2024-11-18 PROCEDURE — 85025 COMPLETE CBC W/AUTO DIFF WBC: CPT | Performed by: HOSPITALIST

## 2024-11-18 PROCEDURE — 99238 HOSP IP/OBS DSCHRG MGMT 30/<: CPT | Performed by: INTERNAL MEDICINE

## 2024-11-18 PROCEDURE — G0378 HOSPITAL OBSERVATION PER HR: HCPCS

## 2024-11-18 RX ORDER — POLYETHYLENE GLYCOL 3350 17 G/17G
17 POWDER, FOR SOLUTION ORAL DAILY
Qty: 30 PACKET | Refills: 0 | Status: SHIPPED | OUTPATIENT
Start: 2024-11-18 | End: 2024-12-18

## 2024-11-18 RX ORDER — LACTULOSE 10 G/15ML
20 SOLUTION ORAL 2 TIMES DAILY
Qty: 600 ML | Refills: 0 | Status: SHIPPED | OUTPATIENT
Start: 2024-11-18 | End: 2024-11-28

## 2024-11-18 RX ORDER — NITROGLYCERIN 0.4 MG/1
0.4 TABLET SUBLINGUAL
Status: DISCONTINUED | OUTPATIENT
Start: 2024-11-18 | End: 2024-11-18

## 2024-11-18 RX ORDER — POTASSIUM CHLORIDE 1500 MG/1
40 TABLET, EXTENDED RELEASE ORAL EVERY 4 HOURS
Status: DISCONTINUED | OUTPATIENT
Start: 2024-11-18 | End: 2024-11-18 | Stop reason: HOSPADM

## 2024-11-18 RX ADMIN — HYDROCODONE BITARTRATE AND ACETAMINOPHEN 1 TABLET: 7.5; 325 TABLET ORAL at 09:16

## 2024-11-18 RX ADMIN — PANTOPRAZOLE SODIUM 40 MG: 40 TABLET, DELAYED RELEASE ORAL at 05:07

## 2024-11-18 RX ADMIN — LEVOTHYROXINE SODIUM 112 MCG: 0.11 TABLET ORAL at 09:15

## 2024-11-18 RX ADMIN — Medication 10 ML: at 09:20

## 2024-11-18 RX ADMIN — POTASSIUM CHLORIDE 40 MEQ: 1500 TABLET, EXTENDED RELEASE ORAL at 09:16

## 2024-11-18 RX ADMIN — CITALOPRAM HYDROBROMIDE 20 MG: 20 TABLET ORAL at 09:15

## 2024-11-18 RX ADMIN — POLYETHYLENE GLYCOL 3350 17 G: 17 POWDER, FOR SOLUTION ORAL at 09:18

## 2024-11-18 RX ADMIN — CARVEDILOL 6.25 MG: 3.12 TABLET, FILM COATED ORAL at 09:17

## 2024-11-18 RX ADMIN — ISOSORBIDE MONONITRATE 30 MG: 30 TABLET, EXTENDED RELEASE ORAL at 09:17

## 2024-11-18 RX ADMIN — LOSARTAN POTASSIUM 25 MG: 50 TABLET, FILM COATED ORAL at 09:16

## 2024-11-18 RX ADMIN — AMLODIPINE BESYLATE 5 MG: 5 TABLET ORAL at 09:17

## 2024-11-18 RX ADMIN — ASPIRIN 81 MG: 81 TABLET, COATED ORAL at 09:16

## 2024-11-18 NOTE — DISCHARGE SUMMARY
Date of Admission: 11/16/2024    Date of Discharge:  11/18/2024    Length of stay:  LOS: 0 days     Admission Diagnosis:   Abdominal pain  Constipation     Discharge Diagnosis:   Severe constipation likely from narcotic use for pain control related to surgery  - continue miralax and lactulose bid   - patient is tolerating a regular diet   - surgery was consulted and does not feel patient had cecal volvulus after reviewing CT scan     Recent shoulder surgery, postop care     Hypertension  - continue home meds      GERD  - continue PPI     Dyslipidemia   - on a statin     Hypothyroidism  - continue Synthroid            Hospital Course:  Ashlie Levi is a 79 y.o. female who presented to [facility] on 11/16/2024 complaining of having no bowel movements almost for last 1 week, patient underwent CT abdomen revealing constipation.  Patient underwent recent right shoulder surgery, she was noted taking hydrocodone at home for the pain control.  Denies for any shortness of breath, denies for any nausea vomiting, she does feel uncomfortable in the bed.  She says she is passing gas.  Following this we were asked to admit the patient for further care.  General surgery consulted from ER, they reviewed her scan do not feel there is evidence of cecal volvulus. Patient was started on bowel regimen and is having bowel movements and tolerating a regular diet. Patient and family do not feel colace was helping so this has been discontinued. Will send in a few days of lactulose and miralax. Patient given instructions on how to discontinue these if she starts to have diarrhea. She can follow up with ortho as previously recommended.     Procedures Performed:  none         Consults:   Consults       Date and Time Order Name Status Description    11/16/2024  6:00 PM Inpatient General Surgery Consult Completed     11/12/2024  8:18 AM Inpatient Hospitalist Consult Completed             Vital Signs:  Temp:  [96.2 °F (35.7  °C)-98.8 °F (37.1 °C)] 98.6 °F (37 °C)  Heart Rate:  [59-78] 78  Resp:  [18-20] 20  BP: (138-173)/(63-76) 160/76  Body mass index is 29.58 kg/m².    Physical Exam:  Physical Exam  Vitals reviewed.   Constitutional:       General: She is not in acute distress.     Appearance: She is normal weight.   HENT:      Head: Normocephalic and atraumatic.   Cardiovascular:      Rate and Rhythm: Normal rate.      Heart sounds: No murmur heard.  Pulmonary:      Effort: Pulmonary effort is normal. No respiratory distress.   Abdominal:      General: Bowel sounds are normal. There is no distension.      Palpations: Abdomen is soft.      Tenderness: There is no abdominal tenderness. There is no guarding.   Musculoskeletal:      Comments: RUE in sling   Neurological:      Mental Status: She is alert.   Psychiatric:         Mood and Affect: Mood normal.         Behavior: Behavior normal.               Pertinent Test Results:   Lab Results (last 72 hours)       Procedure Component Value Units Date/Time    Basic Metabolic Panel [[id number]]  (Abnormal) Collected: 11/18/24 0422    Specimen: Blood Updated: 11/18/24 0455     Glucose 155 mg/dL      BUN 9 mg/dL      Creatinine 0.58 mg/dL      Sodium 136 mmol/L      Potassium 3.6 mmol/L      Chloride 101 mmol/L      CO2 24.8 mmol/L      Calcium 9.1 mg/dL      BUN/Creatinine Ratio 15.5     Anion Gap 10.2 mmol/L      eGFR 91.0 mL/min/1.73     Narrative:      GFR Normal >60  Chronic Kidney Disease <60  Kidney Failure <15    The GFR formula is only valid for adults with stable renal function between ages 18 and 70.    CBC & Differential [[id number]]  (Abnormal) Collected: 11/18/24 0422    Specimen: Blood Updated: 11/18/24 0433    Narrative:      The following orders were created for panel order CBC & Differential.  Procedure                               Abnormality         Status                     ---------                               -----------         ------                     CBC Auto  Differential[[id number]]        Abnormal            Final result                 Please view results for these tests on the individual orders.    CBC Auto Differential [[id number]]  (Abnormal) Collected: 11/18/24 0422    Specimen: Blood Updated: 11/18/24 0433     WBC 9.05 10*3/mm3      RBC 3.27 10*6/mm3      Hemoglobin 9.8 g/dL      Hematocrit 30.0 %      MCV 91.7 fL      MCH 30.0 pg      MCHC 32.7 g/dL      RDW 13.7 %      RDW-SD 45.5 fl      MPV 9.8 fL      Platelets 325 10*3/mm3      Neutrophil % 50.4 %      Lymphocyte % 29.6 %      Monocyte % 7.1 %      Eosinophil % 11.5 %      Basophil % 0.8 %      Immature Grans % 0.6 %      Neutrophils, Absolute 4.57 10*3/mm3      Lymphocytes, Absolute 2.68 10*3/mm3      Monocytes, Absolute 0.64 10*3/mm3      Eosinophils, Absolute 1.04 10*3/mm3      Basophils, Absolute 0.07 10*3/mm3      Immature Grans, Absolute 0.05 10*3/mm3      nRBC 0.0 /100 WBC     Basic Metabolic Panel [[id number]]  (Abnormal) Collected: 11/17/24 0742    Specimen: Blood Updated: 11/17/24 0830     Glucose 150 mg/dL      BUN 8 mg/dL      Creatinine 0.60 mg/dL      Sodium 139 mmol/L      Potassium 3.8 mmol/L      Chloride 102 mmol/L      CO2 26.1 mmol/L      Calcium 9.4 mg/dL      BUN/Creatinine Ratio 13.3     Anion Gap 10.9 mmol/L      eGFR 90.3 mL/min/1.73     Narrative:      GFR Normal >60  Chronic Kidney Disease <60  Kidney Failure <15    The GFR formula is only valid for adults with stable renal function between ages 18 and 70.    CBC & Differential [[id number]]  (Abnormal) Collected: 11/17/24 0742    Specimen: Blood Updated: 11/17/24 0811    Narrative:      The following orders were created for panel order CBC & Differential.  Procedure                               Abnormality         Status                     ---------                               -----------         ------                     CBC Auto Differential[[id number]]        Abnormal            Final result                 Please view results  for these tests on the individual orders.    CBC Auto Differential [[id number]]  (Abnormal) Collected: 11/17/24 0742    Specimen: Blood Updated: 11/17/24 0811     WBC 9.89 10*3/mm3      RBC 3.45 10*6/mm3      Hemoglobin 10.3 g/dL      Hematocrit 31.5 %      MCV 91.3 fL      MCH 29.9 pg      MCHC 32.7 g/dL      RDW 13.8 %      RDW-SD 45.6 fl      MPV 10.1 fL      Platelets 348 10*3/mm3      Neutrophil % 56.7 %      Lymphocyte % 24.7 %      Monocyte % 6.0 %      Eosinophil % 11.3 %      Basophil % 0.7 %      Immature Grans % 0.6 %      Neutrophils, Absolute 5.61 10*3/mm3      Lymphocytes, Absolute 2.44 10*3/mm3      Monocytes, Absolute 0.59 10*3/mm3      Eosinophils, Absolute 1.12 10*3/mm3      Basophils, Absolute 0.07 10*3/mm3      Immature Grans, Absolute 0.06 10*3/mm3      nRBC 0.0 /100 WBC     Comprehensive Metabolic Panel [[id number]]  (Abnormal) Collected: 11/16/24 1435    Specimen: Blood Updated: 11/16/24 1501     Glucose 109 mg/dL      BUN 11 mg/dL      Creatinine 0.58 mg/dL      Sodium 134 mmol/L      Potassium 4.8 mmol/L      Comment: Specimen hemolyzed.  Result may be falsely elevated.        Chloride 101 mmol/L      CO2 20.1 mmol/L      Calcium 9.2 mg/dL      Total Protein 6.5 g/dL      Albumin 3.3 g/dL      ALT (SGPT) 22 U/L      Comment: Specimen hemolyzed.  Result may  be falsely elevated.        AST (SGOT) 39 U/L      Comment: Specimen hemolyzed.  Result may be falsely elevated.        Alkaline Phosphatase 94 U/L      Total Bilirubin 0.6 mg/dL      Globulin 3.2 gm/dL      A/G Ratio 1.0 g/dL      BUN/Creatinine Ratio 19.0     Anion Gap 12.9 mmol/L      eGFR 91.0 mL/min/1.73     Narrative:      GFR Normal >60  Chronic Kidney Disease <60  Kidney Failure <15    The GFR formula is only valid for adults with stable renal function between ages 18 and 70.    Lipase [[id number]]  (Normal) Collected: 11/16/24 1357    Specimen: Blood Updated: 11/16/24 1424     Lipase 14 U/L      Comment: Specimen hemolyzed.  Results  may be falsely decreased.       CBC & Differential [[id number]]  (Abnormal) Collected: 11/16/24 1357    Specimen: Blood Updated: 11/16/24 1422    Narrative:      The following orders were created for panel order CBC & Differential.  Procedure                               Abnormality         Status                     ---------                               -----------         ------                     CBC Auto Differential[[id number]]        Abnormal            Final result               Scan Slide[[id number]]                   Normal              Final result                 Please view results for these tests on the individual orders.    Scan Slide [[id number]]  (Normal) Collected: 11/16/24 1357    Specimen: Blood Updated: 11/16/24 1422     RBC Morphology Normal     WBC Morphology Normal     Platelet Morphology Normal    CBC Auto Differential [[id number]]  (Abnormal) Collected: 11/16/24 1357    Specimen: Blood Updated: 11/16/24 1413     WBC 12.42 10*3/mm3      RBC 3.40 10*6/mm3      Hemoglobin 10.1 g/dL      Hematocrit 31.1 %      MCV 91.5 fL      MCH 29.7 pg      MCHC 32.5 g/dL      RDW 13.7 %      RDW-SD 45.5 fl      MPV 10.2 fL      Platelets 332 10*3/mm3      Neutrophil % 62.2 %      Lymphocyte % 21.2 %      Monocyte % 5.5 %      Eosinophil % 10.1 %      Basophil % 0.5 %      Immature Grans % 0.5 %      Neutrophils, Absolute 7.74 10*3/mm3      Lymphocytes, Absolute 2.63 10*3/mm3      Monocytes, Absolute 0.68 10*3/mm3      Eosinophils, Absolute 1.25 10*3/mm3      Basophils, Absolute 0.06 10*3/mm3      Immature Grans, Absolute 0.06 10*3/mm3      nRBC 0.0 /100 WBC     Urinalysis With Culture If Indicated - Urine, Clean Catch [[id number]]  (Abnormal) Collected: 11/16/24 1357    Specimen: Urine, Clean Catch Updated: 11/16/24 1411     Color, UA Yellow     Appearance, UA Clear     pH, UA 5.5     Specific Gravity, UA 1.015     Glucose, UA Negative     Ketones, UA Negative     Bilirubin, UA Negative     Blood, UA Negative      Protein, UA Negative     Leuk Esterase, UA Small (1+)     Nitrite, UA Negative     Urobilinogen, UA 0.2 E.U./dL    Narrative:      In absence of clinical symptoms, the presence of pyuria, bacteria, and/or nitrites on the urinalysis result does not correlate with infection.          Imaging Results (Most Recent)       Procedure Component Value Units Date/Time    CT Abdomen Pelvis Without Contrast [[id number]] Collected: 11/16/24 1717     Updated: 11/16/24 1730    Narrative:      CT ABDOMEN PELVIS WO CONTRAST    Date of Exam: 11/16/2024 4:45 PM EST    Indication: constipation x8d.    Comparison: CT abdomen pelvis August 30, 2021    Technique: Axial CT images were obtained of the abdomen and pelvis without the administration of contrast. Sagittal and coronal reconstructions were performed.  Automated exposure control and iterative reconstruction methods were used.      Findings:  There is no definite abnormality of the liver, spleen or pancreas. The adrenal glands are unchanged in appearance. There is calcification in the midpole of the right kidney that may be vascular in nature or reflect a tiny nonobstructing calculus.. No   definite obstructive uropathy is seen. The bladder does not appear unusual for the degree of distention. Streak artifact from right hip arthroplasty limits assessment of the pelvis.    There is colonic diverticulosis within the sigmoid portion of the colon without definitive change of acute diverticulitis. It is noted in the right quadrant of the abdomen best defined on the axial images from the image 49-64 that there is a whirlpool   sign involving the mesentery without obvious bowel obstruction. The finding is of questionable significance. There is a moderate stool burden in the more distal colon.    Atherosclerotic vascular calcification is present. There is coronary artery calcification.    There are degenerative changes involving the lumbar spine.      Impression:       Impression:  1.Calcification is noted in the midpole right kidney that may be vascular in nature or sequela of a nonobstructing calculus.  2.Colonic diverticulosis  3.Moderate stool burden in the more distal colon that could reflect some constipation.  4.Whirlpool sign within the mesentery in the right quadrant of questionable significance.  5.Atherosclerotic vascular calcification  6.Degenerative change lumbar spine            Electronically Signed: Forest Garcia MD    11/16/2024 5:27 PM EST    Workstation ID: [id number]                Imaging Results (All)       Procedure Component Value Units Date/Time    CT Abdomen Pelvis Without Contrast [[id number]] Collected: 11/16/24 1717     Updated: 11/16/24 1730    Narrative:      CT ABDOMEN PELVIS WO CONTRAST    Date of Exam: 11/16/2024 4:45 PM EST    Indication: constipation x8d.    Comparison: CT abdomen pelvis August 30, 2021    Technique: Axial CT images were obtained of the abdomen and pelvis without the administration of contrast. Sagittal and coronal reconstructions were performed.  Automated exposure control and iterative reconstruction methods were used.      Findings:  There is no definite abnormality of the liver, spleen or pancreas. The adrenal glands are unchanged in appearance. There is calcification in the midpole of the right kidney that may be vascular in nature or reflect a tiny nonobstructing calculus.. No   definite obstructive uropathy is seen. The bladder does not appear unusual for the degree of distention. Streak artifact from right hip arthroplasty limits assessment of the pelvis.    There is colonic diverticulosis within the sigmoid portion of the colon without definitive change of acute diverticulitis. It is noted in the right quadrant of the abdomen best defined on the axial images from the image 49-64 that there is a whirlpool   sign involving the mesentery without obvious bowel obstruction. The finding is of questionable significance. There is a  moderate stool burden in the more distal colon.    Atherosclerotic vascular calcification is present. There is coronary artery calcification.    There are degenerative changes involving the lumbar spine.      Impression:      Impression:  1.Calcification is noted in the midpole right kidney that may be vascular in nature or sequela of a nonobstructing calculus.  2.Colonic diverticulosis  3.Moderate stool burden in the more distal colon that could reflect some constipation.  4.Whirlpool sign within the mesentery in the right quadrant of questionable significance.  5.Atherosclerotic vascular calcification  6.Degenerative change lumbar spine            Electronically Signed: Forest Gacria MD    11/16/2024 5:27 PM EST    Workstation ID: [id number]                  Discharge Disposition:  Home or Self Care    Discharge Medications:     Discharge Medications        New Medications        Instructions Start Date   lactulose 10 GM/15ML solution  Commonly known as: CHRONULAC   20 g, Oral, 2 Times Daily      polyethylene glycol 17 g packet  Commonly known as: MIRALAX   17 g, Oral, Daily             Changes to Medications        Instructions Start Date   aspirin 81 MG EC tablet  What changed: when to take this   81 mg, Oral, Daily      carvedilol 6.25 MG tablet  Commonly known as: COREG  What changed: when to take this   Take 1 tablet by mouth twice daily             Continue These Medications        Instructions Start Date   amLODIPine 5 MG tablet  Commonly known as: NORVASC   Take 1 tablet by mouth once daily      atorvastatin 20 MG tablet  Commonly known as: LIPITOR   20 mg, Nightly      citalopram 40 MG tablet  Commonly known as: CeleXA   40 mg, Daily      HYDROcodone-acetaminophen 7.5-325 MG per tablet  Commonly known as: NORCO   1 tablet, Oral, Every 6 Hours PRN      irbesartan 300 MG tablet  Commonly known as: AVAPRO   TAKE 1 TABLET BY MOUTH ONCE DAILY AT NIGHT      isosorbide mononitrate 30 MG 24 hr tablet  Commonly known  as: IMDUR   30 mg, Daily      levothyroxine 112 MCG tablet  Commonly known as: SYNTHROID, LEVOTHROID   1 tablet, Daily      LORazepam 0.5 MG tablet  Commonly known as: ATIVAN   Take 1 tablet by mouth Every 6 (Six) Hours As Needed.      meloxicam 15 MG tablet  Commonly known as: MOBIC   15 mg, Oral, Daily      nitroglycerin 0.4 MG SL tablet  Commonly known as: NITROSTAT   0.4 mg, Sublingual, Every 5 Minutes PRN, Take no more than 3 doses in 15 minutes and call EMS.      omeprazole 40 MG capsule  Commonly known as: priLOSEC   40 mg, Oral, Daily      ondansetron ODT 4 MG disintegrating tablet  Commonly known as: ZOFRAN-ODT   4 mg, Translingual, Every 8 Hours PRN      QC Tumeric Complex 500 MG capsule  Generic drug: Turmeric   500 mg, Oral, Daily             Stop These Medications      docusate sodium 100 MG capsule  Commonly known as: Colace              Michael  reviewed    Discharge Diet:   Diet Instructions       Diet: Cardiac Diets; Healthy Heart (2-3 Na+); Regular (IDDSI 7); Thin (IDDSI 0)      Discharge Diet: Cardiac Diets    Cardiac Diet: Healthy Heart (2-3 Na+)    Texture: Regular (IDDSI 7)    Fluid Consistency: Thin (IDDSI 0)            Activity at Discharge:   as previously recommended as per ortho     Follow-up Appointments  Future Appointments   Date Time Provider Department Center   11/27/2024 10:30 AM Joesfina Wray APRN [facility]   12/4/2024 10:30 AM LAG CT 1 BH [facility] CT [facility]   7/23/2025  1:00 PM Al Granado III, MD [facility]           Test Results Pending at Discharge:      Condition on Discharge:    stable     Risk for Readmission (LACE): Score: 2 (11/18/2024  6:00 AM)          Juan David Dahl DO  11/18/24  08:25 EST    Time: Discharge 25 min with face-to-face history/exam, writing all prescriptions, and documenting discharge data including care coordination            Note disclaimer: At [facility], we believe that sharing information builds trust and better relationships. You are  receiving this note because you recently visited [facility]. It is possible you will see health information before a provider has talked with you about it. This kind of information can be easy to misunderstand. To help you fully understand what it means for your health, we urge you to discuss this note with your provider.

## 2024-11-18 NOTE — CASE MANAGEMENT/SOCIAL WORK
Case Management Discharge Note      Final Note: dc home    Provided Post Acute Provider List?: N/A  Provided Post Acute Provider Quality & Resource List?: N/A    Selected Continued Care - Discharged on 11/18/2024 Admission date: 11/16/2024 - Discharge disposition: Home or Self Care      Destination    No services have been selected for the patient.                Durable Medical Equipment    No services have been selected for the patient.                Dialysis/Infusion    No services have been selected for the patient.                Home Medical Care    No services have been selected for the patient.                Therapy    No services have been selected for the patient.                Community Resources    No services have been selected for the patient.                Community & DME    No services have been selected for the patient.                         Final Discharge Disposition Code: 01 - home or self-care

## 2024-11-18 NOTE — DISCHARGE INSTRUCTIONS
Take miralax daily and lactulose two times per day.   If you are having more than 2 normal bowel movements per day you can stop the lactulose and continue miralax daily. If you continue to have multiple bowel movements per day you can stop the miralax.

## 2024-11-18 NOTE — PLAN OF CARE
Goal Outcome Evaluation:  Plan of Care Reviewed With: patient      Patient been having bowel movement.

## 2024-11-19 ENCOUNTER — READMISSION MANAGEMENT (OUTPATIENT)
Dept: CALL CENTER | Facility: HOSPITAL | Age: 79
End: 2024-11-19
Payer: MEDICARE

## 2024-11-19 NOTE — OUTREACH NOTE
Prep Survey      Flowsheet Row Responses   [facility] patient discharged from? [place]   Is LACE score < 7 ? Yes   Eligibility Readm Mgmt   Discharge diagnosis Cecal volvulus   Does the patient have one of the following disease processes/diagnoses(primary or secondary)? Other   Does the patient have Home health ordered? No   Is there a DME ordered? No   Medication alerts for this patient see avs   Prep survey completed? Yes            Daphnie ENG - Registered Nurse

## 2024-11-21 ENCOUNTER — READMISSION MANAGEMENT (OUTPATIENT)
Dept: CALL CENTER | Facility: HOSPITAL | Age: 79
End: 2024-11-21
Payer: MEDICARE

## 2024-11-21 NOTE — OUTREACH NOTE
LAG < 7 Survey      Flowsheet Row Responses   [facility] patient discharged from? [place]   Does the patient have one of the following disease processes/diagnoses(primary or secondary)? Other   BHLAG <7 Attempt successful? Yes   Call start time 1329   Unsuccessful attempts Attempt 2   Call end time 1334   Discharge diagnosis Cecal volvulus   Meds reviewed with patient/caregiver? Yes   Is the patient having any side effects they believe may be caused by any medication additions or changes? No   Does the patient have all medications ordered at discharge? Yes   Prescription comments reviewed bowel regimen instructions on page 9 with ,  he is going to stop  one dose of lactulose to normalize BMs   Is the patient taking all medications as directed (includes completed medication regime)? Yes   Does the patient have a primary care provider?  Yes   Does the patient have an appointment with their PCP within 7 days of discharge? Greater than 7 days   Nursing Interventions Verified appointment date/time/provider  [12/12/24]   Has the patient kept scheduled appointments due by today? Yes   Comments Pt has decreased narcotic use   Has home health visited the patient within 72 hours of discharge? N/A   Psychosocial issues? No   Did the patient receive a copy of their discharge instructions? Yes   Nursing interventions Reviewed instructions with patient   What is the patient's perception of their health status since discharge? Improving  [Pt is doing better but is waking up several times in the night to have BMs,   to stop BID dosing of lactulose to see how she does.  Directed him to page 8 of AVS with bowel regimen instructions.  Reports her shoulder pain has been better.]   Is the patient/caregiver able to teach back signs and symptoms related to disease process for when to call PCP? Yes   Graduated Yes            CYRUS BARROW - Registered Nurse

## 2024-11-22 DIAGNOSIS — Z96.611 S/P REVERSE TOTAL SHOULDER ARTHROPLASTY, RIGHT: ICD-10-CM

## 2024-11-22 DIAGNOSIS — S42.291A OTHER CLOSED DISPLACED FRACTURE OF PROXIMAL END OF RIGHT HUMERUS, INITIAL ENCOUNTER: Primary | ICD-10-CM

## 2024-11-27 ENCOUNTER — OFFICE VISIT (OUTPATIENT)
Dept: ORTHOPEDIC SURGERY | Facility: CLINIC | Age: 79
End: 2024-11-27
Payer: MEDICARE

## 2024-11-27 VITALS — HEIGHT: 60 IN | WEIGHT: 151 LBS | BODY MASS INDEX: 29.64 KG/M2

## 2024-11-27 DIAGNOSIS — Z96.611 S/P REVERSE TOTAL SHOULDER ARTHROPLASTY, RIGHT: Primary | ICD-10-CM

## 2024-11-27 PROCEDURE — 99024 POSTOP FOLLOW-UP VISIT: CPT | Performed by: NURSE PRACTITIONER

## 2024-11-27 NOTE — PROGRESS NOTES
CC: follow-up s/p right reverse total shoulder arthroplasty for treatment of proximal humerus fracture, open biceps tenodesis, DOS 11/12/2024    Interval history: Ashlie Levi presents back to clinic for 2 week post-operative visit. She is doing well in regards to right upper extremity. Pain well controlled with current medication regimen and is improving. Denies any wound problem, fevers, or chills. Currently taking aspirin for DVT prophylaxis. Denies calf pain, bilaterally.     Physical Examination:   right shoulder examined out of dressing  Incision clean, dry, intact, free of erythema or evidence of infection  Dermabond dressing intact  Distal motor and sensory exam is grossly intact.  Hand is well perfused, flex/extend all digits right hand  Positive sensation all distributions right upper extremity   Brisk capillary refill all digits  All compartments soft and easily compressible     Assessment: status post right reverse total shoulder arthroplasty     Plan:  1. Continues to recover as expected.  2. Encouraged to continue with pendulum motions right upper extremity along with wrist/hand/finger range of motion.  3. Avoid submerging into water for the next 2 weeks, may shower.  4. Continue with sling for total four weeks post-op.  We will proceed with PT at the 6-week ayla.  She is to continue with sling x 6 weeks.  She is to progress with pendulum motions at the 4-week ayla.  We could technically start home health PT at the 4-week ayla utilizing the pendulum exercises.  I do recommend guidance with activities with the right shoulder.  I will place the order for home health to begin at the 4-week interval beginning with pendulum exercises.  We did adjust to provide her a new sling with abduction pillow.  We did discuss the reasoning for the abduction pillow.  The mesh dressing was removed today.  She can keep this open to air.  Shower allowing the water to run over.  I do not want for her to submerge into any  water for the next 2 weeks.  Avoid topical application of any treatment to the incision for at least the next 2 weeks.  5. Will follow-up with Dr. Ang in 4 weeks, x-rays to be completed right shoulder at that time.      No orders of the defined types were placed in this encounter.    Orders Placed This Encounter   Procedures    Ambulatory Referral to Home Health (Outpatient)     Referral Priority:   Routine     Referral Type:   Home Health     Referral Reason:   Specialty Services Required     Requested Specialty:   Home Health Services     Number of Visits Requested:   999        [As Noted in HPI] : as noted in HPI [Negative] : Heme/Lymph

## 2024-12-04 RX ORDER — AMLODIPINE BESYLATE 5 MG/1
5 TABLET ORAL DAILY
Qty: 90 TABLET | Refills: 2 | Status: SHIPPED | OUTPATIENT
Start: 2024-12-04

## 2024-12-12 RX ORDER — IRBESARTAN 300 MG/1
TABLET ORAL
Qty: 90 TABLET | Refills: 1 | Status: SHIPPED | OUTPATIENT
Start: 2024-12-12

## 2024-12-12 RX ORDER — CARVEDILOL 6.25 MG/1
TABLET ORAL
Qty: 180 TABLET | Refills: 1 | Status: SHIPPED | OUTPATIENT
Start: 2024-12-12

## 2024-12-18 ENCOUNTER — OFFICE VISIT (OUTPATIENT)
Dept: ORTHOPEDIC SURGERY | Facility: CLINIC | Age: 79
End: 2024-12-18
Payer: MEDICARE

## 2024-12-18 VITALS — BODY MASS INDEX: 29.64 KG/M2 | WEIGHT: 151 LBS | HEIGHT: 60 IN

## 2024-12-18 DIAGNOSIS — Z96.611 S/P REVERSE TOTAL SHOULDER ARTHROPLASTY, RIGHT: Primary | ICD-10-CM

## 2024-12-18 RX ORDER — ACETAMINOPHEN 500 MG
500 TABLET ORAL EVERY 6 HOURS PRN
COMMUNITY

## 2024-12-18 NOTE — PROGRESS NOTES
Name: Ashlie Levi  MRN: [id number]  Diagnosis: s/p right reverse TSA Arthroplasty for treatment of prior humerus fracture, date of surgery 11/12/2024    Interval History: Ashlie Levi returns for 6-week postoperative visit stating overall doing well at this point in time, she does still have some mild pain but fairly controlled at this point.  She has been using her sling.  Denies numbness or tingling.  She has been using wrist and fingers for functional activities and doing well with that.  She has continued use of her sling.    Physical Examination:   right shoulder-   Passive forward flexion 90 degrees, passive external rotation 20 degrees, active forward flexion 60 degrees, active external rotation 10 degrees, positive deltoid firing all 3 components.  3 out of 5 strength on resisted forward flexion and external rotation, 4 out of 5 strength in belly press test  Incision is well-healed  There is no active drainage, erythema, or evidence of infection.   Distal motor and sensory exam is grossly intact. Hand is well perfused.    Radiographic review: Radiographs of the right shoulder 3 views, AP Grashey, scapular Y and axillary lateral, ordered and reviewed by me today, indication s/p shoulder arthroplasty, shows that the implant is in good position with retained bony fragments from greater and lesser tuberosity from proximal humerus fracture in relatively acceptable position with mild superior position of greater tuberosity segment. There is no evidence of implant loosening, dislocations or fractures. Compared to immediate postop xrays from hospital.      Assessment: Status post right reverse TSA Arthroplasty for treatment of proximal humerus fracture    Plan:  Overall patient doing well at this point in time, discontinue sling, begin home exercises with physical therapy  Follow-up in 6 weeks, no x-rays needed

## 2025-01-29 ENCOUNTER — OFFICE VISIT (OUTPATIENT)
Dept: ORTHOPEDIC SURGERY | Facility: CLINIC | Age: 80
End: 2025-01-29
Payer: MEDICARE

## 2025-01-29 VITALS — BODY MASS INDEX: 29.88 KG/M2 | HEIGHT: 60 IN | WEIGHT: 152.2 LBS

## 2025-01-29 DIAGNOSIS — S42.291A OTHER CLOSED DISPLACED FRACTURE OF PROXIMAL END OF RIGHT HUMERUS, INITIAL ENCOUNTER: ICD-10-CM

## 2025-01-29 DIAGNOSIS — Z96.611 S/P REVERSE TOTAL SHOULDER ARTHROPLASTY, RIGHT: Primary | ICD-10-CM

## 2025-01-29 NOTE — PROGRESS NOTES
Subjective:     Patient ID: Ashlie Levi is a 79 y.o. female.    Chief Complaint:  Status post right reverse shoulder arthroplasty for proximal humerus fracture-2024  History of Present Illness  The patient returns to the clinic today for a follow-up evaluation regarding her right shoulder.    She reports significant improvement in her condition, with no numbness or tingling. She has not experienced any fevers, chills, sweats, or complications with her surgical incision. She expresses satisfaction with her recovery post-surgery. She is continuing to work with home therapy on bands for stretching and strengthening.    Social History     Occupational History    Not on file   Tobacco Use    Smoking status: Former     Current packs/day: 0.00     Average packs/day: 1 pack/day for 50.0 years (50.0 ttl pk-yrs)     Types: Cigarettes     Start date: 10/13/1956     Quit date: 10/13/2006     Years since quittin.3     Passive exposure: Never    Smokeless tobacco: Never    Tobacco comments:     quit 2006   Vaping Use    Vaping status: Never Used   Substance and Sexual Activity    Alcohol use: Yes     Comment: occasional    Drug use: No    Sexual activity: Not Currently     Partners: Male     Birth control/protection: Surgical, Post-menopausal     Comment: GREG with OC      Past Medical History:   Diagnosis Date    Abnormal electrocardiogram     2006 heart attack    Arthritis     Chest discomfort     Colon polyp     Coronary artery disease     Disease of thyroid gland     Diverticulitis     Elevated cholesterol     GERD (gastroesophageal reflux disease)     Heart attack     2006    Hyperlipidemia     Hypertension      Past Surgical History:   Procedure Laterality Date    APPENDECTOMY      CARDIAC SURGERY      3 stents    CHOLECYSTECTOMY      COLONOSCOPY N/A 2016    Procedure: COLONOSCOPY;  Surgeon: Giovani Avelar MD;  Location: [facility] OR;  Service:     COLONOSCOPY N/A 2019    Procedure:  Colonoscopy with possible biopsy or polypectomy;  Surgeon: Alfreda Soler DO;  Location:  [facility] OR;  Service: Gastroenterology    COLONOSCOPY N/A 05/18/2021    Procedure: Colonoscopy with polypectomy, biopsy;  Surgeon: Alfreda Soler DO;  Location:  [facility] OR;  Service: Gastroenterology;  Laterality: N/A;  diverticulosis  cecal biopsy  right colon polyp  left colon polyps x2    COLONOSCOPY W/ POLYPECTOMY N/A 10/8/2024    Procedure: COLONOSCOPY WITH POLYPECTOMY;  Surgeon: Alfreda Soler DO;  Location:  [facility] OR;  Service: Gastroenterology;  Laterality: N/A;  diverticulosis, sigmoid colon polyp, right colon polyp    CORONARY STENT PLACEMENT      ENDOSCOPY N/A 11/21/2017    Procedure: ESOPHAGOGASTRODUODENOSCOPY with CARIN test ;  Surgeon: Alfreda Soler DO;  Location:  [facility] OR;  Service:     ENDOSCOPY N/A 06/13/2019    Procedure: Esophagogastroduodenoscopy with possible biopsy, polypectomy, dilation;  Surgeon: Alfreda Soler DO;  Location:  [facility] OR;  Service: Gastroenterology    ENDOSCOPY N/A 05/18/2021    Procedure: Esophagogastroduodenoscopy with biopsies;  Surgeon: Alfreda Soler DO;  Location:  [facility] OR;  Service: Gastroenterology;  Laterality: N/A;  CARIN test  gastritis  duodenitis    EPIDURAL BLOCK  04/30/2024    EYE SURGERY      FOOT GANGLION EXCISION Left     HEMORROIDECTOMY      HERNIA REPAIR      HYSTERECTOMY      GREG with OC age 30 for DUB    INJECTION OF MEDICATION Bilateral 03/26/2024    piriformis    MEDIAL BRANCH BLOCK Bilateral     lumbar    MEDIAL BRANCH BLOCK Bilateral 06/11/2024    L3-L5    OOPHORECTOMY      dx lsc , interval USO    TOTAL HIP ARTHROPLASTY Right 08/23/2023    Procedure: TOTAL HIP ARTHROPLASTY ANTERIOR WITH HANA TABLE;  Surgeon: Aidan Wallace MD;  [facility] OR;  Service: Orthopedics;  Laterality: Right;    TOTAL SHOULDER ARTHROPLASTY W/ DISTAL CLAVICLE EXCISION Right 11/12/2024    Procedure: Reverse total shoulder arthroplasty for treatment  "of proximal humerus fracture and all associated procedures;  Surgeon: Farhat Ang MD;  Location: [facility];  Service: Orthopedics;  Laterality: Right;       Family History   Problem Relation Age of Onset    Hypertension Mother     Stroke Mother     Diabetes Father     Hypertension Father     Heart disease Father     Stroke Father     Heart disease Brother     Colon cancer Brother         dx > 50    Breast cancer Neg Hx     Ovarian cancer Neg Hx     Deep vein thrombosis Neg Hx     Pulmonary embolism Neg Hx          Review of Systems        Objective:  Vitals:    01/29/25 1321   Weight: 69 kg (152 lb 3.2 oz)   Height: 152.4 cm (60\")         01/29/25  1321   Weight: 69 kg (152 lb 3.2 oz)     Body mass index is 29.72 kg/m².    Physical Exam    Vital signs reviewed.   General: No acute distress, alert and oriented  Eyes: conjunctiva clear; pupils equally round and reactive  ENT: external ears and nose atraumatic; oropharynx clear  CV: no peripheral edema  Resp: normal respiratory effort  Skin: no rashes or wounds; normal turgor  Psych: mood and affect appropriate; recent and remote memory intact       Physical Exam  Right shoulder shows active and passive forward flexion at 145 degrees with 4+ out of 5 strength, active and passive external rotation at 45 degrees, 4+ out of 5 strength, 5 out of 5 strength on belly press test with negative bear hug sign. Internal rotation reaches L4. Incision is well healed. Positive deltoid in L3 components.        Imaging:  None today  Assessment:        1. S/P reverse total shoulder arthroplasty, right    2. Other closed displaced fracture of proximal end of right humerus, initial encounter             Assessment & Plan  1. Post-surgical follow-up for right shoulder.  She is doing very well at this point in time. She reports no numbness, tingling, fevers, chills, or issues with her incision. She is very happy with her progress following surgery. Physical examination reveals " active and passive forward flexion at 145 degrees with 4+ out of 5 strength, active and passive external rotation at 45 degrees with 4+ out of 5 strength, 5 out of 5 strength on belly press test, and a negative bear hug sign. Internal rotation is at L4, and the incision is well healed.     She is advised to continue her home exercises for strengthening and range of motion, particularly using bands. A follow-up visit is scheduled in 9 months with repeat x-rays of the right shoulder for a 1-year post-surgery evaluation or sooner if needed.    Follow-up  The patient will follow up in 9 months.      Ashlie Levi was in agreement with plan and had all questions answered.     Orders:  No orders of the defined types were placed in this encounter.      Medications:  No orders of the defined types were placed in this encounter.      Followup:  Return in about 9 months (around 10/29/2025) for xrays needed at follow up.    Diagnoses and all orders for this visit:    1. S/P reverse total shoulder arthroplasty, right (Primary)    2. Other closed displaced fracture of proximal end of right humerus, initial encounter                   Dictated utilizing Dragon dictation

## 2025-02-21 ENCOUNTER — TRANSCRIBE ORDERS (OUTPATIENT)
Dept: ADMINISTRATIVE | Facility: HOSPITAL | Age: 80
End: 2025-02-21
Payer: MEDICARE

## 2025-02-21 DIAGNOSIS — Z12.31 VISIT FOR SCREENING MAMMOGRAM: Primary | ICD-10-CM

## 2025-03-14 ENCOUNTER — LAB (OUTPATIENT)
Dept: LAB | Facility: HOSPITAL | Age: 80
End: 2025-03-14
Payer: MEDICARE

## 2025-03-14 ENCOUNTER — TRANSCRIBE ORDERS (OUTPATIENT)
Dept: ADMINISTRATIVE | Facility: HOSPITAL | Age: 80
End: 2025-03-14
Payer: MEDICARE

## 2025-03-14 DIAGNOSIS — E03.9 PRIMARY HYPOTHYROIDISM: ICD-10-CM

## 2025-03-14 DIAGNOSIS — E78.2 MIXED HYPERLIPIDEMIA: ICD-10-CM

## 2025-03-14 DIAGNOSIS — E03.9 PRIMARY HYPOTHYROIDISM: Primary | ICD-10-CM

## 2025-03-14 LAB
ALBUMIN SERPL-MCNC: 4.2 G/DL (ref 3.5–5.2)
ALBUMIN/GLOB SERPL: 1.4 G/DL
ALP SERPL-CCNC: 103 U/L (ref 39–117)
ALT SERPL W P-5'-P-CCNC: 49 U/L (ref 1–33)
ANION GAP SERPL CALCULATED.3IONS-SCNC: 9.2 MMOL/L (ref 5–15)
AST SERPL-CCNC: 43 U/L (ref 1–32)
BASOPHILS # BLD AUTO: 0.06 10*3/MM3 (ref 0–0.2)
BASOPHILS NFR BLD AUTO: 0.5 % (ref 0–1.5)
BILIRUB SERPL-MCNC: 0.6 MG/DL (ref 0–1.2)
BUN SERPL-MCNC: 12 MG/DL (ref 8–23)
BUN/CREAT SERPL: 15.8 (ref 7–25)
CALCIUM SPEC-SCNC: 9.5 MG/DL (ref 8.6–10.5)
CHLORIDE SERPL-SCNC: 101 MMOL/L (ref 98–107)
CHOLEST SERPL-MCNC: 119 MG/DL (ref 0–200)
CO2 SERPL-SCNC: 27.8 MMOL/L (ref 22–29)
CREAT SERPL-MCNC: 0.76 MG/DL (ref 0.57–1)
DEPRECATED RDW RBC AUTO: 51.8 FL (ref 37–54)
EGFRCR SERPLBLD CKD-EPI 2021: 78.8 ML/MIN/1.73
EOSINOPHIL # BLD AUTO: 0.62 10*3/MM3 (ref 0–0.4)
EOSINOPHIL NFR BLD AUTO: 5.3 % (ref 0.3–6.2)
ERYTHROCYTE [DISTWIDTH] IN BLOOD BY AUTOMATED COUNT: 15.4 % (ref 12.3–15.4)
GLOBULIN UR ELPH-MCNC: 3 GM/DL
GLUCOSE SERPL-MCNC: 153 MG/DL (ref 65–99)
HCT VFR BLD AUTO: 42.8 % (ref 34–46.6)
HDLC SERPL-MCNC: 48 MG/DL (ref 40–60)
HGB BLD-MCNC: 13.6 G/DL (ref 12–15.9)
IMM GRANULOCYTES # BLD AUTO: 0.12 10*3/MM3 (ref 0–0.05)
IMM GRANULOCYTES NFR BLD AUTO: 1 % (ref 0–0.5)
LDLC SERPL CALC-MCNC: 51 MG/DL (ref 0–100)
LDLC/HDLC SERPL: 1.01 {RATIO}
LYMPHOCYTES # BLD AUTO: 3.41 10*3/MM3 (ref 0.7–3.1)
LYMPHOCYTES NFR BLD AUTO: 29.4 % (ref 19.6–45.3)
MCH RBC QN AUTO: 29.2 PG (ref 26.6–33)
MCHC RBC AUTO-ENTMCNC: 31.8 G/DL (ref 31.5–35.7)
MCV RBC AUTO: 92 FL (ref 79–97)
MONOCYTES # BLD AUTO: 0.7 10*3/MM3 (ref 0.1–0.9)
MONOCYTES NFR BLD AUTO: 6 % (ref 5–12)
NEUTROPHILS NFR BLD AUTO: 57.8 % (ref 42.7–76)
NEUTROPHILS NFR BLD AUTO: 6.7 10*3/MM3 (ref 1.7–7)
NRBC BLD AUTO-RTO: 0 /100 WBC (ref 0–0.2)
PLATELET # BLD AUTO: 271 10*3/MM3 (ref 140–450)
PMV BLD AUTO: 10.8 FL (ref 6–12)
POTASSIUM SERPL-SCNC: 4.3 MMOL/L (ref 3.5–5.2)
PROT SERPL-MCNC: 7.2 G/DL (ref 6–8.5)
RBC # BLD AUTO: 4.65 10*6/MM3 (ref 3.77–5.28)
SODIUM SERPL-SCNC: 138 MMOL/L (ref 136–145)
TRIGL SERPL-MCNC: 112 MG/DL (ref 0–150)
TSH SERPL DL<=0.05 MIU/L-ACNC: 4.77 UIU/ML (ref 0.27–4.2)
VLDLC SERPL-MCNC: 20 MG/DL (ref 5–40)
WBC NRBC COR # BLD AUTO: 11.61 10*3/MM3 (ref 3.4–10.8)

## 2025-03-14 PROCEDURE — 85025 COMPLETE CBC W/AUTO DIFF WBC: CPT

## 2025-03-14 PROCEDURE — 80061 LIPID PANEL: CPT

## 2025-03-14 PROCEDURE — 36415 COLL VENOUS BLD VENIPUNCTURE: CPT

## 2025-03-14 PROCEDURE — 84443 ASSAY THYROID STIM HORMONE: CPT

## 2025-03-14 PROCEDURE — 80053 COMPREHEN METABOLIC PANEL: CPT

## 2025-03-31 ENCOUNTER — APPOINTMENT (OUTPATIENT)
Dept: GENERAL RADIOLOGY | Facility: HOSPITAL | Age: 80
End: 2025-03-31
Payer: MEDICARE

## 2025-03-31 ENCOUNTER — HOSPITAL ENCOUNTER (EMERGENCY)
Facility: HOSPITAL | Age: 80
Discharge: HOME | End: 2025-03-31
Payer: MEDICARE

## 2025-03-31 VITALS
OXYGEN SATURATION: 94 % | TEMPERATURE: 98 F | SYSTOLIC BLOOD PRESSURE: 173 MMHG | DIASTOLIC BLOOD PRESSURE: 68 MMHG | HEIGHT: 60 IN | HEART RATE: 60 BPM | RESPIRATION RATE: 20 BRPM | BODY MASS INDEX: 29.84 KG/M2 | WEIGHT: 152 LBS

## 2025-03-31 DIAGNOSIS — M25.551 PAIN OF RIGHT HIP: Primary | ICD-10-CM

## 2025-03-31 PROCEDURE — 99283 EMERGENCY DEPT VISIT LOW MDM: CPT

## 2025-03-31 PROCEDURE — 73502 X-RAY EXAM HIP UNI 2-3 VIEWS: CPT

## 2025-03-31 RX ORDER — METHOCARBAMOL 500 MG/1
750 TABLET, FILM COATED ORAL ONCE
Status: COMPLETED | OUTPATIENT
Start: 2025-03-31 | End: 2025-03-31

## 2025-03-31 RX ORDER — ACETAMINOPHEN 500 MG
1000 TABLET ORAL ONCE
Status: COMPLETED | OUTPATIENT
Start: 2025-03-31 | End: 2025-03-31

## 2025-03-31 RX ORDER — METHOCARBAMOL 750 MG/1
750 TABLET, FILM COATED ORAL 3 TIMES DAILY
Qty: 21 TABLET | Refills: 0 | Status: SHIPPED | OUTPATIENT
Start: 2025-03-31 | End: 2025-04-07

## 2025-03-31 RX ADMIN — METHOCARBAMOL 750 MG: 500 TABLET ORAL at 08:32

## 2025-03-31 RX ADMIN — ACETAMINOPHEN 1000 MG: 500 TABLET, FILM COATED ORAL at 08:32

## 2025-03-31 NOTE — ED PROVIDER NOTES
Subjective   History of Present Illness    79-year-old female with history of CAD, hyperlipidemia, hypertension, GERD, colon polyps, osteoarthritis, lumbar stenosis with radiculopathy, presenting to the emergency department for evaluation of hip pain.  Patient reports that the hip pain is chronic, no new falls, occasionally acts up.  Reports that she feels a pulling sensation going down the back of the right thigh, no leg swelling or numbness/tingling or weakness.  No urinary symptoms.  Pain is mostly localized in the right buttock.    Review of Systems    ROS as specified in HPI.      Past Medical History:   Diagnosis Date    Abnormal electrocardiogram     2006 heart attack    Arthritis     Chest discomfort     Colon polyp     Coronary artery disease     Disease of thyroid gland     Diverticulitis     Elevated cholesterol     GERD (gastroesophageal reflux disease)     Heart attack     2006    Hyperlipidemia     Hypertension        Allergies   Allergen Reactions    Oxycodone-Acetaminophen Hallucinations       Past Surgical History:   Procedure Laterality Date    APPENDECTOMY      CARDIAC SURGERY      3 stents    CHOLECYSTECTOMY      COLONOSCOPY N/A 07/13/2016    Procedure: COLONOSCOPY;  Surgeon: Giovani Avelar MD;  Location: [facility] OR;  Service:     COLONOSCOPY N/A 06/13/2019    Procedure: Colonoscopy with possible biopsy or polypectomy;  Surgeon: Alfreda Soler DO;  Location: [facility] OR;  Service: Gastroenterology    COLONOSCOPY N/A 05/18/2021    Procedure: Colonoscopy with polypectomy, biopsy;  Surgeon: Alfreda Soler DO;  Location: [facility] OR;  Service: Gastroenterology;  Laterality: N/A;  diverticulosis  cecal biopsy  right colon polyp  left colon polyps x2    COLONOSCOPY W/ POLYPECTOMY N/A 10/8/2024    Procedure: COLONOSCOPY WITH POLYPECTOMY;  Surgeon: Alfreda Soler DO;  Location: [facility] OR;  Service: Gastroenterology;  Laterality: N/A;  diverticulosis, sigmoid colon polyp, right  colon polyp    CORONARY STENT PLACEMENT      ENDOSCOPY N/A 11/21/2017    Procedure: ESOPHAGOGASTRODUODENOSCOPY with CARIN test ;  Surgeon: Alfreda Soler DO;  Location:  [facility] OR;  Service:     ENDOSCOPY N/A 06/13/2019    Procedure: Esophagogastroduodenoscopy with possible biopsy, polypectomy, dilation;  Surgeon: Alfreda Soler DO;  Location:  [facility] OR;  Service: Gastroenterology    ENDOSCOPY N/A 05/18/2021    Procedure: Esophagogastroduodenoscopy with biopsies;  Surgeon: Alfreda Soler DO;  Location:  [facility] OR;  Service: Gastroenterology;  Laterality: N/A;  CARIN test  gastritis  duodenitis    EPIDURAL BLOCK  04/30/2024    EYE SURGERY      FOOT GANGLION EXCISION Left     HEMORROIDECTOMY      HERNIA REPAIR      HYSTERECTOMY      GREG with OC age 30 for DUB    INJECTION OF MEDICATION Bilateral 03/26/2024    piriformis    MEDIAL BRANCH BLOCK Bilateral     lumbar    MEDIAL BRANCH BLOCK Bilateral 06/11/2024    L3-L5    OOPHORECTOMY      dx lsc , interval USO    TOTAL HIP ARTHROPLASTY Right 08/23/2023    Procedure: TOTAL HIP ARTHROPLASTY ANTERIOR WITH HANA TABLE;  Surgeon: Aidan Wallace MD;  Location: [facility] OR;  Service: Orthopedics;  Laterality: Right;    TOTAL SHOULDER ARTHROPLASTY W/ DISTAL CLAVICLE EXCISION Right 11/12/2024    Procedure: Reverse total shoulder arthroplasty for treatment of proximal humerus fracture and all associated procedures;  Surgeon: Farhat Ang MD;  Location: [facility] OR;  Service: Orthopedics;  Laterality: Right;       Family History   Problem Relation Age of Onset    Hypertension Mother     Stroke Mother     Diabetes Father     Hypertension Father     Heart disease Father     Stroke Father     Heart disease Brother     Colon cancer Brother         dx > 50    Breast cancer Neg Hx     Ovarian cancer Neg Hx     Deep vein thrombosis Neg Hx     Pulmonary embolism Neg Hx        Social History     Socioeconomic History    Marital status:    Tobacco Use    Smoking  status: Former     Current packs/day: 0.00     Average packs/day: 1 pack/day for 50.0 years (50.0 ttl pk-yrs)     Types: Cigarettes     Start date: 10/13/1956     Quit date: 10/13/2006     Years since quittin.4     Passive exposure: Never    Smokeless tobacco: Never    Tobacco comments:     quit 2006   Vaping Use    Vaping status: Never Used   Substance and Sexual Activity    Alcohol use: Yes     Comment: occasional    Drug use: No    Sexual activity: Not Currently     Partners: Male     Birth control/protection: Surgical, Post-menopausal     Comment: GREG with OC           Objective   Physical Exam  Vitals reviewed.   Constitutional:       General: She is not in acute distress.     Appearance: She is normal weight. She is not toxic-appearing.   HENT:      Head: Normocephalic and atraumatic.   Pulmonary:      Effort: Pulmonary effort is normal. No respiratory distress.   Abdominal:      General: There is no distension.   Musculoskeletal:      Cervical back: Normal range of motion and neck supple. No rigidity.      Comments: Bilateral lower extremities are warm and well-perfused with no asymmetry or swelling, full range of motion in all joints except for the right hip with some limitations in flexion secondary to pain.  Tenderness overlying the right buttock just posterior to the greater trochanter.   Skin:     Capillary Refill: Capillary refill takes less than 2 seconds.      Coloration: Skin is not jaundiced or pale.   Neurological:      Mental Status: She is alert and oriented to person, place, and time.      Sensory: No sensory deficit.      Comments: Normal strength in flexion and extension of the bilateral lower extremities.   Psychiatric:         Mood and Affect: Mood normal.         Behavior: Behavior normal.         Procedures           ED Course                                                       Medical Decision Making  Problems Addressed:  Pain of right hip: complicated acute illness or  injury    Amount and/or Complexity of Data Reviewed  Radiology: ordered.    Risk  OTC drugs.  Prescription drug management.        Final diagnoses:   Pain of right hip       ED Disposition  ED Disposition       ED Disposition   Discharge    Condition   Stable    Comment   --               Kole Silva MD  [address]  [facility] [address] [phone]    Call   to discuss potential scheduling for physical therapy         Medication List        New Prescriptions      methocarbamol 750 MG tablet  Commonly known as: ROBAXIN  Take 1 tablet by mouth 3 (Three) Times a Day for 7 days.            Changed      aspirin 81 MG EC tablet  Take 1 tablet by mouth Daily. Indications: VTE Prophylaxis  What changed: when to take this               Where to Get Your Medications        These medications were sent to [facility] [address] [place] [address] - [address] [facility] - [phone]  - [phone]   [address] [facility] [address]      Phone: [phone]   methocarbamol 750 MG tablet         ED Course: 79-year-old female presenting for right buttock and hip pain.  Acute on chronic, no specific precipitating factors.  Vital stable on arrival, tenderness over the right piriformis and buttock but no bony abnormalities or swelling, no neurovascular compromise, no reason to suspect urinary etiology such as kidney stones or pyelonephritis.  Supportive care measures were discussed at length and she is given Tylenol, methocarbamol for symptom relief, x-ray obtained which was negative.  I again discussed supportive care measures and potential need for physical therapy for musculoskeletal etiology of her symptoms.  They will call other family doctor after discharge from the ER.    Patient appropriate for discharge without further workup/ management from the Emergency Department. All questions were answered. Close return and follow-up instructions were provided, and pt was discharged home in stable  condition.      EKG: None    Consults: None    Incidental Findings: None       Richy Ashley DO  03/31/25 1132

## 2025-03-31 NOTE — DISCHARGE INSTRUCTIONS
You were seen in the emergency department today for hip and back pain. Vital signs + a medical screening exam were performed, and the need for any labwork and/ or imaging were discussed. Results of the above, if performed, were also discussed as well as their clinical significance.     You should schedule a follow-up appointment with your family medicine doctor within the next 2-3 days. If you do not have a family medicine provider we can provide you with contact information to establish care.    Take methocarbamol as prescribed, but be cautious of potential sedating and habit forming effects.    Any prescriptions written today can have a paper copy provided for you to take to any pharmacy you would like in the event that your primary pharmacy is closed.  For any medications that were prescribed as a daily medication and for which you received a dose in the emergency department such as antibiotics, unless otherwise instructed take your 1st dose tomorrow. If you would prefer a paper copy of your prescription, let your treating physician or nurse know.     It is important to remember that symptoms and progression of illness/ injury can change, so do not hesitate to return to the Emergency Department for any new or worsening symptoms.     You should return to the Emergency Department or seek immediate medical care if you develop new or worsening symptoms including but not limited to fever/ chills, inability to move your hip, severe pain, leg swelling.

## 2025-04-07 RX ORDER — LACTULOSE 10 G/15ML
20 SOLUTION ORAL DAILY
Qty: 900 ML | Refills: 0 | Status: SHIPPED | OUTPATIENT
Start: 2025-04-07 | End: 2025-05-07

## 2025-04-08 ENCOUNTER — OFFICE VISIT (OUTPATIENT)
Dept: ORTHOPEDIC SURGERY | Facility: CLINIC | Age: 80
End: 2025-04-08
Payer: MEDICARE

## 2025-04-08 VITALS — BODY MASS INDEX: 29.84 KG/M2 | HEIGHT: 60 IN | WEIGHT: 152 LBS

## 2025-04-08 DIAGNOSIS — M54.16 LUMBAR RADICULOPATHY: Primary | ICD-10-CM

## 2025-04-08 RX ORDER — TRAMADOL HYDROCHLORIDE 50 MG/1
TABLET ORAL
COMMUNITY
Start: 2025-04-04

## 2025-04-08 NOTE — PROGRESS NOTES
Subjective:     Patient ID: Ashlie Levi is a 79 y.o. female.    Chief Complaint:    History of Present Illness  Ashlie Levi presents to clinic today for evaluation of right posterior buttock pain radiating down the posterior lateral aspect of her leg to her posterior calf.  She denies lateral hip pain or anterior groin pain or any pain with hip movement.  The patient has had an MRI following total hip arthroplasty a recent CT scan and multiple x-rays all which were negative for any hip pathology.  She was seen by me in  and had an MRI of her lumbar spine which was ordered and she was referred to Dr. Woodall.  She saw Dr. Woodall and he ordered a myelogram but the patient and her  refused to have it done by Dr. Woodall at [facility] and they never followed up with neurosurgery again.  She is here today with the same symptoms.     Social History     Occupational History    Not on file   Tobacco Use    Smoking status: Former     Current packs/day: 0.00     Average packs/day: 1 pack/day for 50.0 years (50.0 ttl pk-yrs)     Types: Cigarettes     Start date: 10/13/1956     Quit date: 10/13/2006     Years since quittin.4     Passive exposure: Never    Smokeless tobacco: Never    Tobacco comments:     quit 2006   Vaping Use    Vaping status: Never Used   Substance and Sexual Activity    Alcohol use: Yes     Comment: occasional    Drug use: No    Sexual activity: Not Currently     Partners: Male     Birth control/protection: Surgical, Post-menopausal     Comment: GREG with OC      Past Medical History:   Diagnosis Date    Abnormal electrocardiogram     2006 heart attack    Arthritis     Chest discomfort     Colon polyp     Coronary artery disease     Disease of thyroid gland     Diverticulitis     Elevated cholesterol     GERD (gastroesophageal reflux disease)     Heart attack     2006    Hyperlipidemia     Hypertension      Past Surgical History:   Procedure Laterality Date    APPENDECTOMY      CARDIAC  SURGERY      3 stents    CHOLECYSTECTOMY      COLONOSCOPY N/A 07/13/2016    Procedure: COLONOSCOPY;  Surgeon: Giovani Avelar MD;  Location: [facility];  Service:     COLONOSCOPY N/A 06/13/2019    Procedure: Colonoscopy with possible biopsy or polypectomy;  Surgeon: Alfreda Soler DO;  Location: [facility];  Service: Gastroenterology    COLONOSCOPY N/A 05/18/2021    Procedure: Colonoscopy with polypectomy, biopsy;  Surgeon: Alfreda Soler DO;  Location: [facility];  Service: Gastroenterology;  Laterality: N/A;  diverticulosis  cecal biopsy  right colon polyp  left colon polyps x2    COLONOSCOPY W/ POLYPECTOMY N/A 10/8/2024    Procedure: COLONOSCOPY WITH POLYPECTOMY;  Surgeon: Alfreda Soler DO;  Location: [facility];  Service: Gastroenterology;  Laterality: N/A;  diverticulosis, sigmoid colon polyp, right colon polyp    CORONARY STENT PLACEMENT      ENDOSCOPY N/A 11/21/2017    Procedure: ESOPHAGOGASTRODUODENOSCOPY with CARIN test ;  Surgeon: Alfreda Soler DO;  Location: [facility];  Service:     ENDOSCOPY N/A 06/13/2019    Procedure: Esophagogastroduodenoscopy with possible biopsy, polypectomy, dilation;  Surgeon: Alfreda Soler DO;  Location: [facility];  Service: Gastroenterology    ENDOSCOPY N/A 05/18/2021    Procedure: Esophagogastroduodenoscopy with biopsies;  Surgeon: Alfreda Soler DO;  Location: [facility];  Service: Gastroenterology;  Laterality: N/A;  CARIN test  gastritis  duodenitis    EPIDURAL BLOCK  04/30/2024    EYE SURGERY      FOOT GANGLION EXCISION Left     HEMORROIDECTOMY      HERNIA REPAIR      HYSTERECTOMY      GREG with OC age 30 for DUB    INJECTION OF MEDICATION Bilateral 03/26/2024    piriformis    MEDIAL BRANCH BLOCK Bilateral     lumbar    MEDIAL BRANCH BLOCK Bilateral 06/11/2024    L3-L5    OOPHORECTOMY      dx lsc , interval USO    TOTAL HIP ARTHROPLASTY Right 08/23/2023    Procedure: TOTAL HIP ARTHROPLASTY ANTERIOR WITH HANA TABLE;  Surgeon: Rodrigo  "MD Aidan;  [facility];  Service: Orthopedics;  Laterality: Right;    TOTAL SHOULDER ARTHROPLASTY W/ DISTAL CLAVICLE EXCISION Right 11/12/2024    Procedure: Reverse total shoulder arthroplasty for treatment of proximal humerus fracture and all associated procedures;  Surgeon: Farhat Ang MD;  Location:  [facility];  Service: Orthopedics;  Laterality: Right;       Family History   Problem Relation Age of Onset    Hypertension Mother     Stroke Mother     Diabetes Father     Hypertension Father     Heart disease Father     Stroke Father     Heart disease Brother     Colon cancer Brother         dx > 50    Breast cancer Neg Hx     Ovarian cancer Neg Hx     Deep vein thrombosis Neg Hx     Pulmonary embolism Neg Hx                  Objective:  Vitals:    04/08/25 1101   Weight: 68.9 kg (152 lb)   Height: 152.4 cm (60\")         04/08/25  1101   Weight: 68.9 kg (152 lb)     Body mass index is 29.69 kg/m².           Right Hip Exam     Tenderness   The patient is experiencing tenderness in the posterior.    Range of Motion   Flexion:  normal   External rotation:  normal   Internal rotation:  normal     Muscle Strength   Flexion: 5/5     Tests   LAKSHMI: negative  Fadir:  Negative FADIR test    Other   Erythema: absent  Scars: absent  Sensation: normal  Pulse: present    Comments:  Negative Stinchfield               Imaging: Standing AP pelvis was ordered and reviewed by myself in the office today  Indication: right hip replacement  Findings: X-rays demonstrate a right total hip arthroplasty with implants in expected position. Leg lengths and offset appear clinically equal based off radiographic markers.  No signs of fracture, dislocation, subluxation, subsidence, or migration.  Comparative studies: last visit radiographs      XR Hip With or Without Pelvis 2 - 3 View Right  Result Date: 3/31/2025  Impression: 1.Changes from right hip arthroplasty. 2.Narrowing left hip joint. 3.Degenerative change visualized " lower lumbar spine. Electronically Signed: Forest Garcia MD  3/31/2025 8:26 AM EDT  Workstation ID: [id number]          Assessment:        1. Lumbar radiculopathy           Plan:          Discussed treatment options at length with patient at today's visit.  I had an extensive conversation with the patient and her  again that again I believe this is related to lumbar radiculopathy.  She has an MRI from 2023 which shows severe foraminal stenosis at L5-S1 which corresponds dermatome only to the distribution of her pain from her low back posterior buttock down the posterior lateral aspect of her leg to her calf.  I discussed with them and they state that they saw Dr. Woodall who states he could not do anything for them.  After reviewing Dr. Eduardo's notes and telephone encounters it appears as though he recommended a CT myelogram to be done at [facility] but they refused and never return for follow-up.  I discussed with them that unfortunately I believe that this is a back problem and not an orthopedic hip problem.  I am unable to treat them for this condition as I am not neurosurgery or orthopedic spine trained.  I discussed with them that I would recommend a referral back to Dr. Mora but they state they would not like to go there.  An order was placed to see Dr. Aristides Cedeno at [facility].  I discussed with them that unless she develops anterior groin pain or direct lateral hip pain I would not have anything else to offer them in terms of this neurosurgery/spine condition.  I am happy to see him back at any point in time but as for this issue I do not have anything else to offer.  Follow up: as needed      Ashlie Levi was in agreement with plan and had all questions answered.     Medications:  No orders of the defined types were placed in this encounter.      Followup:  No follow-ups on file.    Diagnoses and all orders for this visit:    1. Lumbar radiculopathy (Primary)  -     Ambulatory Referral to  Neurosurgery  -     Ambulatory Referral to Orthopedic Surgery          Dictated utilizing Dragon dictation

## 2025-04-14 ENCOUNTER — OFFICE VISIT (OUTPATIENT)
Dept: SURGERY | Facility: CLINIC | Age: 80
End: 2025-04-14
Payer: MEDICARE

## 2025-04-14 VITALS
WEIGHT: 152 LBS | HEART RATE: 72 BPM | HEIGHT: 60 IN | RESPIRATION RATE: 20 BRPM | SYSTOLIC BLOOD PRESSURE: 138 MMHG | BODY MASS INDEX: 29.84 KG/M2 | DIASTOLIC BLOOD PRESSURE: 82 MMHG

## 2025-04-14 DIAGNOSIS — K59.09 OTHER CONSTIPATION: ICD-10-CM

## 2025-04-14 DIAGNOSIS — R10.84 GENERALIZED ABDOMINAL PAIN: ICD-10-CM

## 2025-04-14 DIAGNOSIS — R13.19 OTHER DYSPHAGIA: Primary | ICD-10-CM

## 2025-04-14 PROCEDURE — 3079F DIAST BP 80-89 MM HG: CPT | Performed by: SURGERY

## 2025-04-14 PROCEDURE — 1160F RVW MEDS BY RX/DR IN RCRD: CPT | Performed by: SURGERY

## 2025-04-14 PROCEDURE — 99214 OFFICE O/P EST MOD 30 MIN: CPT | Performed by: SURGERY

## 2025-04-14 PROCEDURE — 1159F MED LIST DOCD IN RCRD: CPT | Performed by: SURGERY

## 2025-04-14 PROCEDURE — 3075F SYST BP GE 130 - 139MM HG: CPT | Performed by: SURGERY

## 2025-04-14 RX ORDER — DOCUSATE SODIUM 100 MG/1
100 CAPSULE, LIQUID FILLED ORAL 2 TIMES DAILY
Qty: 60 CAPSULE | Refills: 1 | Status: SHIPPED | OUTPATIENT
Start: 2025-04-14

## 2025-04-14 NOTE — PROGRESS NOTES
Ashlie Levi 79 y.o. female presents as a self ref for eval RLQ pain followed with immediate need to have BM and diff swallowing solids and liquids.   Chief Complaint   Patient presents with    Difficulty Swallowing       History of Present Illness  The patient is an 79-year-old female who presents for evaluation of constipation, back pain, and swallowing issues.    She has been experiencing constipation, which was initially managed with a daily dose of lactulose. However, due to a lack of bowel movements over several days, the dosage was increased, resulting in significant bowel movements last night and this morning. She also reports intermittent lower abdominal pain prior to defecation, a symptom she has experienced throughout her life. The pain is severe enough to induce crying. She recalls that during her last colonoscopy, the procedure was prolonged due to a kink in her bowels, and she wonders if this could be contributing to her current symptoms. She has not observed any black stools recently, noting that they have transitioned to a blonde color. She maintains a regular diet, including de la rosa, eggs, and sausage for breakfast, a smoothie for lunch, and either a hamburger or salad for dinner. She notes that consumption of salad appears to increase her flatulence. She is currently on lactulose.    She is scheduled to consult with a back surgeon, Dr. Eugene, in [place] on Thursday. She has been experiencing persistent pain in the same location on one side of her back, which was so severe a week ago that it necessitated a hospital visit. She was prescribed a muscle relaxer and informed that her leg muscles were reacting to improper walking. She uses a walker for mobility but experiences pain when sitting down, which she attributes to a pinched nerve. Her mobility is limited, as she can not walk without limping and favoring one leg due to pain. She has undergone an x-ray of her leg and received injections for pain  management. She is currently undergoing physical therapy twice a week at home and takes two Tylenol every eight hours for pain relief. She has been prescribed hydrocodone for shoulder surgery and takes Mobic for arthritis-related pain.    She continues to experience difficulty swallowing, although it is less severe than before. She reports a hacking cough that does not produce phlegm and describes it as a deep cough. She does not experience heartburn or reflux. She was previously diagnosed with bronchitis and prescribed medication, but the cough persisted for three weeks after discontinuation of the medication. She was then given a cough suppressant.    Supplemental Information  She has a history of a broken shoulder, which she believes did not heal properly.    ALLERGIES  The patient has no known allergies.    MEDICATIONS  Current: Tylenol, Norvasc, aspirin, Lipitor, Coreg, Celexa, Mobic, Prilosec, Ultram, lactulose      Review of Systems   All other systems reviewed and are negative.            Current Outpatient Medications:     acetaminophen (TYLENOL) 500 MG tablet, Take 1 tablet by mouth Every 6 (Six) Hours As Needed for Mild Pain., Disp: , Rfl:     amLODIPine (NORVASC) 5 MG tablet, Take 1 tablet by mouth Daily., Disp: 90 tablet, Rfl: 2    aspirin 81 MG EC tablet, Take 1 tablet by mouth Daily. Indications: VTE Prophylaxis (Patient taking differently: Take 1 tablet by mouth Every Night. Indications: VTE Prophylaxis), Disp: 21 tablet, Rfl: 0    atorvastatin (LIPITOR) 20 MG tablet, Take 1 tablet by mouth Every Night., Disp: , Rfl:     carvedilol (COREG) 6.25 MG tablet, Take 1 tablet by mouth twice daily, Disp: 180 tablet, Rfl: 1    citalopram (CeleXA) 40 MG tablet, Take 1 tablet by mouth Daily., Disp: , Rfl:     docusate sodium (Colace) 100 MG capsule, Take 1 capsule by mouth 2 (Two) Times a Day., Disp: 60 capsule, Rfl: 1    HYDROcodone-acetaminophen (NORCO) 7.5-325 MG per tablet, Take 1 tablet by mouth Every 6  (Six) Hours As Needed for Moderate Pain., Disp: 50 tablet, Rfl: 0    irbesartan (AVAPRO) 300 MG tablet, TAKE 1 TABLET BY MOUTH ONCE DAILY AT NIGHT, Disp: 90 tablet, Rfl: 1    isosorbide mononitrate (IMDUR) 30 MG 24 hr tablet, Take 1 tablet by mouth Daily., Disp: , Rfl:     lactulose (CHRONULAC) 10 GM/15ML solution, Take 30 mL by mouth Daily for 30 days., Disp: 900 mL, Rfl: 0    levothyroxine (SYNTHROID, LEVOTHROID) 112 MCG tablet, Take 1 tablet by mouth Daily., Disp: , Rfl:     LORazepam (ATIVAN) 0.5 MG tablet, Take 1 tablet by mouth Every 6 (Six) Hours As Needed., Disp: , Rfl:     meloxicam (MOBIC) 15 MG tablet, Take 1 tablet by mouth Daily., Disp: 30 tablet, Rfl: 0    nitroglycerin (NITROSTAT) 0.4 MG SL tablet, Place 1 tablet under the tongue Every 5 (Five) Minutes As Needed for Chest Pain. Take no more than 3 doses in 15 minutes and call EMS., Disp: 25 tablet, Rfl: 1    omeprazole (priLOSEC) 40 MG capsule, Take 1 capsule by mouth once daily, Disp: 90 capsule, Rfl: 4    ondansetron ODT (ZOFRAN-ODT) 4 MG disintegrating tablet, Place 1 tablet on the tongue Every 8 (Eight) Hours As Needed for Nausea or Vomiting., Disp: 20 tablet, Rfl: 0    traMADol (ULTRAM) 50 MG tablet, TAKE 1 TABLET BY MOUTH EVERY 8 HOURS AS NEEDED FOR 7 DAYS, Disp: , Rfl:     Turmeric (QC Tumeric Complex) 500 MG capsule, Take 1 capsule by mouth Daily., Disp: , Rfl:         Allergies   Allergen Reactions    Oxycodone-Acetaminophen Hallucinations           Past Medical History:   Diagnosis Date    Abnormal electrocardiogram     2006 heart attack    Arthritis     Chest discomfort     Colon polyp     Coronary artery disease     Disease of thyroid gland     Diverticulitis     Elevated cholesterol     GERD (gastroesophageal reflux disease)     Heart attack     2006    Hyperlipidemia     Hypertension            Past Surgical History:   Procedure Laterality Date    APPENDECTOMY      CARDIAC SURGERY      3 stents    CHOLECYSTECTOMY      COLONOSCOPY N/A  07/13/2016    Procedure: COLONOSCOPY;  Surgeon: Giovani Avelar MD;  Location:  [facility];  Service:     COLONOSCOPY N/A 06/13/2019    Procedure: Colonoscopy with possible biopsy or polypectomy;  Surgeon: Alfreda Soler DO;  Location:  [facility];  Service: Gastroenterology    COLONOSCOPY N/A 05/18/2021    Procedure: Colonoscopy with polypectomy, biopsy;  Surgeon: Alfreda Soler DO;  Location:  [facility];  Service: Gastroenterology;  Laterality: N/A;  diverticulosis  cecal biopsy  right colon polyp  left colon polyps x2    COLONOSCOPY W/ POLYPECTOMY N/A 10/8/2024    Procedure: COLONOSCOPY WITH POLYPECTOMY;  Surgeon: Alfreda Soler DO;  Location:  [facility];  Service: Gastroenterology;  Laterality: N/A;  diverticulosis, sigmoid colon polyp, right colon polyp    CORONARY STENT PLACEMENT      ENDOSCOPY N/A 11/21/2017    Procedure: ESOPHAGOGASTRODUODENOSCOPY with CARIN test ;  Surgeon: Alfreda Soler DO;  Location:  [facility];  Service:     ENDOSCOPY N/A 06/13/2019    Procedure: Esophagogastroduodenoscopy with possible biopsy, polypectomy, dilation;  Surgeon: Alfreda Soler DO;  Location:  [facility];  Service: Gastroenterology    ENDOSCOPY N/A 05/18/2021    Procedure: Esophagogastroduodenoscopy with biopsies;  Surgeon: Alfreda Soler DO;  [facility];  Service: Gastroenterology;  Laterality: N/A;  CARIN test  gastritis  duodenitis    EPIDURAL BLOCK  04/30/2024    EYE SURGERY      FOOT GANGLION EXCISION Left     HEMORROIDECTOMY      HERNIA REPAIR      HYSTERECTOMY      GREG with OC age 30 for DUB    INJECTION OF MEDICATION Bilateral 03/26/2024    piriformis    MEDIAL BRANCH BLOCK Bilateral     lumbar    MEDIAL BRANCH BLOCK Bilateral 06/11/2024    L3-L5    OOPHORECTOMY      dx lsc , interval USO    TOTAL HIP ARTHROPLASTY Right 08/23/2023    Procedure: TOTAL HIP ARTHROPLASTY ANTERIOR WITH HANA TABLE;  Surgeon: Aidan Wallace MD;  Location: [facility] OR;  Service: Orthopedics;   Laterality: Right;    TOTAL SHOULDER ARTHROPLASTY W/ DISTAL CLAVICLE EXCISION Right 2024    Procedure: Reverse total shoulder arthroplasty for treatment of proximal humerus fracture and all associated procedures;  Surgeon: Farhat Ang MD;  Location: [facility];  Service: Orthopedics;  Laterality: Right;           Social History     Tobacco Use    Smoking status: Former     Current packs/day: 0.00     Average packs/day: 1 pack/day for 50.0 years (50.0 ttl pk-yrs)     Types: Cigarettes     Start date: 10/13/1956     Quit date: 10/13/2006     Years since quittin.5     Passive exposure: Never    Smokeless tobacco: Never    Tobacco comments:     quit    Vaping Use    Vaping status: Never Used   Substance Use Topics    Alcohol use: Yes     Comment: occasional    Drug use: No           Immunization History   Administered Date(s) Administered    COVID-19 (MODERNA) 1st,2nd,3rd Dose Monovalent 2021, 2021, 2021    COVID-19 (MODERNA) BIVALENT 12+YRS 2022    Fluzone High-Dose 65+YRS 10/12/2018, 10/10/2019    Fluzone High-Dose 65+yrs 10/24/2020, 10/05/2022, 2023    Pneumococcal Conjugate 13-Valent (PCV13) 10/24/2020    Pneumococcal Polysaccharide (PPSV23) 10/12/2018    Shingrix 2023, 2024    Td (TDVAX) 2002    Tdap 10/05/2022    Zostavax 2013           Physical Exam  Vitals and nursing note reviewed.   Constitutional:       Appearance: Normal appearance.   Cardiovascular:      Rate and Rhythm: Normal rate and regular rhythm.   Pulmonary:      Effort: Pulmonary effort is normal.      Breath sounds: Normal breath sounds.   Abdominal:      General: Bowel sounds are normal.      Palpations: Abdomen is soft.   Skin:     General: Skin is warm and dry.   Neurological:      Mental Status: She is alert.         Physical Exam  Lungs were auscultated.  Heart was examined.  Abdominal exam was performed.    Debilities/Disabilities Identified: None    Emotional  "Behavior: Appropriate      /82   Pulse 72   Resp 20   Ht 152.4 cm (60\")   Wt 68.9 kg (152 lb)   LMP  (LMP Unknown)   BMI 29.69 kg/m²         Diagnoses and all orders for this visit:    1. Other dysphagia (Primary)    2. Other constipation    3. Generalized abdominal pain    Other orders  -     docusate sodium (Colace) 100 MG capsule; Take 1 capsule by mouth 2 (Two) Times a Day.  Dispense: 60 capsule; Refill: 1      Assessment & Plan  1. Constipation.  Her constipation may be attributed to her diverticulosis, which occasionally complicates bowel movements. Given her back issues, she is not an ideal candidate for surgical intervention due to the associated risks. A CT scan of the abdomen and pelvis will be ordered to rule out any potential colon inflammation. She will continue her lactulose regimen and Colace will be added as a stool softener to counteract the constipating effects of her pain medication.    2. Back pain.  Her back pain could potentially be linked to a slipped disc, which can manifest in various ways. She is advised to maintain her appointment with the back specialist and discuss the possibility of resuming physical therapy. The use of a wheelchair for long distances is acceptable, but she is encouraged to walk as much as possible and utilize her walker. A consultation with a chiropractor may also be beneficial if her bone health permits.    3. Swallowing issues.  An upper endoscopy will be scheduled to further investigate her swallowing difficulties.    Patient or patient representative verbalized consent for the use of Ambient Listening during the visit with  Alfreda Soler DO for chart documentation. 4/14/2025  09:42 EDT        "

## 2025-04-14 NOTE — LETTER
April 14, 2025     Kole Silva MD  [address] [place] [address]    Patient: Ashlie Levi   YOB: 1945   Date of Visit: 4/14/2025     Dear Kole Silva MD:       Thank you for referring Ashlie Lvei to me for evaluation. Below are the relevant portions of my assessment and plan of care.    If you have questions, please do not hesitate to call me. I look forward to following Ashlie along with you.         Sincerely,        Alfreda Soler DO        CC: No Recipients    Alfreda Soler DO  04/14/25 0943  Sign when Signing Visit  Ashlie Levi 79 y.o. female presents as a self ref for eval RLQ pain followed with immediate need to have BM and diff swallowing solids and liquids.   Chief Complaint   Patient presents with   • Difficulty Swallowing       History of Present Illness  The patient is an 79-year-old female who presents for evaluation of constipation, back pain, and swallowing issues.    She has been experiencing constipation, which was initially managed with a daily dose of lactulose. However, due to a lack of bowel movements over several days, the dosage was increased, resulting in significant bowel movements last night and this morning. She also reports intermittent lower abdominal pain prior to defecation, a symptom she has experienced throughout her life. The pain is severe enough to induce crying. She recalls that during her last colonoscopy, the procedure was prolonged due to a kink in her bowels, and she wonders if this could be contributing to her current symptoms. She has not observed any black stools recently, noting that they have transitioned to a blonde color. She maintains a regular diet, including de la rosa, eggs, and sausage for breakfast, a smoothie for lunch, and either a hamburger or salad for dinner. She notes that consumption of salad appears to increase her flatulence. She is currently on lactulose.    She is scheduled to consult with a back surgeon,   [facility] [place] on Thursday. She has been experiencing persistent pain in the same location on one side of her back, which was so severe a week ago that it necessitated a hospital visit. She was prescribed a muscle relaxer and informed that her leg muscles were reacting to improper walking. She uses a walker for mobility but experiences pain when sitting down, which she attributes to a pinched nerve. Her mobility is limited, as she can not walk without limping and favoring one leg due to pain. She has undergone an x-ray of her leg and received injections for pain management. She is currently undergoing physical therapy twice a week at home and takes two Tylenol every eight hours for pain relief. She has been prescribed hydrocodone for shoulder surgery and takes Mobic for arthritis-related pain.    She continues to experience difficulty swallowing, although it is less severe than before. She reports a hacking cough that does not produce phlegm and describes it as a deep cough. She does not experience heartburn or reflux. She was previously diagnosed with bronchitis and prescribed medication, but the cough persisted for three weeks after discontinuation of the medication. She was then given a cough suppressant.    Supplemental Information  She has a history of a broken shoulder, which she believes did not heal properly.    ALLERGIES  The patient has no known allergies.    MEDICATIONS  Current: Tylenol, Norvasc, aspirin, Lipitor, Coreg, Celexa, Mobic, Prilosec, Ultram, lactulose      Review of Systems   All other systems reviewed and are negative.            Current Outpatient Medications:   •  acetaminophen (TYLENOL) 500 MG tablet, Take 1 tablet by mouth Every 6 (Six) Hours As Needed for Mild Pain., Disp: , Rfl:   •  amLODIPine (NORVASC) 5 MG tablet, Take 1 tablet by mouth Daily., Disp: 90 tablet, Rfl: 2  •  aspirin 81 MG EC tablet, Take 1 tablet by mouth Daily. Indications: VTE Prophylaxis (Patient taking  differently: Take 1 tablet by mouth Every Night. Indications: VTE Prophylaxis), Disp: 21 tablet, Rfl: 0  •  atorvastatin (LIPITOR) 20 MG tablet, Take 1 tablet by mouth Every Night., Disp: , Rfl:   •  carvedilol (COREG) 6.25 MG tablet, Take 1 tablet by mouth twice daily, Disp: 180 tablet, Rfl: 1  •  citalopram (CeleXA) 40 MG tablet, Take 1 tablet by mouth Daily., Disp: , Rfl:   •  docusate sodium (Colace) 100 MG capsule, Take 1 capsule by mouth 2 (Two) Times a Day., Disp: 60 capsule, Rfl: 1  •  HYDROcodone-acetaminophen (NORCO) 7.5-325 MG per tablet, Take 1 tablet by mouth Every 6 (Six) Hours As Needed for Moderate Pain., Disp: 50 tablet, Rfl: 0  •  irbesartan (AVAPRO) 300 MG tablet, TAKE 1 TABLET BY MOUTH ONCE DAILY AT NIGHT, Disp: 90 tablet, Rfl: 1  •  isosorbide mononitrate (IMDUR) 30 MG 24 hr tablet, Take 1 tablet by mouth Daily., Disp: , Rfl:   •  lactulose (CHRONULAC) 10 GM/15ML solution, Take 30 mL by mouth Daily for 30 days., Disp: 900 mL, Rfl: 0  •  levothyroxine (SYNTHROID, LEVOTHROID) 112 MCG tablet, Take 1 tablet by mouth Daily., Disp: , Rfl:   •  LORazepam (ATIVAN) 0.5 MG tablet, Take 1 tablet by mouth Every 6 (Six) Hours As Needed., Disp: , Rfl:   •  meloxicam (MOBIC) 15 MG tablet, Take 1 tablet by mouth Daily., Disp: 30 tablet, Rfl: 0  •  nitroglycerin (NITROSTAT) 0.4 MG SL tablet, Place 1 tablet under the tongue Every 5 (Five) Minutes As Needed for Chest Pain. Take no more than 3 doses in 15 minutes and call EMS., Disp: 25 tablet, Rfl: 1  •  omeprazole (priLOSEC) 40 MG capsule, Take 1 capsule by mouth once daily, Disp: 90 capsule, Rfl: 4  •  ondansetron ODT (ZOFRAN-ODT) 4 MG disintegrating tablet, Place 1 tablet on the tongue Every 8 (Eight) Hours As Needed for Nausea or Vomiting., Disp: 20 tablet, Rfl: 0  •  traMADol (ULTRAM) 50 MG tablet, TAKE 1 TABLET BY MOUTH EVERY 8 HOURS AS NEEDED FOR 7 DAYS, Disp: , Rfl:   •  Turmeric (QC Tumeric Complex) 500 MG capsule, Take 1 capsule by mouth Daily., Disp: ,  Rfl:         Allergies   Allergen Reactions   • Oxycodone-Acetaminophen Hallucinations           Past Medical History:   Diagnosis Date   • Abnormal electrocardiogram     2006 heart attack   • Arthritis    • Chest discomfort    • Colon polyp    • Coronary artery disease    • Disease of thyroid gland    • Diverticulitis    • Elevated cholesterol    • GERD (gastroesophageal reflux disease)    • Heart attack     2006   • Hyperlipidemia    • Hypertension            Past Surgical History:   Procedure Laterality Date   • APPENDECTOMY     • CARDIAC SURGERY      3 stents   • CHOLECYSTECTOMY     • COLONOSCOPY N/A 07/13/2016    Procedure: COLONOSCOPY;  Surgeon: Giovani Avelar MD;  Location: [facility] OR;  Service:    • COLONOSCOPY N/A 06/13/2019    Procedure: Colonoscopy with possible biopsy or polypectomy;  Surgeon: Alfreda Soler DO;  Location: [facility] OR;  Service: Gastroenterology   • COLONOSCOPY N/A 05/18/2021    Procedure: Colonoscopy with polypectomy, biopsy;  Surgeon: Alfreda Soler DO;  Location: [facility] OR;  Service: Gastroenterology;  Laterality: N/A;  diverticulosis  cecal biopsy  right colon polyp  left colon polyps x2   • COLONOSCOPY W/ POLYPECTOMY N/A 10/8/2024    Procedure: COLONOSCOPY WITH POLYPECTOMY;  Surgeon: Alfreda Soler DO;  Location: [facility] OR;  Service: Gastroenterology;  Laterality: N/A;  diverticulosis, sigmoid colon polyp, right colon polyp   • CORONARY STENT PLACEMENT     • ENDOSCOPY N/A 11/21/2017    Procedure: ESOPHAGOGASTRODUODENOSCOPY with CARIN test ;  Surgeon: Alfreda Soler DO;  Location: [facility] OR;  Service:    • ENDOSCOPY N/A 06/13/2019    Procedure: Esophagogastroduodenoscopy with possible biopsy, polypectomy, dilation;  Surgeon: Alfreda Soler DO;  Location: [facility] OR;  Service: Gastroenterology   • ENDOSCOPY N/A 05/18/2021    Procedure: Esophagogastroduodenoscopy with biopsies;  Surgeon: Alfreda Soler DO;  Location: [facility] OR;  Service:  Gastroenterology;  Laterality: N/A;  CARIN test  gastritis  duodenitis   • EPIDURAL BLOCK  2024   • EYE SURGERY     • FOOT GANGLION EXCISION Left    • HEMORROIDECTOMY     • HERNIA REPAIR     • HYSTERECTOMY      GREG with OC age 30 for DUB   • INJECTION OF MEDICATION Bilateral 2024    piriformis   • MEDIAL BRANCH BLOCK Bilateral     lumbar   • MEDIAL BRANCH BLOCK Bilateral 2024    L3-L5   • OOPHORECTOMY      dx lsc , interval USO   • TOTAL HIP ARTHROPLASTY Right 2023    Procedure: TOTAL HIP ARTHROPLASTY ANTERIOR WITH HANA TABLE;  Surgeon: Aidan Wallace MD;  Location: [facility] OR;  Service: Orthopedics;  Laterality: Right;   • TOTAL SHOULDER ARTHROPLASTY W/ DISTAL CLAVICLE EXCISION Right 2024    Procedure: Reverse total shoulder arthroplasty for treatment of proximal humerus fracture and all associated procedures;  Surgeon: Farhat Ang MD;  Location: [facility] OR;  Service: Orthopedics;  Laterality: Right;           Social History     Tobacco Use   • Smoking status: Former     Current packs/day: 0.00     Average packs/day: 1 pack/day for 50.0 years (50.0 ttl pk-yrs)     Types: Cigarettes     Start date: 10/13/1956     Quit date: 10/13/2006     Years since quittin.5     Passive exposure: Never   • Smokeless tobacco: Never   • Tobacco comments:     quit    Vaping Use   • Vaping status: Never Used   Substance Use Topics   • Alcohol use: Yes     Comment: occasional   • Drug use: No           Immunization History   Administered Date(s) Administered   • COVID-19 (MODERNA) 1st,2nd,3rd Dose Monovalent 2021, 2021, 2021   • COVID-19 (MODERNA) BIVALENT 12+YRS 2022   • Fluzone High-Dose 65+YRS 10/12/2018, 10/10/2019   • Fluzone High-Dose 65+yrs 10/24/2020, 10/05/2022, 2023   • Pneumococcal Conjugate 13-Valent (PCV13) 10/24/2020   • Pneumococcal Polysaccharide (PPSV23) 10/12/2018   • Shingrix 2023, 2024   • Td (TDVAX) 2002   • Tdap  "10/05/2022   • Zostavax 07/01/2013           Physical Exam  Vitals and nursing note reviewed.   Constitutional:       Appearance: Normal appearance.   Cardiovascular:      Rate and Rhythm: Normal rate and regular rhythm.   Pulmonary:      Effort: Pulmonary effort is normal.      Breath sounds: Normal breath sounds.   Abdominal:      General: Bowel sounds are normal.      Palpations: Abdomen is soft.   Skin:     General: Skin is warm and dry.   Neurological:      Mental Status: She is alert.         Physical Exam  Lungs were auscultated.  Heart was examined.  Abdominal exam was performed.    Debilities/Disabilities Identified: None    Emotional Behavior: Appropriate      /82   Pulse 72   Resp 20   Ht 152.4 cm (60\")   Wt 68.9 kg (152 lb)   LMP  (LMP Unknown)   BMI 29.69 kg/m²         Diagnoses and all orders for this visit:    1. Other dysphagia (Primary)    2. Other constipation    3. Generalized abdominal pain    Other orders  -     docusate sodium (Colace) 100 MG capsule; Take 1 capsule by mouth 2 (Two) Times a Day.  Dispense: 60 capsule; Refill: 1      Assessment & Plan  1. Constipation.  Her constipation may be attributed to her diverticulosis, which occasionally complicates bowel movements. Given her back issues, she is not an ideal candidate for surgical intervention due to the associated risks. A CT scan of the abdomen and pelvis will be ordered to rule out any potential colon inflammation. She will continue her lactulose regimen and Colace will be added as a stool softener to counteract the constipating effects of her pain medication.    2. Back pain.  Her back pain could potentially be linked to a slipped disc, which can manifest in various ways. She is advised to maintain her appointment with the back specialist and discuss the possibility of resuming physical therapy. The use of a wheelchair for long distances is acceptable, but she is encouraged to walk as much as possible and utilize her " walker. A consultation with a chiropractor may also be beneficial if her bone health permits.    3. Swallowing issues.  An upper endoscopy will be scheduled to further investigate her swallowing difficulties.    Patient or patient representative verbalized consent for the use of Ambient Listening during the visit with  Alfreda Soler DO for chart documentation. 4/14/2025  09:42 EDT

## 2025-04-16 ENCOUNTER — TELEPHONE (OUTPATIENT)
Dept: ORTHOPEDIC SURGERY | Facility: CLINIC | Age: 80
End: 2025-04-16
Payer: MEDICARE

## 2025-04-16 DIAGNOSIS — K57.90 DIVERTICULOSIS: ICD-10-CM

## 2025-04-16 DIAGNOSIS — K92.1 BLACK STOOL: ICD-10-CM

## 2025-04-16 DIAGNOSIS — R10.30 LOWER ABDOMINAL PAIN: ICD-10-CM

## 2025-04-16 DIAGNOSIS — R10.13 EPIGASTRIC PAIN: ICD-10-CM

## 2025-04-16 DIAGNOSIS — K59.09 OTHER CONSTIPATION: Primary | ICD-10-CM

## 2025-04-16 NOTE — TELEPHONE ENCOUNTER
Caller: YARON    Relationship: [facility]    Best call back number:     What form or medical record are you requesting: RECORDS FOR [facility]. YARON STATED THEY RECEIVED A REFERRAL BUT IT DID NOT STATE WHAT IT WAS REGARDING OR HAVE ANY RECORDS ATTACHED    How would you like to receive the form or medical records (pick-up, mail, fax): FAX  If fax, what is the fax number:     Timeframe paperwork needed: BY TOMORROW , PT HAS APPT AT 10:30

## 2025-04-18 ENCOUNTER — TRANSCRIBE ORDERS (OUTPATIENT)
Dept: ADMINISTRATIVE | Facility: HOSPITAL | Age: 80
End: 2025-04-18
Payer: MEDICARE

## 2025-04-18 DIAGNOSIS — M54.50 LOW BACK PAIN, UNSPECIFIED BACK PAIN LATERALITY, UNSPECIFIED CHRONICITY, UNSPECIFIED WHETHER SCIATICA PRESENT: Primary | ICD-10-CM

## 2025-04-23 ENCOUNTER — TELEPHONE (OUTPATIENT)
Dept: NEUROSURGERY | Facility: CLINIC | Age: 80
End: 2025-04-23
Payer: MEDICARE

## 2025-04-23 NOTE — TELEPHONE ENCOUNTER
Tried calling patient to inform patient needs to see patient in the office prior to scheduling a myelogram ; no answer VM full

## 2025-04-23 NOTE — TELEPHONE ENCOUNTER
PATIENT'S  CALLED AND WOULD LIKE TO MOVE FORWARD WITH THE MYELOGRAM THAT WAS RECOMMENDED IN 2024    LAST TIME SEEM WAS 8/6/24    THEY CANNOT GET AN APPOINTMENT WITH [facility] UNTIL JUNE 2025     WANT TO SEE IF DR. HURT CAN SEE HER SOONER     PLEASE CALL AL WITH ANY ADVICE     [phone]      THANK YOU!

## 2025-04-24 ENCOUNTER — HOSPITAL ENCOUNTER (OUTPATIENT)
Dept: CT IMAGING | Facility: HOSPITAL | Age: 80
Discharge: HOME | End: 2025-04-24
Admitting: SURGERY
Payer: MEDICARE

## 2025-04-24 DIAGNOSIS — K57.90 DIVERTICULOSIS: ICD-10-CM

## 2025-04-24 DIAGNOSIS — K59.09 OTHER CONSTIPATION: ICD-10-CM

## 2025-04-24 DIAGNOSIS — R10.30 LOWER ABDOMINAL PAIN: ICD-10-CM

## 2025-04-24 DIAGNOSIS — R10.13 EPIGASTRIC PAIN: ICD-10-CM

## 2025-04-24 DIAGNOSIS — K92.1 BLACK STOOL: ICD-10-CM

## 2025-04-24 PROCEDURE — 74177 CT ABD & PELVIS W/CONTRAST: CPT

## 2025-04-24 PROCEDURE — 25510000001 IOPAMIDOL PER 1 ML: Performed by: SURGERY

## 2025-04-24 RX ORDER — IOPAMIDOL 755 MG/ML
100 INJECTION, SOLUTION INTRAVASCULAR
Status: COMPLETED | OUTPATIENT
Start: 2025-04-24 | End: 2025-04-24

## 2025-04-24 RX ADMIN — IOPAMIDOL 100 ML: 755 INJECTION, SOLUTION INTRAVENOUS at 15:01

## 2025-04-24 NOTE — TELEPHONE ENCOUNTER
PT'S , AL CALLED BACK: I GOT PT SCHEDULED TO SEE DR. HURT ON 5/1/25 AT 8:45 AM-IS THERE ANYWAY TO GET PT SCHEDULED A LITTLE LATER BECAUSE THEY HAVE TO DRIVE 60 MILES? PLEASE ADVISE! THANKS!      AL'S CONTACT: [phone]  BEST TIME TO CALL: ANYTIME

## 2025-04-27 ENCOUNTER — ANESTHESIA EVENT (OUTPATIENT)
Dept: PERIOP | Facility: HOSPITAL | Age: 80
End: 2025-04-27
Payer: MEDICARE

## 2025-04-28 NOTE — TELEPHONE ENCOUNTER
S/w HarjitLevi and informed that they 8:45 is the only appt we have available for Thursday, he said that they will keep the appt and if they can't make it he will call and R/S

## 2025-04-29 ENCOUNTER — ANESTHESIA (OUTPATIENT)
Dept: PERIOP | Facility: HOSPITAL | Age: 80
End: 2025-04-29
Payer: MEDICARE

## 2025-04-29 ENCOUNTER — HOSPITAL ENCOUNTER (OUTPATIENT)
Facility: HOSPITAL | Age: 80
Setting detail: HOSPITAL OUTPATIENT SURGERY
Discharge: HOME | End: 2025-04-29
Attending: SURGERY | Admitting: SURGERY
Payer: MEDICARE

## 2025-04-29 VITALS
BODY MASS INDEX: 30.31 KG/M2 | DIASTOLIC BLOOD PRESSURE: 81 MMHG | HEART RATE: 58 BPM | SYSTOLIC BLOOD PRESSURE: 168 MMHG | WEIGHT: 155.2 LBS | RESPIRATION RATE: 16 BRPM | TEMPERATURE: 97.5 F | OXYGEN SATURATION: 96 %

## 2025-04-29 DIAGNOSIS — K59.09 OTHER CONSTIPATION: ICD-10-CM

## 2025-04-29 DIAGNOSIS — R10.84 GENERALIZED ABDOMINAL PAIN: ICD-10-CM

## 2025-04-29 DIAGNOSIS — R13.19 OTHER DYSPHAGIA: ICD-10-CM

## 2025-04-29 LAB — UREASE TISS QL: POSITIVE

## 2025-04-29 PROCEDURE — 25010000002 LIDOCAINE 2% SOLUTION: Performed by: NURSE ANESTHETIST, CERTIFIED REGISTERED

## 2025-04-29 PROCEDURE — 25010000002 PROPOFOL 200 MG/20ML EMULSION: Performed by: NURSE ANESTHETIST, CERTIFIED REGISTERED

## 2025-04-29 PROCEDURE — 87081 CULTURE SCREEN ONLY: CPT | Performed by: SURGERY

## 2025-04-29 PROCEDURE — 43239 EGD BIOPSY SINGLE/MULTIPLE: CPT | Performed by: SURGERY

## 2025-04-29 PROCEDURE — 25810000003 LACTATED RINGERS PER 1000 ML: Performed by: NURSE ANESTHETIST, CERTIFIED REGISTERED

## 2025-04-29 RX ORDER — LIDOCAINE HYDROCHLORIDE 20 MG/ML
INJECTION, SOLUTION INFILTRATION; PERINEURAL AS NEEDED
Status: DISCONTINUED | OUTPATIENT
Start: 2025-04-29 | End: 2025-04-29 | Stop reason: SURG

## 2025-04-29 RX ORDER — LIDOCAINE HYDROCHLORIDE 10 MG/ML
0.5 INJECTION, SOLUTION EPIDURAL; INFILTRATION; INTRACAUDAL; PERINEURAL ONCE AS NEEDED
Status: DISCONTINUED | OUTPATIENT
Start: 2025-04-29 | End: 2025-04-29 | Stop reason: HOSPADM

## 2025-04-29 RX ORDER — SODIUM CHLORIDE 0.9 % (FLUSH) 0.9 %
10 SYRINGE (ML) INJECTION EVERY 12 HOURS SCHEDULED
Status: DISCONTINUED | OUTPATIENT
Start: 2025-04-29 | End: 2025-04-29 | Stop reason: HOSPADM

## 2025-04-29 RX ORDER — ONDANSETRON 2 MG/ML
4 INJECTION INTRAMUSCULAR; INTRAVENOUS ONCE AS NEEDED
Status: DISCONTINUED | OUTPATIENT
Start: 2025-04-29 | End: 2025-04-29 | Stop reason: HOSPADM

## 2025-04-29 RX ORDER — SODIUM CHLORIDE 0.9 % (FLUSH) 0.9 %
10 SYRINGE (ML) INJECTION AS NEEDED
Status: DISCONTINUED | OUTPATIENT
Start: 2025-04-29 | End: 2025-04-29 | Stop reason: HOSPADM

## 2025-04-29 RX ORDER — SODIUM CHLORIDE, SODIUM LACTATE, POTASSIUM CHLORIDE, CALCIUM CHLORIDE 600; 310; 30; 20 MG/100ML; MG/100ML; MG/100ML; MG/100ML
9 INJECTION, SOLUTION INTRAVENOUS CONTINUOUS
Status: DISCONTINUED | OUTPATIENT
Start: 2025-04-29 | End: 2025-04-29 | Stop reason: HOSPADM

## 2025-04-29 RX ORDER — PROPOFOL 10 MG/ML
INJECTION, EMULSION INTRAVENOUS AS NEEDED
Status: DISCONTINUED | OUTPATIENT
Start: 2025-04-29 | End: 2025-04-29 | Stop reason: SURG

## 2025-04-29 RX ORDER — SODIUM CHLORIDE, SODIUM LACTATE, POTASSIUM CHLORIDE, CALCIUM CHLORIDE 600; 310; 30; 20 MG/100ML; MG/100ML; MG/100ML; MG/100ML
100 INJECTION, SOLUTION INTRAVENOUS ONCE
Status: DISCONTINUED | OUTPATIENT
Start: 2025-04-29 | End: 2025-04-29 | Stop reason: HOSPADM

## 2025-04-29 RX ORDER — SODIUM CHLORIDE 9 MG/ML
40 INJECTION, SOLUTION INTRAVENOUS AS NEEDED
Status: DISCONTINUED | OUTPATIENT
Start: 2025-04-29 | End: 2025-04-29 | Stop reason: HOSPADM

## 2025-04-29 RX ORDER — SUCRALFATE 1 G/1
1 TABLET ORAL 4 TIMES DAILY
Qty: 120 TABLET | Refills: 1 | Status: SHIPPED | OUTPATIENT
Start: 2025-04-29 | End: 2026-04-29

## 2025-04-29 RX ORDER — SODIUM CHLORIDE, SODIUM LACTATE, POTASSIUM CHLORIDE, CALCIUM CHLORIDE 600; 310; 30; 20 MG/100ML; MG/100ML; MG/100ML; MG/100ML
INJECTION, SOLUTION INTRAVENOUS CONTINUOUS PRN
Status: DISCONTINUED | OUTPATIENT
Start: 2025-04-29 | End: 2025-04-29 | Stop reason: SURG

## 2025-04-29 RX ADMIN — SODIUM CHLORIDE, POTASSIUM CHLORIDE, SODIUM LACTATE AND CALCIUM CHLORIDE: 600; 310; 30; 20 INJECTION, SOLUTION INTRAVENOUS at 10:57

## 2025-04-29 RX ADMIN — LIDOCAINE HYDROCHLORIDE 100 MG: 20 INJECTION, SOLUTION INFILTRATION; PERINEURAL at 10:59

## 2025-04-29 RX ADMIN — PROPOFOL 100 MG: 10 INJECTION, EMULSION INTRAVENOUS at 11:01

## 2025-04-29 NOTE — ANESTHESIA PREPROCEDURE EVALUATION
Anesthesia Evaluation     Patient summary reviewed and Nursing notes reviewed   no history of anesthetic complications:   NPO Solid Status: > 8 hours  NPO Liquid Status: > 8 hours           Airway   Mallampati: II  TM distance: >3 FB  Neck ROM: full  Possible difficult intubation  Dental    (+) implants, lower dentures and upper dentures    Comment: Caps along bottom    Pulmonary - normal exam    breath sounds clear to auscultation  (+) a smoker (quit 2006) Former, cigarettes,  (-) sleep apnea  Cardiovascular - normal exam    ECG reviewed  PT is on anticoagulation therapy  Patient on routine beta blocker and Beta blocker given within 24 hours of surgery  Rhythm: regular  Rate: normal    (+) hypertension well controlled 2 medications or greater, past MI (2006)  >12 months, CAD, cardiac stents (1 or 2, 2006) Drug eluting stent more than 12 months ago , hyperlipidemia  (-) angina, DVT    ROS comment: HEART RATE=63  bpm  RR Urigozot=513  ms  WA Kuukwpre=969  ms  P Horizontal Axis=27  deg  P Front Axis=8  deg  QRSD Interval=78  ms  QT Dvflmdin=045  ms  MTaE=284  ms  QRS Axis=16  deg  T Wave Axis=65  deg  - ABNORMAL ECG -  Sinus rhythm  Prolonged WA interval  No change from prior tracing  Electronically Signed By: Cassandra Layne ([facility]) 2024-11-12 13:33:31  Date and Time of Study:2024-11-12 10:47:07    ·   Calculated EF = 70%).  · Myocardial perfusion imaging indicates a normal myocardial perfusion study with no evidence of ischemia.  · Impressions are consistent with a low risk study.      Neuro/Psych- negative ROS  GI/Hepatic/Renal/Endo    (+) GERD well controlled, thyroid problem hypothyroidism    Musculoskeletal     (+) back pain (on pain meds), chronic pain, neck pain (aches), radiculopathy (sciatica) Right lower extremity  Abdominal  - normal exam    Abdomen: soft.   Substance History   (+) alcohol use (wine occassionally, <1 week)     OB/GYN negative ob/gyn ROS         Other   arthritis (generalizedTSA for fracture  11/2024),     ROS/Med Hx Other: Water with meds 0700                Anesthesia Plan    ASA 3     MAC     intravenous induction     Anesthetic plan, risks, benefits, and alternatives have been provided, discussed and informed consent has been obtained with: patient and spouse/significant other.    Use of blood products discussed with patient and spouse/significant other  Consented to blood products.    Plan discussed with CRNA.    CODE STATUS:

## 2025-04-29 NOTE — ANESTHESIA POSTPROCEDURE EVALUATION
Patient: Ashlie Levi    Procedure Summary       Date: 04/29/25 Room / Location: [facility] ENDOSCOPY 1 /  [facility] OR    Anesthesia Start: 1057 Anesthesia Stop: 1107    Procedure: ESOPHAGOGASTRODUODENOSCOPY WITH BIOPSY (Esophagus) Diagnosis:       Other dysphagia      Other constipation      Generalized abdominal pain      (Other dysphagia [R13.19])      (Other constipation [K59.09])      (Generalized abdominal pain [R10.84])    Surgeons: Alfreda Soler DO Provider: Carine Saldana CRNA    Anesthesia Type: MAC ASA Status: 3            Anesthesia Type: MAC    Vitals  Vitals Value Taken Time   /73 04/29/25 11:50   Temp     Pulse 60 04/29/25 11:52   Resp 16 04/29/25 11:30   SpO2 95 % 04/29/25 11:50   Vitals shown include unfiled device data.        Post Anesthesia Care and Evaluation    Patient location during evaluation: PHASE II  Patient participation: complete - patient participated  Level of consciousness: awake and alert  Pain score: 0  Pain management: satisfactory to patient    Airway patency: patent  Anesthetic complications: No anesthetic complications  PONV Status: none  Cardiovascular status: acceptable  Respiratory status: acceptable  Hydration status: acceptable

## 2025-04-29 NOTE — INTERVAL H&P NOTE
H&P reviewed. The patient was examined and there are no changes to the H&P.      Temp 97.5 °F (36.4 °C) (Oral)   Wt 70.4 kg (155 lb 3.2 oz)   LMP  (LMP Unknown)   BMI 30.31 kg/m²

## 2025-04-29 NOTE — OP NOTE
EGD Procedure Note    Pre-operative Diagnosis: Dysphagia and abdominal pain    Post-operative Diagnosis: Gastritis    Procedure:  EGD with biopsy for H. pylori with cold forceps    Surgeon: Karlene    Estimated Blood Loss: Minimal    Complications: None    Anesthetic: MAC per Carine Saldana CRNA    Indications: See preoperative diagnosis    Findings/Treatments:    Gastritis-biopsy for H. pylori using cold forceps    Recommendations:  -Await pathology.      Technique:    After discussing the benefits and risks of an EGD, benefits and risks not limited to but including:  Bleeding, infection, perforation, aspiration; informed consent was signed.  The patient was taken into the endoscopy suite at [facility] and placed in the left lateral decubitus position.  MAC anesthesia was induced under appropriate monitoring.  Bite block was placed and the gastroscope was inserted thru such and advanced under direct vision to second portion of the duodenum.  A careful inspection was made as the gastroscope was withdrawn, including a retroflexed view of the proximal stomach; findings and interventions are described below.  If biopsies were taken, this was done with the cold biopsy forceps.    Alfreda Soler D.O.

## 2025-04-30 ENCOUNTER — TELEPHONE (OUTPATIENT)
Dept: SURGERY | Facility: CLINIC | Age: 80
End: 2025-04-30
Payer: MEDICARE

## 2025-04-30 RX ORDER — AMOXICILLIN 500 MG/1
CAPSULE ORAL
Qty: 56 CAPSULE | Refills: 0 | Status: SHIPPED | OUTPATIENT
Start: 2025-04-30

## 2025-04-30 RX ORDER — OMEPRAZOLE 40 MG/1
40 CAPSULE, DELAYED RELEASE ORAL 2 TIMES DAILY
Qty: 28 CAPSULE | Refills: 0 | Status: SHIPPED | OUTPATIENT
Start: 2025-04-30 | End: 2025-05-14

## 2025-04-30 RX ORDER — CLARITHROMYCIN 500 MG/1
TABLET ORAL
Qty: 28 TABLET | Refills: 0 | Status: SHIPPED | OUTPATIENT
Start: 2025-04-30

## 2025-04-30 NOTE — PROGRESS NOTES
3  SUBJECTIVE:   CC: Rj Licea is an 23 year old male who presents for preventive health visit.       Patient has been advised of split billing requirements and indicates understanding: Yes  Healthy Habits:    Do you get at least three servings of calcium containing foods daily (dairy, green leafy vegetables, etc.)? yes    Amount of exercise or daily activities, outside of work: 0 day(s) per week    Problems taking medications regularly not applicable    Medication side effects: No    Have you had an eye exam in the past two years? no    Do you see a dentist twice per year? yes    Do you have sleep apnea, excessive snoring or daytime drowsiness?yes    Rj presents today with his mother for his annual wellness visit. with covid has been at home pretty much all of the time. he is tolerating his feeds well, but no appreciate weight gain. not very active, has been doing well from a medication standpoint. still has bouts of severe diarrhea, not sure if related to feeds or not. no complaints of  abdominal pain. no blood in still. no issues with G tube. no recent med changes. specialists have requested some labs and will get those today with routine labs. some bruising on legs at times, not sure if from bumping things or from hitting his legs when is is singing or anxious/agitatied. does not need refills today. Mom agrees with transition to adult GI and get input and new reccs on feeds, etc as well as management of loose stools.   Today's PHQ-2 Score: 2  PHQ-2 ( 1999 [employer]) 3/12/2021 2/6/2019   Q1: Little interest or pleasure in doing things 1 1   Q2: Feeling down, depressed or hopeless 1 2   PHQ-2 Score 2 3   Q1: Little interest or pleasure in doing things Several days Several days   Q2: Feeling down, depressed or hopeless Several days More than half the days   PHQ-2 Score 2 3       Abuse: Current or Past(Physical, Sexual or Emotional)- No  Do you feel safe in your environment? Yes        Social History  Subjective   Patient ID: Ashlie Levi is a 79 y.o. female is here today for follow-up to discuss having a myelogram.    Today patient states that she has low back pain that radiates to B/L buttocks, along with N/T in B/L legs     History of Present Illness    This patient returns today.  She continues with pain in her back with radiation to her right hip and into her groin on both sides.  She has pain down her right leg as well.  This does wax and wane some but it is always there.  About 3 weeks ago was really severe.  She has had injections and physical therapy in the past but nothing is really helped very much.    The following portions of the patient's history were reviewed and updated as appropriate: allergies, current medications, past family history, past medical history, past social history, past surgical history, and problem list.      Objective     There were no vitals filed for this visit.  There is no height or weight on file to calculate BMI.    Tobacco Use: Medium Risk (5/1/2025)    Patient History     Smoking Tobacco Use: Former     Smokeless Tobacco Use: Never     Passive Exposure: Never          Physical Exam    Neurological:      Mental Status: He is alert and oriented to person, place, and time.       Neurological Exam    Mental Status  Alert. Oriented to person, place, and time.            Assessment & Plan   Independent Review of Radiographic Studies:      I personally reviewed the images from the following studies.    I reviewed her MRI which was done in November 2023.  This shows a little left-sided narrowing at L3-4 but the other levels are widely open.  She did have a compression fracture of L1 that was new since August 2021.    Medical Decision Making:      I again told the patient I thought we should go ahead and do a lumbar myelogram.  I told the patient what a myelogram involves.  I explained that there is a 50% chance of developing a bad headache and nausea as a result of the test.  I  "    Tobacco Use     Smoking status: Never Smoker     Smokeless tobacco: Never Used   Substance Use Topics     Alcohol use: No     If you drink alcohol do you typically have >3 drinks per day or >7 drinks per week? No                      Last PSA: No results found for: PSA    Reviewed orders with patient. Reviewed health maintenance and updated orders accordingly - Yes  BP Readings from Last 3 Encounters:   03/17/21 106/64   05/30/20 118/73   02/06/20 98/76    Wt Readings from Last 3 Encounters:   03/17/21 37.9 kg (83 lb 9.6 oz)   05/29/20 41.5 kg (91 lb 9.6 oz)   02/06/20 39.4 kg (86 lb 14.4 oz)                    Reviewed and updated as needed this visit by clinical staff   Allergies  Meds              Reviewed and updated as needed this visit by Provider                    ROS:  CONSTITUTIONAL: NEGATIVE for fever, chills, change in weight  INTEGUMENTARY/SKIN: NEGATIVE for worrisome rashes, moles or lesions  EYES: NEGATIVE for vision changes or irritation  ENT: NEGATIVE for ear, mouth and throat problems  RESP: NEGATIVE for significant cough or SOB  CV: NEGATIVE for chest pain, palpitations or peripheral edema  GI: NEGATIVE for nausea, abdominal pain, heartburn, or change in bowel habits   male: negative for dysuria, hematuria, decreased urinary stream, erectile dysfunction, urethral discharge  MUSCULOSKELETAL: NEGATIVE for significant arthralgias or myalgia  NEURO: NEGATIVE for weakness, dizziness or paresthesias  PSYCHIATRIC: NEGATIVE for changes in mood or affect    OBJECTIVE:   /64   Pulse 92   Temp 98.2  F (36.8  C) (Oral)   Resp 16   Ht 1.556 m (5' 1.25\")   Wt 37.9 kg (83 lb 9.6 oz)   BMI 15.67 kg/m       Wt Readings from Last 3 Encounters:   03/17/21 37.9 kg (83 lb 9.6 oz)   05/29/20 41.5 kg (91 lb 9.6 oz)   02/06/20 39.4 kg (86 lb 14.4 oz)       EXAM:  GENERAL: alert and no distress  EYES: blind, lids within normal limits bileterally  HENT: normocephalic and atrumatic mucous membranes moist " explained that there is also a very small chance of infection, seizures, and bleeding.  I explained how we would treat a post myelogram headache including bedrest, caffeinated fluids, steroids, and blood patch.  The patient does ask to proceed.    Diagnoses and all orders for this visit:    1. Lumbar radiculopathy (Primary)  -     Obtain Informed Consent; Standing  -     IR Myelogram Lumbar Spine; Future  -     CT Lumbar Spine With Intrathecal Contrast; Future  -     XR Spine Lumbar Complete With Flex & Ext; Future  -     No Lab Testing Needed; Standing  -     dexAMETHasone (DECADRON) 4 MG tablet; Take 2 tablets by mouth Take As Directed. Take both tablets by mouth 2 hours before myelogram  Dispense: 2 tablet; Refill: 0      Return for After radiology test.            RESP: lungs clear to auscultation - no rales, rhonchi or wheezes  CV: regular rate and rhythm, normal S1 S2, no S3 or S4, no murmur, click or rub, no peripheral edema  ABDOMEN: soft, nontender, no hepatosplenomegaly, no masses and bowel sounds normal in place  MS: no gross musculoskeletal defects noted, no edema; + mm atrophy stable and symmetric  SKIN: no suspicious lesions or rashes  NEURO: mentation intact and speech at baseline    Results for orders placed or performed in visit on 03/17/21   Ammonia     Status: None   Result Value Ref Range    Ammonia 16 10 - 50 umol/L   Clobazam and Metabolite, Quantitative, Serum or Plasma     Status: None   Result Value Ref Range    Clobazam 218 30 - 300 ng/mL    N-Desmethylclobazam 1,955 300 - 3,000 ng/mL   Lipid panel reflex to direct LDL Fasting     Status: None   Result Value Ref Range    Cholesterol 179 <200 mg/dL    Triglycerides 57 <150 mg/dL    HDL Cholesterol 95 >39 mg/dL    LDL Cholesterol Calculated 73 <100 mg/dL    Non HDL Cholesterol 84 <130 mg/dL   **Comprehensive metabolic panel FUTURE anytime     Status: Abnormal   Result Value Ref Range    Sodium 136 133 - 144 mmol/L    Potassium 4.2 3.4 - 5.3 mmol/L    Chloride 104 94 - 109 mmol/L    Carbon Dioxide 30 20 - 32 mmol/L    Anion Gap 2 (L) 3 - 14 mmol/L    Glucose 73 70 - 99 mg/dL    Urea Nitrogen 15 7 - 30 mg/dL    Creatinine 0.79 0.66 - 1.25 mg/dL    GFR Estimate >90 >60 mL/min/[1.73_m2]    GFR Estimate If Black >90 >60 mL/min/[1.73_m2]    Calcium 9.0 8.5 - 10.1 mg/dL    Bilirubin Total 0.9 0.2 - 1.3 mg/dL    Albumin 3.6 3.4 - 5.0 g/dL    Protein Total 7.5 6.8 - 8.8 g/dL    Alkaline Phosphatase 81 40 - 150 U/L    ALT 26 0 - 70 U/L    AST 37 0 - 45 U/L   Prealbumin     Status: None   Result Value Ref Range    Prealbumin 25 15 - 45 mg/dL   **A1C FUTURE anytime     Status: None   Result Value Ref Range    Hemoglobin A1C 5.0 0 - 5.6 %   Valproic acid     Status: None   Result Value Ref Range    Valproic Acid  Level 92 50 - 100 mg/L   **CBC with platelets FUTURE anytime     Status: Abnormal   Result Value Ref Range    WBC 6.7 4.0 - 11.0 10e9/L    RBC Count 4.23 (L) 4.4 - 5.9 10e12/L    Hemoglobin 14.8 13.3 - 17.7 g/dL    Hematocrit 43.8 40.0 - 53.0 %     (H) 78 - 100 fl    MCH 35.0 (H) 26.5 - 33.0 pg    MCHC 33.8 31.5 - 36.5 g/dL    RDW 12.4 10.0 - 15.0 %    Platelet Count 113 (L) 150 - 450 10e9/L   **Vitamin D Deficiency FUTURE anytime     Status: None   Result Value Ref Range    Vitamin D Deficiency screening 51 20 - 75 ug/L   **TSH with free T4 reflex FUTURE anytime     Status: None   Result Value Ref Range    TSH 0.94 0.40 - 4.00 mU/L   Gabapentin level     Status: None   Result Value Ref Range    Gabapentin Level 7.0 4.0 - 16.0 ug/mL         ASSESSMENT/PLAN:   1. Routine general medical examination at a health care facility  d/w pt preventative care measures including seat belt use, bike helmet, moderation of EtOH, avoiding tobacco, avoiding excessive sun exposure/sunscreen, wt management or wt loss if BMI > 30, need to screen for lipid disorders, mood disorders, CAD risk factors, etc. Also discussed accident prevention and future RHM schedule.   - REVIEW OF HEALTH MAINTENANCE PROTOCOL ORDERS  - Gabapentin level    2. Mild protein-calorie malnutrition (H)  discussed with patient (or patient's parents/caregiver) pathophysiology of condition and treatment options.  weight down a little but stable, wonder about adjusting feeds potentially. will get info on who at [facility] patient could extablish with gor ongoing GI uspport and nutritional adjustments, will get back to mom when I hear back. Patient's parent(s) verbalized understanding and are agreeable to this plan.    - Lipid panel reflex to direct LDL Fasting  - **Comprehensive metabolic panel FUTURE anytime  - Prealbumin  - **A1C FUTURE anytime  - Valproic acid  - **CBC with platelets FUTURE anytime  - **Vitamin D Deficiency FUTURE anytime  - **TSH with free T4  "reflex FUTURE anytime    3. Mood disorder (H)  Stable. continue cares and plan and follow-up with /MH as scheduled. will get requested labs today as well.    4. CP (cerebral palsy), spastic, diplegic (H)  as above, due for labs.   - Lipid panel reflex to direct LDL Fasting  - **Comprehensive metabolic panel FUTURE anytime  - **A1C FUTURE anytime  - Valproic acid  - **CBC with platelets FUTURE anytime  - **Vitamin D Deficiency FUTURE anytime  - **TSH with free T4 reflex FUTURE anytime  - Gabapentin level    5. Seizure disorder (H)  Well controlled on current medication(s). will get labs as requested by neuro.   - Ammonia  - Clobazam and Metabolite, Quantitative, Serum or Plasma  - Valproic acid  - Gabapentin level    6. Gastrostomy tube dependent (H)  as above, likely needs reevaluation of feeds.   - Lipid panel reflex to direct LDL Fasting  - **Comprehensive metabolic panel FUTURE anytime  - Prealbumin  - **A1C FUTURE anytime  - **CBC with platelets FUTURE anytime  - **Vitamin D Deficiency FUTURE anytime  - **TSH with free T4 reflex FUTURE anytime    7. Autism spectrum disorder associated with known medical or genetic condition or environmental factor, requiring substantial support (level 2)  discussed with patient (or patient's parents/caregiver) pathophysiology of condition and treatment options.  reviewed labs, medications, diet ,etc.  MOm feels well supported at this time, reviwed CC resources, etc.   - **Comprehensive metabolic panel FUTURE anytime    Patient has been advised of split billing requirements and indicates understanding: Yes  COUNSELING:  Reviewed preventive health counseling, as reflected in patient instructions    Estimated body mass index is 17.17 kg/m  as calculated from the following:    Height as of 2/6/20: 1.556 m (5' 1.25\").    Weight as of 5/29/20: 41.5 kg (91 lb 9.6 oz).    Weight management plan noted, stable and monitoring and pre-albumin level obtained    He reports that he has never " smoked. He has never used smokeless tobacco.      Counseling Resources:  ATP IV Guidelines  Pooled Cohorts Equation Calculator  FRAX Risk Assessment  ICSI Preventive Guidelines  Dietary Guidelines for Americans, 2010  USDA's MyPlate  ASA Prophylaxis  Lung CA Screening    I have discussed with patient the risks, benefits, medications, treatment options and modalities.   I have instructed the patient to call or schedule a follow-up appointment if any problems or failure to improve.     Total additonal time spent with patient discussing medical issues in addition to the physical/wellness exam was 30 min,  greater than 50% of the time spent face-to-face with the patient discussing the above issue(s) including nutrition, feeds, medication, labs, speciality referrals, and plan of care.     Micky Leyva MD  [facility]

## 2025-04-30 NOTE — H&P (VIEW-ONLY)
Subjective   Patient ID: Ashlie Levi is a 79 y.o. female is here today for follow-up to discuss having a myelogram.    Today patient states that she has low back pain that radiates to B/L buttocks, along with N/T in B/L legs     History of Present Illness    This patient returns today.  She continues with pain in her back with radiation to her right hip and into her groin on both sides.  She has pain down her right leg as well.  This does wax and wane some but it is always there.  About 3 weeks ago was really severe.  She has had injections and physical therapy in the past but nothing is really helped very much.    The following portions of the patient's history were reviewed and updated as appropriate: allergies, current medications, past family history, past medical history, past social history, past surgical history, and problem list.      Objective     There were no vitals filed for this visit.  There is no height or weight on file to calculate BMI.    Tobacco Use: Medium Risk (5/1/2025)    Patient History     Smoking Tobacco Use: Former     Smokeless Tobacco Use: Never     Passive Exposure: Never          Physical Exam    Neurological:      Mental Status: He is alert and oriented to person, place, and time.       Neurological Exam    Mental Status  Alert. Oriented to person, place, and time.            Assessment & Plan   Independent Review of Radiographic Studies:      I personally reviewed the images from the following studies.    I reviewed her MRI which was done in November 2023.  This shows a little left-sided narrowing at L3-4 but the other levels are widely open.  She did have a compression fracture of L1 that was new since August 2021.    Medical Decision Making:      I again told the patient I thought we should go ahead and do a lumbar myelogram.  I told the patient what a myelogram involves.  I explained that there is a 50% chance of developing a bad headache and nausea as a result of the test.  I  explained that there is also a very small chance of infection, seizures, and bleeding.  I explained how we would treat a post myelogram headache including bedrest, caffeinated fluids, steroids, and blood patch.  The patient does ask to proceed.    Diagnoses and all orders for this visit:    1. Lumbar radiculopathy (Primary)  -     Obtain Informed Consent; Standing  -     IR Myelogram Lumbar Spine; Future  -     CT Lumbar Spine With Intrathecal Contrast; Future  -     XR Spine Lumbar Complete With Flex & Ext; Future  -     No Lab Testing Needed; Standing  -     dexAMETHasone (DECADRON) 4 MG tablet; Take 2 tablets by mouth Take As Directed. Take both tablets by mouth 2 hours before myelogram  Dispense: 2 tablet; Refill: 0      Return for After radiology test.

## 2025-05-01 ENCOUNTER — HOSPITAL ENCOUNTER (OUTPATIENT)
Dept: MAMMOGRAPHY | Facility: HOSPITAL | Age: 80
Discharge: HOME | End: 2025-05-01
Admitting: INTERNAL MEDICINE
Payer: MEDICARE

## 2025-05-01 ENCOUNTER — OFFICE VISIT (OUTPATIENT)
Dept: NEUROSURGERY | Facility: CLINIC | Age: 80
End: 2025-05-01
Payer: MEDICARE

## 2025-05-01 DIAGNOSIS — Z12.31 VISIT FOR SCREENING MAMMOGRAM: ICD-10-CM

## 2025-05-01 DIAGNOSIS — M54.16 LUMBAR RADICULOPATHY: Primary | ICD-10-CM

## 2025-05-01 PROCEDURE — 77067 SCR MAMMO BI INCL CAD: CPT

## 2025-05-01 PROCEDURE — 77063 BREAST TOMOSYNTHESIS BI: CPT | Performed by: RADIOLOGY

## 2025-05-01 PROCEDURE — 99214 OFFICE O/P EST MOD 30 MIN: CPT | Performed by: NEUROLOGICAL SURGERY

## 2025-05-01 PROCEDURE — 77067 SCR MAMMO BI INCL CAD: CPT | Performed by: RADIOLOGY

## 2025-05-01 PROCEDURE — 77063 BREAST TOMOSYNTHESIS BI: CPT

## 2025-05-01 RX ORDER — DEXAMETHASONE 4 MG/1
8 TABLET ORAL TAKE AS DIRECTED
Qty: 2 TABLET | Refills: 0 | Status: SHIPPED | OUTPATIENT
Start: 2025-05-01

## 2025-05-01 RX ORDER — BENZONATATE 200 MG/1
1 CAPSULE ORAL 3 TIMES DAILY
COMMUNITY
Start: 2025-04-14

## 2025-05-01 NOTE — PROGRESS NOTES
05/08/25 0003   Pre-Procedure Phone Call   Procedure Time Verified Yes   Arrival Time 0600   Procedure Location Verified Yes   Medical History Reviewed No   NPO Status Reinforced Yes   Ride and Caregiver Arranged Yes   Patient Knows to Bring Current Medications No   Bring Outside Films Requested No

## 2025-05-05 ENCOUNTER — OFFICE VISIT (OUTPATIENT)
Dept: SURGERY | Facility: CLINIC | Age: 80
End: 2025-05-05
Payer: MEDICARE

## 2025-05-05 DIAGNOSIS — A04.8 H. PYLORI INFECTION: Primary | ICD-10-CM

## 2025-05-05 PROCEDURE — 1159F MED LIST DOCD IN RCRD: CPT | Performed by: SURGERY

## 2025-05-05 PROCEDURE — 1160F RVW MEDS BY RX/DR IN RCRD: CPT | Performed by: SURGERY

## 2025-05-05 PROCEDURE — 99213 OFFICE O/P EST LOW 20 MIN: CPT | Performed by: SURGERY

## 2025-05-05 NOTE — LETTER
May 5, 2025     Kole Silva MD  [address] [place] [address]    Patient: Ashlie Levi   YOB: 1945   Date of Visit: 5/5/2025     Dear Kole Silva MD:       Thank you for referring Ashlie Levi to me for evaluation. Below are the relevant portions of my assessment and plan of care.    If you have questions, please do not hesitate to call me. I look forward to following Ashlie along with you.         Sincerely,        Alfreda Soler DO        CC: No Recipients    Alfreda Soler DO  05/05/25 0956  Sign when Signing Visit  Ashlie Levi 79 y.o. female presents for  FU EGD/+ H Pylori/ CT. Pt states she is feeling much better.  Pt is still taking Rx and states she is doing well with meds.     History of Present Illness  The patient is an 79-year-old female here for follow-up from EGD.    She reports a general sense of well-being, with no abdominal discomfort. She has been adhering to her prescribed medication regimen for her infection, which she was previously unaware of due to the absence of pain. She has been maintaining good hygiene practices, such as washing her hands before meals and avoiding sharing food or drink with others. She also inquires about the possibility of sexual activity with her . She has been taking her bowel medication twice daily, which has proven beneficial. She has one bottle remaining from her initial prescription of three.    Her back is feeling better, and she is walking better. She saw Dr. Eduardo, who wants to do a myelogram. He explained that he would give her some kind of shot along the spine and do a scan, and she would have to lay flat on her back for 2 days. He mentioned that he does not operate on people over 80 but can do minor surgery if he sees something that he can help with.      Review of Systems        Past Medical History:   Diagnosis Date   • Abnormal electrocardiogram     2006 heart attack   • Arthritis    • Chest discomfort     • Colon polyp    • Coronary artery disease    • Disease of thyroid gland    • Diverticulitis    • Elevated cholesterol    • GERD (gastroesophageal reflux disease)    • Heart attack     2006   • Hyperlipidemia    • Hypertension            Past Surgical History:   Procedure Laterality Date   • APPENDECTOMY     • CARDIAC SURGERY      3 stents   • CHOLECYSTECTOMY     • COLONOSCOPY N/A 07/13/2016    Procedure: COLONOSCOPY;  Surgeon: Giovani Avelar MD;  Location:  [facility] OR;  Service:    • COLONOSCOPY N/A 06/13/2019    Procedure: Colonoscopy with possible biopsy or polypectomy;  Surgeon: Alfreda Soler DO;  Location:  [facility] OR;  Service: Gastroenterology   • COLONOSCOPY N/A 05/18/2021    Procedure: Colonoscopy with polypectomy, biopsy;  Surgeon: Alfreda Soler DO;  Location:  [facility] OR;  Service: Gastroenterology;  Laterality: N/A;  diverticulosis  cecal biopsy  right colon polyp  left colon polyps x2   • COLONOSCOPY W/ POLYPECTOMY N/A 10/8/2024    Procedure: COLONOSCOPY WITH POLYPECTOMY;  Surgeon: Alfreda Soler DO;  Location:  [facility] OR;  Service: Gastroenterology;  Laterality: N/A;  diverticulosis, sigmoid colon polyp, right colon polyp   • CORONARY STENT PLACEMENT     • ENDOSCOPY N/A 11/21/2017    Procedure: ESOPHAGOGASTRODUODENOSCOPY with CARIN test ;  Surgeon: Alfreda Soler DO;  Location:  [facility] OR;  Service:    • ENDOSCOPY N/A 06/13/2019    Procedure: Esophagogastroduodenoscopy with possible biopsy, polypectomy, dilation;  Surgeon: Alfreda Soler DO;  Location:  [facility];  Service: Gastroenterology   • ENDOSCOPY N/A 05/18/2021    Procedure: Esophagogastroduodenoscopy with biopsies;  Surgeon: Alfreda Soler DO;  Location:  [facility] OR;  Service: Gastroenterology;  Laterality: N/A;  CARIN test  gastritis  duodenitis   • ENDOSCOPY N/A 4/29/2025    Procedure: ESOPHAGOGASTRODUODENOSCOPY WITH BIOPSY;  Surgeon: Alfreda Soler DO;  Location:  [facility] OR;  Service:  Gastroenterology;  Laterality: N/A;  Mild Gastritis;   • EPIDURAL BLOCK  04/30/2024   • EYE SURGERY     • FOOT GANGLION EXCISION Left    • HEMORROIDECTOMY     • HERNIA REPAIR     • HYSTERECTOMY      GREG with [facility] age 30 for DUB   • INJECTION OF MEDICATION Bilateral 03/26/2024    piriformis   • MEDIAL BRANCH BLOCK Bilateral     lumbar   • MEDIAL BRANCH BLOCK Bilateral 06/11/2024    L3-L5   • OOPHORECTOMY      dx lsc , interval USO   • TOTAL HIP ARTHROPLASTY Right 08/23/2023    Procedure: TOTAL HIP ARTHROPLASTY ANTERIOR WITH HANA TABLE;  Surgeon: Aidan Wallace MD;  Location: [facility] OR;  Service: Orthopedics;  Laterality: Right;   • TOTAL SHOULDER ARTHROPLASTY W/ DISTAL CLAVICLE EXCISION Right 11/12/2024    Procedure: Reverse total shoulder arthroplasty for treatment of proximal humerus fracture and all associated procedures;  Surgeon: Farhat Ang MD;  Location: [facility] OR;  Service: Orthopedics;  Laterality: Right;           Physical Exam  Constitutional:       Appearance: Normal appearance.   Skin:     General: Skin is warm and dry.   Neurological:      General: No focal deficit present.      Mental Status: She is alert and oriented to person, place, and time.   Psychiatric:         Mood and Affect: Mood normal.         Behavior: Behavior normal.       Physical Exam        LMP  (LMP Unknown)         Diagnoses and all orders for this visit:    1. H. pylori infection (Primary)      Assessment & Plan  1. Helicobacter pylori infection.  She is currently taking medications for her H. pylori infection and reports no pain or discomfort. She is advised to continue her current medication regimen. She is also advised to maintain good hygiene practices, such as washing hands before eating and drinking, and to avoid sharing food or drinks with others. She can continue having sexual relations with her .    2. Back pain.  She reports improvement in her back pain and is walking better. She mentioned that Dr. Eduardo  has recommended a myelogram. She is advised to proceed with the myelogram as planned. She can continue her current bowel medication regimen, taking it twice a day if it helps. She will call if she needs a refill.    Patient or patient representative verbalized consent for the use of Ambient Listening during the visit with  Alfreda Soler DO for chart documentation. 5/5/2025  09:38 EDT

## 2025-05-05 NOTE — PROGRESS NOTES
Ashlie Levi 79 y.o. female presents for  FU EGD/+ H Pylori/ CT. Pt states she is feeling much better.  Pt is still taking Rx and states she is doing well with meds.     History of Present Illness  The patient is an 79-year-old female here for follow-up from EGD.    She reports a general sense of well-being, with no abdominal discomfort. She has been adhering to her prescribed medication regimen for her infection, which she was previously unaware of due to the absence of pain. She has been maintaining good hygiene practices, such as washing her hands before meals and avoiding sharing food or drink with others. She also inquires about the possibility of sexual activity with her . She has been taking her bowel medication twice daily, which has proven beneficial. She has one bottle remaining from her initial prescription of three.    Her back is feeling better, and she is walking better. She saw Dr. Eduardo, who wants to do a myelogram. He explained that he would give her some kind of shot along the spine and do a scan, and she would have to lay flat on her back for 2 days. He mentioned that he does not operate on people over 80 but can do minor surgery if he sees something that he can help with.      Review of Systems        Past Medical History:   Diagnosis Date    Abnormal electrocardiogram     2006 heart attack    Arthritis     Chest discomfort     Colon polyp     Coronary artery disease     Disease of thyroid gland     Diverticulitis     Elevated cholesterol     GERD (gastroesophageal reflux disease)     Heart attack     2006    Hyperlipidemia     Hypertension            Past Surgical History:   Procedure Laterality Date    APPENDECTOMY      CARDIAC SURGERY      3 stents    CHOLECYSTECTOMY      COLONOSCOPY N/A 07/13/2016    Procedure: COLONOSCOPY;  Surgeon: Giovani Avelar MD;  Location: [facility] OR;  Service:     COLONOSCOPY N/A 06/13/2019    Procedure: Colonoscopy with possible biopsy or  polypectomy;  Surgeon: Alfreda Soler DO;  Location:  [facility] OR;  Service: Gastroenterology    COLONOSCOPY N/A 05/18/2021    Procedure: Colonoscopy with polypectomy, biopsy;  Surgeon: Alfreda Soler DO;  Location:  [facility] OR;  Service: Gastroenterology;  Laterality: N/A;  diverticulosis  cecal biopsy  right colon polyp  left colon polyps x2    COLONOSCOPY W/ POLYPECTOMY N/A 10/8/2024    Procedure: COLONOSCOPY WITH POLYPECTOMY;  Surgeon: Alfreda Soler DO;  Location:  [facility] OR;  Service: Gastroenterology;  Laterality: N/A;  diverticulosis, sigmoid colon polyp, right colon polyp    CORONARY STENT PLACEMENT      ENDOSCOPY N/A 11/21/2017    Procedure: ESOPHAGOGASTRODUODENOSCOPY with CARIN test ;  Surgeon: Alfreda Soler DO;  Location:  [facility] OR;  Service:     ENDOSCOPY N/A 06/13/2019    Procedure: Esophagogastroduodenoscopy with possible biopsy, polypectomy, dilation;  Surgeon: Alfreda Soler DO;  Location:  [facility] OR;  Service: Gastroenterology    ENDOSCOPY N/A 05/18/2021    Procedure: Esophagogastroduodenoscopy with biopsies;  Surgeon: Alfreda Soler DO;  Location:  [facility] OR;  Service: Gastroenterology;  Laterality: N/A;  CARIN test  gastritis  duodenitis    ENDOSCOPY N/A 4/29/2025    Procedure: ESOPHAGOGASTRODUODENOSCOPY WITH BIOPSY;  Surgeon: Alfreda Soler DO;  Location:  [facility] OR;  Service: Gastroenterology;  Laterality: N/A;  Mild Gastritis;    EPIDURAL BLOCK  04/30/2024    EYE SURGERY      FOOT GANGLION EXCISION Left     HEMORROIDECTOMY      HERNIA REPAIR      HYSTERECTOMY      GREG with OC age 30 for DUB    INJECTION OF MEDICATION Bilateral 03/26/2024    piriformis    MEDIAL BRANCH BLOCK Bilateral     lumbar    MEDIAL BRANCH BLOCK Bilateral 06/11/2024    L3-L5    OOPHORECTOMY      dx lsc , interval USO    TOTAL HIP ARTHROPLASTY Right 08/23/2023    Procedure: TOTAL HIP ARTHROPLASTY ANTERIOR WITH HANA TABLE;  Surgeon: Aidan Wallace MD;  Location:  [facility] OR;  Service:  Orthopedics;  Laterality: Right;    TOTAL SHOULDER ARTHROPLASTY W/ DISTAL CLAVICLE EXCISION Right 11/12/2024    Procedure: Reverse total shoulder arthroplasty for treatment of proximal humerus fracture and all associated procedures;  Surgeon: Farhat Ang MD;  Location: [facility];  Service: Orthopedics;  Laterality: Right;           Physical Exam  Constitutional:       Appearance: Normal appearance.   Skin:     General: Skin is warm and dry.   Neurological:      General: No focal deficit present.      Mental Status: She is alert and oriented to person, place, and time.   Psychiatric:         Mood and Affect: Mood normal.         Behavior: Behavior normal.       Physical Exam        LMP  (LMP Unknown)         Diagnoses and all orders for this visit:    1. H. pylori infection (Primary)      Assessment & Plan  1. Helicobacter pylori infection.  She is currently taking medications for her H. pylori infection and reports no pain or discomfort. She is advised to continue her current medication regimen. She is also advised to maintain good hygiene practices, such as washing hands before eating and drinking, and to avoid sharing food or drinks with others. She can continue having sexual relations with her .    2. Back pain.  She reports improvement in her back pain and is walking better. She mentioned that Dr. Eduardo has recommended a myelogram. She is advised to proceed with the myelogram as planned. She can continue her current bowel medication regimen, taking it twice a day if it helps. She will call if she needs a refill.    Patient or patient representative verbalized consent for the use of Ambient Listening during the visit with  Alfreda Soler DO for chart documentation. 5/5/2025  09:38 EDT

## 2025-05-07 NOTE — PROGRESS NOTES
Subjective   Patient ID: Ashlie Levi is a 79 y.o. female is here today for follow-up with a new Lumbar Myelogram done today.    Today patient's symptoms are unchanged     History of Present Illness    This patient returns today.  She continues with pain in her right hip and into her right groin.  She also has some pain in her left groin.  There is not a lot of radiation all the way down her legs but she does have numbness and tingling in her legs.  She has been treated with physical therapy and injections but nothing is really helped.  Her most severe pain is in her right lower back radiating into her right groin.  The pain in her right thigh is only occasionally she really does not say anything about the pain on the left today at all.    The following portions of the patient's history were reviewed and updated as appropriate: allergies, current medications, past family history, past medical history, past social history, past surgical history, and problem list.      Objective     There were no vitals filed for this visit.  There is no height or weight on file to calculate BMI.    Tobacco Use: Medium Risk (5/8/2025)    Patient History     Smoking Tobacco Use: Former     Smokeless Tobacco Use: Never     Passive Exposure: Never          Physical Exam    Neurological:      Mental Status: He is alert and oriented to person, place, and time.       Neurological Exam    Mental Status  Alert. Oriented to person, place, and time.            Assessment & Plan   Independent Review of Radiographic Studies:      I personally reviewed the images from the following studies.    I reviewed her plain films, myelogram, and CT scan which were done this morning.  They have not yet been read by radiology.  The plain films show multilevel degenerative disease.  There is no evidence of instability on flexion and extension.  On the myelogram itself there is some narrowing on the right at L2-3 and L3-4 with fairly severe central stenosis and  L1-2.  There is also a nerve root cut out on the right at T12-L1.  On the left side there is narrowing at T12-L1, L1-2 and L3-4.  Standing films the narrowing at L1-2 becomes more pronounced as does L3-4.  There is also some narrowing at L5 S1.  On the post myelographic CT scan the lower thoracic spine looks okay.  At L1-2 there is fairly severe central stenosis.  Is worse on the left than the right.  The conus ends there.  At L2-3 there is a little narrowing but not severe.  L3-4 shows at least moderate stenosis as well.  L4-5 is really fairly open including the foramina and L5-S1 mostly looks okay.    Medical Decision Making:      I told the patient and her  about the imaging.  I think her primary problem is coming from L1-2.  Some of it could be from L2-3.  I told her that we could consider surgery at L1-2 if the problem is so severe she simply cannot live with it but my advice would be for her to continue to live with it.  If she gets to the point where that is simply not working then she can call and we can always reassess her.  There is nothing here that is so dangerous that we absolutely have to do surgery.  She is in agreement with that plan.    Diagnoses and all orders for this visit:    1. Lumbar stenosis with neurogenic claudication (Primary)      Return if symptoms worsen or fail to improve.

## 2025-05-08 ENCOUNTER — HOSPITAL ENCOUNTER (OUTPATIENT)
Dept: CT IMAGING | Facility: HOSPITAL | Age: 80
Discharge: HOME | End: 2025-05-08
Payer: MEDICARE

## 2025-05-08 ENCOUNTER — HOSPITAL ENCOUNTER (OUTPATIENT)
Dept: GENERAL RADIOLOGY | Facility: HOSPITAL | Age: 80
Discharge: HOME | End: 2025-05-08
Payer: MEDICARE

## 2025-05-08 ENCOUNTER — OFFICE VISIT (OUTPATIENT)
Dept: NEUROSURGERY | Facility: CLINIC | Age: 80
End: 2025-05-08
Payer: MEDICARE

## 2025-05-08 VITALS
OXYGEN SATURATION: 98 % | WEIGHT: 157 LBS | BODY MASS INDEX: 30.82 KG/M2 | RESPIRATION RATE: 18 BRPM | TEMPERATURE: 97.1 F | DIASTOLIC BLOOD PRESSURE: 64 MMHG | HEIGHT: 60 IN | HEART RATE: 75 BPM | SYSTOLIC BLOOD PRESSURE: 135 MMHG

## 2025-05-08 DIAGNOSIS — M48.062 LUMBAR STENOSIS WITH NEUROGENIC CLAUDICATION: Primary | ICD-10-CM

## 2025-05-08 DIAGNOSIS — M54.16 LUMBAR RADICULOPATHY: ICD-10-CM

## 2025-05-08 PROCEDURE — 72132 CT LUMBAR SPINE W/DYE: CPT

## 2025-05-08 PROCEDURE — 62304 MYELOGRAPHY LUMBAR INJECTION: CPT

## 2025-05-08 PROCEDURE — 25010000002 LIDOCAINE 1 % SOLUTION: Performed by: NEUROLOGICAL SURGERY

## 2025-05-08 PROCEDURE — 72114 X-RAY EXAM L-S SPINE BENDING: CPT

## 2025-05-08 PROCEDURE — 25510000001 IOPAMIDOL 41 % SOLUTION: Performed by: NEUROLOGICAL SURGERY

## 2025-05-08 PROCEDURE — 1159F MED LIST DOCD IN RCRD: CPT | Performed by: NEUROLOGICAL SURGERY

## 2025-05-08 PROCEDURE — 99214 OFFICE O/P EST MOD 30 MIN: CPT | Performed by: NEUROLOGICAL SURGERY

## 2025-05-08 PROCEDURE — 1160F RVW MEDS BY RX/DR IN RCRD: CPT | Performed by: NEUROLOGICAL SURGERY

## 2025-05-08 PROCEDURE — 72240 MYELOGRAPHY NECK SPINE: CPT

## 2025-05-08 RX ORDER — ACETAMINOPHEN 325 MG/1
650 TABLET ORAL EVERY 4 HOURS PRN
Status: DISCONTINUED | OUTPATIENT
Start: 2025-05-08 | End: 2025-05-09 | Stop reason: HOSPADM

## 2025-05-08 RX ORDER — IOPAMIDOL 408 MG/ML
20 INJECTION, SOLUTION INTRATHECAL
Status: COMPLETED | OUTPATIENT
Start: 2025-05-08 | End: 2025-05-08

## 2025-05-08 RX ORDER — LIDOCAINE HYDROCHLORIDE 10 MG/ML
10 INJECTION, SOLUTION INFILTRATION; PERINEURAL ONCE
Status: COMPLETED | OUTPATIENT
Start: 2025-05-08 | End: 2025-05-08

## 2025-05-08 RX ADMIN — IOPAMIDOL 20 ML: 408 INJECTION, SOLUTION INTRATHECAL at 07:11

## 2025-05-08 RX ADMIN — LIDOCAINE HYDROCHLORIDE 6 ML: 10 INJECTION, SOLUTION INFILTRATION; PERINEURAL at 07:09

## 2025-05-08 NOTE — POST-PROCEDURE NOTE
Preop diagnosis:  Lumbar radiculopathy  Postop diagnosis:  same  LP at L23  15 mL of 200M Isovue  Complications: none  EBL: 0 mL  Specimens: none

## 2025-05-08 NOTE — DISCHARGE INSTRUCTIONS
EDUCATION /DISCHARGE INSTRUCTIONS:    A myelogram is a special radiology procedure of the spinal cord, spinal nerves and other related structures.  You will be awake during the examination.  An area of your lower back will be cleansed with an antiseptic solution.  The physician will inject a numbing medication in your lower back.  While your back is numb, a needle will be placed in the lower back area.  A small amount of spinal fluid may be withdrawn and sent to the lab if ordered by your physician. While the needle is in the back, an injection of a contrast material (xray dye) will be given through the needle.  The contrast material will allow the physician to see the spinal cord and spinal nerves.  Once injected, the needle will be removed and a band aid will be placed over the injection site.  The table will be tilted during the process to allow the contrast material to flow to particular areas in the spine.  Following the injection and xrays, you will be taken to the CT scanner where more pictures will be taken. After the procedure is finished, the contrast material will be absorbed by your body and eliminated through your kidneys.  The radiologist will study and interpret your myelogram and send the results to your physician.  Procedure risks of a myelogram include, but are not limited to:  *  Bleeding   *  Seizure  *  Infection   *  Headache, possibly severe requiring a blood patch  *  Contrast reaction  *  Nerve or cord injury  *  Paralysis and death  Benefits of the procedure:  *  Best examination for delineating pathology related to spinal cord compression from a disc and/or nerve root compression  Alternatives to the procedure:  MRI - a non invasive procedure requiring intravenous contrast injection.  Cannot be done on patients with certain pacemakers or metal in the body.  MRI risks include possible reaction to the contrast material, movement of metal located in the body.Benefit to MRI:  Non-invasive and  usually painless procedure.  THIS EDUCATION INFORMATION WAS REVIEWED PRIOR TO PROCEDURE AND CONSENT.     24 hour rest period ends _________Friday 5/9/25___________.  Important information following your myelogram:  * ACTIVITY:   *  You may sit up in the car to go home.  *  When you get home, remain on bed rest (flat on your back or on your side) for 24 hours. You may place a rolled up towel under your neck for support  * You may get up to the bathroom and sit up to eat and drink then lie back down  * Drink additional fluids for 24 hours after the myelogram.   * Continue to drink additional fluids for the next 2-3 days. Water and caffeinated beverages are encouraged.  * Remain less active for the next two to three days.  * Do not drive for 24 hours following a myelogram.  * You may remove the bandage and shower in the morning.  *Resume taking blood thinner aspirin today    CALL YOUR PHYSICIAN FOR THE FOLLOWING:  * Pain at the injection site  * Redness, swelling, bruising or drainage at the injection site.  * A fever by mouth of 101.0 or any new symptoms  Headaches are a common side effect after a myelogram.  If you get a headache, you should stay flat in bed and drink plenty of fluids. If the headache persists and does not go away with rest/medication,   CALL Dr. Thompson at [phone]

## 2025-05-08 NOTE — NURSING NOTE
No Signs/Symptoms of distress noted. Patient taken by wheelchair to Dr. Thompson office to meet her .

## 2025-05-14 RX ORDER — ISOSORBIDE MONONITRATE 30 MG/1
30 TABLET, EXTENDED RELEASE ORAL DAILY
Qty: 90 TABLET | Refills: 0 | Status: SHIPPED | OUTPATIENT
Start: 2025-05-14

## 2025-06-11 RX ORDER — CARVEDILOL 6.25 MG/1
6.25 TABLET ORAL 2 TIMES DAILY
Qty: 180 TABLET | Refills: 0 | Status: SHIPPED | OUTPATIENT
Start: 2025-06-11

## 2025-06-11 RX ORDER — IRBESARTAN 300 MG/1
300 TABLET ORAL NIGHTLY
Qty: 90 TABLET | Refills: 0 | Status: SHIPPED | OUTPATIENT
Start: 2025-06-11

## 2025-06-19 RX ORDER — SUCRALFATE 1 G/1
1 TABLET ORAL 4 TIMES DAILY
Qty: 120 TABLET | Refills: 0 | Status: SHIPPED | OUTPATIENT
Start: 2025-06-19

## 2025-07-15 RX ORDER — SUCRALFATE 1 G/1
1 TABLET ORAL 4 TIMES DAILY
Qty: 120 TABLET | Refills: 0 | Status: SHIPPED | OUTPATIENT
Start: 2025-07-15

## 2025-07-23 ENCOUNTER — OFFICE VISIT (OUTPATIENT)
Dept: CARDIOLOGY | Facility: CLINIC | Age: 80
End: 2025-07-23
Payer: MEDICARE

## 2025-07-23 VITALS
BODY MASS INDEX: 30.77 KG/M2 | WEIGHT: 156.7 LBS | SYSTOLIC BLOOD PRESSURE: 136 MMHG | OXYGEN SATURATION: 94 % | DIASTOLIC BLOOD PRESSURE: 70 MMHG | HEIGHT: 60 IN | HEART RATE: 69 BPM

## 2025-07-23 DIAGNOSIS — E78.2 MIXED HYPERLIPIDEMIA: Chronic | ICD-10-CM

## 2025-07-23 DIAGNOSIS — I10 PRIMARY HYPERTENSION: Chronic | ICD-10-CM

## 2025-07-23 DIAGNOSIS — I25.10 CORONARY ARTERY DISEASE INVOLVING NATIVE CORONARY ARTERY OF NATIVE HEART WITHOUT ANGINA PECTORIS: Primary | Chronic | ICD-10-CM

## 2025-07-23 PROCEDURE — 93000 ELECTROCARDIOGRAM COMPLETE: CPT | Performed by: INTERNAL MEDICINE

## 2025-07-23 PROCEDURE — 99214 OFFICE O/P EST MOD 30 MIN: CPT | Performed by: INTERNAL MEDICINE

## 2025-07-23 PROCEDURE — 3075F SYST BP GE 130 - 139MM HG: CPT | Performed by: INTERNAL MEDICINE

## 2025-07-23 PROCEDURE — 1159F MED LIST DOCD IN RCRD: CPT | Performed by: INTERNAL MEDICINE

## 2025-07-23 PROCEDURE — 1160F RVW MEDS BY RX/DR IN RCRD: CPT | Performed by: INTERNAL MEDICINE

## 2025-07-23 PROCEDURE — 3078F DIAST BP <80 MM HG: CPT | Performed by: INTERNAL MEDICINE

## 2025-07-23 RX ORDER — VENLAFAXINE 75 MG/1
75 TABLET ORAL 2 TIMES DAILY
COMMUNITY
End: 2025-07-23

## 2025-07-23 RX ORDER — CHLORCYCLIZINE HYDROCHLORIDE AND PSEUDOEPHEDRINE HYDROCHLORIDE 25; 60 MG/1; MG/1
TABLET ORAL
COMMUNITY
Start: 2025-07-08

## 2025-07-23 NOTE — PROGRESS NOTES
Subjective:     Encounter Date:07/23/25        Patient ID: Ashlie Levi is a 79 y.o. female.    Dear Dr. Silva,    I had the pleasure seeing this patient in the office today.  She comes in for 1 year follow-up. She has a history of CAD and had a stent placed in her LAD in 2006.  This was performed by Dr. Caldwell at [facility].  She had a stress test performed  5/2019 that demonstrated normal left ventricular and no ischemia.    Having back issues.  Been fatigued. Not having any particular cardiac complaints.  No chest pain or chest discomfort.  No shortness of breath.  Denies any presyncope or syncope.    The following portions of the patient's history were reviewed and updated as appropriate: allergies, current medications, past family history, past medical history, past social history, past surgical history and problem list.    Past Medical History:   Diagnosis Date    Abnormal electrocardiogram     2006 heart attack    Arthritis     Chest discomfort     Colon polyp     Coronary artery disease     Disease of thyroid gland     Diverticulitis     Elevated cholesterol     GERD (gastroesophageal reflux disease)     Heart attack     2006    Hyperlipidemia     Hypertension        Past Surgical History:   Procedure Laterality Date    APPENDECTOMY      CARDIAC SURGERY      3 stents    CHOLECYSTECTOMY      COLONOSCOPY N/A 07/13/2016    Procedure: COLONOSCOPY;  Surgeon: Giovani Avelar MD;  Location: [facility] OR;  Service:     COLONOSCOPY N/A 06/13/2019    Procedure: Colonoscopy with possible biopsy or polypectomy;  Surgeon: Alfreda Soler DO;  Location: [facility] OR;  Service: Gastroenterology    COLONOSCOPY N/A 05/18/2021    Procedure: Colonoscopy with polypectomy, biopsy;  Surgeon: Alfreda Soler DO;  Location: [facility] OR;  Service: Gastroenterology;  Laterality: N/A;  diverticulosis  cecal biopsy  right colon polyp  left colon polyps x2    COLONOSCOPY W/ POLYPECTOMY N/A 10/8/2024     Procedure: COLONOSCOPY WITH POLYPECTOMY;  Surgeon: Alfreda Soler DO;  Location: [facility] OR;  Service: Gastroenterology;  Laterality: N/A;  diverticulosis, sigmoid colon polyp, right colon polyp    CORONARY STENT PLACEMENT      ENDOSCOPY N/A 11/21/2017    Procedure: ESOPHAGOGASTRODUODENOSCOPY with CARIN test ;  Surgeon: Alfreda Soler DO;  Location:  [facility] OR;  Service:     ENDOSCOPY N/A 06/13/2019    Procedure: Esophagogastroduodenoscopy with possible biopsy, polypectomy, dilation;  Surgeon: Alfreda Soler DO;  Location: [facility] OR;  Service: Gastroenterology    ENDOSCOPY N/A 05/18/2021    Procedure: Esophagogastroduodenoscopy with biopsies;  Surgeon: Alfreda Soler DO;  Location: [facility] OR;  Service: Gastroenterology;  Laterality: N/A;  CARIN test  gastritis  duodenitis    ENDOSCOPY N/A 4/29/2025    Procedure: ESOPHAGOGASTRODUODENOSCOPY WITH BIOPSY;  Surgeon: Alfreda Soler DO;  Location: [facility] OR;  Service: Gastroenterology;  Laterality: N/A;  Mild Gastritis;    EPIDURAL BLOCK  04/30/2024    EYE SURGERY      FOOT GANGLION EXCISION Left     HEMORROIDECTOMY      HERNIA REPAIR      HYSTERECTOMY      GREG with OC age 30 for DUB    INJECTION OF MEDICATION Bilateral 03/26/2024    piriformis    MEDIAL BRANCH BLOCK Bilateral     lumbar    MEDIAL BRANCH BLOCK Bilateral 06/11/2024    L3-L5    OOPHORECTOMY      dx lsc , interval USO    TOTAL HIP ARTHROPLASTY Right 08/23/2023    Procedure: TOTAL HIP ARTHROPLASTY ANTERIOR WITH HANA TABLE;  Surgeon: Aidan Wallace MD;  Location: [facility] OR;  Service: Orthopedics;  Laterality: Right;    TOTAL SHOULDER ARTHROPLASTY W/ DISTAL CLAVICLE EXCISION Right 11/12/2024    Procedure: Reverse total shoulder arthroplasty for treatment of proximal humerus fracture and all associated procedures;  Surgeon: Farhat Ang MD;  Location: [facility] OR;  Service: Orthopedics;  Laterality: Right;             ECG 12 Lead    Date/Time: 7/23/2025 1:13 PM  Performed by:  "Al Granado III, MD    Authorized by: Al Granado III, MD  Comparison: compared with previous ECG   Similar to previous ECG  Rhythm: sinus rhythm  Rate: normal  Conduction: conduction normal  QRS axis: normal  Other findings: non-specific ST-T wave changes    Clinical impression: normal ECG             Objective:     Vitals:    07/23/25 1300   BP: 136/70   BP Location: Left arm   Patient Position: Sitting   Cuff Size: Adult   Pulse: 69   SpO2: 94%   Weight: 71.1 kg (156 lb 11.2 oz)   Height: 152.4 cm (60\")             General Appearance:    Alert, cooperative, in no acute distress   Head:    Normocephalic, without obvious abnormality, atraumatic   Eyes:            Lids and lashes normal, conjunctivae and sclerae normal, no   icterus, no pallor, corneas clear, PERRLA   Ears:    Ears appear intact with no abnormalities noted   Throat:   No oral lesions, no thrush, oral mucosa moist   Neck:   No adenopathy, supple, trachea midline, no thyromegaly, no   carotid bruit, no JVD   Back:     No kyphosis present, no scoliosis present, no skin lesions, erythema or scars, no tenderness to percussion or palpation, range of motion normal   Lungs:     Clear to auscultation,respirations regular, even and unlabored    Heart:    Regular rhythm and normal rate, normal S1 and S2, no murmur, no gallop, no rub, no click   Chest Wall:    No abnormalities observed   Abdomen:     Normal bowel sounds, no masses, no organomegaly, soft        non-tender, non-distended, no guarding, no rebound  tenderness   Extremities:   Moves all extremities well, no edema, no cyanosis, no redness   Pulses:   Pulses palpable and equal bilaterally. Normal radial, carotid, femoral, dorsalis pedis and posterior tibial pulses bilaterally. Normal abdominal aorta   Skin:  Psychiatric:   No bleeding, bruising or rash    Alert and oriented x 3, normal mood and affect       Lab Review:                Lab Results   Component Value Date    GLUCOSE 153 (H) " 03/14/2025    BUN 12 03/14/2025    CREATININE 0.76 03/14/2025    EGFRIFNONA 57 (L) 11/21/2021    BCR 15.8 03/14/2025    K 4.3 03/14/2025    CO2 27.8 03/14/2025    CALCIUM 9.5 03/14/2025    ALBUMIN 4.2 03/14/2025    AST 43 (H) 03/14/2025    ALT 49 (H) 03/14/2025               Assessment:          Diagnosis Plan   1. Coronary artery disease involving native coronary artery of native heart without angina pectoris        2. Primary hypertension        3. Mixed hyperlipidemia                   Plan:       1. Coronary Artery Disease  Assessment   The patient has no angina  Plan   Lifestyle modifications discussed include adhering to a heart healthy diet, avoidance of tobacco products, maintenance of a healthy weight, medication compliance, regular exercise and regular monitoring of cholesterol and blood pressure  Subjective - Objective   There has been a previous stent procedure using HERBERTH    Current antiplatelet therapy includes aspirin 81 mg  2.  Hyperlipidemia-continue current diet and therapy, AST, ALT reviewed  3.  Hypertensive heart disease-blood pressure well controlled on amlodipine carvedilol irbesartan, BUN and creatinine are reviewed.  Continue current medical therapy.    Thank you very much for allowing us to participate in the care of this pleasant patient.  Please don't hesitate to call if I can be of assistance in any way.      Current Outpatient Medications:     acetaminophen (TYLENOL) 500 MG tablet, Take 1 tablet by mouth Every 6 (Six) Hours As Needed for Mild Pain., Disp: , Rfl:     amLODIPine (NORVASC) 5 MG tablet, Take 1 tablet by mouth Daily., Disp: 90 tablet, Rfl: 2    aspirin 81 MG EC tablet, Take 1 tablet by mouth Daily. Indications: VTE Prophylaxis (Patient taking differently: Take 1 tablet by mouth Every Night. Indications: VTE Prophylaxis), Disp: 21 tablet, Rfl: 0    atorvastatin (LIPITOR) 20 MG tablet, Take 1 tablet by mouth Every Night., Disp: , Rfl:     carvedilol (COREG) 6.25 MG tablet, Take  1 tablet by mouth twice daily, Disp: 180 tablet, Rfl: 0    docusate sodium (Colace) 100 MG capsule, Take 1 capsule by mouth 2 (Two) Times a Day., Disp: 60 capsule, Rfl: 1    irbesartan (AVAPRO) 300 MG tablet, TAKE 1 TABLET BY MOUTH ONCE DAILY AT NIGHT, Disp: 90 tablet, Rfl: 0    isosorbide mononitrate (IMDUR) 30 MG 24 hr tablet, Take 1 tablet by mouth once daily, Disp: 90 tablet, Rfl: 0    levothyroxine (SYNTHROID, LEVOTHROID) 112 MCG tablet, Take 1 tablet by mouth Daily., Disp: , Rfl:     nitroglycerin (NITROSTAT) 0.4 MG SL tablet, Place 1 tablet under the tongue Every 5 (Five) Minutes As Needed for Chest Pain. Take no more than 3 doses in 15 minutes and call EMS., Disp: 25 tablet, Rfl: 1    omeprazole (priLOSEC) 40 MG capsule, Take 1 capsule by mouth once daily, Disp: 90 capsule, Rfl: 4    Stahist AD 25-60 MG tablet, TAKE 1 TABLET BY MOUTH THREE TIMES DAILY AS NEEDED FOR CONGESTION, Disp: , Rfl:     TURMERIC PO, Take 500 mg by mouth Daily. (Patient not taking: Reported on 7/23/2025), Disp: , Rfl:          EMR Dragon/Transcription disclaimer:    Much of this encounter note is an electronic transcription/translation of spoken language to printed text. The electronic translation of spoken language may permit erroneous, or at times, nonsensical words or phrases to be inadvertently transcribed; Although I have reviewed the note for such errors, some may still exist.

## 2025-08-11 RX ORDER — ISOSORBIDE MONONITRATE 30 MG/1
30 TABLET, EXTENDED RELEASE ORAL DAILY
Qty: 90 TABLET | Refills: 0 | Status: SHIPPED | OUTPATIENT
Start: 2025-08-11

## 2025-08-18 ENCOUNTER — TELEPHONE (OUTPATIENT)
Dept: SURGERY | Facility: CLINIC | Age: 80
End: 2025-08-18
Payer: MEDICARE

## 2025-08-18 RX ORDER — OMEPRAZOLE 40 MG/1
40 CAPSULE, DELAYED RELEASE ORAL DAILY
Qty: 90 CAPSULE | Refills: 4 | Status: SHIPPED | OUTPATIENT
Start: 2025-08-18

## 2025-08-25 RX ORDER — AMLODIPINE BESYLATE 5 MG/1
5 TABLET ORAL DAILY
Qty: 90 TABLET | Refills: 0 | Status: SHIPPED | OUTPATIENT
Start: 2025-08-25

## (undated) DEVICE — PCH INST SURG INVISISHIELD 2PCKT

## (undated) DEVICE — DECANTER BAG 9": Brand: MEDLINE INDUSTRIES, INC.

## (undated) DEVICE — LAB CORP AGAR SLANT UREA PK/10

## (undated) DEVICE — ELECTRD BLD EZ CLN STD 4IN

## (undated) DEVICE — HANDPIECE SET WITH HIGH FLOW TIP AND SUCTION TUBE: Brand: INTERPULSE

## (undated) DEVICE — PCH SURG INVISISHIELD FLD/COL W/DRN/PRT 20X6IN

## (undated) DEVICE — SUCTION CANISTER, 3000CC,SAFELINER: Brand: DEROYAL

## (undated) DEVICE — KT MIX CMT PALAMIX/UNO VAC WO/CMT

## (undated) DEVICE — FRCP BX RADJAW4 NDL 2.8 240CM LG OG BX40

## (undated) DEVICE — GLV SURG SENSICARE PI PF LF 7 GRN STRL

## (undated) DEVICE — MASK,FACE,SHIELD,BLUE,ANTI FOG,TIES: Brand: MEDLINE

## (undated) DEVICE — YANKAUER,BULB TIP,W/O VENT,RIGID,STERILE: Brand: MEDLINE

## (undated) DEVICE — VIAL FORMALIN CAP 10P 40ML

## (undated) DEVICE — WEREWOLF FASTSEAL 6.0 HEMOSTASIS WAND: Brand: FASTSEAL 6.0 HEMOSTASIS WAND

## (undated) DEVICE — PK TOTL 90

## (undated) DEVICE — DRAPE,U/ SHT,SPLIT,PLAS,STERIL: Brand: MEDLINE

## (undated) DEVICE — INTENT TO BE USED WITH SUTURE MATERIAL FOR TISSUE CLOSURE: Brand: RICHARD-ALLAN® NEEDLE 1/2 CIRCLE TAPER

## (undated) DEVICE — COVER,MAYO STAND,STERILE: Brand: MEDLINE

## (undated) DEVICE — BW-412T DISP COMBO CLEANING BRUSH: Brand: SINGLE USE COMBINATION CLEANING BRUSH

## (undated) DEVICE — Device

## (undated) DEVICE — GOWN ISOL W/THUMB UNIV BLU BX/15

## (undated) DEVICE — PATIENT RETURN ELECTRODE, SINGLE-USE, CONTACT QUALITY MONITORING, ADULT, WITH 9FT CORD, FOR PATIENTS WEIGING OVER 33LBS. (15KG): Brand: MEGADYNE

## (undated) DEVICE — TP CLTH MEDIPORE SFT 4IN 10YD

## (undated) DEVICE — SOL IRR H2O BTL 1000ML STRL

## (undated) DEVICE — HYBRID TUBING/CAP SET FOR OLYMPUS® SCOPES: Brand: ERBE

## (undated) DEVICE — PREP IM ENCHANCED TOTAL HIP BONE                                    PREPARATION KIT: Brand: PREP-IM

## (undated) DEVICE — DRP SURG U/DRP INVISISHIELD PA 48X52IN

## (undated) DEVICE — 3M™ IOBAN™ 2 ANTIMICROBIAL INCISE DRAPE 6651EZ: Brand: IOBAN™ 2

## (undated) DEVICE — SUT ETHIB NO 5 MB46G

## (undated) DEVICE — SYR CONTRL LUERLOK 10CC

## (undated) DEVICE — ELECTRD NDL EZ CLN STD 2.75IN

## (undated) DEVICE — DUAL CUT SAGITTAL BLADE

## (undated) DEVICE — FRAZIER SUCTION INSTRUMENT 10 FR W/CONTROL VENT & OBTURATOR: Brand: FRAZIER

## (undated) DEVICE — DECANTER: Brand: UNBRANDED

## (undated) DEVICE — PREP SOL POVIDONE/IODINE BT 4OZ

## (undated) DEVICE — PEEL-AWAY TOGA, 2X LARGE: Brand: FLYTE

## (undated) DEVICE — TOWEL,OR,DSP,ST,BLUE,STD,4/PK,20PK/CS: Brand: MEDLINE

## (undated) DEVICE — SPNG GZ WOVN 4X4IN 12PLY 10/BX STRL

## (undated) DEVICE — IRRIGATOR BULB ASEPTO 60CC STRL

## (undated) DEVICE — NEEDLE, QUINCKE, 18GX3.5": Brand: MEDLINE

## (undated) DEVICE — MAT FLR ABSORBENT LG 4FT 10 2.5FT

## (undated) DEVICE — TRAP FLD MINIVAC MEGADYNE 100ML

## (undated) DEVICE — MASK,FACE,FLUID RESIST,SHLD,EARLOOP: Brand: MEDLINE

## (undated) DEVICE — INTENDED FOR TISSUE SEPARATION, AND OTHER PROCEDURES THAT REQUIRE A SHARP SURGICAL BLADE TO PUNCTURE OR CUT.: Brand: BARD-PARKER ® STAINLESS STEEL BLADES

## (undated) DEVICE — LINER SURG CANSTR SXN S/RIGD 1500CC

## (undated) DEVICE — SUT ABS VICRLY/PLS COAT CR 2/0 CP/2/REV/CUT/NDL 8X18IN UD

## (undated) DEVICE — T-MAX DISPOSABLE FACE MASK 8 PER BOX

## (undated) DEVICE — SPNG LAP 18X18IN LF STRL PK/5

## (undated) DEVICE — THE BITE BLOCK MAXI, LATEX FREE STRAP IS USED TO PROTECT THE ENDOSCOPE INSERTION TUBE FROM BEING BITTEN BY THE PATIENT.

## (undated) DEVICE — KT ORCA ORCAPOD DISP STRL

## (undated) DEVICE — SYR LL W/SCALE/MARK 3ML STRL

## (undated) DEVICE — STRIP,CLOSURE,WOUND,MEDI-STRIP,1/2X4: Brand: MEDLINE

## (undated) DEVICE — INTENDED USE FOR SURGICAL MARKING ON INTACT SKIN, ALSO PROVIDES A PERMANENT METHOD OF IDENTIFYING OBJECTS IN THE OPERATING ROOM: Brand: WRITESITE® REGULAR TIP SKIN MARKER

## (undated) DEVICE — ENDO. PORT CONNECTOR W/VALVE FOR OLYMPUS® SCOPES: Brand: ERBE

## (undated) DEVICE — PAD,NON-ADHERENT,3X8,STERILE,LF,1/PK: Brand: MEDLINE

## (undated) DEVICE — COAXIAL FEMORAL CANAL TIP

## (undated) DEVICE — Device: Brand: XPERIENCE

## (undated) DEVICE — GLV SURG SENSICARE PI LF PF 7.5

## (undated) DEVICE — SOL IRR H2O BO 1000ML STRL

## (undated) DEVICE — TRANSFER SET 3": Brand: MEDLINE INDUSTRIES, INC.

## (undated) DEVICE — PICO 7 15CM X 15CM: Brand: PICO™ 7

## (undated) DEVICE — TUBING, SUCTION, 1/4" X 20', STRAIGHT: Brand: MEDLINE INDUSTRIES, INC.

## (undated) DEVICE — CONTAINER,SPECIMEN,OR STERILE,4OZ: Brand: MEDLINE

## (undated) DEVICE — Device: Brand: DEFENDO AIR/WATER/SUCTION AND BIOPSY VALVE

## (undated) DEVICE — SOL ISO/ALC RUB 70PCT 4OZ

## (undated) DEVICE — APPL CHLORAPREP HI/LITE 26ML ORNG

## (undated) DEVICE — GLV SURG SENSICARE PI LF PF 7.0

## (undated) DEVICE — SYR LL 3CC

## (undated) DEVICE — ANTIBACTERIAL UNDYED BRAIDED (POLYGLACTIN 910), SYNTHETIC ABSORBABLE SUTURE: Brand: COATED VICRYL

## (undated) DEVICE — DRAPE,TOWEL,LARGE,INVISISHIELD: Brand: MEDLINE

## (undated) DEVICE — DRSNG SURESITE WNDW 4X4.5

## (undated) DEVICE — SYS CLS SKIN PREMIERPRO EXOFINFUSION 22CM

## (undated) DEVICE — GLV SURG SENSICARE PF POLYISPRN SZ8 LF

## (undated) DEVICE — SYS IRR SURGIPHOR A/MIC RTU BO PVPI 450ML STRL

## (undated) DEVICE — ADAPT CLN BIOGUARD AIR/H2O DISP

## (undated) DEVICE — DISPOSABLE DRAPE, STERILE, FOR A CDS-3072 6 FOOT TABLE: Brand: PEDIGO PRODUCTS, INC.

## (undated) DEVICE — SHEET, ORTHO, SPLIT, STERILE: Brand: MEDLINE

## (undated) DEVICE — SUCTION CANISTER, 1000CC,SAFELINER: Brand: DEROYAL

## (undated) DEVICE — PK ANT HIP 40

## (undated) DEVICE — COVER,MAYO STAND,XL,STERILE: Brand: MEDLINE

## (undated) DEVICE — 3M™ IOBAN™ 2 ANTIMICROBIAL INCISE DRAPE 6640EZ: Brand: IOBAN™ 2

## (undated) DEVICE — NEEDLE, QUINCKE 22GX3.5": Brand: MEDLINE INDUSTRIES, INC.

## (undated) DEVICE — GLV SURG SENSICARE PI LF PF 8 GRN STRL

## (undated) DEVICE — WRAP SHLDR COMPR COLD/THERP

## (undated) DEVICE — PINNACLE CANCELLOUS BONE SCREW 6.5MM X 40MM
Type: IMPLANTABLE DEVICE | Site: HIP | Status: NON-FUNCTIONAL
Brand: PINNACLE
Removed: 2023-08-23

## (undated) DEVICE — BOWL PLSTC LG 32OZ BLU STRL

## (undated) DEVICE — CVR HNDL LT SURG ACCSSRY BLU STRL

## (undated) DEVICE — 3M™ DURAPORE™ SURGICAL TAPE 2 INCHES X 10YARDS (5.0CM X 9.1M) 6ROLLS/CARTON 10CARTONS/CASE 1538-2: Brand: 3M™ DURAPORE™

## (undated) DEVICE — ZIPPERED TOGA, 2X LARGE: Brand: FLYTE, SURGICOOL

## (undated) DEVICE — SUT MNCRYL 2/0 CT1 36IN

## (undated) DEVICE — TBG PENCL TELESCP MEGADYNE SMOKE EVAC 10FT